# Patient Record
Sex: MALE | Race: WHITE | NOT HISPANIC OR LATINO | Employment: OTHER | URBAN - METROPOLITAN AREA
[De-identification: names, ages, dates, MRNs, and addresses within clinical notes are randomized per-mention and may not be internally consistent; named-entity substitution may affect disease eponyms.]

---

## 2017-03-02 ENCOUNTER — OFFICE VISIT (OUTPATIENT)
Dept: FAMILY MEDICINE CLINIC | Facility: CLINIC | Age: 77
End: 2017-03-02

## 2017-03-02 VITALS
RESPIRATION RATE: 16 BRPM | TEMPERATURE: 98.2 F | SYSTOLIC BLOOD PRESSURE: 110 MMHG | BODY MASS INDEX: 29.57 KG/M2 | HEIGHT: 72 IN | DIASTOLIC BLOOD PRESSURE: 60 MMHG | OXYGEN SATURATION: 97 % | HEART RATE: 66 BPM | WEIGHT: 218.3 LBS

## 2017-03-02 DIAGNOSIS — M47.816 SPONDYLOSIS OF LUMBAR REGION WITHOUT MYELOPATHY OR RADICULOPATHY: Primary | ICD-10-CM

## 2017-03-02 PROCEDURE — 99213 OFFICE O/P EST LOW 20 MIN: CPT | Performed by: INTERNAL MEDICINE

## 2017-03-02 NOTE — PROGRESS NOTES
"Subjective   Patient ID: Faustino Horton Jr. is a 76 y.o. male presents with   Chief Complaint   Patient presents with   • Back Pain       HPI - this patient presents today with lumbar pain from degenerative disc disease is been going on for years but seems like it's getting worse.  Pain is worse in the morning slowly gets better as he gets moving around.  He's on her for somebody can do about this.  She does not take nonsteroidals but he takes gabapentin.  X-ray from last April showed degenerative disc disease he has no symptoms of radiculopathy.    Assessment plan    Degenerative disc disease without radiculopathy-lets try physical therapy continue gabapentin        Allergies   Allergen Reactions   • Acetylcysteine Hives   • Allopurinol Hives   • Baclofen Itching   • Hydrocodone-Acetaminophen Swelling   • Levaquin [Levofloxacin] Itching       The following portions of the patient's history were reviewed and updated as appropriate: allergies, current medications, past family history, past medical history, past social history, past surgical history and problem list.      Review of Systems   Constitutional: Negative.    Musculoskeletal: Positive for arthralgias and back pain. Negative for gait problem.   Neurological: Negative.        Objective     Vitals:    03/02/17 0949   BP: 110/60   Pulse: 66   Resp: 16   Temp: 98.2 °F (36.8 °C)   TempSrc: Oral   SpO2: 97%   Weight: 218 lb 4.8 oz (99 kg)   Height: 72\" (182.9 cm)         Physical Exam   Constitutional: He is oriented to person, place, and time. He appears well-developed and well-nourished.   Musculoskeletal: Normal range of motion. He exhibits no edema, tenderness or deformity.   Neurological: He is alert and oriented to person, place, and time.   Psychiatric: He has a normal mood and affect. His behavior is normal.   Nursing note and vitals reviewed.        Faustino was seen today for back pain.    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

## 2017-03-09 ENCOUNTER — HOSPITAL ENCOUNTER (OUTPATIENT)
Dept: GENERAL RADIOLOGY | Facility: HOSPITAL | Age: 77
Discharge: HOME | End: 2017-03-09
Attending: SURGERY | Admitting: SURGERY

## 2017-03-09 ENCOUNTER — TREATMENT (OUTPATIENT)
Dept: PHYSICAL THERAPY | Facility: CLINIC | Age: 77
End: 2017-03-09

## 2017-03-09 DIAGNOSIS — M54.50 CHRONIC BILATERAL LOW BACK PAIN WITHOUT SCIATICA: Primary | ICD-10-CM

## 2017-03-09 DIAGNOSIS — G89.29 CHRONIC BILATERAL LOW BACK PAIN WITHOUT SCIATICA: Primary | ICD-10-CM

## 2017-03-09 DIAGNOSIS — Z86.79 S/P AAA REPAIR: ICD-10-CM

## 2017-03-09 DIAGNOSIS — Z98.890 S/P AAA REPAIR: ICD-10-CM

## 2017-03-09 PROCEDURE — G8978 MOBILITY CURRENT STATUS: HCPCS | Performed by: PHYSICAL THERAPIST

## 2017-03-09 PROCEDURE — G8979 MOBILITY GOAL STATUS: HCPCS | Performed by: PHYSICAL THERAPIST

## 2017-03-09 PROCEDURE — 97140 MANUAL THERAPY 1/> REGIONS: CPT | Performed by: PHYSICAL THERAPIST

## 2017-03-09 PROCEDURE — 74010 HC ABDOMEN SERIES FOR ANEURYSM: CPT

## 2017-03-09 PROCEDURE — G0283 ELEC STIM OTHER THAN WOUND: HCPCS | Performed by: PHYSICAL THERAPIST

## 2017-03-09 PROCEDURE — 97161 PT EVAL LOW COMPLEX 20 MIN: CPT | Performed by: PHYSICAL THERAPIST

## 2017-03-10 NOTE — PROGRESS NOTES
Physical Therapy Initial Evaluation and Plan of Care    Patient: Faustino Horton Jr.   : 1940  Diagnosis/ICD-10 Code:  Chronic bilateral low back pain without sciatica [M54.5, G89.29]  Referring practitioner: Yifan Patino MD  Date of Initial Visit: 3/9/2017        Subjective Evaluation    History of Present Illness  Mechanism of injury: Patient  Reports that he has had a long history of low back pain over the past 20 years which led to the discovery of an aortic aneursym 10 years ago where he underwent a bypass.  He has been taking gabapetin for low back pain for several years which helps.  His last imaging shows DDD, and arthritis with bulging discs as well.   His biggest complaint has been over the past 6 weeks he is having a harder and harder time getting moving in the am with difficulty walking.    PMHx: DM, MI, aortic aneurysm, neuropathy in feet    Quality of life: fair    Pain  Current pain ratin  At best pain ratin  At worst pain ratin  Location: low back  Quality: dull ache, sharp and tight  Relieving factors: rest and change in position  Aggravating factors: movement  Progression: worsening    Treatments  Previous treatment: injection treatment  Patient Goals  Patient goals for therapy: decreased pain, increased motion, increased strength, independence with ADLs/IADLs and return to sport/leisure activities             Objective     Palpation   Left   Muscle spasm in the erector spinae and lumbar paraspinals.     Right   Muscle spasm in the erector spinae and lumbar paraspinals.     Tenderness     Lumbar Spine  Tenderness in the spinous process.     Additional Tenderness Details  Decreased joint play at L 1-5    Active Range of Motion     Lumbar   Flexion: 75 (percent) degrees   Extension: 50 (percent) degrees   Left lateral flexion: 50 (percent) degrees   Right lateral flexion: 50 (percent) degrees   Left  rotation: 50 (percent) degrees   Right rotation: 50 (percent) degrees     Strength/Myotome Testing     Left Hip   Planes of Motion   Flexion: 4  Extension: 4  Abduction: 4  Adduction: 4    Right Hip   Planes of Motion   Flexion: 4  Extension: 4  Abduction: 4  Adduction: 4    Left Knee   Flexion: 4  Extension: 4    Right Knee   Flexion: 4  Extension: 4    Left Ankle/Foot   Dorsiflexion: 4  Plantar flexion: 4  Inversion: 4  Eversion: 4    Right Ankle/Foot   Dorsiflexion: 4  Plantar flexion: 4  Inversion: 4  Eversion: 4    Tests     Lumbar     Left   Positive passive SLR.     Right   Positive passive SLR.          Assessment & Plan     Assessment  Impairments: abnormal or restricted ROM, impaired physical strength, lacks appropriate home exercise program and pain with function  Assessment details: Patient was referred to physical therapy for low back pain.  Patient has been having pain for the past 20 years with it progressively becoming worse especially in the morning.  Patient has a complex medical history which has made staying active difficult.  Patient has decreased lower extremity and core strength, decreased lumbar ROm and joint mobility, high levels of pain.  At this time skilled physical therapy is medically necessary to restore patient to PLOF without any limitations.   Barriers to therapy: see past medical history  Prognosis: fair  Prognosis details: GOALS  STG - 4 weeks  1. Patient will be able to to get out of bed in the am without any difficulty.  2. Patient will report a 0/10 pain when first rising in the am.    LTG - 8 weeks  1. Patient will report 0/10 pain with all activity.  2. Patient will demonstrate 5/5 strength in his lower extremities for transfers and 4+/5 core strength.     Plan  Therapy options: will be seen for skilled physical therapy services  Planned modality interventions: thermotherapy (hydrocollator packs), electrical stimulation/Angolan stimulation, ultrasound, traction and  cryotherapy  Planned therapy interventions: abdominal trunk stabilization, flexibility, functional ROM exercises, home exercise program, joint mobilization, manual therapy, neuromuscular re-education, soft tissue mobilization, spinal/joint mobilization, strengthening, stretching and therapeutic activities  Frequency: 2x week  Duration in weeks: 8  Treatment plan discussed with: patient          Manual PT [id number] 25 minutes    Timed Treatment:   25   mins   Total Treatment:    55   mins    PT SIGNATURE: Cat Beth, LIAM   [place] License # [id number]  DATE TREATMENT INITIATED: 3/10/2017    Medicare Initial Certification  Certification Period: 6/8/2017  I certify that the therapy services are furnished while this patient is under my care.  The services outlined above are required by this patient, and will be reviewed every 90 days.     PHYSICIAN: Yifan Patino MD      DATE:     Please sign and return via fax to [phone].. Thank you, [facility].

## 2017-03-15 ENCOUNTER — TREATMENT (OUTPATIENT)
Dept: PHYSICAL THERAPY | Facility: CLINIC | Age: 77
End: 2017-03-15

## 2017-03-15 DIAGNOSIS — M54.50 CHRONIC BILATERAL LOW BACK PAIN WITHOUT SCIATICA: Primary | ICD-10-CM

## 2017-03-15 DIAGNOSIS — G89.29 CHRONIC BILATERAL LOW BACK PAIN WITHOUT SCIATICA: Primary | ICD-10-CM

## 2017-03-15 PROCEDURE — G0283 ELEC STIM OTHER THAN WOUND: HCPCS | Performed by: PHYSICAL THERAPIST

## 2017-03-15 PROCEDURE — 97140 MANUAL THERAPY 1/> REGIONS: CPT | Performed by: PHYSICAL THERAPIST

## 2017-03-15 NOTE — PROGRESS NOTES
Physical Therapy Daily Progress Note      Subjective     Faustino Hortno reports: that his back is feeling better today with a reduction in pain he reports that so far PT is helping.       Objective   See Exercise, Manual, and Modality Logs for complete treatment.       Assessment/Plan  Patient is doing well but continues to have a lot of tenderness at his lumbar paraspinals.     Progress per Plan of Care           Manual PT [id number] 25 minutes    Timed Treatment:   25   mins   Total Treatment:     40   mins    Cat Beth, PT  Physical Therapist  [place] License # [id number]

## 2017-03-17 ENCOUNTER — TREATMENT (OUTPATIENT)
Dept: PHYSICAL THERAPY | Facility: CLINIC | Age: 77
End: 2017-03-17

## 2017-03-17 DIAGNOSIS — G89.29 CHRONIC BILATERAL LOW BACK PAIN WITHOUT SCIATICA: Primary | ICD-10-CM

## 2017-03-17 DIAGNOSIS — M54.50 CHRONIC BILATERAL LOW BACK PAIN WITHOUT SCIATICA: Primary | ICD-10-CM

## 2017-03-17 PROCEDURE — G0283 ELEC STIM OTHER THAN WOUND: HCPCS | Performed by: PHYSICAL THERAPIST

## 2017-03-17 PROCEDURE — 97140 MANUAL THERAPY 1/> REGIONS: CPT | Performed by: PHYSICAL THERAPIST

## 2017-03-17 NOTE — PROGRESS NOTES
Physical Therapy Daily Progress Note      Subjective     Faustino Horton reports: that his back is feeling better but he continues to have some pain with activity.       Objective   See Exercise, Manual, and Modality Logs for complete treatment.       Assessment/Plan  Mr. Horton is making great progress with a reduction in pain and improved lumbar mobility.     Progress per Plan of Care           Manual PT [id number] 25 minutes    Timed Treatment:   25   mins   Total Treatment:    40   mins    Cat Beth, PT  Physical Therapist  [place] License # [id number]

## 2017-03-22 ENCOUNTER — TREATMENT (OUTPATIENT)
Dept: PHYSICAL THERAPY | Facility: CLINIC | Age: 77
End: 2017-03-22

## 2017-03-22 DIAGNOSIS — G89.29 CHRONIC BILATERAL LOW BACK PAIN WITHOUT SCIATICA: Primary | ICD-10-CM

## 2017-03-22 DIAGNOSIS — M54.50 CHRONIC BILATERAL LOW BACK PAIN WITHOUT SCIATICA: Primary | ICD-10-CM

## 2017-03-22 PROCEDURE — G0283 ELEC STIM OTHER THAN WOUND: HCPCS | Performed by: PHYSICAL THERAPIST

## 2017-03-22 PROCEDURE — 97140 MANUAL THERAPY 1/> REGIONS: CPT | Performed by: PHYSICAL THERAPIST

## 2017-03-23 NOTE — PROGRESS NOTES
Physical Therapy Daily Progress Note      Subjective     Faustino Horton reports: that his back is feeling about the same today.     Pain Scale: 4 /10    Objective   See Exercise, Manual, and Modality Logs for complete treatment.       Assessment/Plan  Mr. Horton had a reduction in pain after manual therapy, he had a lot of tenderness at his PSIS bilaterally.    Progress per Plan of Care           Manual PT [id number] 25 minutes    Timed Treatment:   25   mins   Total Treatment:    40   mins    Cat Beth, PT  Physical Therapist  [place] License # [id number]

## 2017-03-24 ENCOUNTER — TREATMENT (OUTPATIENT)
Dept: PHYSICAL THERAPY | Facility: CLINIC | Age: 77
End: 2017-03-24

## 2017-03-24 DIAGNOSIS — G89.29 CHRONIC BILATERAL LOW BACK PAIN WITHOUT SCIATICA: Primary | ICD-10-CM

## 2017-03-24 DIAGNOSIS — M54.50 CHRONIC BILATERAL LOW BACK PAIN WITHOUT SCIATICA: Primary | ICD-10-CM

## 2017-03-24 PROCEDURE — 97140 MANUAL THERAPY 1/> REGIONS: CPT | Performed by: PHYSICAL THERAPIST

## 2017-03-24 PROCEDURE — G0283 ELEC STIM OTHER THAN WOUND: HCPCS | Performed by: PHYSICAL THERAPIST

## 2017-03-24 PROCEDURE — 97110 THERAPEUTIC EXERCISES: CPT | Performed by: PHYSICAL THERAPIST

## 2017-03-24 NOTE — PROGRESS NOTES
Physical Therapy Daily Progress Note      Subjective     Faustino Horton reports: that he is able to move better in the am and get up out of bed without much difficulty.  He reports however that he continues to have some pain in his back with making getting in and out of the car challenging.   Pain Scale: 3 /10    Objective   See Exercise, Manual, and Modality Logs for complete treatment.       Assessment/Plan  SPoke with Mr. Horton about the pathology of his condition and what was to be expected with physical therapy.  He was very understanding and has so far been pleased with the improvement he is seeing.  Today we initiated some core stabilization which he did well with but found challenging.     Progress per Plan of Care           Manual PT [id number] 30 minutes and Therapy Exercise [id number] 10 minutes    Timed Treatment:   40   mins   Total Treatment:     55   mins    Cat Beth, PT  Physical Therapist  [place] License # [id number]

## 2017-03-29 ENCOUNTER — TREATMENT (OUTPATIENT)
Dept: PHYSICAL THERAPY | Facility: CLINIC | Age: 77
End: 2017-03-29

## 2017-03-29 DIAGNOSIS — M54.50 CHRONIC BILATERAL LOW BACK PAIN WITHOUT SCIATICA: Primary | ICD-10-CM

## 2017-03-29 DIAGNOSIS — G89.29 CHRONIC BILATERAL LOW BACK PAIN WITHOUT SCIATICA: Primary | ICD-10-CM

## 2017-03-29 PROCEDURE — G0283 ELEC STIM OTHER THAN WOUND: HCPCS | Performed by: PHYSICAL THERAPIST

## 2017-03-29 PROCEDURE — 97110 THERAPEUTIC EXERCISES: CPT | Performed by: PHYSICAL THERAPIST

## 2017-03-29 PROCEDURE — 97140 MANUAL THERAPY 1/> REGIONS: CPT | Performed by: PHYSICAL THERAPIST

## 2017-03-29 NOTE — PROGRESS NOTES
Physical Therapy Daily Progress Note      Subjective     Faustino Horton reports: that his back is feeling much better and he is able to get up from bed in the morning with no stiffness.   He reports that he  Continues to have some pain when standing for long periods of time.     Pain Scale: 2 /10    Objective   See Exercise, Manual, and Modality Logs for complete treatment.       Assessment/Plan  Mr. Horton is making great progress with PT with a reduction in pain and increased lumbar mobility.  We will start to introduce more core strengthening exercises into his program.    Progress per Plan of Care           Manual PT [id number] 25 minutes and Therapy Exercise [id number] 15 minutes    Timed Treatment:   40   mins   Total Treatment:    55   mins    Cat Beth, PT  Physical Therapist  [place] License # [id number]

## 2017-03-31 ENCOUNTER — TREATMENT (OUTPATIENT)
Dept: PHYSICAL THERAPY | Facility: CLINIC | Age: 77
End: 2017-03-31

## 2017-03-31 DIAGNOSIS — G89.29 CHRONIC BILATERAL LOW BACK PAIN WITHOUT SCIATICA: Primary | ICD-10-CM

## 2017-03-31 DIAGNOSIS — M54.50 CHRONIC BILATERAL LOW BACK PAIN WITHOUT SCIATICA: Primary | ICD-10-CM

## 2017-03-31 PROCEDURE — 97140 MANUAL THERAPY 1/> REGIONS: CPT | Performed by: PHYSICAL THERAPIST

## 2017-03-31 PROCEDURE — G0283 ELEC STIM OTHER THAN WOUND: HCPCS | Performed by: PHYSICAL THERAPIST

## 2017-03-31 NOTE — PROGRESS NOTES
Physical Therapy Daily Progress Note      Subjective     Faustino Horton reports: that he believes PT is really helping him because he is able to get out of bed in the am without difficulty.  He reports that his wife is battling cancer and had a fever this am and he had to take her to the MD today because of it and she is getting fluids. He reports that he is worried about her and wants to hold on exercise today so he can be with her.     Pain Scale:  3/10    Objective   See Exercise, Manual, and Modality Logs for complete treatment.       Assessment/Plan  Mr. Horton is improving with his lumbar mobility. He continues to have a lot of muscle stiffness but overall is progressing well.       Progress per Plan of Care           Manual PT [id number] 25 minutes    Timed Treatment:   25   mins   Total Treatment:     40   mins    Cat Beth PT  Physical Therapist  [place] License # [id number]

## 2017-04-05 ENCOUNTER — TREATMENT (OUTPATIENT)
Dept: PHYSICAL THERAPY | Facility: CLINIC | Age: 77
End: 2017-04-05

## 2017-04-05 DIAGNOSIS — M54.50 CHRONIC BILATERAL LOW BACK PAIN WITHOUT SCIATICA: Primary | ICD-10-CM

## 2017-04-05 DIAGNOSIS — G89.29 CHRONIC BILATERAL LOW BACK PAIN WITHOUT SCIATICA: Primary | ICD-10-CM

## 2017-04-05 PROCEDURE — G0283 ELEC STIM OTHER THAN WOUND: HCPCS | Performed by: PHYSICAL THERAPIST

## 2017-04-05 PROCEDURE — 97140 MANUAL THERAPY 1/> REGIONS: CPT | Performed by: PHYSICAL THERAPIST

## 2017-04-05 NOTE — PROGRESS NOTES
Re-Assessment / Re-Certification      Patient: Faustino Horton Jr.   : 1940  Diagnosis/ICD-10 Code:  Chronic bilateral low back pain without sciatica [M54.5, G89.29]  Referring practitioner: Yifan Patino MD  Date of Initial Visit: 2017  Today's Date: 2017  Patient has been seen for 8 sessions      Subjective:     Subjective Evaluation    Pain  Current pain ratin  Quality: tight and pulling  Relieving factors: rest and relaxation  Aggravating factors: movement           Faustino Horton reports: that he no longer has any pain getting out of bed or difficulty but throughout the day his pain continues to be present and he is unable to stand for greater than 10 minutes or his pain becomes present.     Subjective Questionnaire: Oswestry: 22  Clinical Progress: improved  Home Program Compliance: Yes  Objective     Palpation   Left   Tenderness of the lumbar paraspinals and quadratus lumborum.     Right Tenderness of the lumbar paraspinals and quadratus lumborum.     Tenderness     Lumbar Spine  Tenderness in the spinous process.     Additional Tenderness Details  Continued stiffness at L3-5 with tenderness and stiffness with mobility.      Assessment & Plan     Assessment  Assessment details: Mr. Horton continues to have a lot of stiffness in his lumbar spine with tenderness at the spinous processes.  He has made progress with mobility when it comes to the am. He is able to get out of bed without pain and is no longer slow moving.  He continues to report however pain when standing for long periods of time limiting him to about 10 minutes of standing time. He continues to report a dull aching pain in his low back that occasionally catches.  At this time skilled PT continues to be beneficial to increase his lumbar spinal mobility and core strength to return him to PLOF.   Prognosis: good      Progress toward previous goals: Partially Met      Recommendations: Continue as planned  Timeframe: 1  month  Prognosis to achieve goals: good    PT Signature: Cat Beth, PT      Based upon review of the patient's progress and continued therapy plan, it is my medical opinion that Faustino Horton should continue physical therapy treatment at [facility]  [address] [place] [address], [address] [address] [phone].    Signature: __________________________________  Yifan Patino MD      Manual PT [id number] 25 minutes  There ex with tech  Timed Code Treatment: 25 Minutes and Total Treatment Time: 55 Minutes

## 2017-04-07 ENCOUNTER — TREATMENT (OUTPATIENT)
Dept: PHYSICAL THERAPY | Facility: CLINIC | Age: 77
End: 2017-04-07

## 2017-04-07 DIAGNOSIS — M54.50 CHRONIC BILATERAL LOW BACK PAIN WITHOUT SCIATICA: Primary | ICD-10-CM

## 2017-04-07 DIAGNOSIS — G89.29 CHRONIC BILATERAL LOW BACK PAIN WITHOUT SCIATICA: Primary | ICD-10-CM

## 2017-04-07 PROCEDURE — 97140 MANUAL THERAPY 1/> REGIONS: CPT | Performed by: PHYSICAL THERAPIST

## 2017-04-07 PROCEDURE — G0283 ELEC STIM OTHER THAN WOUND: HCPCS | Performed by: PHYSICAL THERAPIST

## 2017-04-07 NOTE — PROGRESS NOTES
Physical Therapy Daily Progress Note  Patient has been seen for 9 sessions    Subjective     Faustino Horton reports: that his back is feeling more sore today he thinks that it is from the bike. He reports having more stiffness in the am than he usually does.     Pain Scale:  4/10    Objective   See Exercise, Manual, and Modality Logs for complete treatment.       Assessment/Plan  Mr. Horton continues to have pain in his low back , he has made some progress with PT but his symptoms continue to be present.     Progress per Plan of Care           Manual PT [id number] 25 minutes    Timed Treatment:   25   mins   Total Treatment:     40   mins    Cat Beth, PT  Physical Therapist  [place] License # [id number]

## 2017-04-12 ENCOUNTER — TREATMENT (OUTPATIENT)
Dept: PHYSICAL THERAPY | Facility: CLINIC | Age: 77
End: 2017-04-12

## 2017-04-12 DIAGNOSIS — M54.50 CHRONIC BILATERAL LOW BACK PAIN WITHOUT SCIATICA: Primary | ICD-10-CM

## 2017-04-12 DIAGNOSIS — G89.29 CHRONIC BILATERAL LOW BACK PAIN WITHOUT SCIATICA: Primary | ICD-10-CM

## 2017-04-12 PROCEDURE — 97140 MANUAL THERAPY 1/> REGIONS: CPT | Performed by: PHYSICAL THERAPIST

## 2017-04-12 PROCEDURE — 97110 THERAPEUTIC EXERCISES: CPT | Performed by: PHYSICAL THERAPIST

## 2017-04-12 PROCEDURE — G0283 ELEC STIM OTHER THAN WOUND: HCPCS | Performed by: PHYSICAL THERAPIST

## 2017-04-12 NOTE — PROGRESS NOTES
Physical Therapy Daily Progress Note  Patient has been seen for 10 sessions    Subjective     Faustino Horton reports: that his back continues to cause him a lot of stiffness.  He reports that PT had been helping making it easier to get out of bed in the am however over the past few days his low back pain has returned in the am.    Pain Scale:  4/10    Objective   See Exercise, Manual, and Modality Logs for complete treatment.       Assessment/Plan  Mr. Horton continues to have a lot of stiffness in his lumbar spine with decreased mobility. Today we added in the Nustep to help increase some motion and strength in his legs.     Progress per Plan of Care           Manual PT [id number] 30 minutes and Therapy Exercise [id number] 10 minutes    Timed Treatment:   40   mins   Total Treatment:     55   mins    Cat Beth, PT  Physical Therapist  [place] License # [id number]

## 2017-04-14 ENCOUNTER — TREATMENT (OUTPATIENT)
Dept: PHYSICAL THERAPY | Facility: CLINIC | Age: 77
End: 2017-04-14

## 2017-04-14 DIAGNOSIS — G89.29 CHRONIC BILATERAL LOW BACK PAIN WITHOUT SCIATICA: Primary | ICD-10-CM

## 2017-04-14 DIAGNOSIS — M54.50 CHRONIC BILATERAL LOW BACK PAIN WITHOUT SCIATICA: Primary | ICD-10-CM

## 2017-04-14 PROCEDURE — G0283 ELEC STIM OTHER THAN WOUND: HCPCS | Performed by: PHYSICAL THERAPIST

## 2017-04-14 PROCEDURE — 97140 MANUAL THERAPY 1/> REGIONS: CPT | Performed by: PHYSICAL THERAPIST

## 2017-04-14 NOTE — PROGRESS NOTES
Physical Therapy Daily Progress Note  Patient has been seen for 11 sessions    Subjective     Faustino Horton reports: that his back feels about the same.  He continues to have pain and a lot of tightness.     Pain Scale:  4/10    Objective   See Exercise, Manual, and Modality Logs for complete treatment.       Assessment/Plan  Mr. Horton and I spoke about his options and he has decided to return to his MD after his next appointment and see what he wants to do.  He states that he wants to hold off on having an injection for a little longer.     Progress per Plan of Care           Manual PT [id number] 25 minutes    Timed Treatment:   25   mins   Total Treatment:    40   mins    Cat Beth, PT  Physical Therapist  [place] License # [id number]

## 2017-04-16 ENCOUNTER — HOSPITAL ENCOUNTER (EMERGENCY)
Facility: HOSPITAL | Age: 77
Discharge: HOME | End: 2017-04-16
Attending: EMERGENCY MEDICINE | Admitting: EMERGENCY MEDICINE

## 2017-04-16 ENCOUNTER — APPOINTMENT (OUTPATIENT)
Dept: GENERAL RADIOLOGY | Facility: HOSPITAL | Age: 77
End: 2017-04-16

## 2017-04-16 VITALS
HEIGHT: 72 IN | WEIGHT: 210 LBS | DIASTOLIC BLOOD PRESSURE: 75 MMHG | TEMPERATURE: 99.3 F | BODY MASS INDEX: 28.44 KG/M2 | RESPIRATION RATE: 16 BRPM | OXYGEN SATURATION: 94 % | SYSTOLIC BLOOD PRESSURE: 126 MMHG | HEART RATE: 75 BPM

## 2017-04-16 DIAGNOSIS — J40 BRONCHITIS: Primary | ICD-10-CM

## 2017-04-16 DIAGNOSIS — R50.9 FEVER IN ADULT: ICD-10-CM

## 2017-04-16 LAB
ALBUMIN SERPL-MCNC: 4.3 G/DL (ref 3.5–5.2)
ALBUMIN/GLOB SERPL: 1.3 G/DL
ALP SERPL-CCNC: 79 U/L (ref 39–117)
ALT SERPL W P-5'-P-CCNC: 15 U/L (ref 1–41)
ANION GAP SERPL CALCULATED.3IONS-SCNC: 15.9 MMOL/L
AST SERPL-CCNC: 18 U/L (ref 1–40)
BASOPHILS # BLD AUTO: 0.02 10*3/MM3 (ref 0–0.2)
BASOPHILS NFR BLD AUTO: 0.3 % (ref 0–1.5)
BILIRUB SERPL-MCNC: 0.5 MG/DL (ref 0.1–1.2)
BUN BLD-MCNC: 14 MG/DL (ref 8–23)
BUN/CREAT SERPL: 10.3 (ref 7–25)
CALCIUM SPEC-SCNC: 9.1 MG/DL (ref 8.6–10.5)
CHLORIDE SERPL-SCNC: 99 MMOL/L (ref 98–107)
CO2 SERPL-SCNC: 24.1 MMOL/L (ref 22–29)
CREAT BLD-MCNC: 1.36 MG/DL (ref 0.76–1.27)
D-LACTATE SERPL-SCNC: 2.7 MMOL/L (ref 0.5–2)
DEPRECATED RDW RBC AUTO: 46.5 FL (ref 37–54)
EOSINOPHIL # BLD AUTO: 0.26 10*3/MM3 (ref 0–0.7)
EOSINOPHIL NFR BLD AUTO: 3.4 % (ref 0.3–6.2)
ERYTHROCYTE [DISTWIDTH] IN BLOOD BY AUTOMATED COUNT: 12.6 % (ref 11.5–14.5)
FLUAV AG NPH QL: NEGATIVE
FLUBV AG NPH QL IA: NEGATIVE
GFR SERPL CREATININE-BSD FRML MDRD: 51 ML/MIN/1.73
GLOBULIN UR ELPH-MCNC: 3.3 GM/DL
GLUCOSE BLD-MCNC: 211 MG/DL (ref 65–99)
HCT VFR BLD AUTO: 40.4 % (ref 40.4–52.2)
HGB BLD-MCNC: 13.8 G/DL (ref 13.7–17.6)
IMM GRANULOCYTES # BLD: 0 10*3/MM3 (ref 0–0.03)
IMM GRANULOCYTES NFR BLD: 0 % (ref 0–0.5)
LYMPHOCYTES # BLD AUTO: 0.65 10*3/MM3 (ref 0.9–4.8)
LYMPHOCYTES NFR BLD AUTO: 8.5 % (ref 19.6–45.3)
MCH RBC QN AUTO: 34.4 PG (ref 27–32.7)
MCHC RBC AUTO-ENTMCNC: 34.2 G/DL (ref 32.6–36.4)
MCV RBC AUTO: 100.7 FL (ref 79.8–96.2)
MONOCYTES # BLD AUTO: 1.01 10*3/MM3 (ref 0.2–1.2)
MONOCYTES NFR BLD AUTO: 13.3 % (ref 5–12)
NEUTROPHILS # BLD AUTO: 5.68 10*3/MM3 (ref 1.9–8.1)
NEUTROPHILS NFR BLD AUTO: 74.5 % (ref 42.7–76)
PLATELET # BLD AUTO: 201 10*3/MM3 (ref 140–500)
PMV BLD AUTO: 10 FL (ref 6–12)
POTASSIUM BLD-SCNC: 3.7 MMOL/L (ref 3.5–5.2)
PROCALCITONIN SERPL-MCNC: 0.06 NG/ML (ref 0.1–0.25)
PROT SERPL-MCNC: 7.6 G/DL (ref 6–8.5)
RBC # BLD AUTO: 4.01 10*6/MM3 (ref 4.6–6)
SODIUM BLD-SCNC: 139 MMOL/L (ref 136–145)
WBC NRBC COR # BLD: 7.62 10*3/MM3 (ref 4.5–10.7)

## 2017-04-16 PROCEDURE — 85025 COMPLETE CBC W/AUTO DIFF WBC: CPT | Performed by: EMERGENCY MEDICINE

## 2017-04-16 PROCEDURE — 87040 BLOOD CULTURE FOR BACTERIA: CPT | Performed by: EMERGENCY MEDICINE

## 2017-04-16 PROCEDURE — 99283 EMERGENCY DEPT VISIT LOW MDM: CPT

## 2017-04-16 PROCEDURE — 83605 ASSAY OF LACTIC ACID: CPT | Performed by: EMERGENCY MEDICINE

## 2017-04-16 PROCEDURE — 71020 HC CHEST PA AND LATERAL: CPT

## 2017-04-16 PROCEDURE — 36415 COLL VENOUS BLD VENIPUNCTURE: CPT

## 2017-04-16 PROCEDURE — 87804 INFLUENZA ASSAY W/OPTIC: CPT | Performed by: EMERGENCY MEDICINE

## 2017-04-16 PROCEDURE — 84145 PROCALCITONIN (PCT): CPT | Performed by: EMERGENCY MEDICINE

## 2017-04-16 PROCEDURE — 96360 HYDRATION IV INFUSION INIT: CPT

## 2017-04-16 PROCEDURE — 80053 COMPREHEN METABOLIC PANEL: CPT | Performed by: EMERGENCY MEDICINE

## 2017-04-16 RX ORDER — PREDNISONE 10 MG/1
TABLET ORAL
Qty: 40 TABLET | Refills: 0 | Status: SHIPPED | OUTPATIENT
Start: 2017-04-16 | End: 2017-04-27 | Stop reason: SDUPTHER

## 2017-04-16 RX ORDER — AZITHROMYCIN 250 MG/1
250 TABLET, FILM COATED ORAL DAILY
Qty: 6 TABLET | Refills: 0 | Status: SHIPPED | OUTPATIENT
Start: 2017-04-16 | End: 2017-04-24

## 2017-04-16 RX ADMIN — SODIUM CHLORIDE 500 ML: 9 INJECTION, SOLUTION INTRAVENOUS at 13:20

## 2017-04-16 NOTE — ED PROVIDER NOTES
EMERGENCY DEPARTMENT ENCOUNTER    CHIEF COMPLAINT  Chief Complaint: fever, cough  History given by: patient, family  History limited by: nothing   Room Number: 27/27  PMD: Yifan Patino MD      HPI:  Pt is a 76 y.o. male who presents complaining of a productive cough, which began last night. Pt is unsure what color his sputum was since he swallowed his sputum. Pt also complains of a subjective fever. Pt states that he had similar symptoms previously when he was diagnosed with pneumonia. Pt states that he saw his endocrinologist at [place] one week ago for routine follow up and was cleared at that time.    Duration:  1.5 days  Onset: gradual  Timing: intermittent  Location: N/A  Radiation: N/A  Quality: productive  Intensity/Severity: moderate  Progression: worsening  Associated Symptoms: fever  Aggravating Factors: none  Alleviating Factors: none  Previous Episodes: Pt states that he had similar symptoms previously when he was diagnosed with pneumonia.  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/02/2017     Resolved Ambulatory Problems     Diagnosis Date Noted   • No Resolved Ambulatory Problems     Past Medical History:   Diagnosis Date   • AAA (abdominal aortic aneurysm)    • Acute kidney failure    • Acute MI    • Adrenal insufficiency    • Arthritis    • B12 deficiency    • Back pain    • Cancer    • Coronary artery  disease    • Diabetes mellitus    • Disease of thyroid gland    • Gout    • Hyperlipidemia    • Hypertension    • Hypotension    • Hypothyroid    • Low testosterone    • Peripheral nerve disease    • Pneumonia 2012   • Prostate mass    • Vertebral compression fracture    • Vitamin B12 deficiency        PAST SURGICAL HISTORY  Past Surgical History:   Procedure Laterality Date   • ABDOMINAL AORTIC ANEURYSM REPAIR      stent placed Dr. Puga - 2005, sees q6 months   • APPENDECTOMY     • CHOLECYSTECTOMY         FAMILY HISTORY  Family History   Problem Relation Age of Onset   • Cancer Mother    • Breast cancer Mother    • Heart disease Mother    • Hypertension Father    • Stroke Father    • Cancer Sister    • Breast cancer Sister    • Aneurysm Sister    • Hypertension Sister    • Thyroid disease Sister    • Hyperlipidemia Sister    • Diabetes Brother    • Aneurysm Brother    • Stroke Brother        SOCIAL HISTORY  Social History     Social History   • Marital status:      Spouse name: N/A   • Number of children: N/A   • Years of education: N/A     Occupational History   • Not on file.     Social History Main Topics   • Smoking status: Never Smoker   • Smokeless tobacco: Never Used   • Alcohol use 1.2 oz/week     2 Cans of beer per week      Comment: daily   • Drug use: No   • Sexual activity: Defer     Other Topics Concern   • Not on file     Social History Narrative       ALLERGIES  Acetylcysteine; Allopurinol; Baclofen; Hydrocodone-acetaminophen; and Levaquin [levofloxacin]    REVIEW OF SYSTEMS  Review of Systems   Constitutional: Positive for fever (subjective). Negative for activity change and appetite change.   HENT: Negative for congestion and sore throat.    Eyes: Negative.    Respiratory: Positive for cough (productive). Negative for shortness of breath.    Cardiovascular: Negative for chest pain and leg swelling.   Gastrointestinal: Negative for abdominal pain, diarrhea and vomiting.   Endocrine: Negative.     Genitourinary: Negative for decreased urine volume and dysuria.   Musculoskeletal: Negative for neck pain.   Skin: Negative for rash and wound.   Allergic/Immunologic: Negative.    Neurological: Negative for weakness, numbness and headaches.   Hematological: Negative.    Psychiatric/Behavioral: Negative.    All other systems reviewed and are negative.      PHYSICAL EXAM  ED Triage Vitals   Temp Heart Rate Resp BP SpO2   04/16/17 1203 04/16/17 1203 04/16/17 1203 -- 04/16/17 1203   100.3 °F (37.9 °C) 78 16  98 %      Temp src Heart Rate Source Patient Position BP Location FiO2 (%)   04/16/17 1203 04/16/17 1203 -- -- --   Tympanic Monitor          Physical Exam   Constitutional: He is oriented to person, place, and time and well-developed, well-nourished, and in no distress.   HENT:   Head: Normocephalic and atraumatic.   Mouth/Throat: Mucous membranes are normal.   Eyes: Conjunctivae and EOM are normal. Pupils are equal, round, and reactive to light.   Neck: Normal range of motion. Neck supple.   Cardiovascular: Normal rate, regular rhythm and normal heart sounds.    Pulmonary/Chest: Effort normal. No respiratory distress. He has no wheezes. He has rhonchi in the right lower field and the left lower field.   Abdominal: Soft. There is no tenderness. There is no rebound and no guarding.   obese   Musculoskeletal: Normal range of motion. He exhibits no edema.   Neurological: He is alert and oriented to person, place, and time. He has normal sensation and normal strength.   Skin: Skin is warm and dry.   Healing abrasion to right anterior lower leg   Psychiatric: Mood and affect normal.   Nursing note and vitals reviewed.      LAB RESULTS  Lab Results (last 24 hours)     Procedure Component Value Units Date/Time    CBC & Differential [[id number]] Collected:  04/16/17 1242    Specimen:  Blood Updated:  04/16/17 1258    Narrative:       The following orders were created for panel order CBC & Differential.  Procedure                                Abnormality         Status                     ---------                               -----------         ------                     CBC Auto Differential[[id number]]         Abnormal            Final result                 Please view results for these tests on the individual orders.    Lactic Acid, Plasma [[id number]]  (Abnormal) Collected:  04/16/17 1242    Specimen:  Blood from Arm, Right Updated:  04/16/17 1310     Lactate 2.7 (C) mmol/L     Blood Culture [[id number]] Collected:  04/16/17 1242    Specimen:  Blood from Arm, Right Updated:  04/16/17 1255    CBC Auto Differential [[id number]]  (Abnormal) Collected:  04/16/17 1242    Specimen:  Blood from Arm, Right Updated:  04/16/17 1258     WBC 7.62 10*3/mm3      RBC 4.01 (L) 10*6/mm3      Hemoglobin 13.8 g/dL      Hematocrit 40.4 %      .7 (H) fL      MCH 34.4 (H) pg      MCHC 34.2 g/dL      RDW 12.6 %      RDW-SD 46.5 fl      MPV 10.0 fL      Platelets 201 10*3/mm3      Neutrophil % 74.5 %      Lymphocyte % 8.5 (L) %      Monocyte % 13.3 (H) %      Eosinophil % 3.4 %      Basophil % 0.3 %      Immature Grans % 0.0 %      Neutrophils, Absolute 5.68 10*3/mm3      Lymphocytes, Absolute 0.65 (L) 10*3/mm3      Monocytes, Absolute 1.01 10*3/mm3      Eosinophils, Absolute 0.26 10*3/mm3      Basophils, Absolute 0.02 10*3/mm3      Immature Grans, Absolute 0.00 10*3/mm3     Comprehensive Metabolic Panel [[id number]]  (Abnormal) Collected:  04/16/17 1243    Specimen:  Blood from Arm, Right Updated:  04/16/17 1324     Glucose 211 (H) mg/dL      BUN 14 mg/dL      Creatinine 1.36 (H) mg/dL      Sodium 139 mmol/L      Potassium 3.7 mmol/L      Chloride 99 mmol/L      CO2 24.1 mmol/L      Calcium 9.1 mg/dL      Total Protein 7.6 g/dL      Albumin 4.30 g/dL      ALT (SGPT) 15 U/L      AST (SGOT) 18 U/L      Alkaline Phosphatase 79 U/L      Total Bilirubin 0.5 mg/dL      eGFR Non African Amer 51 (L) mL/min/1.73      Globulin 3.3 gm/dL      A/G Ratio 1.3 g/dL  "     BUN/Creatinine Ratio 10.3     Anion Gap 15.9 mmol/L     Narrative:       The MDRD GFR formula is only valid for adults with stable renal function between ages 18 and 70.    Procalcitonin [[id number]]  (Abnormal) Collected:  04/16/17 1243    Specimen:  Blood from Arm, Right Updated:  04/16/17 1331     Procalcitonin 0.06 (L) ng/mL     Narrative:       As a Marker for Sepsis (Non-Neonates):   1. <0.5 ng/mL represents a low risk of severe sepsis and/or septic shock.  1. >2 ng/mL represents a high risk of severe sepsis and/or septic shock.    As a Marker for Lower Respiratory Tract Infections that require antibiotic therapy:  PCT on Admission     Antibiotic Therapy             6-12 Hrs later  > 0.5                Strongly Recommended            >0.25 - <0.5         Recommended  0.1 - 0.25           Discouraged                   Remeasure/reassess PCT  <0.1                 Strongly Discouraged          Remeasure/reassess PCT      As 28 day mortality risk marker: \"Change in Procalcitonin Result\" (> 80 % or <=80 %) if Day 0 (or Day 1) and Day 4 values are available. Refer to http://www.[employer]-pct-calculator.com/   Change in PCT <=80 %   A decrease of PCT levels below or equal to 80 % defines a positive change in PCT test result representing a higher risk for 28-day all-cause mortality of patients diagnosed with severe sepsis or septic shock.  Change in PCT > 80 %   A decrease of PCT levels of more than 80 % defines a negative change in PCT result representing a lower risk for 28-day all-cause mortality of patients diagnosed with severe sepsis or septic shock.                Blood Culture [[id number]] Collected:  04/16/17 1312    Specimen:  Blood from Arm, Left Updated:  04/16/17 1317    Influenza Antigen [[id number]]  (Normal) Collected:  04/16/17 1312    Specimen:  Swab from Nasopharynx Updated:  04/16/17 1331     Influenza A Ag, EIA Negative     Influenza B Ag, EIA Negative          I ordered the above labs and reviewed " the results    RADIOLOGY  XR Chest 2 View   Final Result   No focal pulmonary consolidation. Tortuous aorta. Follow-up   as clinically indicated.       This report was finalized on 4/16/2017 12:43 PM by Dr. Dev Rosas MD.               I ordered the above noted radiological studies. Interpreted by radiologist. Reviewed by me in PACS.       PROCEDURES  Procedures      PROGRESS AND CONSULTS  ED Course     1212- Ordered blood work, lactic acid, blood cultures, procalcitonin and CXR for further evaluation.    1250- Ordered influenza antigen for further evaluation.    1355- Rechecked pt. Pt is resting comfortably. Notified pt and family of the pt's lab and imaging results. Discussed the plan to discharge the pt home on abx and steroids. I recommended that the pt follow up with his PMD to ensure that he continues to improve. Pt and family agree with the plan and all questions were addressed.    1:56 PM  Latest vital signs   BP- 124/72 HR- 74 Temp- 100.3 °F (37.9 °C) (Tympanic) O2 sat- 95%     MEDICAL DECISION MAKING  Results were reviewed/discussed with the patient and they were also made aware of online access. Pt also made aware that some labs, such as cultures, will not be resulted during ER visit and follow up with PMD is necessary.     MDM  Number of Diagnoses or Management Options  Bronchitis:      Amount and/or Complexity of Data Reviewed  Clinical lab tests: ordered and reviewed (WBC=7.62, lactic acid=2.7)  Tests in the radiology section of CPT®: ordered and reviewed (CXR shows nothing acute)  Obtain history from someone other than the patient: yes (family)  Independent visualization of images, tracings, or specimens: yes    Patient Progress  Patient progress: stable         DIAGNOSIS  Final diagnoses:   Bronchitis   Fever in adult       DISPOSITION  DISCHARGE    Patient discharged in stable condition.    Reviewed implications of results, diagnosis, meds, responsibility to follow up, warning signs and  symptoms of possible worsening, potential complications and reasons to return to ER, including fever, worsening pain or any concerns.    Patient/Family voiced understanding of above instructions.    Discussed plan for discharge, as there is no emergent indication for admission.  Pt/family is agreeable and understands need for follow up and repeat testing.  Pt is aware that discharge does not mean that nothing is wrong but it indicates no emergency is present that requires admission and they must continue care with follow-up as given below or physician of their choice.     FOLLOW-UP  Yifan Patino MD  [address]  [place] [id number] [phone]    Call in 1 day  for follow up         Medication List      New Prescriptions          azithromycin 250 MG tablet   Commonly known as:  ZITHROMAX   Take 1 tablet by mouth Daily. Take 2 tablets the first day, then 1 tablet   daily for 4 days.       predniSONE 10 MG tablet   Commonly known as:  DELTASONE   4 po QD x 3d, 3 po QD x 3d, 2 po QD x 3d, 1 po QD x 3d               Latest Documented Vital Signs:  As of 1:58 PM  BP- 124/72 HR- 74 Temp- 100.3 °F (37.9 °C) (Tympanic) O2 sat- 95%    --  Documentation assistance provided by ana laura Bryant for Dr. Kong.  Information recorded by the scribe was done at my direction and has been verified and validated by me.     Chelsea Bryant  04/16/17 1301       Chelsea Bryant  04/16/17 3404       Zhou Kong MD  04/17/17 2565

## 2017-04-18 ENCOUNTER — TELEPHONE (OUTPATIENT)
Dept: SOCIAL WORK | Facility: HOSPITAL | Age: 77
End: 2017-04-18

## 2017-04-18 NOTE — TELEPHONE ENCOUNTER
Spoke with pt today in f/u and he states he feels a little better. He was able to fill his scripts today and is taking them as directed. He did call his PCP today and has a f/u on 4/27/17. No other questions or concerns voiced by pt at this time. Noni STEWART

## 2017-04-21 LAB
BACTERIA SPEC AEROBE CULT: NORMAL
BACTERIA SPEC AEROBE CULT: NORMAL

## 2017-04-27 ENCOUNTER — OFFICE VISIT (OUTPATIENT)
Dept: FAMILY MEDICINE CLINIC | Facility: CLINIC | Age: 77
End: 2017-04-27

## 2017-04-27 VITALS
HEART RATE: 54 BPM | OXYGEN SATURATION: 94 % | WEIGHT: 214.1 LBS | RESPIRATION RATE: 16 BRPM | BODY MASS INDEX: 29 KG/M2 | DIASTOLIC BLOOD PRESSURE: 64 MMHG | HEIGHT: 72 IN | TEMPERATURE: 98 F | SYSTOLIC BLOOD PRESSURE: 120 MMHG

## 2017-04-27 DIAGNOSIS — H83.09 LABYRINTHITIS, UNSPECIFIED LATERALITY: Primary | ICD-10-CM

## 2017-04-27 PROCEDURE — 99213 OFFICE O/P EST LOW 20 MIN: CPT | Performed by: INTERNAL MEDICINE

## 2017-04-27 RX ORDER — PREDNISONE 10 MG/1
10 TABLET ORAL 2 TIMES DAILY
Qty: 10 TABLET | Refills: 1 | Status: SHIPPED | OUTPATIENT
Start: 2017-04-27 | End: 2017-06-26 | Stop reason: HOSPADM

## 2017-04-27 NOTE — PROGRESS NOTES
"Subjective   Patient ID: Faustino Horton Jr. is a 76 y.o. male presents with   Chief Complaint   Patient presents with   • Follow-up     hospital, f/u on pt for his back, f/u on bronchitis, on monday he was so dizzy he couldn't stand up and went to u/c with a inner ear infection       HPI - This patient recently had a viral respiratory infection.  He ended up getting a Z-Moe and steroid taper.  The bronchitis improved but then he ended up with a labyrinthitis likely viral.  He was placed on meclizine and it helped but if he gets off meclizine then he gets very dizzy that's been going on for about a week.    Assessment plan    Likely viral labyrinthitis-prednisone 10 mg twice daily for 5 days meclizine.  Patient's let me know she's not feeling better.  He agreed.    Allergies   Allergen Reactions   • Acetylcysteine Hives   • Allopurinol Hives   • Baclofen Itching   • Hydrocodone-Acetaminophen Swelling   • Levaquin [Levofloxacin] Itching       The following portions of the patient's history were reviewed and updated as appropriate: allergies, current medications, past family history, past medical history, past social history, past surgical history and problem list.      Review of Systems   Constitutional: Negative.    Eyes: Negative.    Neurological: Positive for dizziness. Negative for syncope, speech difficulty, weakness, numbness and headaches.       Objective     Vitals:    04/27/17 1017   BP: 120/64   Pulse: 54   Resp: 16   Temp: 98 °F (36.7 °C)   TempSrc: Oral   SpO2: 94%   Weight: 214 lb 1.6 oz (97.1 kg)   Height: 72\" (182.9 cm)         Physical Exam   Constitutional: He is oriented to person, place, and time. He appears well-developed and well-nourished.   HENT:   Head: Normocephalic and atraumatic.   Right Ear: External ear normal.   Left Ear: External ear normal.   Mouth/Throat: Oropharynx is clear and moist.   Eyes: EOM are normal. Pupils are equal, round, and reactive to light.   Neurological: He is alert " and oriented to person, place, and time.   Psychiatric: He has a normal mood and affect. His behavior is normal.   Nursing note and vitals reviewed.        Faustino was seen today for follow-up.    Diagnoses and all orders for this visit:    Labyrinthitis, unspecified laterality    Other orders  -     predniSONE (DELTASONE) 10 MG tablet; Take 1 tablet by mouth 2 (Two) Times a Day.      Call or return to clinic prn if these symptoms worsen or fail to improve as anticipated.

## 2017-05-25 ENCOUNTER — DOCUMENTATION (OUTPATIENT)
Dept: PHYSICAL THERAPY | Facility: CLINIC | Age: 77
End: 2017-05-25

## 2017-06-22 ENCOUNTER — HOSPITAL ENCOUNTER (INPATIENT)
Facility: HOSPITAL | Age: 77
LOS: 4 days | Discharge: HOME | End: 2017-06-26
Attending: FAMILY MEDICINE | Admitting: INTERNAL MEDICINE

## 2017-06-22 ENCOUNTER — APPOINTMENT (OUTPATIENT)
Dept: GENERAL RADIOLOGY | Facility: HOSPITAL | Age: 77
End: 2017-06-22

## 2017-06-22 ENCOUNTER — APPOINTMENT (OUTPATIENT)
Dept: CT IMAGING | Facility: HOSPITAL | Age: 77
End: 2017-06-22

## 2017-06-22 DIAGNOSIS — I95.9 HYPOTENSION, UNSPECIFIED HYPOTENSION TYPE: ICD-10-CM

## 2017-06-22 DIAGNOSIS — E27.40 ADRENAL INSUFFICIENCY (HCC): Primary | ICD-10-CM

## 2017-06-22 DIAGNOSIS — N39.0 ACUTE UTI: ICD-10-CM

## 2017-06-22 LAB
ALBUMIN SERPL-MCNC: 3.4 G/DL (ref 3.5–5.2)
ALBUMIN/GLOB SERPL: 1.3 G/DL
ALP SERPL-CCNC: 61 U/L (ref 39–117)
ALT SERPL W P-5'-P-CCNC: 23 U/L (ref 1–41)
ANION GAP SERPL CALCULATED.3IONS-SCNC: 17.2 MMOL/L
AST SERPL-CCNC: 29 U/L (ref 1–40)
BACTERIA UR QL AUTO: ABNORMAL /HPF
BASOPHILS # BLD AUTO: 0 10*3/MM3 (ref 0–0.2)
BASOPHILS NFR BLD AUTO: 0 % (ref 0–1.5)
BILIRUB SERPL-MCNC: 0.3 MG/DL (ref 0.1–1.2)
BILIRUB UR QL STRIP: NEGATIVE
BUN BLD-MCNC: 14 MG/DL (ref 8–23)
BUN/CREAT SERPL: 7.4 (ref 7–25)
CALCIUM SPEC-SCNC: 8.5 MG/DL (ref 8.6–10.5)
CHLORIDE SERPL-SCNC: 103 MMOL/L (ref 98–107)
CLARITY UR: ABNORMAL
CO2 SERPL-SCNC: 22.8 MMOL/L (ref 22–29)
COLOR UR: ABNORMAL
CREAT BLD-MCNC: 1.9 MG/DL (ref 0.76–1.27)
D-LACTATE SERPL-SCNC: 4.1 MMOL/L (ref 0.5–2)
D-LACTATE SERPL-SCNC: 4.7 MMOL/L (ref 0.5–2)
DEPRECATED RDW RBC AUTO: 49.6 FL (ref 37–54)
EOSINOPHIL # BLD AUTO: 0.06 10*3/MM3 (ref 0–0.7)
EOSINOPHIL NFR BLD AUTO: 1.2 % (ref 0.3–6.2)
ERYTHROCYTE [DISTWIDTH] IN BLOOD BY AUTOMATED COUNT: 13.5 % (ref 11.5–14.5)
GFR SERPL CREATININE-BSD FRML MDRD: 35 ML/MIN/1.73
GLOBULIN UR ELPH-MCNC: 2.6 GM/DL
GLUCOSE BLD-MCNC: 96 MG/DL (ref 65–99)
GLUCOSE BLDC GLUCOMTR-MCNC: 177 MG/DL (ref 70–130)
GLUCOSE UR STRIP-MCNC: NEGATIVE MG/DL
HCT VFR BLD AUTO: 37 % (ref 40.4–52.2)
HGB BLD-MCNC: 12.4 G/DL (ref 13.7–17.6)
HGB UR QL STRIP.AUTO: NEGATIVE
HOLD SPECIMEN: NORMAL
HOLD SPECIMEN: NORMAL
HYALINE CASTS UR QL AUTO: ABNORMAL /LPF
IMM GRANULOCYTES # BLD: 0.02 10*3/MM3 (ref 0–0.03)
IMM GRANULOCYTES NFR BLD: 0.4 % (ref 0–0.5)
KETONES UR QL STRIP: ABNORMAL
LEUKOCYTE ESTERASE UR QL STRIP.AUTO: ABNORMAL
LYMPHOCYTES # BLD AUTO: 0.27 10*3/MM3 (ref 0.9–4.8)
LYMPHOCYTES NFR BLD AUTO: 5.2 % (ref 19.6–45.3)
MAGNESIUM SERPL-MCNC: 2 MG/DL (ref 1.6–2.4)
MCH RBC QN AUTO: 34 PG (ref 27–32.7)
MCHC RBC AUTO-ENTMCNC: 33.5 G/DL (ref 32.6–36.4)
MCV RBC AUTO: 101.4 FL (ref 79.8–96.2)
MONOCYTES # BLD AUTO: 0.07 10*3/MM3 (ref 0.2–1.2)
MONOCYTES NFR BLD AUTO: 1.4 % (ref 5–12)
NEUTROPHILS # BLD AUTO: 4.73 10*3/MM3 (ref 1.9–8.1)
NEUTROPHILS NFR BLD AUTO: 91.8 % (ref 42.7–76)
NITRITE UR QL STRIP: NEGATIVE
PH UR STRIP.AUTO: <=5 [PH] (ref 5–8)
PLATELET # BLD AUTO: 134 10*3/MM3 (ref 140–500)
PMV BLD AUTO: 10.2 FL (ref 6–12)
POTASSIUM BLD-SCNC: 3.5 MMOL/L (ref 3.5–5.2)
PROCALCITONIN SERPL-MCNC: 10 NG/ML (ref 0.1–0.25)
PROT SERPL-MCNC: 6 G/DL (ref 6–8.5)
PROT UR QL STRIP: ABNORMAL
RBC # BLD AUTO: 3.65 10*6/MM3 (ref 4.6–6)
RBC # UR: ABNORMAL /HPF
REF LAB TEST METHOD: ABNORMAL
SODIUM BLD-SCNC: 143 MMOL/L (ref 136–145)
SP GR UR STRIP: 1.02 (ref 1–1.03)
SQUAMOUS #/AREA URNS HPF: ABNORMAL /HPF
TRANS CELLS #/AREA URNS HPF: ABNORMAL /HPF
TROPONIN T SERPL-MCNC: <0.01 NG/ML (ref 0–0.03)
URATE CRY URNS QL MICRO: ABNORMAL /HPF
UROBILINOGEN UR QL STRIP: ABNORMAL
WBC NRBC COR # BLD: 5.15 10*3/MM3 (ref 4.5–10.7)
WBC UR QL AUTO: ABNORMAL /HPF
WHOLE BLOOD HOLD SPECIMEN: NORMAL
WHOLE BLOOD HOLD SPECIMEN: NORMAL

## 2017-06-22 PROCEDURE — 93005 ELECTROCARDIOGRAM TRACING: CPT | Performed by: FAMILY MEDICINE

## 2017-06-22 PROCEDURE — 93010 ELECTROCARDIOGRAM REPORT: CPT | Performed by: INTERNAL MEDICINE

## 2017-06-22 PROCEDURE — 80053 COMPREHEN METABOLIC PANEL: CPT | Performed by: FAMILY MEDICINE

## 2017-06-22 PROCEDURE — 83735 ASSAY OF MAGNESIUM: CPT | Performed by: FAMILY MEDICINE

## 2017-06-22 PROCEDURE — 83605 ASSAY OF LACTIC ACID: CPT | Performed by: FAMILY MEDICINE

## 2017-06-22 PROCEDURE — 71010 HC CHEST PA OR AP: CPT

## 2017-06-22 PROCEDURE — 74176 CT ABD & PELVIS W/O CONTRAST: CPT

## 2017-06-22 PROCEDURE — 99285 EMERGENCY DEPT VISIT HI MDM: CPT

## 2017-06-22 PROCEDURE — 84484 ASSAY OF TROPONIN QUANT: CPT | Performed by: FAMILY MEDICINE

## 2017-06-22 PROCEDURE — 25010000003 HYDROCORTISONE SOD SUCCINATE PF 250 MG RECONSTITUTED SOLUTION: Performed by: FAMILY MEDICINE

## 2017-06-22 PROCEDURE — 25010000002 HYDROCORTISONE SODIUM SUCCINATE 100 MG RECONSTITUTED SOLUTION: Performed by: INTERNAL MEDICINE

## 2017-06-22 PROCEDURE — 63710000001 INSULIN ASPART PER 5 UNITS: Performed by: INTERNAL MEDICINE

## 2017-06-22 PROCEDURE — 82962 GLUCOSE BLOOD TEST: CPT

## 2017-06-22 PROCEDURE — 85025 COMPLETE CBC W/AUTO DIFF WBC: CPT | Performed by: FAMILY MEDICINE

## 2017-06-22 PROCEDURE — 25010000003 CEFTRIAXONE PER 250 MG: Performed by: FAMILY MEDICINE

## 2017-06-22 PROCEDURE — 87086 URINE CULTURE/COLONY COUNT: CPT | Performed by: FAMILY MEDICINE

## 2017-06-22 PROCEDURE — 87040 BLOOD CULTURE FOR BACTERIA: CPT | Performed by: FAMILY MEDICINE

## 2017-06-22 PROCEDURE — 81001 URINALYSIS AUTO W/SCOPE: CPT | Performed by: FAMILY MEDICINE

## 2017-06-22 PROCEDURE — 84145 PROCALCITONIN (PCT): CPT | Performed by: FAMILY MEDICINE

## 2017-06-22 RX ORDER — CEFTRIAXONE SODIUM 1 G/50ML
1 INJECTION, SOLUTION INTRAVENOUS ONCE
Status: COMPLETED | OUTPATIENT
Start: 2017-06-22 | End: 2017-06-22

## 2017-06-22 RX ORDER — CEFTRIAXONE SODIUM 1 G/50ML
1 INJECTION, SOLUTION INTRAVENOUS EVERY 24 HOURS
Status: DISCONTINUED | OUTPATIENT
Start: 2017-06-23 | End: 2017-06-23

## 2017-06-22 RX ORDER — SODIUM CHLORIDE 0.9 % (FLUSH) 0.9 %
1-10 SYRINGE (ML) INJECTION AS NEEDED
Status: DISCONTINUED | OUTPATIENT
Start: 2017-06-22 | End: 2017-06-26 | Stop reason: HOSPADM

## 2017-06-22 RX ORDER — CEFTRIAXONE SODIUM 2 G/50ML
2 INJECTION, SOLUTION INTRAVENOUS EVERY 12 HOURS
Status: DISCONTINUED | OUTPATIENT
Start: 2017-06-23 | End: 2017-06-23

## 2017-06-22 RX ORDER — SODIUM CHLORIDE 9 MG/ML
125 INJECTION, SOLUTION INTRAVENOUS CONTINUOUS
Status: DISCONTINUED | OUTPATIENT
Start: 2017-06-22 | End: 2017-06-24

## 2017-06-22 RX ORDER — ATORVASTATIN CALCIUM 20 MG/1
40 TABLET, FILM COATED ORAL DAILY
Status: DISCONTINUED | OUTPATIENT
Start: 2017-06-23 | End: 2017-06-26 | Stop reason: HOSPADM

## 2017-06-22 RX ORDER — ONDANSETRON 2 MG/ML
4 INJECTION INTRAMUSCULAR; INTRAVENOUS EVERY 6 HOURS PRN
Status: DISCONTINUED | OUTPATIENT
Start: 2017-06-22 | End: 2017-06-26 | Stop reason: HOSPADM

## 2017-06-22 RX ORDER — CHOLECALCIFEROL (VITAMIN D3) 125 MCG
500 CAPSULE ORAL NIGHTLY
Status: DISCONTINUED | OUTPATIENT
Start: 2017-06-22 | End: 2017-06-26 | Stop reason: HOSPADM

## 2017-06-22 RX ORDER — ZOLPIDEM TARTRATE 5 MG/1
5 TABLET ORAL NIGHTLY PRN
Status: DISCONTINUED | OUTPATIENT
Start: 2017-06-22 | End: 2017-06-24

## 2017-06-22 RX ORDER — ZOLPIDEM TARTRATE 10 MG/1
5 TABLET ORAL NIGHTLY PRN
COMMUNITY
End: 2017-07-20

## 2017-06-22 RX ORDER — HEPARIN SODIUM 5000 [USP'U]/ML
5000 INJECTION, SOLUTION INTRAVENOUS; SUBCUTANEOUS EVERY 12 HOURS SCHEDULED
Status: DISCONTINUED | OUTPATIENT
Start: 2017-06-22 | End: 2017-06-26 | Stop reason: HOSPADM

## 2017-06-22 RX ORDER — CHOLECALCIFEROL (VITAMIN D3) 125 MCG
500 CAPSULE ORAL NIGHTLY
COMMUNITY
End: 2017-07-09 | Stop reason: HOSPADM

## 2017-06-22 RX ORDER — DEXTROSE MONOHYDRATE 25 G/50ML
25 INJECTION, SOLUTION INTRAVENOUS
Status: DISCONTINUED | OUTPATIENT
Start: 2017-06-22 | End: 2017-06-26 | Stop reason: HOSPADM

## 2017-06-22 RX ORDER — LEVOTHYROXINE SODIUM 0.15 MG/1
150 TABLET ORAL
Status: DISCONTINUED | OUTPATIENT
Start: 2017-06-23 | End: 2017-06-26 | Stop reason: HOSPADM

## 2017-06-22 RX ORDER — NICOTINE POLACRILEX 4 MG
15 LOZENGE BUCCAL
Status: DISCONTINUED | OUTPATIENT
Start: 2017-06-22 | End: 2017-06-26 | Stop reason: HOSPADM

## 2017-06-22 RX ORDER — SODIUM CHLORIDE 0.9 % (FLUSH) 0.9 %
10 SYRINGE (ML) INJECTION AS NEEDED
Status: DISCONTINUED | OUTPATIENT
Start: 2017-06-22 | End: 2017-06-26 | Stop reason: HOSPADM

## 2017-06-22 RX ORDER — MECLIZINE HYDROCHLORIDE 25 MG/1
25 TABLET ORAL 3 TIMES DAILY PRN
Status: DISCONTINUED | OUTPATIENT
Start: 2017-06-22 | End: 2017-06-26 | Stop reason: HOSPADM

## 2017-06-22 RX ORDER — ACETAMINOPHEN 325 MG/1
650 TABLET ORAL EVERY 4 HOURS PRN
Status: DISCONTINUED | OUTPATIENT
Start: 2017-06-22 | End: 2017-06-26 | Stop reason: HOSPADM

## 2017-06-22 RX ADMIN — HYDROCORTISONE SODIUM SUCCINATE 125 MG: 100 INJECTION, POWDER, FOR SOLUTION INTRAMUSCULAR; INTRAVENOUS at 21:06

## 2017-06-22 RX ADMIN — SODIUM CHLORIDE 125 ML/HR: 9 INJECTION, SOLUTION INTRAVENOUS at 22:56

## 2017-06-22 RX ADMIN — INSULIN ASPART 2 UNITS: 100 INJECTION, SOLUTION INTRAVENOUS; SUBCUTANEOUS at 21:07

## 2017-06-22 RX ADMIN — CEFTRIAXONE SODIUM 1 G: 1 INJECTION, SOLUTION INTRAVENOUS at 15:26

## 2017-06-22 RX ADMIN — SODIUM CHLORIDE 250 ML/HR: 9 INJECTION, SOLUTION INTRAVENOUS at 17:42

## 2017-06-22 RX ADMIN — SODIUM CHLORIDE 2829 ML: 9 INJECTION, SOLUTION INTRAVENOUS at 11:00

## 2017-06-22 RX ADMIN — HYDROCORTISONE SODIUM SUCCINATE 100 MG: 250 INJECTION, POWDER, FOR SOLUTION INTRAMUSCULAR; INTRAVENOUS at 11:32

## 2017-06-22 RX ADMIN — SODIUM CHLORIDE 1000 ML: 9 INJECTION, SOLUTION INTRAVENOUS at 10:38

## 2017-06-22 RX ADMIN — SODIUM CHLORIDE 250 ML/HR: 9 INJECTION, SOLUTION INTRAVENOUS at 13:00

## 2017-06-22 NOTE — ED PROVIDER NOTES
EMERGENCY DEPARTMENT ENCOUNTER    CHIEF COMPLAINT  Chief Complaint: dizziness/hypotension  History given by: patient, spouse  History limited by: none  Room Number: 21/21  PMD: Yifan Patino MD   Endocrinology - Dr. Stockton ([facility])   PMD - Dr. Ardon ([facility])      HPI:  Pt is a 77 y.o. male with h/o adrenal insufficiency (currently on steroids) presents complaining of dizziness onset this morning when the pt attempted to stand up. The pt also c/o nausea, hypotension, and abd discomfort at onset. The pt says he was near syncopal. Per the spouse the pt has been seen in the ED previously for similar symptoms which were due to his adrenal insufficiency. He denies cough, fevers, chills, urinary sx, CP, SOA,   Per the spouse the pt has h/o CAD and several years ago had 99% LAD blockage.     Duration:  Onset this morning  Onset: gradual  Timing: constant  Location: head, abd, blood pressure  Radiation: none  Quality: dizziness  Intensity/Severity: moderate  Progression: unchanged  Associated Symptoms: abd discomfort, nausea, dizziness, hypotensive  Aggravating Factors: movement  Alleviating Factors: none  Previous Episodes: h/o vertigo and HTN  Treatment before arrival: denies    PAST MEDICAL HISTORY  Active Ambulatory Problems     Diagnosis Date Noted   • Chronic coronary artery disease 04/17/2016   • Gout 04/17/2016   • Hyperlipidemia 04/17/2016   • Hypertension 04/17/2016   • Hypothyroidism 04/17/2016   • Peripheral vascular disease 04/17/2016   • Sepsis associated hypotension 05/07/2016   • Bacterial pneumonia 05/08/2016   • Wheezing 05/08/2016   • Acute kidney injury 05/08/2016   • Pneumonia of right lung due to infectious organism 05/17/2016   • Adrenal insufficiency 05/17/2016   • Bronchitis 10/31/2016   • Spondylosis of lumbar region without myelopathy or radiculopathy 03/02/2017     Resolved Ambulatory Problems     Diagnosis Date Noted   • No Resolved Ambulatory Problems     Past Medical History:   Diagnosis  Date   • AAA (abdominal aortic aneurysm)    • Acute kidney failure    • Acute MI    • Adrenal insufficiency    • Arthritis    • B12 deficiency    • Back pain    • Cancer    • Coronary artery disease    • Diabetes mellitus    • Disease of thyroid gland    • Gout    • Hyperlipidemia    • Hypertension    • Hypotension    • Hypothyroid    • Low testosterone    • Peripheral nerve disease    • Pneumonia 2012   • Prostate mass    • Vertebral compression fracture    • Vitamin B12 deficiency        PAST SURGICAL HISTORY  Past Surgical History:   Procedure Laterality Date   • ABDOMINAL AORTIC ANEURYSM REPAIR      stent placed Dr. Puga - 2005, sees q6 months   • APPENDECTOMY     • CHOLECYSTECTOMY         FAMILY HISTORY  Family History   Problem Relation Age of Onset   • Cancer Mother    • Breast cancer Mother    • Heart disease Mother    • Hypertension Father    • Stroke Father    • Cancer Sister    • Breast cancer Sister    • Aneurysm Sister    • Hypertension Sister    • Thyroid disease Sister    • Hyperlipidemia Sister    • Diabetes Brother    • Aneurysm Brother    • Stroke Brother        SOCIAL HISTORY  Social History     Social History   • Marital status:      Spouse name: N/A   • Number of children: N/A   • Years of education: N/A     Occupational History   • Not on file.     Social History Main Topics   • Smoking status: Never Smoker   • Smokeless tobacco: Never Used   • Alcohol use 1.2 oz/week     2 Cans of beer per week      Comment: daily   • Drug use: No   • Sexual activity: Defer     Other Topics Concern   • Not on file     Social History Narrative       ALLERGIES  Acetylcysteine; Allopurinol; Baclofen; Hydrocodone-acetaminophen; and Levaquin [levofloxacin]    REVIEW OF SYSTEMS  Review of Systems   Constitutional: Negative for chills and fever.   HENT: Negative for trouble swallowing and voice change.    Respiratory: Negative for cough and shortness of breath.    Cardiovascular: Negative for chest pain.    Gastrointestinal: Positive for abdominal pain (discomfort) and nausea. Negative for diarrhea and vomiting.   Genitourinary: Negative for dysuria and hematuria.   Skin: Negative for wound.   Neurological: Positive for dizziness. Negative for headaches.   All other systems reviewed and are negative.      PHYSICAL EXAM  ED Triage Vitals   Temp Heart Rate Resp BP SpO2   06/22/17 1011 06/22/17 1011 06/22/17 1011 06/22/17 1011 06/22/17 1011   98.2 °F (36.8 °C) 75 16 90/53 97 %      Temp src Heart Rate Source Patient Position BP Location FiO2 (%)   06/22/17 1011 06/22/17 1011 06/22/17 1036 -- --   Oral Monitor Lying         Physical Exam   Constitutional: He is oriented to person, place, and time and well-developed, well-nourished, and in no distress. No distress.   HENT:   Head: Normocephalic and atraumatic.   Eyes: EOM are normal. Pupils are equal, round, and reactive to light.   Neck: Normal range of motion. Neck supple.   Cardiovascular: Normal rate, regular rhythm and normal heart sounds.    Pulmonary/Chest: Effort normal and breath sounds normal. No respiratory distress.   Abdominal:   Hyperactive bowel sounds.    Musculoskeletal: Normal range of motion. He exhibits no edema.   Neurological: He is alert and oriented to person, place, and time. He has normal sensation and normal strength.   Skin: Skin is warm and dry.   Psychiatric: Mood and affect normal.   Nursing note and vitals reviewed.      LAB RESULTS  Lab Results (last 24 hours)     Procedure Component Value Units Date/Time    CBC & Differential [[id number]] Collected:  06/22/17 1130    Specimen:  Blood Updated:  06/22/17 1144    Narrative:       The following orders were created for panel order CBC & Differential.  Procedure                               Abnormality         Status                     ---------                               -----------         ------                     CBC Auto Differential[[id number]]        Abnormal            Final result                  Please view results for these tests on the individual orders.    Comprehensive Metabolic Panel [[id number]]  (Abnormal) Collected:  06/22/17 1130    Specimen:  Blood Updated:  06/22/17 1206     Glucose 96 mg/dL      BUN 14 mg/dL      Creatinine 1.90 (H) mg/dL      Sodium 143 mmol/L      Potassium 3.5 mmol/L      Chloride 103 mmol/L      CO2 22.8 mmol/L      Calcium 8.5 (L) mg/dL      Total Protein 6.0 g/dL      Albumin 3.40 (L) g/dL      ALT (SGPT) 23 U/L      AST (SGOT) 29 U/L      Alkaline Phosphatase 61 U/L      Total Bilirubin 0.3 mg/dL      eGFR Non African Amer 35 (L) mL/min/1.73      Globulin 2.6 gm/dL      A/G Ratio 1.3 g/dL      BUN/Creatinine Ratio 7.4     Anion Gap 17.2 mmol/L     Narrative:       The MDRD GFR formula is only valid for adults with stable renal function between ages 18 and 70.    Troponin [[id number]]  (Normal) Collected:  06/22/17 1130    Specimen:  Blood Updated:  06/22/17 1206     Troponin T <0.010 ng/mL     Narrative:       Troponin T Reference Ranges:  Less than 0.03 ng/mL:    Negative for AMI  0.03 to 0.09 ng/mL:      Indeterminant for AMI  Greater than 0.09 ng/mL: Positive for AMI    Magnesium [[id number]]  (Normal) Collected:  06/22/17 1130    Specimen:  Blood Updated:  06/22/17 1206     Magnesium 2.0 mg/dL     CBC Auto Differential [[id number]]  (Abnormal) Collected:  06/22/17 1130    Specimen:  Blood Updated:  06/22/17 1144     WBC 5.15 10*3/mm3      RBC 3.65 (L) 10*6/mm3      Hemoglobin 12.4 (L) g/dL      Hematocrit 37.0 (L) %      .4 (H) fL      MCH 34.0 (H) pg      MCHC 33.5 g/dL      RDW 13.5 %      RDW-SD 49.6 fl      MPV 10.2 fL      Platelets 134 (L) 10*3/mm3      Neutrophil % 91.8 (H) %      Lymphocyte % 5.2 (L) %      Monocyte % 1.4 (L) %      Eosinophil % 1.2 %      Basophil % 0.0 %      Immature Grans % 0.4 %      Neutrophils, Absolute 4.73 10*3/mm3      Lymphocytes, Absolute 0.27 (L) 10*3/mm3      Monocytes, Absolute 0.07 (L) 10*3/mm3       Eosinophils, Absolute 0.06 10*3/mm3      Basophils, Absolute 0.00 10*3/mm3      Immature Grans, Absolute 0.02 10*3/mm3     Lactic Acid, Plasma [[id number]]  (Abnormal) Collected:  06/22/17 1130    Specimen:  Blood Updated:  06/22/17 1204     Lactate 4.7 (C) mmol/L     Urinalysis With / Culture If Indicated [[id number]]  (Abnormal) Collected:  06/22/17 1254    Specimen:  Urine from Urine, Clean Catch Updated:  06/22/17 1340     Color, UA Dark Yellow (A)     Appearance, UA Cloudy (A)     pH, UA <=5.0     Specific Gravity, UA 1.020     Glucose, UA Negative     Ketones, UA Trace (A)     Bilirubin, UA Negative     Blood, UA Negative     Protein,  mg/dL (2+) (A)     Leuk Esterase, UA Trace (A)     Nitrite, UA Negative     Urobilinogen, UA 0.2 E.U./dL    Urinalysis, Microscopic Only [[id number]]  (Abnormal) Collected:  06/22/17 1254    Specimen:  Urine from Urine, Clean Catch Updated:  06/22/17 1353     RBC, UA 0-2 /HPF      WBC, UA 3-5 (A) /HPF      Bacteria, UA 1+ (A) /HPF      Squamous Epithelial Cells, UA 3-6 (A) /HPF      Transitional Epithelial Cells, UA 3-6 (A) /HPF      Hyaline Casts, UA 7-12 /LPF      Uric Acid Crystals, UA Small/1+ /HPF      Methodology Manual Light Microscopy    Urine Culture [[id number]] Collected:  06/22/17 1254    Specimen:  Urine from Urine, Clean Catch Updated:  06/22/17 1314    Blood Culture [[id number]] Collected:  06/22/17 1327    Specimen:  Blood from Blood, Venous Line Updated:  06/22/17 1327    Lactate Acid, Reflex [[id number]]  (Abnormal) Collected:  06/22/17 1328    Specimen:  Blood Updated:  06/22/17 1358     Lactate 4.1 (C) mmol/L           I ordered the above labs and reviewed the results    RADIOLOGY  CT Abdomen Pelvis Without Contrast   Final Result   1.   Bilateral moderate perinephric stranding. At this time, the   possibilities would include bilateral acute pyelonephritis,  acute   tubular necrosis or other cause of acute renal insufficiency   2. Diffuse hepatic steatosis.    3.  Mildly prominent subcarinal lymph node measuring up to 1.2 cm. A   followup CT chest is recommended on a nonemergent basis to further   evaluate.       These findings were discussed with Dr. Lim by telephone at 12:43 PM   on 06/22/2017.       This report was finalized on 6/22/2017 12:55 PM by Dr. Jordon Polanco MD.          XR Chest 1 View              I ordered the above noted radiological studies. Interpreted by radiologist. Discussed with radiologist. Reviewed by me in PACS.       PROCEDURES  Critical Care  Performed by: RASHID LIM  Authorized by: RASHID LIM   Total critical care time: 30 minutes  Critical care time was exclusive of separately billable procedures and treating other patients and teaching time.  Critical care was necessary to treat or prevent imminent or life-threatening deterioration of the following conditions: circulatory failure.  Critical care was time spent personally by me on the following activities: development of treatment plan with patient or surrogate, interpretation of cardiac output measurements, evaluation of patient's response to treatment, examination of patient, obtaining history from patient or surrogate, ordering and performing treatments and interventions, ordering and review of laboratory studies, ordering and review of radiographic studies, pulse oximetry, re-evaluation of patient's condition and review of old charts.            PROGRESS AND CONSULTS  ED Course     10:53 AM  Ordered CT ABD/PEL and Lactic Acid for further evaluation. Ordered Solu-Cortef for adrenal insufficiency and IVF for hydration.      2:12 PM  Ordered Rocephin for infection. Call placed to hospitalist for admission.     Rechecked repetitively    2:50 PM  Call returned by Dr. Adrian ([facility]) who agrees to admit.       MEDICAL DECISION MAKING  Results were reviewed/discussed with the patient and they were also made aware of online access. Pt also made aware that some labs, such as cultures,  will not be resulted during ER visit and follow up with PMD is necessary.     MDM  Number of Diagnoses or Management Options     Amount and/or Complexity of Data Reviewed  Clinical lab tests: reviewed and ordered (CREAT - 1.90  Initial Trop - negative  Lactate - 4.7  RBC - 3.65  UA - questionable for UTI. )  Tests in the radiology section of CPT®: reviewed and ordered (CT ABD/PEL - Perinephric stranding.      Independently viewed by me. Interpreted by radiologist. Discussed with Radiologist.       CXR - negative acute    Independently viewed by me. Interpreted by radiologist  )  Tests in the medicine section of CPT®: reviewed and ordered (EKG           EKG time: 1040  Rhythm/Rate: NSR, 72  P waves and SC: normal  QRS, axis: normal    ST and T waves: normal     Interpreted Contemporaneously by me, independently viewed  unchanged compared to prior 5/7/16  )  Discussion of test results with the performing providers: yes (Radiology )  Decide to obtain previous medical records or to obtain history from someone other than the patient: yes (Reviewed previous notes in EPIC.   Pt was seen in the ED on 4/16/17 for Bronchitis. )  Review and summarize past medical records: yes  Discuss the patient with other providers: yes (Dr. Adrian - [facility])    Critical Care  Total time providing critical care: 30-74 minutes         DIAGNOSIS  Final diagnoses:   Adrenal insufficiency   Hypotension, unspecified hypotension type   Acute UTI, possible       DISPOSITION  Admit    Latest Documented Vital Signs:  As of 2:51 PM  BP- 106/66 HR- 70 Temp- 98.2 °F (36.8 °C) (Oral) O2 sat- 92%    --  Documentation assistance provided by ana laura Erickson for Dr. Templeton.  Information recorded by the ana laura was done at my direction and has been verified and validated by me.       Derik Erickson  06/22/17 1041       Derik Erickson  06/22/17 9666       Henrik Templeton MD  06/22/17 0828

## 2017-06-22 NOTE — ED NOTES
Patient placed on 2L of oxygen due to history of sleep apnea and decreased oxygen saturations while sleeping.   MD aware.      Sally Harvey RN  06/22/17 3513

## 2017-06-22 NOTE — ED NOTES
Made Dr. Templeton aware of patients condition and his b/p. Normal saline bolus started.      Angel Luna RN  06/22/17 1037

## 2017-06-23 PROBLEM — N17.9 ACUTE RENAL FAILURE (HCC): Status: ACTIVE | Noted: 2017-06-23

## 2017-06-23 LAB
ALBUMIN SERPL-MCNC: 3.2 G/DL (ref 3.5–5.2)
ALBUMIN/GLOB SERPL: 1.1 G/DL
ALP SERPL-CCNC: 51 U/L (ref 39–117)
ALT SERPL W P-5'-P-CCNC: 15 U/L (ref 1–41)
ANION GAP SERPL CALCULATED.3IONS-SCNC: 16.4 MMOL/L
AST SERPL-CCNC: 19 U/L (ref 1–40)
BACTERIA SPEC AEROBE CULT: NORMAL
BASOPHILS # BLD AUTO: 0 10*3/MM3 (ref 0–0.2)
BASOPHILS NFR BLD AUTO: 0 % (ref 0–1.5)
BILIRUB SERPL-MCNC: 0.3 MG/DL (ref 0.1–1.2)
BUN BLD-MCNC: 23 MG/DL (ref 8–23)
BUN/CREAT SERPL: 13.7 (ref 7–25)
CALCIUM SPEC-SCNC: 7.7 MG/DL (ref 8.6–10.5)
CHLORIDE SERPL-SCNC: 104 MMOL/L (ref 98–107)
CO2 SERPL-SCNC: 16.6 MMOL/L (ref 22–29)
CREAT BLD-MCNC: 1.68 MG/DL (ref 0.76–1.27)
D-LACTATE SERPL-SCNC: 3 MMOL/L (ref 0.5–2)
D-LACTATE SERPL-SCNC: 4.7 MMOL/L (ref 0.5–2)
DEPRECATED RDW RBC AUTO: 48.8 FL (ref 37–54)
DEPRECATED RDW RBC AUTO: 50.8 FL (ref 37–54)
EOSINOPHIL # BLD AUTO: 0 10*3/MM3 (ref 0–0.7)
EOSINOPHIL NFR BLD AUTO: 0 % (ref 0.3–6.2)
ERYTHROCYTE [DISTWIDTH] IN BLOOD BY AUTOMATED COUNT: 13.7 % (ref 11.5–14.5)
ERYTHROCYTE [DISTWIDTH] IN BLOOD BY AUTOMATED COUNT: 13.8 % (ref 11.5–14.5)
GFR SERPL CREATININE-BSD FRML MDRD: 40 ML/MIN/1.73
GLOBULIN UR ELPH-MCNC: 2.8 GM/DL
GLUCOSE BLD-MCNC: 212 MG/DL (ref 65–99)
GLUCOSE BLDC GLUCOMTR-MCNC: 179 MG/DL (ref 70–130)
GLUCOSE BLDC GLUCOMTR-MCNC: 187 MG/DL (ref 70–130)
GLUCOSE BLDC GLUCOMTR-MCNC: 219 MG/DL (ref 70–130)
GLUCOSE BLDC GLUCOMTR-MCNC: 259 MG/DL (ref 70–130)
HBA1C MFR BLD: 6.99 % (ref 4.8–5.6)
HCT VFR BLD AUTO: 34.4 % (ref 40.4–52.2)
HCT VFR BLD AUTO: 35.1 % (ref 40.4–52.2)
HGB BLD-MCNC: 11.8 G/DL (ref 13.7–17.6)
HGB BLD-MCNC: 11.9 G/DL (ref 13.7–17.6)
IMM GRANULOCYTES # BLD: 0.06 10*3/MM3 (ref 0–0.03)
IMM GRANULOCYTES NFR BLD: 0.5 % (ref 0–0.5)
LYMPHOCYTES # BLD AUTO: 0.39 10*3/MM3 (ref 0.9–4.8)
LYMPHOCYTES NFR BLD AUTO: 3.1 % (ref 19.6–45.3)
MCH RBC QN AUTO: 33.8 PG (ref 27–32.7)
MCH RBC QN AUTO: 34.2 PG (ref 27–32.7)
MCHC RBC AUTO-ENTMCNC: 33.6 G/DL (ref 32.6–36.4)
MCHC RBC AUTO-ENTMCNC: 34.6 G/DL (ref 32.6–36.4)
MCV RBC AUTO: 100.6 FL (ref 79.8–96.2)
MCV RBC AUTO: 98.9 FL (ref 79.8–96.2)
MONOCYTES # BLD AUTO: 0.85 10*3/MM3 (ref 0.2–1.2)
MONOCYTES NFR BLD AUTO: 6.9 % (ref 5–12)
NEUTROPHILS # BLD AUTO: 11.1 10*3/MM3 (ref 1.9–8.1)
NEUTROPHILS NFR BLD AUTO: 89.5 % (ref 42.7–76)
PLATELET # BLD AUTO: 140 10*3/MM3 (ref 140–500)
PLATELET # BLD AUTO: 145 10*3/MM3 (ref 140–500)
PMV BLD AUTO: 10.3 FL (ref 6–12)
PMV BLD AUTO: 10.8 FL (ref 6–12)
POTASSIUM BLD-SCNC: 4.3 MMOL/L (ref 3.5–5.2)
PROT SERPL-MCNC: 6 G/DL (ref 6–8.5)
RBC # BLD AUTO: 3.48 10*6/MM3 (ref 4.6–6)
RBC # BLD AUTO: 3.49 10*6/MM3 (ref 4.6–6)
SODIUM BLD-SCNC: 137 MMOL/L (ref 136–145)
TSH SERPL DL<=0.05 MIU/L-ACNC: 1.13 MIU/ML (ref 0.27–4.2)
WBC NRBC COR # BLD: 12.4 10*3/MM3 (ref 4.5–10.7)
WBC NRBC COR # BLD: 12.42 10*3/MM3 (ref 4.5–10.7)

## 2017-06-23 PROCEDURE — 25010000002 VANCOMYCIN: Performed by: INTERNAL MEDICINE

## 2017-06-23 PROCEDURE — 84443 ASSAY THYROID STIM HORMONE: CPT | Performed by: INTERNAL MEDICINE

## 2017-06-23 PROCEDURE — 63710000001 INSULIN DETEMER PER 5 UNITS: Performed by: INTERNAL MEDICINE

## 2017-06-23 PROCEDURE — 25010000002 HEPARIN (PORCINE) PER 1000 UNITS: Performed by: INTERNAL MEDICINE

## 2017-06-23 PROCEDURE — 25010000002 HYDROCORTISONE SODIUM SUCCINATE 100 MG RECONSTITUTED SOLUTION: Performed by: INTERNAL MEDICINE

## 2017-06-23 PROCEDURE — 83605 ASSAY OF LACTIC ACID: CPT | Performed by: INTERNAL MEDICINE

## 2017-06-23 PROCEDURE — 83036 HEMOGLOBIN GLYCOSYLATED A1C: CPT | Performed by: INTERNAL MEDICINE

## 2017-06-23 PROCEDURE — 63710000001 INSULIN ASPART PER 5 UNITS: Performed by: INTERNAL MEDICINE

## 2017-06-23 PROCEDURE — 82962 GLUCOSE BLOOD TEST: CPT

## 2017-06-23 PROCEDURE — 99223 1ST HOSP IP/OBS HIGH 75: CPT | Performed by: INTERNAL MEDICINE

## 2017-06-23 PROCEDURE — 85027 COMPLETE CBC AUTOMATED: CPT | Performed by: INTERNAL MEDICINE

## 2017-06-23 PROCEDURE — 85025 COMPLETE CBC W/AUTO DIFF WBC: CPT | Performed by: INTERNAL MEDICINE

## 2017-06-23 PROCEDURE — 25010000002 CEFEPIME: Performed by: INTERNAL MEDICINE

## 2017-06-23 PROCEDURE — 80053 COMPREHEN METABOLIC PANEL: CPT | Performed by: INTERNAL MEDICINE

## 2017-06-23 RX ORDER — CLOBETASOL PROPIONATE 0.5 MG/G
CREAM TOPICAL EVERY 12 HOURS SCHEDULED
Status: DISCONTINUED | OUTPATIENT
Start: 2017-06-23 | End: 2017-06-26 | Stop reason: HOSPADM

## 2017-06-23 RX ORDER — GABAPENTIN 400 MG/1
1200 CAPSULE ORAL EVERY 12 HOURS SCHEDULED
Status: DISCONTINUED | OUTPATIENT
Start: 2017-06-23 | End: 2017-06-26 | Stop reason: HOSPADM

## 2017-06-23 RX ADMIN — HEPARIN SODIUM 5000 UNITS: 5000 INJECTION, SOLUTION INTRAVENOUS; SUBCUTANEOUS at 02:48

## 2017-06-23 RX ADMIN — ATORVASTATIN CALCIUM 40 MG: 20 TABLET, FILM COATED ORAL at 08:29

## 2017-06-23 RX ADMIN — HYDROCORTISONE SODIUM SUCCINATE 125 MG: 100 INJECTION, POWDER, FOR SOLUTION INTRAMUSCULAR; INTRAVENOUS at 14:01

## 2017-06-23 RX ADMIN — INSULIN ASPART 4 UNITS: 100 INJECTION, SOLUTION INTRAVENOUS; SUBCUTANEOUS at 11:57

## 2017-06-23 RX ADMIN — HYDROCORTISONE SODIUM SUCCINATE 100 MG: 100 INJECTION, POWDER, FOR SOLUTION INTRAMUSCULAR; INTRAVENOUS at 20:23

## 2017-06-23 RX ADMIN — INSULIN ASPART 6 UNITS: 100 INJECTION, SOLUTION INTRAVENOUS; SUBCUTANEOUS at 17:52

## 2017-06-23 RX ADMIN — CEFEPIME 2 G: 2 INJECTION, POWDER, FOR SOLUTION INTRAVENOUS at 02:56

## 2017-06-23 RX ADMIN — INSULIN ASPART 2 UNITS: 100 INJECTION, SOLUTION INTRAVENOUS; SUBCUTANEOUS at 08:29

## 2017-06-23 RX ADMIN — HYDROCORTISONE SODIUM SUCCINATE 125 MG: 100 INJECTION, POWDER, FOR SOLUTION INTRAMUSCULAR; INTRAVENOUS at 02:49

## 2017-06-23 RX ADMIN — SODIUM CHLORIDE 125 ML/HR: 9 INJECTION, SOLUTION INTRAVENOUS at 13:04

## 2017-06-23 RX ADMIN — HYDROCORTISONE SODIUM SUCCINATE 125 MG: 100 INJECTION, POWDER, FOR SOLUTION INTRAMUSCULAR; INTRAVENOUS at 08:30

## 2017-06-23 RX ADMIN — GABAPENTIN 1200 MG: 400 CAPSULE ORAL at 21:41

## 2017-06-23 RX ADMIN — VANCOMYCIN HYDROCHLORIDE 1500 MG: 1 INJECTION, POWDER, LYOPHILIZED, FOR SOLUTION INTRAVENOUS at 04:34

## 2017-06-23 RX ADMIN — CLOBETASOL PROPIONATE: 0.5 CREAM TOPICAL at 21:41

## 2017-06-23 RX ADMIN — INSULIN DETEMIR 20 UNITS: 100 INJECTION, SOLUTION SUBCUTANEOUS at 20:23

## 2017-06-23 RX ADMIN — CEFEPIME 2 G: 2 INJECTION, POWDER, FOR SOLUTION INTRAVENOUS at 13:59

## 2017-06-23 RX ADMIN — HEPARIN SODIUM 5000 UNITS: 5000 INJECTION, SOLUTION INTRAVENOUS; SUBCUTANEOUS at 20:23

## 2017-06-23 RX ADMIN — HEPARIN SODIUM 5000 UNITS: 5000 INJECTION, SOLUTION INTRAVENOUS; SUBCUTANEOUS at 08:30

## 2017-06-23 RX ADMIN — CYANOCOBALAMIN TAB 500 MCG 500 MCG: 500 TAB at 20:23

## 2017-06-23 RX ADMIN — SODIUM CHLORIDE 125 ML/HR: 9 INJECTION, SOLUTION INTRAVENOUS at 10:18

## 2017-06-23 RX ADMIN — ACETAMINOPHEN 650 MG: 325 TABLET ORAL at 08:29

## 2017-06-23 RX ADMIN — CYANOCOBALAMIN TAB 500 MCG 500 MCG: 500 TAB at 02:48

## 2017-06-23 RX ADMIN — LEVOTHYROXINE SODIUM 150 MCG: 150 TABLET ORAL at 07:09

## 2017-06-23 NOTE — CONSULTS
Derm Consult  Impression: Septic Emboli to hips secondary to UTI   PE:  Multiple vasculitic papules, each 3 - 5 mm in size scattered about both hips.  Plan:  Treatment of the pt's sepsis will clear these lesions.             I will rx topically with Clobetasol cream B I D.

## 2017-06-23 NOTE — PLAN OF CARE
Problem: Patient Care Overview (Adult)  Goal: Plan of Care Review  Outcome: Ongoing (interventions implemented as appropriate)    06/22/17 2000   Coping/Psychosocial Response Interventions   Plan Of Care Reviewed With patient   Patient Care Overview   Progress no change   Outcome Evaluation   Outcome Summary/Follow up Plan Pt admitted to floor from ER. Vital signs stable, BP much better. No complaints of pain. IV fluids continue. Awaiting MD to see pt. Continue to monitor.        Goal: Adult Individualization and Mutuality  Outcome: Ongoing (interventions implemented as appropriate)  Goal: Discharge Needs Assessment  Outcome: Ongoing (interventions implemented as appropriate)    Problem: Infection, Risk/Actual (Adult)  Goal: Identify Related Risk Factors and Signs and Symptoms  Outcome: Ongoing (interventions implemented as appropriate)  Goal: Infection Prevention/Resolution  Outcome: Ongoing (interventions implemented as appropriate)    Problem: Sepsis (Adult)  Goal: Signs and Symptoms of Listed Potential Problems Will be Absent or Manageable (Sepsis)  Outcome: Ongoing (interventions implemented as appropriate)

## 2017-06-23 NOTE — H&P
CHIEF COMPLAINT: Came to the emergency room with sudden onset of dry heaving, nausea, weakness, cramps in his legs starting at 3 a.m. in the morning.     SOURCE OF INFORMATION: The patient himself, the patient's current and previous hospitalization records.     HISTORY OF PRESENT ILLNESS: The patient is a 77-year-old male who has a complicated past medical history including the diagnosis of adrenal insufficiency for an unclear reason and has been on dexamethasone and fludrocortisone.  Was in his usual state of health when he went to bed last night. He woke up at 3:00 in the morning with significant dry heaving, nausea, and weakness and cramps in his lower extremities and felt bad. He thought he was going to go to the kitchen and drink some dill pickle juice to take care of the cramps but could not do it and was very dizzy. He crawled back in his bed and tried to sleep and eventually woke up in the morning very weak. He was dizzy whenever he was trying to stand up. His wife checked his blood pressure and it was in 70s. At that point in time his wife decided that she needs to call 911 and bring him to the emergency room for further evaluation. In the emergency room he was found to have blood pressure of 90/53, lactic acid level of 4.7, and procalcitonin of 10. The patient was afebrile and was feeling weak. The patient was given IV bolus, blood cultures were drawn, and was started on IV Rocephin for concerns for possible bilateral pyelonephritis, as CT scan of the abdomen and pelvis did show perinephric stranding. Upon further questioning, patient denies any significant pain and discomfort in any part of his body but does recall spots showing up in his underwear area 2 days prior. These spots are itching and are not painful. Patient denies any exposure to sick contact. The patient does not recall traveling recently and also does not recall anybody new visited him. The patient does do the outside work and, in fact, the  night before he went to bed all day he did all kinds of activities without feeling hampered. He has a pet dog. The lesions that he noticed in the underwear area 2 days prior to go onset of his symptoms at 3:00 in the morning have evolved into dark areas that itch. These areas do not hurt him. He does not have any other areas present on any other parts of his body.     PAST MEDICAL HISTORY:  1.  Coronary artery disease.   2.  Hypertension.   3.  Dyslipidemia.   4.  Adrenal insufficiency of unclear etiology.   5.  History of abdominal aortic aneurysm.   6.  Arthritis.  7.  Cancer.   8.  History of MI.   9.  Diabetes.   10.  History of gout.   11.  History of acute-on-chronic renal failure in the past.   12.  Osteoporosis and vertebral compression fractures.   13.  Hypogonadism.   14.  Vitamin B12 deficiency.   15.  Chronic back pain     PAST SURGICAL HISTORY:   1.  Cholecystectomy.   2.  Appendectomy.   3.  Abdominal aortic aneurysm repair with a stent placement.     ALLERGIES:   1.  ACETYLCYSTEINE.    2.  ALLOPURINOL.    3.  BACLOFEN.    4.  HYDROCODONE/ACETAMINOPHEN.    5.  LEVAQUIN.    6.  OXYCODONE.    7.  TRAMADOL.    MEDICATIONS:  1.  Lipitor.    2.  Decadron 0.5 mg daily.    3.  Florinef 0.1 mg daily.    4.  Gabapentin 600 mg.    5.  Glimepiride 2 mg.    6.  Levothyroxine.    7.  Lisinopril.   8.  Vitamin B12.    9.  Ambien.    10.  Meclizine.    11.  Prednisone.    12.  Multivitamin.     FAMILY HISTORY: Remarkable for abdominal aortic aneurysm, stroke, diabetes, high blood pressure, breast cancer, thyroid disease, as well as dyslipidemia.     SOCIAL HISTORY: The patient is . Denies smoking.  Drinks about 2 cans of beer per week.     REVIEW OF SYSTEMS:  CONSTITUTIONAL: Remarkable for no fever or chills. Generalized weakness.   CARDIOVASCULAR: Remarkable for no chest pain or shortness of breath.   GASTROINTESTINAL: Remarkable for severe nausea but no vomiting, diarrhea, or abdominal pain.   GENITOURINARY:  Remarkable for no burning on urination, frequency, urgency, or flank pain.   MUSCULOSKELETAL: Remarkable for cramps in the lower extremities but no other symptoms.   NEUROLOGIC: Remarkable for dizziness upon getting up denies any focal weakness or confusion.     PHYSICAL EXAMINATION:  GENERAL: At time of my evaluation, he is lying in the bed, appears ill, does not appear to be in any acute distress. Alert and oriented x3.   VITAL SIGNS: Temperature 97.9, pulse 63, respiratory rate 22, blood pressure 114/60, and oxygen saturation 96% on room air. Upon arrival his blood pressure was 68/56.   HEENT: Remarkable for dry oral mucosa. No facial asymmetry noted. No facial lesions. No conjunctival lesions noted.   NECK: Supple.   CHEST: Bilateral air entry. Clear to auscultation.   HEART: S1 and S2 regular.   ABDOMEN: Soft, nontender.   EXTREMITIES: No significant edema.   SKIN: His groin, gluteal, and suprapubic area show circumscribed, nontender lesions that are more consistent with scabbing insect bites versus vasculitic lesions. They are scattered in the groin folds on the lateral aspect of the bilateral gluteal area and near the  cleft.  No lesions on the genitals noted. These lesions are not present anywhere else and his palms and soles are free of any lesions.     DIAGNOSTIC DATA: Procalcitonin 10. Urinalysis shows leukocyte esterase trace. Urine specific gravity 1.020.  Microscopic examination shows 3-5 WBCs, 3-5 hyaline casts seen. Chemistry shows BUN 14, creatinine 1.90.  LFTs within normal limits. CBC shows WBC 5.5, hemoglobin 12.4, .4, and platelets 134,000.  Differential shows 91.8 neutrophils. Troponin less than 0.01. CT scan of the abdomen and pelvis shows bilateral moderate perinephric stranding concerning for possible acute renal insufficiency or pyelonephritis.       IMPRESSION:  1.  Hypertension.   2.  Skin lesions with significant dry heaving.  Is very concerning for underlying evolving sepsis  and possibilities include insect bite with itching resulting in secondary systemic infection with bacteremia due to infected skin/insect bite site versus vasculitic lesions due to bacteremic sepsis of endovascular type, i.e., possible infected abdominal aortic aneurysm graft.   3.  Diabetes mellitus.   4.  Possible multidermatomal zoster given the use of dexamethasone and prednisone.   5.  Relative adrenal insufficiency.   6.  History of coronary artery disease.   7.  Acute renal failure on chronic kidney disease.   8.  History of peripheral vascular disease.     PLAN:   1.  Admit the patient.   2.  IV fluids.    3.  Empiric antibiotic therapy directed towards systemic bacterial sepsis with associated bacteremia originating from the skin or endovascular infection causing skin lesions due to bacteremic/vasculitic process. Start him on IV vancomycin and cefepime to cover for Pseudomonas-type pathogens and keep an eye on progression of skin lesions in the underwear distribution area.   4.  Continue Solu-Cortef and fluids and check serial lactic acid level.    5.  Further assessment as his condition evolves.   6.  May consider dermatology evaluation, biopsy of these lesions, and check 2D echo with Doppler.        Janine Adrian M.D.  JN:fan  D:   06/23/2017 01:44:00  T:   06/23/2017 02:16:13  Job ID:   [id number]  Document ID:   [id number]  cc:   Srinivas Gómez M.D.

## 2017-06-23 NOTE — PROGRESS NOTES
"Pharmacokinetic Consult - Vancomycin Dosing (Initial Note)    Faustino Horton Jr. has been consulted for pharmacy to dose vancomycin for sepsis.  Pharmacy dosing vancomycin per Dr. Adrian's request.   Goal trough: 15-20 mg/L   Other antimicrobials: cefepime     Relevant clinical data and objective history reviewed:  77 y.o. male 72\" (182.9 cm) 223 lb 12.8 oz (102 kg)    Past Medical History:   Diagnosis Date   • AAA (abdominal aortic aneurysm)    • Acute kidney failure    • Acute MI     2004 - stented   • Adrenal insufficiency    • Arthritis    • B12 deficiency    • Back pain    • Cancer     prostate - prostatectomy   • Coronary artery disease    • Diabetes mellitus     oral meds   • Disease of thyroid gland    • Gout    • Hyperlipidemia    • Hypertension    • Hypotension     2-3 years ago was hospitalized 13 times in 1 year span abrupt hypotension   • Hypothyroid    • Low testosterone    • Peripheral nerve disease    • Pneumonia 2012   • Prostate mass     Malignant Tumor   • Vertebral compression fracture    • Vitamin B12 deficiency      Creatinine   Date Value Ref Range Status   06/23/2017 1.68 (H) 0.76 - 1.27 mg/dL Final   06/22/2017 1.90 (H) 0.76 - 1.27 mg/dL Final     BUN   Date Value Ref Range Status   06/23/2017 23 8 - 23 mg/dL Final     Estimated Creatinine Clearance: 47 mL/min (by C-G formula based on Cr of 1.68).    Lab Results   Component Value Date    WBC 12.42 (H) 06/23/2017     Temp Readings from Last 3 Encounters:   06/23/17 97.6 °F (36.4 °C) (Oral)   04/27/17 98 °F (36.7 °C) (Oral)   04/24/17 97.6 °F (36.4 °C) (Oral)      Baseline culture/source/susceptibility:   6/23 lactate : 3.0  6/22 BCx: NG  6/22 UCx: mixed   6/22 PCT: 10     Vancomycin Dosing history:  6/23 0434 vancomycin 1500 mg iv once    Assessment/Plan  1. Patient received vancomycin 1500 mg iv once this AM. Given patient's age, weight and renal function, will start vancomycin 1750 mg IV q24h.   2. Will monitor serum creatinine every 24 " hours for the first 3 days then at least every 48 hours per dosing recommendations.   3. Vancomycin level on 6/26 prior to the 4th dose. Thank you Dr. Adrian for the consult and pharmacy will continue to follow daily while on vancomycin and adjust as needed.     Diana Moses, Pharm.D.

## 2017-06-23 NOTE — PROGRESS NOTES
LOS: 1 day   Patient Care Team:  Yifan Patino MD as PCP - General (Internal Medicine)    Chief Complaint   Patient presents with   • Abdominal Pain     dizzy, hypotension - 125 mg solumedrol and 900 ns ems   • Dizziness         Subjective   Feels much better today. No nausea/vomiting. No dizziness.     Objective     Vital Signs  Temp:  [97.6 °F (36.4 °C)-98 °F (36.7 °C)] 97.6 °F (36.4 °C)  Heart Rate:  [52-72] 52  Resp:  [20-22] 20  BP: ()/(60-92) 138/92    Physical Exam:  WDWN WM in NAD  Skin warm & dry  Lungs clear  Heart RRR  Abd obese, soft, NT, BS+  Ext no edema     Results Review:     I reviewed the patient's new clinical results.    Medication Review:       LABS    Results from last 7 days  Lab Units 06/23/17  0610 06/22/17  1130   SODIUM mmol/L 137 143   POTASSIUM mmol/L 4.3 3.5   CHLORIDE mmol/L 104 103   TOTAL CO2 mmol/L 16.6* 22.8   BUN mg/dL 23 14   CREATININE mg/dL 1.68* 1.90*   GLUCOSE mg/dL 212* 96   CALCIUM mg/dL 7.7* 8.5*       Results from last 7 days  Lab Units 06/23/17  0610 06/22/17  1130   WBC 10*3/mm3 12.42* 5.15   HEMOGLOBIN g/dL 11.9* 12.4*   HEMATOCRIT % 34.4* 37.0*   PLATELETS 10*3/mm3 140 134*               Assessment/Plan     Active Problems:    Adrenal insufficiency    Acute renal failure - better, cont IVFs    Hypotension - BP better today    Nausea with vomiting - appears resolved    Sepsis - presumably pyelonephritis although pt had no sxs of pyelo. Discussed results of CT abd with pt. There is a notation about a subcarinal lymph node that is enlarged. Will check CT chest    Consult derm for skin rash        Dina Ohara MD  06/23/17  1:04 PM      Time:

## 2017-06-23 NOTE — PROGRESS NOTES
Discharge Planning Assessment  [facility]     Patient Name: Fautsino Horton Jr.  MRN: [id number]  Today's Date: 6/23/2017    Admit Date: 6/22/2017          Discharge Needs Assessment       06/23/17 1442    Living Environment    Lives With spouse    Living Arrangements house    Home Accessibility no concerns    Stair Railings at Home none    Type of Financial/Environmental Concern none    Transportation Available car    Living Environment    Provides Primary Care For no one    Quality Of Family Relationships supportive    Able to Return to Prior Living Arrangements yes    Discharge Needs Assessment    Concerns To Be Addressed no discharge needs identified    Readmission Within The Last 30 Days no previous admission in last 30 days    Anticipated Changes Related to Illness none    Equipment Needed After Discharge none            Discharge Plan       06/23/17 1443    Case Management/Social Work Plan    Plan Home no needs    Patient/Family In Agreement With Plan yes    Additional Comments IMM verified, met at bedside with pt who lives at home with sp. He has been independent in care. He used HH yrs ago after surgery for his shoulder this was done in [place]. He has never been to rehab. Uses no equip at home. Per pt his  PCP is Yifan Patino and Rx is [facility] in [place]. Denies problems affording or obtaining medications. Plt denies needs. CCP will follow and assist as needed....drc        Discharge Placement     No information found                Demographic Summary       06/23/17 1441    Referral Information    Admission Type inpatient    Arrived From admitted as an inpatient;home or self-care    Referral Source admission list    Reason For Consult discharge planning    Record Reviewed clinical discipline documentation;history and physical    Primary Care Physician Information    Name Yifan Sukumar            Functional Status       06/23/17 1442    Functional Status Current    Ambulation 2-->assistive person     Transferring 2-->assistive person    Toileting 2-->assistive person    Bathing 2-->assistive person    Functional Status Prior    Ambulation 0-->independent    Transferring 0-->independent    Toileting 0-->independent    Bathing 0-->independent    Dressing 0-->independent    Eating 0-->independent            Psychosocial     None            Abuse/Neglect     None            Legal     None            Substance Abuse     None            Patient Forms     None          Mila Rubio RN

## 2017-06-23 NOTE — PLAN OF CARE
Problem: Patient Care Overview (Adult)  Goal: Plan of Care Review  Outcome: Ongoing (interventions implemented as appropriate)    06/23/17 1400 06/23/17 9673   Coping/Psychosocial Response Interventions   Plan Of Care Reviewed With patient;spouse --    Patient Care Overview   Progress --  improving   Outcome Evaluation   Outcome Summary/Follow up Plan --  Pt's VSS. Pharm is dosing Vanc. Restarting home Gabapentin tonight. Will start Levemir this evening as well and continue SSI while on IV Steroids and off home PO meds. Derm consult to see this evening, await their eval for pt c/o itching. Continue to monitor.        Goal: Adult Individualization and Mutuality  Outcome: Ongoing (interventions implemented as appropriate)  Goal: Discharge Needs Assessment  Outcome: Ongoing (interventions implemented as appropriate)    Problem: Infection, Risk/Actual (Adult)  Goal: Infection Prevention/Resolution  Outcome: Ongoing (interventions implemented as appropriate)    Problem: Sepsis (Adult)  Goal: Signs and Symptoms of Listed Potential Problems Will be Absent or Manageable (Sepsis)  Outcome: Ongoing (interventions implemented as appropriate)

## 2017-06-23 NOTE — PLAN OF CARE
Problem: Patient Care Overview (Adult)  Goal: Plan of Care Review  Outcome: Ongoing (interventions implemented as appropriate)    06/23/17 0359   Coping/Psychosocial Response Interventions   Plan Of Care Reviewed With patient   Patient Care Overview   Progress no change   Outcome Evaluation   Outcome Summary/Follow up Plan New IV antibiotics ordered, Vitals stable, no s/s of distress noted         Problem: Infection, Risk/Actual (Adult)  Goal: Identify Related Risk Factors and Signs and Symptoms  Outcome: Outcome(s) achieved Date Met:  06/23/17

## 2017-06-23 NOTE — PLAN OF CARE
Problem: Infection, Risk/Actual (Adult)  Goal: Identify Related Risk Factors and Signs and Symptoms  Outcome: Outcome(s) achieved Date Met:  06/23/17  Goal: Infection Prevention/Resolution  Outcome: Ongoing (interventions implemented as appropriate)    Problem: Sepsis (Adult)  Goal: Signs and Symptoms of Listed Potential Problems Will be Absent or Manageable (Sepsis)  Outcome: Ongoing (interventions implemented as appropriate)

## 2017-06-23 NOTE — CONSULTS
77 y.o.  Patient Care Team:  Yifan Patino MD as PCP - General (Internal Medicine)      Chief Complaint   Patient presents with   • Abdominal Pain     dizzy, hypotension - 125 mg solumedrol and 900 ns ems   • Dizziness   Reason for consultation-adrenal insufficiency    HPI 77-year-old white male with past medical history of primary adrenal insufficiency unclear etiology has been admitted to the hospital with complaints of low blood pressure, nausea vomiting and muscle cramps.  On the day of the admission patient woke up feeling dizzy and nauseated.  And when he checked his blood pressure it was in 60s.  His wife called EMS and upon arrival to the emergency department his blood pressure was 9553 mm of Hg.  Patient reports that he was compliant with his steroid regimen.    Primary adrenal insufficiency patient reports being diagnosed with this issue about 8-10 years ago.  And has been started on steroids at that time.  He reports seeing Dr. Campos many years ago but is currently following an endocrinologist at [place].  Sees her every 6 months.  He is currently on dexamethasone 0.5 mg oral daily and Florinef 0.1 mg oral daily.  Takes both these medications first thing in the morning and is extremely compliant on the medication.  He also knows about the sick day rules on doubling up on the dose of his steroids in case if he was not feeling well.  However on the day of the admission he did not feel bad going to bed and so did not take double the dose of steroids.  Here in the emergency department patient received IV fluid boluses that has been started on IV steroids along with IV antibiotics for the concerns of bilateral pyelonephritis.  He says that years ago his adrenal insufficiency was diagnosed when he was having chronic stomach pain and the entire workup did not show anything abnormal with his gallbladder or his stomach but revealed adrenal insufficiency.    Of note patient was also showing a skin rash which  started 2 days before this episode and reports itching at the area of the rash.    History of type 2 diabetes mellitus-diagnosed about 2-3 years ago.  Currently on glimiperide 2 mg oral daily takes it in the morning.    Past Medical History:   Diagnosis Date   • AAA (abdominal aortic aneurysm)    • Acute kidney failure    • Acute MI     2004 - stented   • Adrenal insufficiency    • Arthritis    • B12 deficiency    • Back pain    • Cancer     prostate - prostatectomy   • Coronary artery disease    • Diabetes mellitus     oral meds   • Disease of thyroid gland    • Gout    • Hyperlipidemia    • Hypertension    • Hypotension     2-3 years ago was hospitalized 13 times in 1 year span abrupt hypotension   • Hypothyroid    • Low testosterone    • Peripheral nerve disease    • Pneumonia 2012   • Prostate mass     Malignant Tumor   • Vertebral compression fracture    • Vitamin B12 deficiency        Family History   Problem Relation Age of Onset   • Cancer Mother    • Breast cancer Mother    • Heart disease Mother    • Hypertension Father    • Stroke Father    • Cancer Sister    • Breast cancer Sister    • Aneurysm Sister    • Hypertension Sister    • Thyroid disease Sister    • Hyperlipidemia Sister    • Diabetes Brother    • Aneurysm Brother    • Stroke Brother        Social History     Social History   • Marital status:      Spouse name: N/A   • Number of children: N/A   • Years of education: N/A     Occupational History   • Not on file.     Social History Main Topics   • Smoking status: Never Smoker   • Smokeless tobacco: Never Used   • Alcohol use 1.2 oz/week     2 Cans of beer per week      Comment: daily   • Drug use: No   • Sexual activity: Defer     Other Topics Concern   • Not on file     Social History Narrative       Allergies   Allergen Reactions   • Acetylcysteine Hives   • Allopurinol Hives   • Baclofen Itching   • Hydrocodone-Acetaminophen Swelling   • Levaquin [Levofloxacin] Itching   • Oxycodone    •  Tramadol          Current Facility-Administered Medications:   •  acetaminophen (TYLENOL) tablet 650 mg, 650 mg, Oral, Q4H PRN, Janine Adrian MD, 650 mg at 06/23/17 0829  •  atorvastatin (LIPITOR) tablet 40 mg, 40 mg, Oral, Daily, Janine Adrian MD, 40 mg at 06/23/17 0829  •  cefepime (MAXIPIME) 2 g/100 mL 0.9% NS (mbp), 2 g, Intravenous, Q12H, Janine Adrian MD, 2 g at 06/23/17 1359  •  dextrose (D50W) solution 25 g, 25 g, Intravenous, Q15 Min PRN, Janine Adrian MD  •  dextrose (GLUTOSE) oral gel 15 g, 15 g, Oral, Q15 Min PRN, Janine Adrian MD  •  gabapentin (NEURONTIN) capsule 1,200 mg, 1,200 mg, Oral, Q12H, Dina Ohara MD  •  glucagon (human recombinant) (GLUCAGEN DIAGNOSTIC) injection 1 mg, 1 mg, Subcutaneous, Q15 Min PRN, Janine Adrian MD  •  heparin (porcine) 5000 UNIT/ML injection 5,000 Units, 5,000 Units, Subcutaneous, Q12H, Janine Adrian MD, 5,000 Units at 06/23/17 0830  •  hydrocortisone sodium succinate (Solu-CORTEF) injection 125 mg, 125 mg, Intravenous, Q6H, Janine Adrian MD, 125 mg at 06/23/17 1401  •  insulin aspart (novoLOG) injection 0-9 Units, 0-9 Units, Subcutaneous, 4x Daily With Meals & Nightly, Janine Adrian MD, 4 Units at 06/23/17 1157  •  levothyroxine (SYNTHROID, LEVOTHROID) tablet 150 mcg, 150 mcg, Oral, Q AM, Janine Adrian MD, 150 mcg at 06/23/17 0709  •  meclizine (ANTIVERT) tablet 25 mg, 25 mg, Oral, TID PRN, Janine Adrian MD  •  ondansetron (ZOFRAN) injection 4 mg, 4 mg, Intravenous, Q6H PRN, Janine Adrian MD  •  Pharmacy to dose vancomycin, , Does not apply, Continuous PRN, Janine Adrian MD  •  sodium chloride 0.9 % flush 1-10 mL, 1-10 mL, Intravenous, PRN, Jawanabel Adrian MD  •  sodium chloride 0.9 % flush 10 mL, 10 mL, Intravenous, PRN, Henrik Templeton MD  •  sodium chloride 0.9 % infusion, 125 mL/hr, Intravenous, Continuous, Jawanabel Adrian MD, Last Rate: 125 mL/hr at 06/23/17 1304, 125 mL/hr at 06/23/17 1304  •  [START ON 6/24/2017] vancomycin 1750 mg/500 mL 0.9% NS IVPB ([facility]), 1,750 mg,  Intravenous, Q24H, Jawanabel Adrian MD  •  vitamin B-12 (CYANOCOBALAMIN) tablet 500 mcg, 500 mcg, Oral, Nightly, Janine Adrian MD, 500 mcg at 06/23/17 0248  •  zolpidem (AMBIEN) tablet 5 mg, 5 mg, Oral, Nightly PRN, Janine Adrian MD         Review of Systems   Constitutional: Positive for appetite change and fatigue. Negative for fever.   HENT: Negative for facial swelling, nosebleeds, trouble swallowing and voice change.    Eyes: Negative for pain and redness.   Respiratory: Negative for shortness of breath and wheezing.    Cardiovascular: Negative for palpitations and leg swelling.   Gastrointestinal: Positive for abdominal pain and nausea. Negative for diarrhea and vomiting.   Endocrine: Positive for polyuria. Negative for polydipsia.   Genitourinary: Negative for decreased urine volume and frequency.   Musculoskeletal: Negative for joint swelling and neck pain.   Skin: Positive for rash.   Allergic/Immunologic: Negative for immunocompromised state.   Neurological: Positive for dizziness and light-headedness. Negative for seizures and facial asymmetry.   Hematological: Does not bruise/bleed easily.   Psychiatric/Behavioral: Negative for agitation and confusion.     Objective     Vital Signs  Temp:  [97.6 °F (36.4 °C)-98 °F (36.7 °C)] 97.9 °F (36.6 °C)  Heart Rate:  [52-67] 57  Resp:  [20-22] 20  BP: (114-144)/(60-92) 144/92    Physical Exam  Physical Exam   Constitutional: He is oriented to person, place, and time. He appears well-nourished.   obese   HENT:   Head: Normocephalic and atraumatic.   Eyes: Conjunctivae and EOM are normal.   Neck: Normal range of motion. Neck supple. Carotid bruit is not present. No thyromegaly present.   Cardiovascular: Normal rate and normal heart sounds.    HR - 70   Pulmonary/Chest: Effort normal and breath sounds normal. No stridor. No respiratory distress. He has no wheezes.   Abdominal: Soft. Bowel sounds are normal. He exhibits no distension. There is no tenderness.   Central  obesity   Musculoskeletal: He exhibits no edema or tenderness.   Lymphadenopathy:     He has no cervical adenopathy.   Neurological: He is alert and oriented to person, place, and time. He has normal reflexes.   Skin: Skin is warm and dry. Rash noted.   Psychiatric: He has a normal mood and affect. His behavior is normal.   Vitals reviewed.      Results Review:    I reviewed the patient's new clinical results.  Glucose   Date/Time Value Ref Range Status   06/23/2017 1100 219 (H) 70 - 130 mg/dL Final   06/23/2017 0636 179 (H) 70 - 130 mg/dL Final   06/22/2017 2010 177 (H) 70 - 130 mg/dL Final     Lab Results (last 72 hours)     Procedure Component Value Units Date/Time    CBC & Differential [[id number]] Collected:  06/22/17 1130    Specimen:  Blood Updated:  06/22/17 1144    Narrative:       The following orders were created for panel order CBC & Differential.  Procedure                               Abnormality         Status                     ---------                               -----------         ------                     CBC Auto Differential[[id number]]        Abnormal            Final result                 Please view results for these tests on the individual orders.    CBC Auto Differential [[id number]]  (Abnormal) Collected:  06/22/17 1130    Specimen:  Blood Updated:  06/22/17 1144     WBC 5.15 10*3/mm3      RBC 3.65 (L) 10*6/mm3      Hemoglobin 12.4 (L) g/dL      Hematocrit 37.0 (L) %      .4 (H) fL      MCH 34.0 (H) pg      MCHC 33.5 g/dL      RDW 13.5 %      RDW-SD 49.6 fl      MPV 10.2 fL      Platelets 134 (L) 10*3/mm3      Neutrophil % 91.8 (H) %      Lymphocyte % 5.2 (L) %      Monocyte % 1.4 (L) %      Eosinophil % 1.2 %      Basophil % 0.0 %      Immature Grans % 0.4 %      Neutrophils, Absolute 4.73 10*3/mm3      Lymphocytes, Absolute 0.27 (L) 10*3/mm3      Monocytes, Absolute 0.07 (L) 10*3/mm3      Eosinophils, Absolute 0.06 10*3/mm3      Basophils, Absolute 0.00 10*3/mm3      Immature  Grans, Absolute 0.02 10*3/mm3     Lactic Acid, Plasma [[id number]]  (Abnormal) Collected:  06/22/17 1130    Specimen:  Blood Updated:  06/22/17 1204     Lactate 4.7 (C) mmol/L     Comprehensive Metabolic Panel [[id number]]  (Abnormal) Collected:  06/22/17 1130    Specimen:  Blood Updated:  06/22/17 1206     Glucose 96 mg/dL      BUN 14 mg/dL      Creatinine 1.90 (H) mg/dL      Sodium 143 mmol/L      Potassium 3.5 mmol/L      Chloride 103 mmol/L      CO2 22.8 mmol/L      Calcium 8.5 (L) mg/dL      Total Protein 6.0 g/dL      Albumin 3.40 (L) g/dL      ALT (SGPT) 23 U/L      AST (SGOT) 29 U/L      Alkaline Phosphatase 61 U/L      Total Bilirubin 0.3 mg/dL      eGFR Non African Amer 35 (L) mL/min/1.73      Globulin 2.6 gm/dL      A/G Ratio 1.3 g/dL      BUN/Creatinine Ratio 7.4     Anion Gap 17.2 mmol/L     Narrative:       The MDRD GFR formula is only valid for adults with stable renal function between ages 18 and 70.    Troponin [[id number]]  (Normal) Collected:  06/22/17 1130    Specimen:  Blood Updated:  06/22/17 1206     Troponin T <0.010 ng/mL     Narrative:       Troponin T Reference Ranges:  Less than 0.03 ng/mL:    Negative for AMI  0.03 to 0.09 ng/mL:      Indeterminant for AMI  Greater than 0.09 ng/mL: Positive for AMI    Magnesium [[id number]]  (Normal) Collected:  06/22/17 1130    Specimen:  Blood Updated:  06/22/17 1206     Magnesium 2.0 mg/dL     [place] Draw [[id number]] Collected:  06/22/17 1130    Specimen:  Blood Updated:  06/22/17 1301    Narrative:       The following orders were created for panel order [place] Draw.  Procedure                               Abnormality         Status                     ---------                               -----------         ------                     Light Blue Top[[id number]]                                   Final result               Green Top (Gel)[[id number]]                                  Final result               Lavender Top[[id number]]                                      Final result               Gold Top - SST[[id number]]                                   Final result                 Please view results for these tests on the individual orders.    Light Blue Top [[id number]] Collected:  06/22/17 1130    Specimen:  Blood Updated:  06/22/17 1301     Extra Tube hold for add-on      Auto resulted       Green Top (Gel) [[id number]] Collected:  06/22/17 1130    Specimen:  Blood Updated:  06/22/17 1301     Extra Tube Hold for add-ons.      Auto resulted.       Lavender Top [[id number]] Collected:  06/22/17 1130    Specimen:  Blood Updated:  06/22/17 1301     Extra Tube hold for add-on      Auto resulted       Gold Top - SST [[id number]] Collected:  06/22/17 1130    Specimen:  Blood Updated:  06/22/17 1301     Extra Tube Hold for add-ons.      Auto resulted.       Urinalysis With / Culture If Indicated [[id number]]  (Abnormal) Collected:  06/22/17 1254    Specimen:  Urine from Urine, Clean Catch Updated:  06/22/17 1340     Color, UA Dark Yellow (A)     Appearance, UA Cloudy (A)     pH, UA <=5.0     Specific Gravity, UA 1.020     Glucose, UA Negative     Ketones, UA Trace (A)     Bilirubin, UA Negative     Blood, UA Negative     Protein,  mg/dL (2+) (A)     Leuk Esterase, UA Trace (A)     Nitrite, UA Negative     Urobilinogen, UA 0.2 E.U./dL    Urinalysis, Microscopic Only [[id number]]  (Abnormal) Collected:  06/22/17 1254    Specimen:  Urine from Urine, Clean Catch Updated:  06/22/17 1353     RBC, UA 0-2 /HPF      WBC, UA 3-5 (A) /HPF      Bacteria, UA 1+ (A) /HPF      Squamous Epithelial Cells, UA 3-6 (A) /HPF      Transitional Epithelial Cells, UA 3-6 (A) /HPF      Hyaline Casts, UA 7-12 /LPF      Uric Acid Crystals, UA Small/1+ /HPF      Methodology Manual Light Microscopy    Lactate Acid, Reflex [[id number]]  (Abnormal) Collected:  06/22/17 1328    Specimen:  Blood Updated:  06/22/17 1358     Lactate 4.1 (C) mmol/L     Procalcitonin [[id number]]  (Abnormal) Collected:   "06/22/17 1614    Specimen:  Blood Updated:  06/22/17 1728     Procalcitonin 10.00 (C) ng/mL     Narrative:       As a Marker for Sepsis (Non-Neonates):   1. <0.5 ng/mL represents a low risk of severe sepsis and/or septic shock.  1. >2 ng/mL represents a high risk of severe sepsis and/or septic shock.    As a Marker for Lower Respiratory Tract Infections that require antibiotic therapy:  PCT on Admission     Antibiotic Therapy             6-12 Hrs later  > 0.5                Strongly Recommended            >0.25 - <0.5         Recommended  0.1 - 0.25           Discouraged                   Remeasure/reassess PCT  <0.1                 Strongly Discouraged          Remeasure/reassess PCT      As 28 day mortality risk marker: \"Change in Procalcitonin Result\" (> 80 % or <=80 %) if Day 0 (or Day 1) and Day 4 values are available. Refer to http://www.[email or web address]calculator[facility]com/   Change in PCT <=80 %   A decrease of PCT levels below or equal to 80 % defines a positive change in PCT test result representing a higher risk for 28-day all-cause mortality of patients diagnosed with severe sepsis or septic shock.  Change in PCT > 80 %   A decrease of PCT levels of more than 80 % defines a negative change in PCT result representing a lower risk for 28-day all-cause mortality of patients diagnosed with severe sepsis or septic shock.                POC Glucose Fingerstick [[id number]]  (Abnormal) Collected:  06/22/17 2010    Specimen:  Blood Updated:  06/22/17 2012     Glucose 177 (H) mg/dL     Narrative:       Meter: [id number] : [id number] Cuong Wray    Lactic Acid, Plasma [[id number]]  (Abnormal) Collected:  06/23/17 0010    Specimen:  Blood Updated:  06/23/17 0050     Lactate 4.7 (C) mmol/L     Blood Culture [[id number]]  (Normal) Collected:  06/22/17 1529    Specimen:  Blood from Arm, Left Updated:  06/23/17 0401     Blood Culture No growth at less than 24 hours    POC Glucose Fingerstick [[id number]]  (Abnormal) Collected:  " 06/23/17 0636    Specimen:  Blood Updated:  06/23/17 0637     Glucose 179 (H) mg/dL     Narrative:       Meter: [id number] : [id number] Cuong Wray    Hemoglobin A1c [[id number]]  (Abnormal) Collected:  06/23/17 0610    Specimen:  Blood Updated:  06/23/17 0654     Hemoglobin A1C 6.99 (H) %     Narrative:       Hemoglobin A1C Ranges:    Increased Risk for Diabetes  5.7% to 6.4%  Diabetes                     >= 6.5%  Diabetic Goal                < 7.0%    Lactic Acid, Plasma [[id number]]  (Abnormal) Collected:  06/23/17 0610    Specimen:  Blood Updated:  06/23/17 0656     Lactate 3.0 (C) mmol/L     CBC (No Diff) [[id number]]  (Abnormal) Collected:  06/23/17 0610    Specimen:  Blood Updated:  06/23/17 0658     WBC 12.42 (H) 10*3/mm3      RBC 3.48 (L) 10*6/mm3      Hemoglobin 11.9 (L) g/dL      Hematocrit 34.4 (L) %      MCV 98.9 (H) fL      MCH 34.2 (H) pg      MCHC 34.6 g/dL      RDW 13.7 %      RDW-SD 48.8 fl      MPV 10.3 fL      Platelets 140 10*3/mm3     Comprehensive Metabolic Panel [[id number]]  (Abnormal) Collected:  06/23/17 0610    Specimen:  Blood Updated:  06/23/17 0706     Glucose 212 (H) mg/dL      BUN 23 mg/dL      Creatinine 1.68 (H) mg/dL      Sodium 137 mmol/L      Potassium 4.3 mmol/L      Chloride 104 mmol/L      CO2 16.6 (L) mmol/L      Calcium 7.7 (L) mg/dL      Total Protein 6.0 g/dL      Albumin 3.20 (L) g/dL      ALT (SGPT) 15 U/L      AST (SGOT) 19 U/L      Alkaline Phosphatase 51 U/L      Total Bilirubin 0.3 mg/dL      eGFR Non African Amer 40 (L) mL/min/1.73      Globulin 2.8 gm/dL      A/G Ratio 1.1 g/dL      BUN/Creatinine Ratio 13.7     Anion Gap 16.4 mmol/L     Narrative:       The MDRD GFR formula is only valid for adults with stable renal function between ages 18 and 70.    TSH [[id number]]  (Normal) Collected:  06/23/17 0610    Specimen:  Blood Updated:  06/23/17 0716     TSH 1.130 mIU/mL     Urine Culture [[id number]] Collected:  06/22/17 1254    Specimen:  Urine from Urine, Clean  Catch Updated:  06/23/17 0846     Urine Culture >100,000 CFU/mL Mixed Culture    Narrative:         Specimen contains mixed organisms of questionable pathogenicity which indicates contamination with commensal josey.  Further identification is unlikely to provide clinically useful information.  Suggest recollection.    POC Glucose Fingerstick [[id number]]  (Abnormal) Collected:  06/23/17 1100    Specimen:  Blood Updated:  06/23/17 1101     Glucose 219 (H) mg/dL     Narrative:       Meter: [id number] : [id number] Sally Gauthier    Blood Culture [[id number]]  (Normal) Collected:  06/22/17 1327    Specimen:  Blood from Blood, Venous Line Updated:  06/23/17 1401     Blood Culture No growth at 24 hours    CBC & Differential [[id number]] Collected:  06/23/17 0610    Specimen:  Blood Updated:  06/23/17 1528    Narrative:       The following orders were created for panel order CBC & Differential.  Procedure                               Abnormality         Status                     ---------                               -----------         ------                     CBC Auto Differential[[id number]]                            In process                   Please view results for these tests on the individual orders.    CBC Auto Differential [[id number]] Collected:  06/23/17 0610    Specimen:  Blood Updated:  06/23/17 1528          Imaging Results (last 72 hours)     Procedure Component Value Units Date/Time    CT Abdomen Pelvis Without Contrast [[id number]] Collected:  06/22/17 1246     Updated:  06/22/17 1258    Narrative:       CT ABDOMEN AND PELVIS WITHOUT CONTRAST     HISTORY: Septic, low blood pressure, increased creatinine     TECHNIQUE: Axial CT images of the abdomen and pelvis were obtained  without administration of intravenous and oral contrast. Coronal and  sagittal reformats were obtained.     COMPARISON: CT angiogram of the abdomen and pelvis from 09/11/2015.     FINDINGS: There is a prominent 1.2 cm subcarinal  lymph node present. No  pleural or pericardial effusion is identified. Bilateral dependent  atelectasis is seen. Calcified coronary artery disease is seen.     Diffuse hepatic steatosis is present. The gallbladder is surgically  absent. The spleen is normal in size and attenuation. The pancreas is  normal without ductal dilatation. Bilateral adrenal glands are normal.  Both kidneys are normal in size and attenuation. There is bilateral  perinephric stranding present. There is a partly exophytic cyst arising  from the lower pole of the right kidney that demonstrates internal  attenuation values around 11 Hounsfield units.     There is bilateral moderate perinephric stranding. No renal calculi or  hydronephrosis. Bilateral ureters demonstrate normal caliber. The  urinary bladder is minimally distended limiting evaluation. The small  and large bowel loops demonstrate normal caliber. There has been stent  graft repair for an abdominal aortic aneurysm which currently measures  6.2 x 5.7 cm in comparison to prior measurement of 6.8 x 6.7 cm  previously. No ascites is present. Degenerative disc disease is  identified within the spine. There is 3 mm retrolisthesis of L5 on S1.  Marked degenerative disc disease is seen at L2-3 level.Small  fat-containing umbilical hernia is present.       Impression:       1.   Bilateral moderate perinephric stranding. At this time, the  possibilities would include bilateral acute pyelonephritis,  acute  tubular necrosis or other cause of acute renal insufficiency  2. Diffuse hepatic steatosis.  3.  Mildly prominent subcarinal lymph node measuring up to 1.2 cm. A  followup CT chest is recommended on a nonemergent basis to further  evaluate.     These findings were discussed with Dr. Templeton by telephone at 12:43 PM  on 06/22/2017.     This report was finalized on 6/22/2017 12:55 PM by Dr. Jordon Polanco MD.       XR Chest 1 View [[id number]] Collected:  06/22/17 1231     Updated:  06/23/17  "1320    Narrative:       PORTABLE CHEST     HISTORY:  Weakness, dizziness.     A single view of the chest demonstrates moderate cardiomegaly. There is  no evidence of focal infiltrate, effusion or congestive failure.     This report was finalized on 6/23/2017 1:17 PM by Dr. Jones Richard MD.             Assessment/Plan     Active Hospital Problems (** Indicates Principal Problem)    Diagnosis Date Noted   • Acute renal failure [N17.9] 06/23/2017   • Adrenal insufficiency [E27.40] 05/17/2016      Resolved Hospital Problems    Diagnosis Date Noted Date Resolved   No resolved problems to display.     Primary adrenal insufficiency  Patient's blood pressure is in decent range and his electrolytes sodium, potassium are within normal limits.  Decrease IV hydrocortisone to 100 mg IV every 6 hours.  Will hold Florinef for now as IV hydrocortisone does have mineralocorticoid effect.  Will check ACTH levels.      Type 2 diabetes mellitus  LhV8o-8.99%  Will start patient on levemir 20 units at bedtime  Continue NovoLog sliding scale 3 times a day before meals and at bedtime  Blood sugars are elevated due to IV steroids.    Hypothyroidism  TSH-1.33  Continue levothyroxine 150 µg oral daily.    Sepsis -   IV antibiotics per primary team.      Skin rash - Derm consulted.     60 minutes out of 110 minutes face to face spent on floor managing care and counseling patient/family on diagnoses, treatment plan and side effects.    Zeb Ni MD.  06/23/17  3:37 PM        EM[facility] Dragon / transcription disclaimer:    \"Dictated utilizing Dragon dictation\".   "

## 2017-06-24 ENCOUNTER — APPOINTMENT (OUTPATIENT)
Dept: CT IMAGING | Facility: HOSPITAL | Age: 77
End: 2017-06-24

## 2017-06-24 ENCOUNTER — APPOINTMENT (OUTPATIENT)
Dept: CARDIOLOGY | Facility: HOSPITAL | Age: 77
End: 2017-06-24
Attending: INTERNAL MEDICINE

## 2017-06-24 PROBLEM — I95.9 HYPOTENSION: Status: ACTIVE | Noted: 2017-06-24

## 2017-06-24 PROBLEM — J15.9 COMMUNITY ACQUIRED BACTERIAL PNEUMONIA: Status: ACTIVE | Noted: 2017-06-24

## 2017-06-24 PROBLEM — R11.2 NAUSEA & VOMITING: Status: ACTIVE | Noted: 2017-06-24

## 2017-06-24 PROBLEM — A41.9 SEPSIS (HCC): Status: ACTIVE | Noted: 2017-06-24

## 2017-06-24 PROBLEM — I76 SEPTIC EMBOLISM (HCC): Status: ACTIVE | Noted: 2017-06-24

## 2017-06-24 LAB
ANION GAP SERPL CALCULATED.3IONS-SCNC: 12.7 MMOL/L
ASCENDING AORTA: 4 CM
B PERT DNA SPEC QL NAA+PROBE: NOT DETECTED
BACTERIA UR QL AUTO: NORMAL /HPF
BASOPHILS # BLD AUTO: 0 10*3/MM3 (ref 0–0.2)
BASOPHILS NFR BLD AUTO: 0 % (ref 0–1.5)
BH CV ECHO MEAS - ACS: 1.9 CM
BH CV ECHO MEAS - AO MAX PG (FULL): 0.94 MMHG
BH CV ECHO MEAS - AO MAX PG: 4.1 MMHG
BH CV ECHO MEAS - AO MEAN PG (FULL): 1 MMHG
BH CV ECHO MEAS - AO MEAN PG: 2 MMHG
BH CV ECHO MEAS - AO ROOT AREA (BSA CORRECTED): 1.5
BH CV ECHO MEAS - AO ROOT AREA: 9.1 CM^2
BH CV ECHO MEAS - AO ROOT DIAM: 3.4 CM
BH CV ECHO MEAS - AO V2 MAX: 101 CM/SEC
BH CV ECHO MEAS - AO V2 MEAN: 63.6 CM/SEC
BH CV ECHO MEAS - AO V2 VTI: 24.5 CM
BH CV ECHO MEAS - ASC AORTA: 4 CM
BH CV ECHO MEAS - AVA(I,A): 2.6 CM^2
BH CV ECHO MEAS - AVA(I,D): 2.6 CM^2
BH CV ECHO MEAS - AVA(V,A): 2.8 CM^2
BH CV ECHO MEAS - AVA(V,D): 2.8 CM^2
BH CV ECHO MEAS - BSA(HAYCOCK): 2.3 M^2
BH CV ECHO MEAS - BSA: 2.2 M^2
BH CV ECHO MEAS - BZI_BMI: 30.2 KILOGRAMS/M^2
BH CV ECHO MEAS - BZI_METRIC_HEIGHT: 182.9 CM
BH CV ECHO MEAS - BZI_METRIC_WEIGHT: 101.2 KG
BH CV ECHO MEAS - CONTRAST EF (2CH): 49.1 ML/M^2
BH CV ECHO MEAS - CONTRAST EF 4CH: 50.6 ML/M^2
BH CV ECHO MEAS - EDV(CUBED): 140.6 ML
BH CV ECHO MEAS - EDV(MOD-SP2): 175 ML
BH CV ECHO MEAS - EDV(MOD-SP4): 170 ML
BH CV ECHO MEAS - EDV(TEICH): 129.5 ML
BH CV ECHO MEAS - EF(CUBED): 30.8 %
BH CV ECHO MEAS - EF(MOD-SP2): 49.1 %
BH CV ECHO MEAS - EF(MOD-SP4): 45 %
BH CV ECHO MEAS - EF(TEICH): 24.8 %
BH CV ECHO MEAS - ESV(CUBED): 97.3 ML
BH CV ECHO MEAS - ESV(MOD-SP2): 89 ML
BH CV ECHO MEAS - ESV(MOD-SP4): 84 ML
BH CV ECHO MEAS - ESV(TEICH): 97.3 ML
BH CV ECHO MEAS - FS: 11.5 %
BH CV ECHO MEAS - IVS/LVPW: 0.92
BH CV ECHO MEAS - IVSD: 1.1 CM
BH CV ECHO MEAS - LAT PEAK E' VEL: 10 CM/SEC
BH CV ECHO MEAS - LV DIASTOLIC VOL/BSA (35-75): 76.2 ML/M^2
BH CV ECHO MEAS - LV MASS(C)D: 234.6 GRAMS
BH CV ECHO MEAS - LV MASS(C)DI: 105.1 GRAMS/M^2
BH CV ECHO MEAS - LV MAX PG: 3.1 MMHG
BH CV ECHO MEAS - LV MEAN PG: 1 MMHG
BH CV ECHO MEAS - LV SYSTOLIC VOL/BSA (12-30): 37.6 ML/M^2
BH CV ECHO MEAS - LV V1 MAX: 88.6 CM/SEC
BH CV ECHO MEAS - LV V1 MEAN: 52.9 CM/SEC
BH CV ECHO MEAS - LV V1 VTI: 20.4 CM
BH CV ECHO MEAS - LVIDD: 5.2 CM
BH CV ECHO MEAS - LVIDS: 4.6 CM
BH CV ECHO MEAS - LVLD AP2: 9.2 CM
BH CV ECHO MEAS - LVLD AP4: 9.5 CM
BH CV ECHO MEAS - LVLS AP2: 8 CM
BH CV ECHO MEAS - LVLS AP4: 8.3 CM
BH CV ECHO MEAS - LVOT AREA (M): 3.1 CM^2
BH CV ECHO MEAS - LVOT AREA: 3.1 CM^2
BH CV ECHO MEAS - LVOT DIAM: 2 CM
BH CV ECHO MEAS - LVPWD: 1.2 CM
BH CV ECHO MEAS - MED PEAK E' VEL: 8 CM/SEC
BH CV ECHO MEAS - MR MAX PG: 145.9 MMHG
BH CV ECHO MEAS - MR MAX VEL: 604 CM/SEC
BH CV ECHO MEAS - MV A DUR: 0.23 SEC
BH CV ECHO MEAS - MV A MAX VEL: 34.7 CM/SEC
BH CV ECHO MEAS - MV DEC SLOPE: 192 CM/SEC^2
BH CV ECHO MEAS - MV DEC TIME: 0.35 SEC
BH CV ECHO MEAS - MV E MAX VEL: 60.8 CM/SEC
BH CV ECHO MEAS - MV E/A: 1.8
BH CV ECHO MEAS - MV MAX PG: 2.4 MMHG
BH CV ECHO MEAS - MV MEAN PG: 1 MMHG
BH CV ECHO MEAS - MV P1/2T MAX VEL: 78.2 CM/SEC
BH CV ECHO MEAS - MV P1/2T: 119.3 MSEC
BH CV ECHO MEAS - MV V2 MAX: 76.7 CM/SEC
BH CV ECHO MEAS - MV V2 MEAN: 39.8 CM/SEC
BH CV ECHO MEAS - MV V2 VTI: 32.3 CM
BH CV ECHO MEAS - MVA P1/2T LCG: 2.8 CM^2
BH CV ECHO MEAS - MVA(P1/2T): 1.8 CM^2
BH CV ECHO MEAS - MVA(VTI): 2 CM^2
BH CV ECHO MEAS - PA ACC TIME: 0.14 SEC
BH CV ECHO MEAS - PA MAX PG (FULL): 1.3 MMHG
BH CV ECHO MEAS - PA MAX PG: 2.1 MMHG
BH CV ECHO MEAS - PA PR(ACCEL): 14.2 MMHG
BH CV ECHO MEAS - PA V2 MAX: 73.3 CM/SEC
BH CV ECHO MEAS - PI END-D VEL: 96.4 CM/SEC
BH CV ECHO MEAS - PULM A REVS DUR: 0.11 SEC
BH CV ECHO MEAS - PULM A REVS VEL: 30.3 CM/SEC
BH CV ECHO MEAS - PULM DIAS VEL: 21.1 CM/SEC
BH CV ECHO MEAS - PULM S/D: 2.2
BH CV ECHO MEAS - PULM SYS VEL: 45.9 CM/SEC
BH CV ECHO MEAS - PVA(V,A): 2.4 CM^2
BH CV ECHO MEAS - PVA(V,D): 2.4 CM^2
BH CV ECHO MEAS - QP/QS: 0.78
BH CV ECHO MEAS - RAP SYSTOLE: 15 MMHG
BH CV ECHO MEAS - RV MAX PG: 0.85 MMHG
BH CV ECHO MEAS - RV MEAN PG: 0 MMHG
BH CV ECHO MEAS - RV V1 MAX: 46 CM/SEC
BH CV ECHO MEAS - RV V1 MEAN: 32.9 CM/SEC
BH CV ECHO MEAS - RV V1 VTI: 13.1 CM
BH CV ECHO MEAS - RVOT AREA: 3.8 CM^2
BH CV ECHO MEAS - RVOT DIAM: 2.2 CM
BH CV ECHO MEAS - RVSP: 52.9 MMHG
BH CV ECHO MEAS - SI(AO): 99.7 ML/M^2
BH CV ECHO MEAS - SI(CUBED): 19.4 ML/M^2
BH CV ECHO MEAS - SI(LVOT): 28.7 ML/M^2
BH CV ECHO MEAS - SI(MOD-SP2): 38.5 ML/M^2
BH CV ECHO MEAS - SI(MOD-SP4): 38.5 ML/M^2
BH CV ECHO MEAS - SI(TEICH): 14.4 ML/M^2
BH CV ECHO MEAS - SUP REN AO DIAM: 2.4 CM
BH CV ECHO MEAS - SV(AO): 222.4 ML
BH CV ECHO MEAS - SV(CUBED): 43.3 ML
BH CV ECHO MEAS - SV(LVOT): 64.1 ML
BH CV ECHO MEAS - SV(MOD-SP2): 86 ML
BH CV ECHO MEAS - SV(MOD-SP4): 86 ML
BH CV ECHO MEAS - SV(RVOT): 49.8 ML
BH CV ECHO MEAS - SV(TEICH): 32.2 ML
BH CV ECHO MEAS - TAPSE (>1.6): 2.4 CM2
BH CV ECHO MEAS - TR MAX VEL: 308 CM/SEC
BH CV VAS BP RIGHT ARM: NORMAL MMHG
BH CV XLRA - RV BASE: 3.4 CM
BH CV XLRA - TDI S': 7 CM/SEC
BILIRUB UR QL STRIP: NEGATIVE
BUN BLD-MCNC: 22 MG/DL (ref 8–23)
BUN/CREAT SERPL: 18.6 (ref 7–25)
C PNEUM DNA NPH QL NAA+NON-PROBE: NOT DETECTED
CALCIUM SPEC-SCNC: 8 MG/DL (ref 8.6–10.5)
CHLORIDE SERPL-SCNC: 107 MMOL/L (ref 98–107)
CLARITY UR: CLEAR
CO2 SERPL-SCNC: 19.3 MMOL/L (ref 22–29)
COLOR UR: YELLOW
CREAT BLD-MCNC: 1.18 MG/DL (ref 0.76–1.27)
D-LACTATE SERPL-SCNC: 1.5 MMOL/L (ref 0.5–2)
DEPRECATED RDW RBC AUTO: 49.7 FL (ref 37–54)
E/E' RATIO: 7
EOSINOPHIL # BLD AUTO: 0.01 10*3/MM3 (ref 0–0.7)
EOSINOPHIL NFR BLD AUTO: 0.1 % (ref 0.3–6.2)
ERYTHROCYTE [DISTWIDTH] IN BLOOD BY AUTOMATED COUNT: 13.7 % (ref 11.5–14.5)
FLUAV H1 2009 PAND RNA NPH QL NAA+PROBE: NOT DETECTED
FLUAV H1 HA GENE NPH QL NAA+PROBE: NOT DETECTED
FLUAV H3 RNA NPH QL NAA+PROBE: NOT DETECTED
FLUAV SUBTYP SPEC NAA+PROBE: NOT DETECTED
FLUBV RNA ISLT QL NAA+PROBE: NOT DETECTED
GFR SERPL CREATININE-BSD FRML MDRD: 60 ML/MIN/1.73
GLUCOSE BLD-MCNC: 160 MG/DL (ref 65–99)
GLUCOSE BLDC GLUCOMTR-MCNC: 139 MG/DL (ref 70–130)
GLUCOSE BLDC GLUCOMTR-MCNC: 179 MG/DL (ref 70–130)
GLUCOSE BLDC GLUCOMTR-MCNC: 234 MG/DL (ref 70–130)
GLUCOSE BLDC GLUCOMTR-MCNC: 236 MG/DL (ref 70–130)
GLUCOSE UR STRIP-MCNC: ABNORMAL MG/DL
HADV DNA SPEC NAA+PROBE: NOT DETECTED
HCOV 229E RNA SPEC QL NAA+PROBE: NOT DETECTED
HCOV HKU1 RNA SPEC QL NAA+PROBE: NOT DETECTED
HCOV NL63 RNA SPEC QL NAA+PROBE: NOT DETECTED
HCOV OC43 RNA SPEC QL NAA+PROBE: NOT DETECTED
HCT VFR BLD AUTO: 35.1 % (ref 40.4–52.2)
HGB BLD-MCNC: 11.9 G/DL (ref 13.7–17.6)
HGB UR QL STRIP.AUTO: ABNORMAL
HMPV RNA NPH QL NAA+NON-PROBE: NOT DETECTED
HPIV1 RNA SPEC QL NAA+PROBE: NOT DETECTED
HPIV2 RNA SPEC QL NAA+PROBE: NOT DETECTED
HPIV3 RNA NPH QL NAA+PROBE: NOT DETECTED
HPIV4 P GENE NPH QL NAA+PROBE: NOT DETECTED
HYALINE CASTS UR QL AUTO: NORMAL /LPF
IMM GRANULOCYTES # BLD: 0.05 10*3/MM3 (ref 0–0.03)
IMM GRANULOCYTES NFR BLD: 0.4 % (ref 0–0.5)
KETONES UR QL STRIP: NEGATIVE
L PNEUMO1 AG UR QL IA: NEGATIVE
LEFT ATRIUM VOLUME INDEX: 59 ML/M2
LEUKOCYTE ESTERASE UR QL STRIP.AUTO: NEGATIVE
LYMPHOCYTES # BLD AUTO: 0.67 10*3/MM3 (ref 0.9–4.8)
LYMPHOCYTES NFR BLD AUTO: 5.8 % (ref 19.6–45.3)
M PNEUMO IGG SER IA-ACNC: NOT DETECTED
MCH RBC QN AUTO: 34 PG (ref 27–32.7)
MCHC RBC AUTO-ENTMCNC: 33.9 G/DL (ref 32.6–36.4)
MCV RBC AUTO: 100.3 FL (ref 79.8–96.2)
MONOCYTES # BLD AUTO: 0.76 10*3/MM3 (ref 0.2–1.2)
MONOCYTES NFR BLD AUTO: 6.6 % (ref 5–12)
NEUTROPHILS # BLD AUTO: 10.11 10*3/MM3 (ref 1.9–8.1)
NEUTROPHILS NFR BLD AUTO: 87.1 % (ref 42.7–76)
NITRITE UR QL STRIP: NEGATIVE
NT-PROBNP SERPL-MCNC: 3978 PG/ML (ref 0–1800)
PH UR STRIP.AUTO: <=5 [PH] (ref 5–8)
PLATELET # BLD AUTO: 147 10*3/MM3 (ref 140–500)
PMV BLD AUTO: 10.9 FL (ref 6–12)
POTASSIUM BLD-SCNC: 3.8 MMOL/L (ref 3.5–5.2)
PROCALCITONIN SERPL-MCNC: 2.17 NG/ML (ref 0.1–0.25)
PROT UR QL STRIP: ABNORMAL
RBC # BLD AUTO: 3.5 10*6/MM3 (ref 4.6–6)
RBC # UR: NORMAL /HPF
REF LAB TEST METHOD: NORMAL
RHINOVIRUS RNA SPEC NAA+PROBE: NOT DETECTED
RSV RNA NPH QL NAA+NON-PROBE: NOT DETECTED
S PNEUM AG SPEC QL LA: NEGATIVE
SODIUM BLD-SCNC: 139 MMOL/L (ref 136–145)
SP GR UR STRIP: >=1.03 (ref 1–1.03)
SQUAMOUS #/AREA URNS HPF: NORMAL /HPF
URATE SERPL-MCNC: 7.5 MG/DL (ref 3.4–7)
UROBILINOGEN UR QL STRIP: ABNORMAL
WBC NRBC COR # BLD: 11.6 10*3/MM3 (ref 4.5–10.7)
WBC UR QL AUTO: NORMAL /HPF

## 2017-06-24 PROCEDURE — 85025 COMPLETE CBC W/AUTO DIFF WBC: CPT | Performed by: INTERNAL MEDICINE

## 2017-06-24 PROCEDURE — 94799 UNLISTED PULMONARY SVC/PX: CPT

## 2017-06-24 PROCEDURE — 87486 CHLMYD PNEUM DNA AMP PROBE: CPT | Performed by: INTERNAL MEDICINE

## 2017-06-24 PROCEDURE — 93306 TTE W/DOPPLER COMPLETE: CPT | Performed by: INTERNAL MEDICINE

## 2017-06-24 PROCEDURE — 84145 PROCALCITONIN (PCT): CPT | Performed by: INTERNAL MEDICINE

## 2017-06-24 PROCEDURE — 99233 SBSQ HOSP IP/OBS HIGH 50: CPT | Performed by: INTERNAL MEDICINE

## 2017-06-24 PROCEDURE — 83880 ASSAY OF NATRIURETIC PEPTIDE: CPT | Performed by: INTERNAL MEDICINE

## 2017-06-24 PROCEDURE — 87633 RESP VIRUS 12-25 TARGETS: CPT | Performed by: INTERNAL MEDICINE

## 2017-06-24 PROCEDURE — 63710000001 INSULIN DETEMER PER 5 UNITS: Performed by: INTERNAL MEDICINE

## 2017-06-24 PROCEDURE — 87449 NOS EACH ORGANISM AG IA: CPT | Performed by: INTERNAL MEDICINE

## 2017-06-24 PROCEDURE — 83605 ASSAY OF LACTIC ACID: CPT | Performed by: INTERNAL MEDICINE

## 2017-06-24 PROCEDURE — 63710000001 INSULIN ASPART PER 5 UNITS: Performed by: INTERNAL MEDICINE

## 2017-06-24 PROCEDURE — 87081 CULTURE SCREEN ONLY: CPT | Performed by: INTERNAL MEDICINE

## 2017-06-24 PROCEDURE — 0 IOPAMIDOL PER 1 ML: Performed by: INTERNAL MEDICINE

## 2017-06-24 PROCEDURE — 25010000002 HYDROCORTISONE SODIUM SUCCINATE 100 MG RECONSTITUTED SOLUTION: Performed by: INTERNAL MEDICINE

## 2017-06-24 PROCEDURE — 25010000002 HEPARIN (PORCINE) PER 1000 UNITS: Performed by: INTERNAL MEDICINE

## 2017-06-24 PROCEDURE — 93306 TTE W/DOPPLER COMPLETE: CPT

## 2017-06-24 PROCEDURE — 84550 ASSAY OF BLOOD/URIC ACID: CPT | Performed by: INTERNAL MEDICINE

## 2017-06-24 PROCEDURE — 87581 M.PNEUMON DNA AMP PROBE: CPT | Performed by: INTERNAL MEDICINE

## 2017-06-24 PROCEDURE — 25010000002 CEFEPIME: Performed by: INTERNAL MEDICINE

## 2017-06-24 PROCEDURE — 87899 AGENT NOS ASSAY W/OPTIC: CPT | Performed by: INTERNAL MEDICINE

## 2017-06-24 PROCEDURE — 25010000002 VANCOMYCIN PER 500 MG: Performed by: INTERNAL MEDICINE

## 2017-06-24 PROCEDURE — 71275 CT ANGIOGRAPHY CHEST: CPT

## 2017-06-24 PROCEDURE — 25010000002 VANCOMYCIN: Performed by: INTERNAL MEDICINE

## 2017-06-24 PROCEDURE — 82962 GLUCOSE BLOOD TEST: CPT

## 2017-06-24 PROCEDURE — 87086 URINE CULTURE/COLONY COUNT: CPT | Performed by: INTERNAL MEDICINE

## 2017-06-24 PROCEDURE — 87798 DETECT AGENT NOS DNA AMP: CPT | Performed by: INTERNAL MEDICINE

## 2017-06-24 PROCEDURE — 94640 AIRWAY INHALATION TREATMENT: CPT

## 2017-06-24 PROCEDURE — 80048 BASIC METABOLIC PNL TOTAL CA: CPT | Performed by: INTERNAL MEDICINE

## 2017-06-24 PROCEDURE — 81001 URINALYSIS AUTO W/SCOPE: CPT | Performed by: INTERNAL MEDICINE

## 2017-06-24 RX ORDER — VANCOMYCIN HYDROCHLORIDE 1 G/200ML
1000 INJECTION, SOLUTION INTRAVENOUS EVERY 12 HOURS
Status: DISCONTINUED | OUTPATIENT
Start: 2017-06-24 | End: 2017-06-25

## 2017-06-24 RX ORDER — FUROSEMIDE 20 MG/1
20 TABLET ORAL ONCE
Status: COMPLETED | OUTPATIENT
Start: 2017-06-24 | End: 2017-06-24

## 2017-06-24 RX ORDER — IPRATROPIUM BROMIDE AND ALBUTEROL SULFATE 2.5; .5 MG/3ML; MG/3ML
3 SOLUTION RESPIRATORY (INHALATION)
Status: DISCONTINUED | OUTPATIENT
Start: 2017-06-24 | End: 2017-06-25

## 2017-06-24 RX ORDER — ALBUTEROL SULFATE 2.5 MG/3ML
2.5 SOLUTION RESPIRATORY (INHALATION) EVERY 6 HOURS PRN
Status: DISCONTINUED | OUTPATIENT
Start: 2017-06-24 | End: 2017-06-26 | Stop reason: HOSPADM

## 2017-06-24 RX ORDER — TEMAZEPAM 15 MG/1
15 CAPSULE ORAL NIGHTLY PRN
Status: DISCONTINUED | OUTPATIENT
Start: 2017-06-24 | End: 2017-06-26 | Stop reason: HOSPADM

## 2017-06-24 RX ORDER — IPRATROPIUM BROMIDE AND ALBUTEROL SULFATE 2.5; .5 MG/3ML; MG/3ML
3 SOLUTION RESPIRATORY (INHALATION) EVERY 4 HOURS PRN
Status: DISCONTINUED | OUTPATIENT
Start: 2017-06-24 | End: 2017-06-26 | Stop reason: HOSPADM

## 2017-06-24 RX ORDER — GUAIFENESIN 600 MG/1
600 TABLET, EXTENDED RELEASE ORAL 2 TIMES DAILY
Status: DISCONTINUED | OUTPATIENT
Start: 2017-06-24 | End: 2017-06-26 | Stop reason: HOSPADM

## 2017-06-24 RX ADMIN — FUROSEMIDE 20 MG: 20 TABLET ORAL at 17:37

## 2017-06-24 RX ADMIN — HEPARIN SODIUM 5000 UNITS: 5000 INJECTION, SOLUTION INTRAVENOUS; SUBCUTANEOUS at 20:39

## 2017-06-24 RX ADMIN — TEMAZEPAM 15 MG: 15 CAPSULE ORAL at 22:17

## 2017-06-24 RX ADMIN — CLOBETASOL PROPIONATE: 0.5 CREAM TOPICAL at 09:00

## 2017-06-24 RX ADMIN — ALBUTEROL SULFATE 2.5 MG: 2.5 SOLUTION RESPIRATORY (INHALATION) at 10:55

## 2017-06-24 RX ADMIN — GABAPENTIN 1200 MG: 400 CAPSULE ORAL at 22:17

## 2017-06-24 RX ADMIN — IPRATROPIUM BROMIDE AND ALBUTEROL SULFATE 3 ML: .5; 3 SOLUTION RESPIRATORY (INHALATION) at 16:15

## 2017-06-24 RX ADMIN — AZITHROMYCIN DIHYDRATE 500 MG: 500 INJECTION, POWDER, LYOPHILIZED, FOR SOLUTION INTRAVENOUS at 17:37

## 2017-06-24 RX ADMIN — CEFEPIME 2 G: 2 INJECTION, POWDER, FOR SOLUTION INTRAVENOUS at 15:45

## 2017-06-24 RX ADMIN — VANCOMYCIN HYDROCHLORIDE 1750 MG: 1 INJECTION, POWDER, LYOPHILIZED, FOR SOLUTION INTRAVENOUS at 05:57

## 2017-06-24 RX ADMIN — INSULIN ASPART 4 UNITS: 100 INJECTION, SOLUTION INTRAVENOUS; SUBCUTANEOUS at 17:37

## 2017-06-24 RX ADMIN — IOPAMIDOL 95 ML: 755 INJECTION, SOLUTION INTRAVENOUS at 13:39

## 2017-06-24 RX ADMIN — HEPARIN SODIUM 5000 UNITS: 5000 INJECTION, SOLUTION INTRAVENOUS; SUBCUTANEOUS at 08:58

## 2017-06-24 RX ADMIN — ATORVASTATIN CALCIUM 40 MG: 20 TABLET, FILM COATED ORAL at 08:59

## 2017-06-24 RX ADMIN — IPRATROPIUM BROMIDE AND ALBUTEROL SULFATE 3 ML: .5; 3 SOLUTION RESPIRATORY (INHALATION) at 20:28

## 2017-06-24 RX ADMIN — HYDROCORTISONE SODIUM SUCCINATE 100 MG: 100 INJECTION, POWDER, FOR SOLUTION INTRAMUSCULAR; INTRAVENOUS at 02:05

## 2017-06-24 RX ADMIN — VANCOMYCIN HYDROCHLORIDE 1000 MG: 1 INJECTION, SOLUTION INTRAVENOUS at 19:21

## 2017-06-24 RX ADMIN — HYDROCORTISONE SODIUM SUCCINATE 100 MG: 100 INJECTION, POWDER, FOR SOLUTION INTRAMUSCULAR; INTRAVENOUS at 08:58

## 2017-06-24 RX ADMIN — INSULIN ASPART 2 UNITS: 100 INJECTION, SOLUTION INTRAVENOUS; SUBCUTANEOUS at 11:50

## 2017-06-24 RX ADMIN — CLOBETASOL PROPIONATE: 0.5 CREAM TOPICAL at 20:40

## 2017-06-24 RX ADMIN — GUAIFENESIN 600 MG: 600 TABLET, EXTENDED RELEASE ORAL at 17:37

## 2017-06-24 RX ADMIN — HYDROCORTISONE SODIUM SUCCINATE 100 MG: 100 INJECTION, POWDER, FOR SOLUTION INTRAMUSCULAR; INTRAVENOUS at 15:45

## 2017-06-24 RX ADMIN — LEVOTHYROXINE SODIUM 150 MCG: 150 TABLET ORAL at 05:57

## 2017-06-24 RX ADMIN — INSULIN DETEMIR 25 UNITS: 100 INJECTION, SOLUTION SUBCUTANEOUS at 20:39

## 2017-06-24 RX ADMIN — HYDROCORTISONE SODIUM SUCCINATE 50 MG: 100 INJECTION, POWDER, FOR SOLUTION INTRAMUSCULAR; INTRAVENOUS at 22:16

## 2017-06-24 RX ADMIN — ZOLPIDEM TARTRATE 5 MG: 5 TABLET, FILM COATED ORAL at 02:01

## 2017-06-24 RX ADMIN — INSULIN ASPART 4 UNITS: 100 INJECTION, SOLUTION INTRAVENOUS; SUBCUTANEOUS at 20:39

## 2017-06-24 RX ADMIN — GABAPENTIN 1200 MG: 400 CAPSULE ORAL at 09:10

## 2017-06-24 RX ADMIN — CYANOCOBALAMIN TAB 500 MCG 500 MCG: 500 TAB at 20:39

## 2017-06-24 RX ADMIN — CEFEPIME 2 G: 2 INJECTION, POWDER, FOR SOLUTION INTRAVENOUS at 02:06

## 2017-06-24 NOTE — PROGRESS NOTES
LOS: 2 days   Patient Care Team:  Yifan Patino MD as PCP - General (Internal Medicine)    Chief Complaint   Patient presents with   • Abdominal Pain     dizzy, hypotension - 125 mg solumedrol and 900 ns ems   • Dizziness         Subjective   Had a bad night. Couldn't sleep from all of the steroids. Also developed SOA. Feels better presently after IVFs were d/c'ed.     Objective     Vital Signs  Temp:  [97.4 °F (36.3 °C)-98.4 °F (36.9 °C)] 97.4 °F (36.3 °C)  Heart Rate:  [42-76] 45  Resp:  [16-20] 20  BP: (106-180)/(66-97) 180/92    Physical Exam:  WDWN WM in NAD  Skin warm & dry  Lungs clear when listening posteriorly but wheezing present in bases when listening anteriorly with pt lying flat  Heart RRR  Abd obese, soft, NT, BS+  Ext no edema     Results Review:     I reviewed the patient's new clinical results.    Medication Review:       LABS    Results from last 7 days  Lab Units 06/24/17  0507 06/23/17  0610 06/22/17  1130   SODIUM mmol/L 139 137 143   POTASSIUM mmol/L 3.8 4.3 3.5   CHLORIDE mmol/L 107 104 103   TOTAL CO2 mmol/L 19.3* 16.6* 22.8   BUN mg/dL 22 23 14   CREATININE mg/dL 1.18 1.68* 1.90*   GLUCOSE mg/dL 160* 212* 96   CALCIUM mg/dL 8.0* 7.7* 8.5*       Results from last 7 days  Lab Units 06/24/17  0507 06/23/17  0610 06/22/17  1130   WBC 10*3/mm3 11.60* 12.40*  12.42* 5.15   HEMOGLOBIN g/dL 11.9* 11.8*  11.9* 12.4*   HEMATOCRIT % 35.1* 35.1*  34.4* 37.0*   PLATELETS 10*3/mm3 147 145  140 134*               Assessment/Plan     Active Problems:    Adrenal insufficiency    Acute renal failure - resolved, will d/c IVFs    Hypotension - resolved. BP high now    Nausea with vomiting -  resolved    Sepsis - chest CT shows multifocal PNA. His current antibx regimen doesn't provide coverage for atypical bacteria. I'd like to be able to change the cefepime to levaquin but this is listed as an allergy. He doesn't recall having this before but I don't want to risk it so I'll add on IV azithromycin.  Will check MRSA screen & if neg will d/c vanc. Will also check urine for Strep pneumo & Legionella Ag. He has started having some cough today & earlier was wheezing.  I've added scheduled & prn mininebs as well.    Volume overload - he has mod right & small left pleural effusions & BNP is up. I'm going to give him a very small dose of lasix but with his ARF when he came in & the hypotension he had, I don't want to risk giving him a bigger dose. Especially with the contrast that he received.    Bradycardia - unusual that he would be bradycardic given the situation but pt reports that every time he comes into the hospital, the staff tells him that he's bradycardic.    Over 45 min spent        Dina Ohara MD  06/24/17  2:55 PM      Time:

## 2017-06-24 NOTE — PLAN OF CARE
Problem: Patient Care Overview (Adult)  Goal: Plan of Care Review  Outcome: Ongoing (interventions implemented as appropriate)    06/24/17 0316   Coping/Psychosocial Response Interventions   Plan Of Care Reviewed With patient   Patient Care Overview   Progress improving   Outcome Evaluation   Outcome Summary/Follow up Plan Patient states he feels better and walked around the nurse's station 3 x's last evening; gave pt ambien to help him rest as he stated the solucortef makes him jittery; no other s/s of distress noted          Problem: Infection, Risk/Actual (Adult)  Goal: Infection Prevention/Resolution  Outcome: Ongoing (interventions implemented as appropriate)    Problem: Sepsis (Adult)  Goal: Signs and Symptoms of Listed Potential Problems Will be Absent or Manageable (Sepsis)  Outcome: Ongoing (interventions implemented as appropriate)

## 2017-06-24 NOTE — PROGRESS NOTES
"Pharmacy to Dose Vancomycin IV    Day 2  Consult for Dr. Adrian  Treating: Sepsis  Goal trough: 15-20 mcg/mL    Current Vancomycin dose: 17 mg/Kg/day    IV Anti-Infectives     Ordered     Dose/Rate Route Frequency Start Stop    06/23/17 0900  vancomycin 1750 mg/500 mL 0.9% NS IVPB ([facility])     Ordering Provider:  Janine Adrian MD    1,750 mg  over 175 Minutes Intravenous Every 24 Hours 06/24/17 0600      06/23/17 0145  cefepime (MAXIPIME) 2 g/100 mL 0.9% NS ([facility])     Ordering Provider:  Janine Adrian MD    2 g Intravenous Every 12 Hours 06/23/17 0230      06/22/17 2340  vancomycin 1500 mg/500 mL 0.9% NS IVPB ([facility])     Ordering Provider:  Janine Adrian MD    15 mg/kg × 94.3 kg Intravenous Once 06/23/17 0200 06/23/17 0434    06/22/17 1412  cefTRIAXone (ROCEPHIN) IVPB 1 g     Ordering Provider:  Henrik Templeton MD    1 g  over 30 Minutes Intravenous Once 06/22/17 1414 06/22/17 1556      Relevant clinical data and objective history reviewed:  77 y.o. male 72\" (182.9 cm) 223 lb 12.8 oz (102 kg)  Body mass index is 30.35 kg/(m^2).    Results from last 7 days  Lab Units 06/24/17  0507 06/23/17  0610 06/22/17  1130   CREATININE mg/dL 1.18 1.68* 1.90*     Estimated Creatinine Clearance: 66.9 mL/min (by C-G formula based on Cr of 1.18).    Lab Results   Component Value Date    WBC 11.60 (H) 06/24/2017    WBC 12.42 (H) 06/23/2017    WBC 12.40 (H) 06/23/2017     Temp:  [97.6 °F (36.4 °C)-98.4 °F (36.9 °C)] 97.9 °F (36.6 °C)  Heart Rate:  [42-76] 42  Resp:  [16-20] 20  BP: (106-155)/(66-97) 155/97    Intake/Output Summary (Last 24 hours) at 06/24/17 1310  Last data filed at 06/24/17 0712   Gross per 24 hour   Intake              220 ml   Output              800 ml   Net             -580 ml     Baseline cultures/labs/radiology:  6/23 Lactate : 3.0  6/22 BCx: NG x 24 hrs  6/22 UCx: >100K mixed cx  6/22 PCT: 10  6/2 CT abd pelvis:Bilateral moderate perinephric stranding. At this time, the possibilities would include bilateral acute " pyelonephritis, acute tubular necrosis or other cause of acute renal insufficiency      Assessment/Plan:   PMH: AAA; Acute kidney failure; Acute MI; Adrenal insufficiency; Arthritis; B12 deficiency; Back pain; Cancer; CAD; Diabetes mellitus; Disease of thyroid gland; Gout; Hyperlipidemia; Hypertension; Hypotension; Hypothyroid; Low testosterone; Peripheral nerve disease; Pneumonia (2012); Prostate mass; Vertebral compression fracture; and Vitamin B12 deficiency.    Scr improved. Will adjust Vancomycin to 1000 mg IV q12hrs (19.6 mg/Kg.day) & follow w levels.    PK parameters:  Dorian: 0.054 hr-1,  T1/2:  12.8 hr, Vd 71.4 L  (0.7 L/kg)    Vancomycin Dosing History:  6/23 04:34 Vancomycin 1500 mg IV x1  6/24 05:57 Vancomycin 1750 mg IV q24hrs (17mg/Kg/day)    Thank you for your consult,    Virginia Conway [facility]  06/24/17 1:10 PM

## 2017-06-24 NOTE — PROGRESS NOTES
77 y.o.   LOS: 2 days   Patient Care Team:  Yifan Patino MD as PCP - General (Internal Medicine)    Chief Complaint:  Adrenal insufficiency    Chief Complaint   Patient presents with   • Abdominal Pain     dizzy, hypotension - 125 mg solumedrol and 900 ns ems   • Dizziness       Subjective  No futher nausea or vomiting.  Blood pressure decently controlled.  Currently on IV antibiotics and IV steroids.  Blood sugars in decent range-120-1 80 mg/dL.    Interval History:    Review of Systems:   Review of Systems   Constitutional: Positive for appetite change and fatigue. Negative for fever.   Gastrointestinal: Negative for nausea and vomiting.   Musculoskeletal: Positive for back pain.   Skin: Positive for rash.   Neurological: Positive for dizziness.     Objective     Vital Signs   Temp:  [97.6 °F (36.4 °C)-98.4 °F (36.9 °C)] 97.9 °F (36.6 °C)  Heart Rate:  [42-76] 42  Resp:  [16-20] 20  BP: (106-155)/(66-97) 155/97    Physical Exam:  Physical Exam   Constitutional: He is oriented to person, place, and time. He appears well-nourished.   obese   HENT:   Head: Normocephalic and atraumatic.   Eyes: Conjunctivae and EOM are normal.   Neck: Normal range of motion. Neck supple. Carotid bruit is not present. No thyromegaly present.   Acanthosis nigricans   Cardiovascular: Normal rate and normal heart sounds.    HR - 62   Pulmonary/Chest: Effort normal and breath sounds normal. No stridor. No respiratory distress. He has no wheezes.   Abdominal: Soft. Bowel sounds are normal. He exhibits no distension. There is no tenderness.   Central obesity   Musculoskeletal: He exhibits no edema or tenderness.   Lymphadenopathy:     He has no cervical adenopathy.   Neurological: He is alert and oriented to person, place, and time. He has normal reflexes.   Skin: Skin is warm and dry. Rash noted.   Psychiatric: He has a normal mood and affect. His behavior is normal.   Vitals reviewed.  Results Review:     I reviewed the patient's new  clinical results.      Glucose   Date/Time Value Ref Range Status   06/24/2017 0507 160 (H) 65 - 99 mg/dL Final   06/23/2017 0610 212 (H) 65 - 99 mg/dL Final   06/22/2017 1130 96 65 - 99 mg/dL Final     Lab Results (last 72 hours)     Procedure Component Value Units Date/Time    CBC & Differential [[id number]] Collected:  06/22/17 1130    Specimen:  Blood Updated:  06/22/17 1144    Narrative:       The following orders were created for panel order CBC & Differential.  Procedure                               Abnormality         Status                     ---------                               -----------         ------                     CBC Auto Differential[[id number]]        Abnormal            Final result                 Please view results for these tests on the individual orders.    CBC Auto Differential [[id number]]  (Abnormal) Collected:  06/22/17 1130    Specimen:  Blood Updated:  06/22/17 1144     WBC 5.15 10*3/mm3      RBC 3.65 (L) 10*6/mm3      Hemoglobin 12.4 (L) g/dL      Hematocrit 37.0 (L) %      .4 (H) fL      MCH 34.0 (H) pg      MCHC 33.5 g/dL      RDW 13.5 %      RDW-SD 49.6 fl      MPV 10.2 fL      Platelets 134 (L) 10*3/mm3      Neutrophil % 91.8 (H) %      Lymphocyte % 5.2 (L) %      Monocyte % 1.4 (L) %      Eosinophil % 1.2 %      Basophil % 0.0 %      Immature Grans % 0.4 %      Neutrophils, Absolute 4.73 10*3/mm3      Lymphocytes, Absolute 0.27 (L) 10*3/mm3      Monocytes, Absolute 0.07 (L) 10*3/mm3      Eosinophils, Absolute 0.06 10*3/mm3      Basophils, Absolute 0.00 10*3/mm3      Immature Grans, Absolute 0.02 10*3/mm3     Lactic Acid, Plasma [[id number]]  (Abnormal) Collected:  06/22/17 1130    Specimen:  Blood Updated:  06/22/17 1204     Lactate 4.7 (C) mmol/L     Comprehensive Metabolic Panel [[id number]]  (Abnormal) Collected:  06/22/17 1130    Specimen:  Blood Updated:  06/22/17 1206     Glucose 96 mg/dL      BUN 14 mg/dL      Creatinine 1.90 (H) mg/dL      Sodium 143 mmol/L       Potassium 3.5 mmol/L      Chloride 103 mmol/L      CO2 22.8 mmol/L      Calcium 8.5 (L) mg/dL      Total Protein 6.0 g/dL      Albumin 3.40 (L) g/dL      ALT (SGPT) 23 U/L      AST (SGOT) 29 U/L      Alkaline Phosphatase 61 U/L      Total Bilirubin 0.3 mg/dL      eGFR Non African Amer 35 (L) mL/min/1.73      Globulin 2.6 gm/dL      A/G Ratio 1.3 g/dL      BUN/Creatinine Ratio 7.4     Anion Gap 17.2 mmol/L     Narrative:       The MDRD GFR formula is only valid for adults with stable renal function between ages 18 and 70.    Troponin [[id number]]  (Normal) Collected:  06/22/17 1130    Specimen:  Blood Updated:  06/22/17 1206     Troponin T <0.010 ng/mL     Narrative:       Troponin T Reference Ranges:  Less than 0.03 ng/mL:    Negative for AMI  0.03 to 0.09 ng/mL:      Indeterminant for AMI  Greater than 0.09 ng/mL: Positive for AMI    Magnesium [[id number]]  (Normal) Collected:  06/22/17 1130    Specimen:  Blood Updated:  06/22/17 1206     Magnesium 2.0 mg/dL     [place] [facility] [[id number]] Collected:  06/22/17 1130    Specimen:  Blood Updated:  06/22/17 1301    Narrative:       The following orders were created for panel order [place] [facility].  Procedure                               Abnormality         Status                     ---------                               -----------         ------                     Light Blue Top[[id number]]                                   Final result               Green Top (Gel)[[id number]]                                  Final result               Lavender Top[[id number]]                                     Final result               Gold Top - SST[[id number]]                                   Final result                 Please view results for these tests on the individual orders.    Light Blue Top [[id number]] Collected:  06/22/17 1130    Specimen:  Blood Updated:  06/22/17 1301     Extra Tube hold for add-on      Auto resulted       Green Top (Gel) [[id number]] Collected:  06/22/17  1130    Specimen:  Blood Updated:  06/22/17 1301     Extra Tube Hold for add-ons.      Auto resulted.       Lavender Top [[id number]] Collected:  06/22/17 1130    Specimen:  Blood Updated:  06/22/17 1301     Extra Tube hold for add-on      Auto resulted       Gold Top - SST [[id number]] Collected:  06/22/17 1130    Specimen:  Blood Updated:  06/22/17 1301     Extra Tube Hold for add-ons.      Auto resulted.       Urinalysis With / Culture If Indicated [[id number]]  (Abnormal) Collected:  06/22/17 1254    Specimen:  Urine from Urine, Clean Catch Updated:  06/22/17 1340     Color, UA Dark Yellow (A)     Appearance, UA Cloudy (A)     pH, UA <=5.0     Specific Gravity, UA 1.020     Glucose, UA Negative     Ketones, UA Trace (A)     Bilirubin, UA Negative     Blood, UA Negative     Protein,  mg/dL (2+) (A)     Leuk Esterase, UA Trace (A)     Nitrite, UA Negative     Urobilinogen, UA 0.2 E.U./dL    Urinalysis, Microscopic Only [[id number]]  (Abnormal) Collected:  06/22/17 1254    Specimen:  Urine from Urine, Clean Catch Updated:  06/22/17 1353     RBC, UA 0-2 /HPF      WBC, UA 3-5 (A) /HPF      Bacteria, UA 1+ (A) /HPF      Squamous Epithelial Cells, UA 3-6 (A) /HPF      Transitional Epithelial Cells, UA 3-6 (A) /HPF      Hyaline Casts, UA 7-12 /LPF      Uric Acid Crystals, UA Small/1+ /HPF      Methodology Manual Light Microscopy    Lactate Acid, Reflex [[id number]]  (Abnormal) Collected:  06/22/17 1328    Specimen:  Blood Updated:  06/22/17 1358     Lactate 4.1 (C) mmol/L     Procalcitonin [[id number]]  (Abnormal) Collected:  06/22/17 1614    Specimen:  Blood Updated:  06/22/17 1728     Procalcitonin 10.00 (C) ng/mL     Narrative:       As a Marker for Sepsis (Non-Neonates):   1. <0.5 ng/mL represents a low risk of severe sepsis and/or septic shock.  1. >2 ng/mL represents a high risk of severe sepsis and/or septic shock.    As a Marker for Lower Respiratory Tract Infections that require antibiotic therapy:  PCT on  "Admission     Antibiotic Therapy             6-12 Hrs later  > 0.5                Strongly Recommended            >0.25 - <0.5         Recommended  0.1 - 0.25           Discouraged                   Remeasure/reassess PCT  <0.1                 Strongly Discouraged          Remeasure/reassess PCT      As 28 day mortality risk marker: \"Change in Procalcitonin Result\" (> 80 % or <=80 %) if Day 0 (or Day 1) and Day 4 values are available. Refer to [email or web address]/   Change in PCT <=80 %   A decrease of PCT levels below or equal to 80 % defines a positive change in PCT test result representing a higher risk for 28-day all-cause mortality of patients diagnosed with severe sepsis or septic shock.  Change in PCT > 80 %   A decrease of PCT levels of more than 80 % defines a negative change in PCT result representing a lower risk for 28-day all-cause mortality of patients diagnosed with severe sepsis or septic shock.                POC Glucose Fingerstick [[id number]]  (Abnormal) Collected:  06/22/17 2010    Specimen:  Blood Updated:  06/22/17 2012     Glucose 177 (H) mg/dL     Narrative:       Meter: [id number] : [id number] Cuong Wray    Lactic Acid, Plasma [[id number]]  (Abnormal) Collected:  06/23/17 0010    Specimen:  Blood Updated:  06/23/17 0050     Lactate 4.7 (C) mmol/L     POC Glucose Fingerstick [[id number]]  (Abnormal) Collected:  06/23/17 0636    Specimen:  Blood Updated:  06/23/17 0637     Glucose 179 (H) mg/dL     Narrative:       Meter: [id number] : [id number] Cuong Wray    Hemoglobin A1c [[id number]]  (Abnormal) Collected:  06/23/17 0610    Specimen:  Blood Updated:  06/23/17 0654     Hemoglobin A1C 6.99 (H) %     Narrative:       Hemoglobin A1C Ranges:    Increased Risk for Diabetes  5.7% to 6.4%  Diabetes                     >= 6.5%  Diabetic Goal                < 7.0%    Lactic Acid, Plasma [[id number]]  (Abnormal) Collected:  06/23/17 0610    Specimen:  Blood Updated:  06/23/17 " 0656     Lactate 3.0 (C) mmol/L     CBC (No Diff) [[id number]]  (Abnormal) Collected:  06/23/17 0610    Specimen:  Blood Updated:  06/23/17 0658     WBC 12.42 (H) 10*3/mm3      RBC 3.48 (L) 10*6/mm3      Hemoglobin 11.9 (L) g/dL      Hematocrit 34.4 (L) %      MCV 98.9 (H) fL      MCH 34.2 (H) pg      MCHC 34.6 g/dL      RDW 13.7 %      RDW-SD 48.8 fl      MPV 10.3 fL      Platelets 140 10*3/mm3     Comprehensive Metabolic Panel [[id number]]  (Abnormal) Collected:  06/23/17 0610    Specimen:  Blood Updated:  06/23/17 0706     Glucose 212 (H) mg/dL      BUN 23 mg/dL      Creatinine 1.68 (H) mg/dL      Sodium 137 mmol/L      Potassium 4.3 mmol/L      Chloride 104 mmol/L      CO2 16.6 (L) mmol/L      Calcium 7.7 (L) mg/dL      Total Protein 6.0 g/dL      Albumin 3.20 (L) g/dL      ALT (SGPT) 15 U/L      AST (SGOT) 19 U/L      Alkaline Phosphatase 51 U/L      Total Bilirubin 0.3 mg/dL      eGFR Non African Amer 40 (L) mL/min/1.73      Globulin 2.8 gm/dL      A/G Ratio 1.1 g/dL      BUN/Creatinine Ratio 13.7     Anion Gap 16.4 mmol/L     Narrative:       The MDRD GFR formula is only valid for adults with stable renal function between ages 18 and 70.    TSH [[id number]]  (Normal) Collected:  06/23/17 0610    Specimen:  Blood Updated:  06/23/17 0716     TSH 1.130 mIU/mL     Urine Culture [[id number]] Collected:  06/22/17 1254    Specimen:  Urine from Urine, Clean Catch Updated:  06/23/17 0846     Urine Culture >100,000 CFU/mL Mixed Culture    Narrative:         Specimen contains mixed organisms of questionable pathogenicity which indicates contamination with commensal josey.  Further identification is unlikely to provide clinically useful information.  Suggest recollection.    POC Glucose Fingerstick [[id number]]  (Abnormal) Collected:  06/23/17 1100    Specimen:  Blood Updated:  06/23/17 1101     Glucose 219 (H) mg/dL     Narrative:       Meter: [id number] : [id number] Sally Gauthier    Blood Culture [[id number]]  (Normal)  Collected:  06/22/17 1327    Specimen:  Blood from Blood, Venous Line Updated:  06/23/17 1401     Blood Culture No growth at 24 hours    Blood Culture [[id number]]  (Normal) Collected:  06/22/17 1529    Specimen:  Blood from Arm, Left Updated:  06/23/17 1601     Blood Culture No growth at 24 hours    POC Glucose Fingerstick [[id number]]  (Abnormal) Collected:  06/23/17 1601    Specimen:  Blood Updated:  06/23/17 1603     Glucose 259 (H) mg/dL     Narrative:       Meter: [id number] : [id number] Cady Gonzalez    CBC & Differential [[id number]] Collected:  06/23/17 0610    Specimen:  Blood Updated:  06/23/17 1632    Narrative:       The following orders were created for panel order CBC & Differential.  Procedure                               Abnormality         Status                     ---------                               -----------         ------                     CBC Auto Differential[[id number]]        Abnormal            Final result                 Please view results for these tests on the individual orders.    CBC Auto Differential [[id number]]  (Abnormal) Collected:  06/23/17 0610    Specimen:  Blood Updated:  06/23/17 1632     WBC 12.40 (H) 10*3/mm3      RBC 3.49 (L) 10*6/mm3      Hemoglobin 11.8 (L) g/dL      Hematocrit 35.1 (L) %      .6 (H) fL      MCH 33.8 (H) pg      MCHC 33.6 g/dL      RDW 13.8 %      RDW-SD 50.8 fl      MPV 10.8 fL      Platelets 145 10*3/mm3      Neutrophil % 89.5 (H) %      Lymphocyte % 3.1 (L) %      Monocyte % 6.9 %      Eosinophil % 0.0 (L) %      Basophil % 0.0 %      Immature Grans % 0.5 %      Neutrophils, Absolute 11.10 (H) 10*3/mm3      Lymphocytes, Absolute 0.39 (L) 10*3/mm3      Monocytes, Absolute 0.85 10*3/mm3      Eosinophils, Absolute 0.00 10*3/mm3      Basophils, Absolute 0.00 10*3/mm3      Immature Grans, Absolute 0.06 (H) 10*3/mm3     POC Glucose Fingerstick [[id number]]  (Abnormal) Collected:  06/23/17 2029    Specimen:  Blood Updated:  06/23/17 2030      Glucose 187 (H) mg/dL     Narrative:       Meter: [id number] : [id number] Cuong Wray    Lactic Acid, Plasma [[id number]]  (Normal) Collected:  06/24/17 0507    Specimen:  Blood Updated:  06/24/17 0529     Lactate 1.5 mmol/L     CBC & Differential [[id number]] Collected:  06/24/17 0507    Specimen:  Blood Updated:  06/24/17 0536    Narrative:       The following orders were created for panel order CBC & Differential.  Procedure                               Abnormality         Status                     ---------                               -----------         ------                     CBC Auto Differential[[id number]]        Abnormal            Final result                 Please view results for these tests on the individual orders.    CBC Auto Differential [[id number]]  (Abnormal) Collected:  06/24/17 0507    Specimen:  Blood Updated:  06/24/17 0536     WBC 11.60 (H) 10*3/mm3      RBC 3.50 (L) 10*6/mm3      Hemoglobin 11.9 (L) g/dL      Hematocrit 35.1 (L) %      .3 (H) fL      MCH 34.0 (H) pg      MCHC 33.9 g/dL      RDW 13.7 %      RDW-SD 49.7 fl      MPV 10.9 fL      Platelets 147 10*3/mm3      Neutrophil % 87.1 (H) %      Lymphocyte % 5.8 (L) %      Monocyte % 6.6 %      Eosinophil % 0.1 (L) %      Basophil % 0.0 %      Immature Grans % 0.4 %      Neutrophils, Absolute 10.11 (H) 10*3/mm3      Lymphocytes, Absolute 0.67 (L) 10*3/mm3      Monocytes, Absolute 0.76 10*3/mm3      Eosinophils, Absolute 0.01 10*3/mm3      Basophils, Absolute 0.00 10*3/mm3      Immature Grans, Absolute 0.05 (H) 10*3/mm3     Basic Metabolic Panel [[id number]]  (Abnormal) Collected:  06/24/17 0507    Specimen:  Blood Updated:  06/24/17 0556     Glucose 160 (H) mg/dL      BUN 22 mg/dL      Creatinine 1.18 mg/dL      Sodium 139 mmol/L      Potassium 3.8 mmol/L      Chloride 107 mmol/L      CO2 19.3 (L) mmol/L      Calcium 8.0 (L) mg/dL      eGFR Non African Amer 60 (L) mL/min/1.73      BUN/Creatinine Ratio 18.6     Anion Gap  "12.7 mmol/L     Narrative:       The MDRD GFR formula is only valid for adults with stable renal function between ages 18 and 70.    Uric Acid [[id number]]  (Abnormal) Collected:  06/24/17 0507    Specimen:  Blood Updated:  06/24/17 0556     Uric Acid 7.5 (H) mg/dL     POC Glucose Fingerstick [[id number]]  (Abnormal) Collected:  06/24/17 0607    Specimen:  Blood Updated:  06/24/17 0609     Glucose 139 (H) mg/dL     Narrative:       Meter: [id number] : [id number] Cuong Wray    Procalcitonin [[id number]]  (Abnormal) Collected:  06/24/17 0507    Specimen:  Blood Updated:  06/24/17 0625     Procalcitonin 2.17 (C) ng/mL     Narrative:       As a Marker for Sepsis (Non-Neonates):   1. <0.5 ng/mL represents a low risk of severe sepsis and/or septic shock.  1. >2 ng/mL represents a high risk of severe sepsis and/or septic shock.    As a Marker for Lower Respiratory Tract Infections that require antibiotic therapy:  PCT on Admission     Antibiotic Therapy             6-12 Hrs later  > 0.5                Strongly Recommended            >0.25 - <0.5         Recommended  0.1 - 0.25           Discouraged                   Remeasure/reassess PCT  <0.1                 Strongly Discouraged          Remeasure/reassess PCT      As 28 day mortality risk marker: \"Change in Procalcitonin Result\" (> 80 % or <=80 %) if Day 0 (or Day 1) and Day 4 values are available. Refer to [email or web address]-[email or web address]/   Change in PCT <=80 %   A decrease of PCT levels below or equal to 80 % defines a positive change in PCT test result representing a higher risk for 28-day all-cause mortality of patients diagnosed with severe sepsis or septic shock.  Change in PCT > 80 %   A decrease of PCT levels of more than 80 % defines a negative change in PCT result representing a lower risk for 28-day all-cause mortality of patients diagnosed with severe sepsis or septic shock.                BNP [[id number]]  (Abnormal) Collected:  06/24/17 0507    " Specimen:  Blood Updated:  06/24/17 1117     proBNP 3978.0 (H) pg/mL     Narrative:       Among patients with dyspnea, NT-proBNP is highly sensitive for the detection of acute congestive heart failure. In addition NT-proBNP of <300 pg/ml effectively rules out acute congestive heart failure with 99% negative predictive value.    POC Glucose Fingerstick [[id number]]  (Abnormal) Collected:  06/24/17 1126    Specimen:  Blood Updated:  06/24/17 1134     Glucose 179 (H) mg/dL     Narrative:       Meter: [id number] : [id number] Krish Baker        Imaging Results (last 72 hours)     Procedure Component Value Units Date/Time    CT Abdomen Pelvis Without Contrast [[id number]] Collected:  06/22/17 1246     Updated:  06/22/17 1258    Narrative:       CT ABDOMEN AND PELVIS WITHOUT CONTRAST     HISTORY: Septic, low blood pressure, increased creatinine     TECHNIQUE: Axial CT images of the abdomen and pelvis were obtained  without administration of intravenous and oral contrast. Coronal and  sagittal reformats were obtained.     COMPARISON: CT angiogram of the abdomen and pelvis from 09/11/2015.     FINDINGS: There is a prominent 1.2 cm subcarinal lymph node present. No  pleural or pericardial effusion is identified. Bilateral dependent  atelectasis is seen. Calcified coronary artery disease is seen.     Diffuse hepatic steatosis is present. The gallbladder is surgically  absent. The spleen is normal in size and attenuation. The pancreas is  normal without ductal dilatation. Bilateral adrenal glands are normal.  Both kidneys are normal in size and attenuation. There is bilateral  perinephric stranding present. There is a partly exophytic cyst arising  from the lower pole of the right kidney that demonstrates internal  attenuation values around 11 Hounsfield units.     There is bilateral moderate perinephric stranding. No renal calculi or  hydronephrosis. Bilateral ureters demonstrate normal caliber. The  urinary bladder is  minimally distended limiting evaluation. The small  and large bowel loops demonstrate normal caliber. There has been stent  graft repair for an abdominal aortic aneurysm which currently measures  6.2 x 5.7 cm in comparison to prior measurement of 6.8 x 6.7 cm  previously. No ascites is present. Degenerative disc disease is  identified within the spine. There is 3 mm retrolisthesis of L5 on S1.  Marked degenerative disc disease is seen at L2-3 level.Small  fat-containing umbilical hernia is present.       Impression:       1.   Bilateral moderate perinephric stranding. At this time, the  possibilities would include bilateral acute pyelonephritis,  acute  tubular necrosis or other cause of acute renal insufficiency  2. Diffuse hepatic steatosis.  3.  Mildly prominent subcarinal lymph node measuring up to 1.2 cm. A  followup CT chest is recommended on a nonemergent basis to further  evaluate.     These findings were discussed with Dr. Templeton by telephone at 12:43 PM  on 06/22/2017.     This report was finalized on 6/22/2017 12:55 PM by Dr. Jordon Polanco MD.       XR Chest 1 View [[id number]] Collected:  06/22/17 1231     Updated:  06/23/17 1320    Narrative:       PORTABLE CHEST     HISTORY:  Weakness, dizziness.     A single view of the chest demonstrates moderate cardiomegaly. There is  no evidence of focal infiltrate, effusion or congestive failure.     This report was finalized on 6/23/2017 1:17 PM by Dr. Jones Richard MD.             Medication Review:  done      Current Facility-Administered Medications:   •  acetaminophen (TYLENOL) tablet 650 mg, 650 mg, Oral, Q4H PRN, Janine Adrian MD, 650 mg at 06/23/17 0829  •  albuterol (PROVENTIL) nebulizer solution 0.083% 2.5 mg/3mL, 2.5 mg, Nebulization, Q6H PRN, Dina Ohara MD, 2.5 mg at 06/24/17 1055  •  atorvastatin (LIPITOR) tablet 40 mg, 40 mg, Oral, Daily, Janine Adrian MD, 40 mg at 06/24/17 0859  •  cefepime (MAXIPIME) 2 g/100 mL 0.9% NS (mbp), 2 g,  Intravenous, Q12H, Janine Adrian MD, 2 g at 06/24/17 0206  •  clobetasol (TEMOVATE) 0.05 % cream, , Topical, Q12H, Pranay Caballero MD  •  dextrose (D50W) solution 25 g, 25 g, Intravenous, Q15 Min PRN, Janine Adrian MD  •  dextrose (GLUTOSE) oral gel 15 g, 15 g, Oral, Q15 Min PRN, Janine Adrian MD  •  gabapentin (NEURONTIN) capsule 1,200 mg, 1,200 mg, Oral, Q12H, Dina Ohara MD, 1,200 mg at 06/24/17 0910  •  glucagon (human recombinant) (GLUCAGEN DIAGNOSTIC) injection 1 mg, 1 mg, Subcutaneous, Q15 Min PRN, Janine Adrian MD  •  heparin (porcine) 5000 UNIT/ML injection 5,000 Units, 5,000 Units, Subcutaneous, Q12H, Janine Adrian MD, 5,000 Units at 06/24/17 0858  •  hydrocortisone sodium succinate (Solu-CORTEF) injection 100 mg, 100 mg, Intravenous, Q6H, Zeb Ni MD, 100 mg at 06/24/17 0858  •  insulin aspart (novoLOG) injection 0-9 Units, 0-9 Units, Subcutaneous, 4x Daily With Meals & Nightly, Janine Adrian MD, 2 Units at 06/24/17 1150  •  insulin detemir (LEVEMIR) injection 20 Units, 20 Units, Subcutaneous, Nightly, Zeb Ni MD, 20 Units at 06/23/17 2023  •  levothyroxine (SYNTHROID, LEVOTHROID) tablet 150 mcg, 150 mcg, Oral, Q AM, Janine Adrian MD, 150 mcg at 06/24/17 0557  •  meclizine (ANTIVERT) tablet 25 mg, 25 mg, Oral, TID PRN, Janine Adrian MD  •  ondansetron (ZOFRAN) injection 4 mg, 4 mg, Intravenous, Q6H PRN, Janine Adrian MD  •  Pharmacy to dose vancomycin, , Does not apply, Continuous PRN, Janine Adrian MD  •  sodium chloride 0.9 % flush 1-10 mL, 1-10 mL, Intravenous, PRN, Jawanabel Adrian MD  •  sodium chloride 0.9 % flush 10 mL, 10 mL, Intravenous, PRN, Henrik Templeton MD  •  vancomycin 1750 mg/500 mL 0.9% NS IVPB ([facility]), 1,750 mg, Intravenous, Q24H, Jawed MD Nguyễn, 1,750 mg at 06/24/17 0557  •  vitamin B-12 (CYANOCOBALAMIN) tablet 500 mcg, 500 mcg, Oral, Nightly, Jawed MD Nguyễn, 500 mcg at 06/23/17 2023  •  zolpidem (AMBIEN) tablet 5 mg, 5 mg, Oral, Nightly PRN, Jawanabel Adrian MD, 5 mg at  "06/24/17 0201    Assessment/Plan     Active Hospital Problems (** Indicates Principal Problem)    Diagnosis Date Noted   • Nausea & vomiting [R11.2] 06/24/2017   • Hypotension [I95.9] 06/24/2017   • Sepsis [A41.9] 06/24/2017   • Acute renal failure [N17.9] 06/23/2017   • Adrenal insufficiency [E27.40] 05/17/2016   • Sepsis associated hypotension [A41.9] 05/07/2016      Resolved Hospital Problems    Diagnosis Date Noted Date Resolved   No resolved problems to display.     Primary adrenal insufficiency - Unclear etiology  Patient's blood pressure is in decent range and his electrolytes sodium, potassium are within normal limits.  Continue IV hydrocortisone 100 mg IV every 6 hours.  Will hold Florinef for now as IV hydrocortisone does have mineralocorticoid effect.  ACTH levels pending.      Type 2 diabetes mellitus  [id number]  Increase levemir to 25 units at bedtime   Continue NovoLog sliding scale 3 times a day before meals and at bedtime  Blood sugars are elevated due to IV steroids.     Hypothyroidism  TSH-1.33  Continue levothyroxine 150 µg oral daily.     Sepsis - most probably related to urinary tract infection/questionable pyelonephritis.  IV antibiotics per primary team.      Skin rash - Derm consulted.   Secondary to septic emboli.    19 minutes out of 35 minutes face to face spent on floor managing care and counseling patient/family on  treatment plan and insulin adjustments    Zeb Ni MD.  06/24/17  12:33 PM      EM[facility] Dragon / transcription disclaimer:    \"Dictated utilizing Dragon dictation\".   "

## 2017-06-25 LAB
ANION GAP SERPL CALCULATED.3IONS-SCNC: 16.8 MMOL/L
BASOPHILS # BLD AUTO: 0.01 10*3/MM3 (ref 0–0.2)
BASOPHILS NFR BLD AUTO: 0.1 % (ref 0–1.5)
BUN BLD-MCNC: 17 MG/DL (ref 8–23)
BUN/CREAT SERPL: 16.7 (ref 7–25)
CALCIUM SPEC-SCNC: 8.4 MG/DL (ref 8.6–10.5)
CHLORIDE SERPL-SCNC: 99 MMOL/L (ref 98–107)
CO2 SERPL-SCNC: 23.2 MMOL/L (ref 22–29)
CREAT BLD-MCNC: 1.02 MG/DL (ref 0.76–1.27)
DEPRECATED RDW RBC AUTO: 48.5 FL (ref 37–54)
EOSINOPHIL # BLD AUTO: 0 10*3/MM3 (ref 0–0.7)
EOSINOPHIL NFR BLD AUTO: 0 % (ref 0.3–6.2)
ERYTHROCYTE [DISTWIDTH] IN BLOOD BY AUTOMATED COUNT: 13.3 % (ref 11.5–14.5)
GFR SERPL CREATININE-BSD FRML MDRD: 71 ML/MIN/1.73
GLUCOSE BLD-MCNC: 137 MG/DL (ref 65–99)
GLUCOSE BLDC GLUCOMTR-MCNC: 147 MG/DL (ref 70–130)
GLUCOSE BLDC GLUCOMTR-MCNC: 221 MG/DL (ref 70–130)
GLUCOSE BLDC GLUCOMTR-MCNC: 239 MG/DL (ref 70–130)
GLUCOSE BLDC GLUCOMTR-MCNC: 247 MG/DL (ref 70–130)
HCT VFR BLD AUTO: 34.9 % (ref 40.4–52.2)
HGB BLD-MCNC: 12 G/DL (ref 13.7–17.6)
IMM GRANULOCYTES # BLD: 0.05 10*3/MM3 (ref 0–0.03)
IMM GRANULOCYTES NFR BLD: 0.4 % (ref 0–0.5)
LYMPHOCYTES # BLD AUTO: 1.33 10*3/MM3 (ref 0.9–4.8)
LYMPHOCYTES NFR BLD AUTO: 11.5 % (ref 19.6–45.3)
MCH RBC QN AUTO: 33.9 PG (ref 27–32.7)
MCHC RBC AUTO-ENTMCNC: 34.4 G/DL (ref 32.6–36.4)
MCV RBC AUTO: 98.6 FL (ref 79.8–96.2)
MONOCYTES # BLD AUTO: 0.96 10*3/MM3 (ref 0.2–1.2)
MONOCYTES NFR BLD AUTO: 8.3 % (ref 5–12)
NEUTROPHILS # BLD AUTO: 9.19 10*3/MM3 (ref 1.9–8.1)
NEUTROPHILS NFR BLD AUTO: 79.7 % (ref 42.7–76)
PLATELET # BLD AUTO: 135 10*3/MM3 (ref 140–500)
PMV BLD AUTO: 11.2 FL (ref 6–12)
POTASSIUM BLD-SCNC: 2.8 MMOL/L (ref 3.5–5.2)
RBC # BLD AUTO: 3.54 10*6/MM3 (ref 4.6–6)
SODIUM BLD-SCNC: 139 MMOL/L (ref 136–145)
WBC NRBC COR # BLD: 11.54 10*3/MM3 (ref 4.5–10.7)

## 2017-06-25 PROCEDURE — 85025 COMPLETE CBC W/AUTO DIFF WBC: CPT | Performed by: INTERNAL MEDICINE

## 2017-06-25 PROCEDURE — 25010000002 CEFEPIME: Performed by: INTERNAL MEDICINE

## 2017-06-25 PROCEDURE — 25010000002 VANCOMYCIN PER 500 MG: Performed by: INTERNAL MEDICINE

## 2017-06-25 PROCEDURE — 80048 BASIC METABOLIC PNL TOTAL CA: CPT | Performed by: INTERNAL MEDICINE

## 2017-06-25 PROCEDURE — 82962 GLUCOSE BLOOD TEST: CPT

## 2017-06-25 PROCEDURE — 99233 SBSQ HOSP IP/OBS HIGH 50: CPT | Performed by: INTERNAL MEDICINE

## 2017-06-25 PROCEDURE — 25010000002 HYDROCORTISONE SODIUM SUCCINATE 100 MG RECONSTITUTED SOLUTION: Performed by: INTERNAL MEDICINE

## 2017-06-25 PROCEDURE — 94799 UNLISTED PULMONARY SVC/PX: CPT

## 2017-06-25 PROCEDURE — 25010000002 HEPARIN (PORCINE) PER 1000 UNITS: Performed by: INTERNAL MEDICINE

## 2017-06-25 PROCEDURE — 63710000001 INSULIN ASPART PER 5 UNITS: Performed by: INTERNAL MEDICINE

## 2017-06-25 RX ORDER — AZITHROMYCIN 250 MG/1
500 TABLET, FILM COATED ORAL
Status: DISCONTINUED | OUTPATIENT
Start: 2017-06-25 | End: 2017-06-26 | Stop reason: HOSPADM

## 2017-06-25 RX ORDER — DEXAMETHASONE 0.5 MG/1
1 TABLET ORAL
Status: DISCONTINUED | OUTPATIENT
Start: 2017-06-26 | End: 2017-06-26 | Stop reason: HOSPADM

## 2017-06-25 RX ORDER — POTASSIUM CHLORIDE 750 MG/1
40 CAPSULE, EXTENDED RELEASE ORAL
Status: COMPLETED | OUTPATIENT
Start: 2017-06-25 | End: 2017-06-25

## 2017-06-25 RX ORDER — LISINOPRIL 2.5 MG/1
2.5 TABLET ORAL
Status: DISCONTINUED | OUTPATIENT
Start: 2017-06-25 | End: 2017-06-26 | Stop reason: HOSPADM

## 2017-06-25 RX ADMIN — GABAPENTIN 1200 MG: 400 CAPSULE ORAL at 20:20

## 2017-06-25 RX ADMIN — INSULIN ASPART 4 UNITS: 100 INJECTION, SOLUTION INTRAVENOUS; SUBCUTANEOUS at 22:10

## 2017-06-25 RX ADMIN — CEFEPIME 2 G: 2 INJECTION, POWDER, FOR SOLUTION INTRAVENOUS at 14:35

## 2017-06-25 RX ADMIN — VANCOMYCIN HYDROCHLORIDE 1000 MG: 1 INJECTION, SOLUTION INTRAVENOUS at 05:49

## 2017-06-25 RX ADMIN — IPRATROPIUM BROMIDE AND ALBUTEROL SULFATE 3 ML: .5; 3 SOLUTION RESPIRATORY (INHALATION) at 10:59

## 2017-06-25 RX ADMIN — POTASSIUM CHLORIDE 40 MEQ: 750 CAPSULE, EXTENDED RELEASE ORAL at 19:21

## 2017-06-25 RX ADMIN — HYDROCORTISONE SODIUM SUCCINATE 50 MG: 100 INJECTION, POWDER, FOR SOLUTION INTRAMUSCULAR; INTRAVENOUS at 05:49

## 2017-06-25 RX ADMIN — ATORVASTATIN CALCIUM 40 MG: 20 TABLET, FILM COATED ORAL at 10:02

## 2017-06-25 RX ADMIN — LISINOPRIL 2.5 MG: 2.5 TABLET ORAL at 20:20

## 2017-06-25 RX ADMIN — GABAPENTIN 1200 MG: 400 CAPSULE ORAL at 10:02

## 2017-06-25 RX ADMIN — LEVOTHYROXINE SODIUM 150 MCG: 150 TABLET ORAL at 05:47

## 2017-06-25 RX ADMIN — IPRATROPIUM BROMIDE AND ALBUTEROL SULFATE 3 ML: .5; 3 SOLUTION RESPIRATORY (INHALATION) at 15:28

## 2017-06-25 RX ADMIN — GUAIFENESIN 600 MG: 600 TABLET, EXTENDED RELEASE ORAL at 18:43

## 2017-06-25 RX ADMIN — HYDROCORTISONE SODIUM SUCCINATE 50 MG: 100 INJECTION, POWDER, FOR SOLUTION INTRAMUSCULAR; INTRAVENOUS at 14:35

## 2017-06-25 RX ADMIN — AZITHROMYCIN DIHYDRATE 500 MG: 500 INJECTION, POWDER, LYOPHILIZED, FOR SOLUTION INTRAVENOUS at 16:10

## 2017-06-25 RX ADMIN — CLOBETASOL PROPIONATE: 0.5 CREAM TOPICAL at 20:19

## 2017-06-25 RX ADMIN — INSULIN DETEMIR 25 UNITS: 100 INJECTION, SOLUTION SUBCUTANEOUS at 20:22

## 2017-06-25 RX ADMIN — INSULIN ASPART 4 UNITS: 100 INJECTION, SOLUTION INTRAVENOUS; SUBCUTANEOUS at 12:01

## 2017-06-25 RX ADMIN — HEPARIN SODIUM 5000 UNITS: 5000 INJECTION, SOLUTION INTRAVENOUS; SUBCUTANEOUS at 10:03

## 2017-06-25 RX ADMIN — HEPARIN SODIUM 5000 UNITS: 5000 INJECTION, SOLUTION INTRAVENOUS; SUBCUTANEOUS at 20:19

## 2017-06-25 RX ADMIN — CYANOCOBALAMIN TAB 500 MCG 500 MCG: 500 TAB at 20:20

## 2017-06-25 RX ADMIN — INSULIN ASPART 4 UNITS: 100 INJECTION, SOLUTION INTRAVENOUS; SUBCUTANEOUS at 18:44

## 2017-06-25 RX ADMIN — HYDROCORTISONE SODIUM SUCCINATE 50 MG: 100 INJECTION, POWDER, FOR SOLUTION INTRAMUSCULAR; INTRAVENOUS at 20:20

## 2017-06-25 RX ADMIN — POTASSIUM CHLORIDE 40 MEQ: 750 CAPSULE, EXTENDED RELEASE ORAL at 10:05

## 2017-06-25 RX ADMIN — TEMAZEPAM 15 MG: 15 CAPSULE ORAL at 22:10

## 2017-06-25 RX ADMIN — POTASSIUM CHLORIDE 40 MEQ: 750 CAPSULE, EXTENDED RELEASE ORAL at 12:03

## 2017-06-25 RX ADMIN — IPRATROPIUM BROMIDE AND ALBUTEROL SULFATE 3 ML: .5; 3 SOLUTION RESPIRATORY (INHALATION) at 06:54

## 2017-06-25 RX ADMIN — GUAIFENESIN 600 MG: 600 TABLET, EXTENDED RELEASE ORAL at 10:03

## 2017-06-25 RX ADMIN — CEFEPIME 2 G: 2 INJECTION, POWDER, FOR SOLUTION INTRAVENOUS at 04:13

## 2017-06-25 NOTE — PROGRESS NOTES
77 y.o.   LOS: 3 days   Patient Care Team:  Yifan Patino MD as PCP - General (Internal Medicine)    Chief Complaint:  Adrenal insufficiency    Chief Complaint   Patient presents with   • Abdominal Pain     dizzy, hypotension - 125 mg solumedrol and 900 ns ems   • Dizziness       Subjective  Pt slept well but reports of urinary incontinence on the diuretic. No further N/V. BG in reasonable range and is eating well.   BP in the last 24 hrs - 158 - 184/79 - 101 mm of hg.     Interval History:    Review of Systems:   Review of Systems   Constitutional: Positive for fatigue. Negative for appetite change and fever.   Gastrointestinal: Negative for nausea and vomiting.   Musculoskeletal: Positive for back pain.   Skin: Positive for rash.   Neurological: Negative for dizziness.     Objective     Vital Signs   Temp:  [97.4 °F (36.3 °C)-97.6 °F (36.4 °C)] 97.6 °F (36.4 °C)  Heart Rate:  [38-50] 40  Resp:  [14-22] 20  BP: (158-184)/() 184/99    Physical Exam:  Physical Exam   Constitutional: He is oriented to person, place, and time. He appears well-nourished.   obese   HENT:   Head: Normocephalic and atraumatic.   Mouth/Throat: Oropharynx is clear and moist.   Eyes: Conjunctivae and EOM are normal.   Neck: Normal range of motion. Neck supple. Carotid bruit is not present. No thyromegaly present.   Acanthosis nigricans   Cardiovascular: Normal rate and normal heart sounds.    HR - 45   Pulmonary/Chest: Effort normal and breath sounds normal. No stridor. No respiratory distress. He has no wheezes.   Abdominal: Soft. Bowel sounds are normal. He exhibits no distension. There is no tenderness.   Central obesity   Musculoskeletal: He exhibits no edema or tenderness.   Lymphadenopathy:     He has no cervical adenopathy.   Neurological: He is alert and oriented to person, place, and time. He has normal reflexes.   Skin: Skin is warm and dry. Rash noted.   Psychiatric: He has a normal mood and affect. His behavior is normal.    Vitals reviewed.  Results Review:     I reviewed the patient's new clinical results.      Glucose   Date/Time Value Ref Range Status   06/25/2017 0542 137 (H) 65 - 99 mg/dL Final   06/24/2017 0507 160 (H) 65 - 99 mg/dL Final   06/23/2017 0610 212 (H) 65 - 99 mg/dL Final     Lab Results (last 72 hours)     Procedure Component Value Units Date/Time    CBC & Differential [[id number]] Collected:  06/22/17 1130    Specimen:  Blood Updated:  06/22/17 1144    Narrative:       The following orders were created for panel order CBC & Differential.  Procedure                               Abnormality         Status                     ---------                               -----------         ------                     CBC Auto Differential[[id number]]        Abnormal            Final result                 Please view results for these tests on the individual orders.    CBC Auto Differential [[id number]]  (Abnormal) Collected:  06/22/17 1130    Specimen:  Blood Updated:  06/22/17 1144     WBC 5.15 10*3/mm3      RBC 3.65 (L) 10*6/mm3      Hemoglobin 12.4 (L) g/dL      Hematocrit 37.0 (L) %      .4 (H) fL      MCH 34.0 (H) pg      MCHC 33.5 g/dL      RDW 13.5 %      RDW-SD 49.6 fl      MPV 10.2 fL      Platelets 134 (L) 10*3/mm3      Neutrophil % 91.8 (H) %      Lymphocyte % 5.2 (L) %      Monocyte % 1.4 (L) %      Eosinophil % 1.2 %      Basophil % 0.0 %      Immature Grans % 0.4 %      Neutrophils, Absolute 4.73 10*3/mm3      Lymphocytes, Absolute 0.27 (L) 10*3/mm3      Monocytes, Absolute 0.07 (L) 10*3/mm3      Eosinophils, Absolute 0.06 10*3/mm3      Basophils, Absolute 0.00 10*3/mm3      Immature Grans, Absolute 0.02 10*3/mm3     Lactic Acid, Plasma [[id number]]  (Abnormal) Collected:  06/22/17 1130    Specimen:  Blood Updated:  06/22/17 1204     Lactate 4.7 (C) mmol/L     Comprehensive Metabolic Panel [[id number]]  (Abnormal) Collected:  06/22/17 1130    Specimen:  Blood Updated:  06/22/17 1206     Glucose 96 mg/dL       BUN 14 mg/dL      Creatinine 1.90 (H) mg/dL      Sodium 143 mmol/L      Potassium 3.5 mmol/L      Chloride 103 mmol/L      CO2 22.8 mmol/L      Calcium 8.5 (L) mg/dL      Total Protein 6.0 g/dL      Albumin 3.40 (L) g/dL      ALT (SGPT) 23 U/L      AST (SGOT) 29 U/L      Alkaline Phosphatase 61 U/L      Total Bilirubin 0.3 mg/dL      eGFR Non African Amer 35 (L) mL/min/1.73      Globulin 2.6 gm/dL      A/G Ratio 1.3 g/dL      BUN/Creatinine Ratio 7.4     Anion Gap 17.2 mmol/L     Narrative:       The MDRD GFR formula is only valid for adults with stable renal function between ages 18 and 70.    Troponin [[id number]]  (Normal) Collected:  06/22/17 1130    Specimen:  Blood Updated:  06/22/17 1206     Troponin T <0.010 ng/mL     Narrative:       Troponin T Reference Ranges:  Less than 0.03 ng/mL:    Negative for AMI  0.03 to 0.09 ng/mL:      Indeterminant for AMI  Greater than 0.09 ng/mL: Positive for AMI    Magnesium [[id number]]  (Normal) Collected:  06/22/17 1130    Specimen:  Blood Updated:  06/22/17 1206     Magnesium 2.0 mg/dL     [facility] Draw [[id number]] Collected:  06/22/17 1130    Specimen:  Blood Updated:  06/22/17 1301    Narrative:       The following orders were created for panel order [facility] Draw.  Procedure                               Abnormality         Status                     ---------                               -----------         ------                     Light Blue Top[[id number]]                                   Final result               Green Top (Gel)[[id number]]                                  Final result               Lavender Top[[id number]]                                     Final result               Gold Top - SST[[id number]]                                   Final result                 Please view results for these tests on the individual orders.    Light Blue Top [[id number]] Collected:  06/22/17 1130    Specimen:  Blood Updated:  06/22/17 1301     Extra Tube hold for add-on       Auto resulted       Green Top (Gel) [[id number]] Collected:  06/22/17 1130    Specimen:  Blood Updated:  06/22/17 1301     Extra Tube Hold for add-ons.      Auto resulted.       Lavender Top [[id number]] Collected:  06/22/17 1130    Specimen:  Blood Updated:  06/22/17 1301     Extra Tube hold for add-on      Auto resulted       Gold Top - SST [[id number]] Collected:  06/22/17 1130    Specimen:  Blood Updated:  06/22/17 1301     Extra Tube Hold for add-ons.      Auto resulted.       Urinalysis With / Culture If Indicated [[id number]]  (Abnormal) Collected:  06/22/17 1254    Specimen:  Urine from Urine, Clean Catch Updated:  06/22/17 1340     Color, UA Dark Yellow (A)     Appearance, UA Cloudy (A)     pH, UA <=5.0     Specific Gravity, UA 1.020     Glucose, UA Negative     Ketones, UA Trace (A)     Bilirubin, UA Negative     Blood, UA Negative     Protein,  mg/dL (2+) (A)     Leuk Esterase, UA Trace (A)     Nitrite, UA Negative     Urobilinogen, UA 0.2 E.U./dL    Urinalysis, Microscopic Only [[id number]]  (Abnormal) Collected:  06/22/17 1254    Specimen:  Urine from Urine, Clean Catch Updated:  06/22/17 1353     RBC, UA 0-2 /HPF      WBC, UA 3-5 (A) /HPF      Bacteria, UA 1+ (A) /HPF      Squamous Epithelial Cells, UA 3-6 (A) /HPF      Transitional Epithelial Cells, UA 3-6 (A) /HPF      Hyaline Casts, UA 7-12 /LPF      Uric Acid Crystals, UA Small/1+ /HPF      Methodology Manual Light Microscopy    Lactate Acid, Reflex [[id number]]  (Abnormal) Collected:  06/22/17 1328    Specimen:  Blood Updated:  06/22/17 1358     Lactate 4.1 (C) mmol/L     Procalcitonin [[id number]]  (Abnormal) Collected:  06/22/17 1614    Specimen:  Blood Updated:  06/22/17 1728     Procalcitonin 10.00 (C) ng/mL     Narrative:       As a Marker for Sepsis (Non-Neonates):   1. <0.5 ng/mL represents a low risk of severe sepsis and/or septic shock.  1. >2 ng/mL represents a high risk of severe sepsis and/or septic shock.    As a Marker for Lower  "Respiratory Tract Infections that require antibiotic therapy:  PCT on Admission     Antibiotic Therapy             6-12 Hrs later  > 0.5                Strongly Recommended            >0.25 - <0.5         Recommended  0.1 - 0.25           Discouraged                   Remeasure/reassess PCT  <0.1                 Strongly Discouraged          Remeasure/reassess PCT      As 28 day mortality risk marker: \"Change in Procalcitonin Result\" (> 80 % or <=80 %) if Day 0 (or Day 1) and Day 4 values are available. Refer to http://www.[email or web address] [place] [facility]cal[facility].com/   Change in PCT <=80 %   A decrease of PCT levels below or equal to 80 % defines a positive change in PCT test result representing a higher risk for 28-day all-cause mortality of patients diagnosed with severe sepsis or septic shock.  Change in PCT > 80 %   A decrease of PCT levels of more than 80 % defines a negative change in PCT result representing a lower risk for 28-day all-cause mortality of patients diagnosed with severe sepsis or septic shock.                POC Glucose Fingerstick [[id number]]  (Abnormal) Collected:  06/22/17 2010    Specimen:  Blood Updated:  06/22/17 2012     Glucose 177 (H) mg/dL     Narrative:       Meter: [id number] : [id number] Cuong Wray    Lactic Acid, Plasma [[id number]]  (Abnormal) Collected:  06/23/17 0010    Specimen:  Blood Updated:  06/23/17 0050     Lactate 4.7 (C) mmol/L     POC Glucose Fingerstick [[id number]]  (Abnormal) Collected:  06/23/17 0636    Specimen:  Blood Updated:  06/23/17 0637     Glucose 179 (H) mg/dL     Narrative:       Meter: [id number] : [id number] Cuong Wray    Hemoglobin A1c [[id number]]  (Abnormal) Collected:  06/23/17 0610    Specimen:  Blood Updated:  06/23/17 0654     Hemoglobin A1C 6.99 (H) %     Narrative:       Hemoglobin A1C Ranges:    Increased Risk for Diabetes  5.7% to 6.4%  Diabetes                     >= 6.5%  Diabetic Goal                < 7.0%    Lactic Acid, Plasma [[id number]]  " (Abnormal) Collected:  06/23/17 0610    Specimen:  Blood Updated:  06/23/17 0656     Lactate 3.0 (C) mmol/L     CBC (No Diff) [[id number]]  (Abnormal) Collected:  06/23/17 0610    Specimen:  Blood Updated:  06/23/17 0658     WBC 12.42 (H) 10*3/mm3      RBC 3.48 (L) 10*6/mm3      Hemoglobin 11.9 (L) g/dL      Hematocrit 34.4 (L) %      MCV 98.9 (H) fL      MCH 34.2 (H) pg      MCHC 34.6 g/dL      RDW 13.7 %      RDW-SD 48.8 fl      MPV 10.3 fL      Platelets 140 10*3/mm3     Comprehensive Metabolic Panel [[id number]]  (Abnormal) Collected:  06/23/17 0610    Specimen:  Blood Updated:  06/23/17 0706     Glucose 212 (H) mg/dL      BUN 23 mg/dL      Creatinine 1.68 (H) mg/dL      Sodium 137 mmol/L      Potassium 4.3 mmol/L      Chloride 104 mmol/L      CO2 16.6 (L) mmol/L      Calcium 7.7 (L) mg/dL      Total Protein 6.0 g/dL      Albumin 3.20 (L) g/dL      ALT (SGPT) 15 U/L      AST (SGOT) 19 U/L      Alkaline Phosphatase 51 U/L      Total Bilirubin 0.3 mg/dL      eGFR Non African Amer 40 (L) mL/min/1.73      Globulin 2.8 gm/dL      A/G Ratio 1.1 g/dL      BUN/Creatinine Ratio 13.7     Anion Gap 16.4 mmol/L     Narrative:       The MDRD GFR formula is only valid for adults with stable renal function between ages 18 and 70.    TSH [[id number]]  (Normal) Collected:  06/23/17 0610    Specimen:  Blood Updated:  06/23/17 0716     TSH 1.130 mIU/mL     Urine Culture [[id number]] Collected:  06/22/17 1254    Specimen:  Urine from Urine, Clean Catch Updated:  06/23/17 0846     Urine Culture >100,000 CFU/mL Mixed Culture    Narrative:         Specimen contains mixed organisms of questionable pathogenicity which indicates contamination with commensal josey.  Further identification is unlikely to provide clinically useful information.  Suggest recollection.    POC Glucose Fingerstick [[id number]]  (Abnormal) Collected:  06/23/17 1100    Specimen:  Blood Updated:  06/23/17 1101     Glucose 219 (H) mg/dL     Narrative:       Meter:  [id number] : [id number] Sally Gauthier    Blood Culture [[id number]]  (Normal) Collected:  06/22/17 1327    Specimen:  Blood from Blood, Venous Line Updated:  06/23/17 1401     Blood Culture No growth at 24 hours    Blood Culture [[id number]]  (Normal) Collected:  06/22/17 1529    Specimen:  Blood from Arm, Left Updated:  06/23/17 1601     Blood Culture No growth at 24 hours    POC Glucose Fingerstick [[id number]]  (Abnormal) Collected:  06/23/17 1601    Specimen:  Blood Updated:  06/23/17 1603     Glucose 259 (H) mg/dL     Narrative:       Meter: [id number] : [id number] Cady Gonzalez    CBC & Differential [[id number]] Collected:  06/23/17 0610    Specimen:  Blood Updated:  06/23/17 1632    Narrative:       The following orders were created for panel order CBC & Differential.  Procedure                               Abnormality         Status                     ---------                               -----------         ------                     CBC Auto Differential[[id number]]        Abnormal            Final result                 Please view results for these tests on the individual orders.    CBC Auto Differential [[id number]]  (Abnormal) Collected:  06/23/17 0610    Specimen:  Blood Updated:  06/23/17 1632     WBC 12.40 (H) 10*3/mm3      RBC 3.49 (L) 10*6/mm3      Hemoglobin 11.8 (L) g/dL      Hematocrit 35.1 (L) %      .6 (H) fL      MCH 33.8 (H) pg      MCHC 33.6 g/dL      RDW 13.8 %      RDW-SD 50.8 fl      MPV 10.8 fL      Platelets 145 10*3/mm3      Neutrophil % 89.5 (H) %      Lymphocyte % 3.1 (L) %      Monocyte % 6.9 %      Eosinophil % 0.0 (L) %      Basophil % 0.0 %      Immature Grans % 0.5 %      Neutrophils, Absolute 11.10 (H) 10*3/mm3      Lymphocytes, Absolute 0.39 (L) 10*3/mm3      Monocytes, Absolute 0.85 10*3/mm3      Eosinophils, Absolute 0.00 10*3/mm3      Basophils, Absolute 0.00 10*3/mm3      Immature Grans, Absolute 0.06 (H) 10*3/mm3     POC Glucose Fingerstick [[id number]]   (Abnormal) Collected:  06/23/17 2029    Specimen:  Blood Updated:  06/23/17 2030     Glucose 187 (H) mg/dL     Narrative:       Meter: [id number] : [id number] Cuong Wray    Lactic Acid, Plasma [[id number]]  (Normal) Collected:  06/24/17 0507    Specimen:  Blood Updated:  06/24/17 0529     Lactate 1.5 mmol/L     CBC & Differential [[id number]] Collected:  06/24/17 0507    Specimen:  Blood Updated:  06/24/17 0536    Narrative:       The following orders were created for panel order CBC & Differential.  Procedure                               Abnormality         Status                     ---------                               -----------         ------                     CBC Auto Differential[[id number]]        Abnormal            Final result                 Please view results for these tests on the individual orders.    CBC Auto Differential [[id number]]  (Abnormal) Collected:  06/24/17 0507    Specimen:  Blood Updated:  06/24/17 0536     WBC 11.60 (H) 10*3/mm3      RBC 3.50 (L) 10*6/mm3      Hemoglobin 11.9 (L) g/dL      Hematocrit 35.1 (L) %      .3 (H) fL      MCH 34.0 (H) pg      MCHC 33.9 g/dL      RDW 13.7 %      RDW-SD 49.7 fl      MPV 10.9 fL      Platelets 147 10*3/mm3      Neutrophil % 87.1 (H) %      Lymphocyte % 5.8 (L) %      Monocyte % 6.6 %      Eosinophil % 0.1 (L) %      Basophil % 0.0 %      Immature Grans % 0.4 %      Neutrophils, Absolute 10.11 (H) 10*3/mm3      Lymphocytes, Absolute 0.67 (L) 10*3/mm3      Monocytes, Absolute 0.76 10*3/mm3      Eosinophils, Absolute 0.01 10*3/mm3      Basophils, Absolute 0.00 10*3/mm3      Immature Grans, Absolute 0.05 (H) 10*3/mm3     Basic Metabolic Panel [[id number]]  (Abnormal) Collected:  06/24/17 0507    Specimen:  Blood Updated:  06/24/17 0556     Glucose 160 (H) mg/dL      BUN 22 mg/dL      Creatinine 1.18 mg/dL      Sodium 139 mmol/L      Potassium 3.8 mmol/L      Chloride 107 mmol/L      CO2 19.3 (L) mmol/L      Calcium 8.0 (L) mg/dL      eGFR  "Non  Amer 60 (L) mL/min/1.73      BUN/Creatinine Ratio 18.6     Anion Gap 12.7 mmol/L     Narrative:       The MDRD GFR formula is only valid for adults with stable renal function between ages 18 and 70.    Uric Acid [[id number]]  (Abnormal) Collected:  06/24/17 0507    Specimen:  Blood Updated:  06/24/17 0556     Uric Acid 7.5 (H) mg/dL     POC Glucose Fingerstick [[id number]]  (Abnormal) Collected:  06/24/17 0607    Specimen:  Blood Updated:  06/24/17 0609     Glucose 139 (H) mg/dL     Narrative:       Meter: [id number] : [id number] Cuong Wray    Procalcitonin [[id number]]  (Abnormal) Collected:  06/24/17 0507    Specimen:  Blood Updated:  06/24/17 0625     Procalcitonin 2.17 (C) ng/mL     Narrative:       As a Marker for Sepsis (Non-Neonates):   1. <0.5 ng/mL represents a low risk of severe sepsis and/or septic shock.  1. >2 ng/mL represents a high risk of severe sepsis and/or septic shock.    As a Marker for Lower Respiratory Tract Infections that require antibiotic therapy:  PCT on Admission     Antibiotic Therapy             6-12 Hrs later  > 0.5                Strongly Recommended            >0.25 - <0.5         Recommended  0.1 - 0.25           Discouraged                   Remeasure/reassess PCT  <0.1                 Strongly Discouraged          Remeasure/reassess PCT      As 28 day mortality risk marker: \"Change in Procalcitonin Result\" (> 80 % or <=80 %) if Day 0 (or Day 1) and Day 4 values are available. Refer to http://www.[employer]-pct-calculator.com/   Change in PCT <=80 %   A decrease of PCT levels below or equal to 80 % defines a positive change in PCT test result representing a higher risk for 28-day all-cause mortality of patients diagnosed with severe sepsis or septic shock.  Change in PCT > 80 %   A decrease of PCT levels of more than 80 % defines a negative change in PCT result representing a lower risk for 28-day all-cause mortality of patients diagnosed with severe sepsis or septic " shock.                BNP [[id number]]  (Abnormal) Collected:  06/24/17 0507    Specimen:  Blood Updated:  06/24/17 1117     proBNP 3978.0 (H) pg/mL     Narrative:       Among patients with dyspnea, NT-proBNP is highly sensitive for the detection of acute congestive heart failure. In addition NT-proBNP of <300 pg/ml effectively rules out acute congestive heart failure with 99% negative predictive value.    POC Glucose Fingerstick [[id number]]  (Abnormal) Collected:  06/24/17 1126    Specimen:  Blood Updated:  06/24/17 1134     Glucose 179 (H) mg/dL     Narrative:       Meter: [id number] : [id number] Krish Baker        Imaging Results (last 72 hours)     Procedure Component Value Units Date/Time    CT Abdomen Pelvis Without Contrast [[id number]] Collected:  06/22/17 1246     Updated:  06/22/17 1258    Narrative:       CT ABDOMEN AND PELVIS WITHOUT CONTRAST     HISTORY: Septic, low blood pressure, increased creatinine     TECHNIQUE: Axial CT images of the abdomen and pelvis were obtained  without administration of intravenous and oral contrast. Coronal and  sagittal reformats were obtained.     COMPARISON: CT angiogram of the abdomen and pelvis from 09/11/2015.     FINDINGS: There is a prominent 1.2 cm subcarinal lymph node present. No  pleural or pericardial effusion is identified. Bilateral dependent  atelectasis is seen. Calcified coronary artery disease is seen.     Diffuse hepatic steatosis is present. The gallbladder is surgically  absent. The spleen is normal in size and attenuation. The pancreas is  normal without ductal dilatation. Bilateral adrenal glands are normal.  Both kidneys are normal in size and attenuation. There is bilateral  perinephric stranding present. There is a partly exophytic cyst arising  from the lower pole of the right kidney that demonstrates internal  attenuation values around 11 Hounsfield units.     There is bilateral moderate perinephric stranding. No renal calculi  or  hydronephrosis. Bilateral ureters demonstrate normal caliber. The  urinary bladder is minimally distended limiting evaluation. The small  and large bowel loops demonstrate normal caliber. There has been stent  graft repair for an abdominal aortic aneurysm which currently measures  6.2 x 5.7 cm in comparison to prior measurement of 6.8 x 6.7 cm  previously. No ascites is present. Degenerative disc disease is  identified within the spine. There is 3 mm retrolisthesis of L5 on S1.  Marked degenerative disc disease is seen at L2-3 level.Small  fat-containing umbilical hernia is present.       Impression:       1.   Bilateral moderate perinephric stranding. At this time, the  possibilities would include bilateral acute pyelonephritis,  acute  tubular necrosis or other cause of acute renal insufficiency  2. Diffuse hepatic steatosis.  3.  Mildly prominent subcarinal lymph node measuring up to 1.2 cm. A  followup CT chest is recommended on a nonemergent basis to further  evaluate.     These findings were discussed with Dr. Templeton by telephone at 12:43 PM  on 06/22/2017.     This report was finalized on 6/22/2017 12:55 PM by Dr. Jordon Polanco MD.       XR Chest 1 View [[id number]] Collected:  06/22/17 1231     Updated:  06/23/17 1320    Narrative:       PORTABLE CHEST     HISTORY:  Weakness, dizziness.     A single view of the chest demonstrates moderate cardiomegaly. There is  no evidence of focal infiltrate, effusion or congestive failure.     This report was finalized on 6/23/2017 1:17 PM by Dr. Jones Richard MD.             Medication Review:  done      Current Facility-Administered Medications:   •  acetaminophen (TYLENOL) tablet 650 mg, 650 mg, Oral, Q4H PRN, Janine Adrian MD, 650 mg at 06/23/17 0829  •  albuterol (PROVENTIL) nebulizer solution 0.083% 2.5 mg/3mL, 2.5 mg, Nebulization, Q6H PRN, Dina Ohara MD, 2.5 mg at 06/24/17 1055  •  atorvastatin (LIPITOR) tablet 40 mg, 40 mg, Oral, Daily, Janine  MD Nguyễn, 40 mg at 06/25/17 1002  •  azithromycin (ZITHROMAX) 500 mg 0.9% NaCl (Add-vantage) 250 mL, 500 mg, Intravenous, Q24H, Dina Ohara MD, 500 mg at 06/24/17 1737  •  cefepime (MAXIPIME) 2 g/100 mL 0.9% NS (mbp), 2 g, Intravenous, Q12H, Janine Adrian MD, 2 g at 06/25/17 0413  •  clobetasol (TEMOVATE) 0.05 % cream, , Topical, Q12H, Pranay Caballero MD  •  dextrose (D50W) solution 25 g, 25 g, Intravenous, Q15 Min PRN, Janine Adrian MD  •  dextrose (GLUTOSE) oral gel 15 g, 15 g, Oral, Q15 Min PRN, Janine Adrian MD  •  gabapentin (NEURONTIN) capsule 1,200 mg, 1,200 mg, Oral, Q12H, Dina Ohara MD, 1,200 mg at 06/25/17 1002  •  glucagon (human recombinant) (GLUCAGEN DIAGNOSTIC) injection 1 mg, 1 mg, Subcutaneous, Q15 Min PRN, Janine Adrian MD  •  guaiFENesin (MUCINEX) 12 hr tablet 600 mg, 600 mg, Oral, BID, Dina Ohara MD, 600 mg at 06/25/17 1003  •  heparin (porcine) 5000 UNIT/ML injection 5,000 Units, 5,000 Units, Subcutaneous, Q12H, Janine Adrian MD, 5,000 Units at 06/25/17 1003  •  hydrocortisone sodium succinate (Solu-CORTEF) injection 50 mg, 50 mg, Intravenous, Q8H, Dina Ohara MD, 50 mg at 06/25/17 0549  •  insulin aspart (novoLOG) injection 0-9 Units, 0-9 Units, Subcutaneous, 4x Daily With Meals & Nightly, Janine Adrian MD, 4 Units at 06/25/17 1201  •  insulin detemir (LEVEMIR) injection 25 Units, 25 Units, Subcutaneous, Nightly, Zeb Ni MD, 25 Units at 06/24/17 2039  •  ipratropium-albuterol (DUO-NEB) nebulizer solution 3 mL, 3 mL, Nebulization, 4x Daily - RT, Dina Ohara MD, 3 mL at 06/25/17 1059  •  ipratropium-albuterol (DUO-NEB) nebulizer solution 3 mL, 3 mL, Nebulization, Q4H PRN, Dina Ohara MD  •  levothyroxine (SYNTHROID, LEVOTHROID) tablet 150 mcg, 150 mcg, Oral, Q AM, Jawanabel Adrian MD, 150 mcg at 06/25/17 0547  •  meclizine (ANTIVERT) tablet 25 mg, 25 mg, Oral, TID PRN, Janien Adrian MD  •  ondansetron (ZOFRAN) injection 4 mg, 4 mg,  Intravenous, Q6H PRN, Janine Adrian MD  •  Pharmacy to dose vancomycin, , Does not apply, Continuous PRN, Janine Adrian MD  •  potassium chloride (MICRO-K) CR capsule 40 mEq, 40 mEq, Oral, TID With Meals, Dina Ohara MD, 40 mEq at 06/25/17 1203  •  sodium chloride 0.9 % flush 1-10 mL, 1-10 mL, Intravenous, PRN, Janine Adrian MD  •  sodium chloride 0.9 % flush 10 mL, 10 mL, Intravenous, PRN, Henrik Templeton MD  •  temazepam (RESTORIL) capsule 15 mg, 15 mg, Oral, Nightly PRN, Dina Ohara MD, 15 mg at 06/24/17 2217  •  vancomycin (VANCOCIN) in iso-osmotic dextrose IVPB 1 g (premix) 200 mL, 1,000 mg, Intravenous, Q12H, Jawanabel Adrian MD, 1,000 mg at 06/25/17 0581  •  vitamin B-12 (CYANOCOBALAMIN) tablet 500 mcg, 500 mcg, Oral, Nightly, Janine Adrian MD, 500 mcg at 06/24/17 2039    Assessment/Plan     Active Hospital Problems (** Indicates Principal Problem)    Diagnosis Date Noted   • Nausea & vomiting [R11.2] 06/24/2017   • Hypotension [I95.9] 06/24/2017   • Sepsis [A41.9] 06/24/2017   • Community acquired bacterial pneumonia [J15.9] 06/24/2017   • Septic embolism [I26.90] 06/24/2017   • Acute renal failure [N17.9] 06/23/2017   • Adrenal insufficiency [E27.40] 05/17/2016   • Sepsis associated hypotension [A41.9] 05/07/2016      Resolved Hospital Problems    Diagnosis Date Noted Date Resolved   No resolved problems to display.     Primary adrenal insufficiency - Unclear etiology  Patient's blood pressure is in decent range and his electrolytes sodium, potassium are within normal limits.  Will decrease steroids to 50 mg iv Q 8 hrs for now.   Noted that K levels are low - not related to low steroids but could because of higher doses of steroids or diuretics.   On K replacement  Will hold Florinef for now as IV hydrocortisone does have mineralocorticoid effect.  ACTH levels pending.      Type 2 diabetes mellitus  NkC3d-9.99  Continue levemir to 25 units at bedtime   Continue NovoLog sliding scale 3 times a day  "before meals and at bedtime  Blood sugars are elevated due to IV steroids.     Hypothyroidism  TSH-1.33  Continue levothyroxine 150 µg oral daily.     Sepsis - Ct scan of the chest showed multifocal PNA.   IV antibiotics per primary team.      Skin rash - Derm consulted.   Secondary to septic emboli.    20 minutes out of 35 minutes face to face spent on floor managing care, discussing plan with nursing and counseling patient/family on treatment options, side effects of the medications.    Zeb Ni MD.  06/25/17  12:33 PM      EMR Dragon / transcription disclaimer:    \"Dictated utilizing Dragon dictation\".   "

## 2017-06-25 NOTE — PROGRESS NOTES
LOS: 3 days   Patient Care Team:  Yifan Patino MD as PCP - General (Internal Medicine)    Chief Complaint   Patient presents with   • Abdominal Pain     dizzy, hypotension - 125 mg solumedrol and 900 ns ems   • Dizziness         Subjective   Feels great today. Slept last night. Doing much better    Objective     Vital Signs  Temp:  [97.4 °F (36.3 °C)-97.6 °F (36.4 °C)] 97.6 °F (36.4 °C)  Heart Rate:  [38-50] 50  Resp:  [14-22] 16  BP: (158-184)/() 184/99    Physical Exam:  WDWN WM in NAD  Skin warm & dry  Lungs BS absent in bases but clear  Heart RRR  Abd obese, soft, NT, BS+  Ext no edema     Results Review:     I reviewed the patient's new clinical results.    Medication Review:       LABS    Results from last 7 days  Lab Units 06/25/17  0542 06/24/17  0507 06/23/17  0610   SODIUM mmol/L 139 139 137   POTASSIUM mmol/L 2.8* 3.8 4.3   CHLORIDE mmol/L 99 107 104   TOTAL CO2 mmol/L 23.2 19.3* 16.6*   BUN mg/dL 17 22 23   CREATININE mg/dL 1.02 1.18 1.68*   GLUCOSE mg/dL 137* 160* 212*   CALCIUM mg/dL 8.4* 8.0* 7.7*       Results from last 7 days  Lab Units 06/25/17  0542 06/24/17  0507 06/23/17  0610   WBC 10*3/mm3 11.54* 11.60* 12.40*  12.42*   HEMOGLOBIN g/dL 12.0* 11.9* 11.8*  11.9*   HEMATOCRIT % 34.9* 35.1* 35.1*  34.4*   PLATELETS 10*3/mm3 135* 147 145  140               Assessment/Plan     Active Problems:    Adrenal insufficiency    Acute renal failure - resolved    Hypotension - resolved. BP high now. I told pt to wait on restarting florinef until his BP goes down    Nausea with vomiting -  resolved    Sepsis - chest CT shows multifocal PNA.     Volume overload - much better today    Bradycardia - plan on holter after d/c. HR better today          Dina Ohara MD  06/25/17  9:20 AM      Time:

## 2017-06-25 NOTE — PLAN OF CARE
Problem: Patient Care Overview (Adult)  Goal: Plan of Care Review  Outcome: Ongoing (interventions implemented as appropriate)    06/25/17 1435 06/25/17 3966   Coping/Psychosocial Response Interventions   Plan Of Care Reviewed With patient --    Patient Care Overview   Progress --  improving   Outcome Evaluation   Outcome Summary/Follow up Plan --  Pt states that he feels much better today. K=2.8, replaced with 40K x 3, recheck in AM. D/C Vanc, change Zithromax to PO starting in the morning. Change Solu-Cortef back to Decadron in the morning. Restart home Lisinopril tonight, BPs will probably still trend high. Pt remains asymptomatic with HR in the 30s-50s today. Continue to monitor tonight, anticipate d/c in the morning       Goal: Adult Individualization and Mutuality  Outcome: Ongoing (interventions implemented as appropriate)  Goal: Discharge Needs Assessment  Outcome: Ongoing (interventions implemented as appropriate)    Problem: Infection, Risk/Actual (Adult)  Goal: Infection Prevention/Resolution  Outcome: Ongoing (interventions implemented as appropriate)    Problem: Sepsis (Adult)  Goal: Signs and Symptoms of Listed Potential Problems Will be Absent or Manageable (Sepsis)  Outcome: Ongoing (interventions implemented as appropriate)

## 2017-06-25 NOTE — PLAN OF CARE
Problem: Patient Care Overview (Adult)  Goal: Plan of Care Review  Outcome: Ongoing (interventions implemented as appropriate)    06/25/17 0444   Coping/Psychosocial Response Interventions   Plan Of Care Reviewed With patient   Patient Care Overview   Progress no change   Outcome Evaluation   Outcome Summary/Follow up Plan Pt vitals stable. Lungs sound a little better. HR still in the 30's-50's range. Pt slept through night. No new complaints

## 2017-06-26 ENCOUNTER — APPOINTMENT (OUTPATIENT)
Dept: CARDIOLOGY | Facility: HOSPITAL | Age: 77
End: 2017-06-26
Attending: HOSPITALIST

## 2017-06-26 VITALS
DIASTOLIC BLOOD PRESSURE: 95 MMHG | TEMPERATURE: 97.6 F | HEART RATE: 49 BPM | OXYGEN SATURATION: 92 % | SYSTOLIC BLOOD PRESSURE: 161 MMHG | WEIGHT: 223.8 LBS | BODY MASS INDEX: 30.31 KG/M2 | HEIGHT: 72 IN | RESPIRATION RATE: 17 BRPM

## 2017-06-26 PROBLEM — E87.6 HYPOKALEMIA: Status: ACTIVE | Noted: 2017-06-26

## 2017-06-26 PROBLEM — R11.2 NAUSEA & VOMITING: Status: RESOLVED | Noted: 2017-06-24 | Resolved: 2017-06-26

## 2017-06-26 PROBLEM — R00.1 SINUS BRADYCARDIA: Status: RESOLVED | Noted: 2017-06-26 | Resolved: 2017-06-26

## 2017-06-26 PROBLEM — E87.6 HYPOKALEMIA: Status: RESOLVED | Noted: 2017-06-26 | Resolved: 2017-06-26

## 2017-06-26 PROBLEM — R00.1 SINUS BRADYCARDIA: Status: ACTIVE | Noted: 2017-06-26

## 2017-06-26 PROBLEM — I95.9 HYPOTENSION: Status: RESOLVED | Noted: 2017-06-24 | Resolved: 2017-06-26

## 2017-06-26 PROBLEM — A41.9 SEPSIS (HCC): Status: RESOLVED | Noted: 2017-06-24 | Resolved: 2017-06-26

## 2017-06-26 PROBLEM — J90 PLEURAL EFFUSION: Status: ACTIVE | Noted: 2017-06-26

## 2017-06-26 PROBLEM — I76 SEPTIC EMBOLISM (HCC): Status: RESOLVED | Noted: 2017-06-24 | Resolved: 2017-06-26

## 2017-06-26 PROBLEM — J15.9 COMMUNITY ACQUIRED BACTERIAL PNEUMONIA: Status: RESOLVED | Noted: 2017-06-24 | Resolved: 2017-06-26

## 2017-06-26 LAB
ANION GAP SERPL CALCULATED.3IONS-SCNC: 14.7 MMOL/L
BACTERIA SPEC AEROBE CULT: NO GROWTH
BASOPHILS # BLD AUTO: 0.01 10*3/MM3 (ref 0–0.2)
BASOPHILS NFR BLD AUTO: 0.1 % (ref 0–1.5)
BUN BLD-MCNC: 15 MG/DL (ref 8–23)
BUN/CREAT SERPL: 16 (ref 7–25)
CALCIUM SPEC-SCNC: 8.9 MG/DL (ref 8.6–10.5)
CHLORIDE SERPL-SCNC: 97 MMOL/L (ref 98–107)
CO2 SERPL-SCNC: 28.3 MMOL/L (ref 22–29)
CREAT BLD-MCNC: 0.94 MG/DL (ref 0.76–1.27)
DEPRECATED RDW RBC AUTO: 46.7 FL (ref 37–54)
EOSINOPHIL # BLD AUTO: 0.01 10*3/MM3 (ref 0–0.7)
EOSINOPHIL NFR BLD AUTO: 0.1 % (ref 0.3–6.2)
ERYTHROCYTE [DISTWIDTH] IN BLOOD BY AUTOMATED COUNT: 13.2 % (ref 11.5–14.5)
GFR SERPL CREATININE-BSD FRML MDRD: 78 ML/MIN/1.73
GLUCOSE BLD-MCNC: 119 MG/DL (ref 65–99)
GLUCOSE BLDC GLUCOMTR-MCNC: 109 MG/DL (ref 70–130)
GLUCOSE BLDC GLUCOMTR-MCNC: 197 MG/DL (ref 70–130)
HCT VFR BLD AUTO: 37.9 % (ref 40.4–52.2)
HGB BLD-MCNC: 13.1 G/DL (ref 13.7–17.6)
IMM GRANULOCYTES # BLD: 0.09 10*3/MM3 (ref 0–0.03)
IMM GRANULOCYTES NFR BLD: 1.2 % (ref 0–0.5)
LYMPHOCYTES # BLD AUTO: 1.79 10*3/MM3 (ref 0.9–4.8)
LYMPHOCYTES NFR BLD AUTO: 23.9 % (ref 19.6–45.3)
MCH RBC QN AUTO: 33.8 PG (ref 27–32.7)
MCHC RBC AUTO-ENTMCNC: 34.6 G/DL (ref 32.6–36.4)
MCV RBC AUTO: 97.7 FL (ref 79.8–96.2)
MONOCYTES # BLD AUTO: 0.75 10*3/MM3 (ref 0.2–1.2)
MONOCYTES NFR BLD AUTO: 10 % (ref 5–12)
MRSA SPEC QL CULT: NORMAL
NEUTROPHILS # BLD AUTO: 4.83 10*3/MM3 (ref 1.9–8.1)
NEUTROPHILS NFR BLD AUTO: 64.7 % (ref 42.7–76)
PLATELET # BLD AUTO: 148 10*3/MM3 (ref 140–500)
PMV BLD AUTO: 10.7 FL (ref 6–12)
POTASSIUM BLD-SCNC: 3 MMOL/L (ref 3.5–5.2)
RBC # BLD AUTO: 3.88 10*6/MM3 (ref 4.6–6)
SODIUM BLD-SCNC: 140 MMOL/L (ref 136–145)
VANCOMYCIN TROUGH SERPL-MCNC: 7 MCG/ML (ref 5–20)
WBC NRBC COR # BLD: 7.48 10*3/MM3 (ref 4.5–10.7)

## 2017-06-26 PROCEDURE — 82962 GLUCOSE BLOOD TEST: CPT

## 2017-06-26 PROCEDURE — 25010000002 CEFEPIME: Performed by: INTERNAL MEDICINE

## 2017-06-26 PROCEDURE — 80048 BASIC METABOLIC PNL TOTAL CA: CPT | Performed by: INTERNAL MEDICINE

## 2017-06-26 PROCEDURE — 80202 ASSAY OF VANCOMYCIN: CPT | Performed by: INTERNAL MEDICINE

## 2017-06-26 PROCEDURE — 93226 XTRNL ECG REC<48 HR SCAN A/R: CPT

## 2017-06-26 PROCEDURE — 85025 COMPLETE CBC W/AUTO DIFF WBC: CPT | Performed by: INTERNAL MEDICINE

## 2017-06-26 PROCEDURE — 99233 SBSQ HOSP IP/OBS HIGH 50: CPT | Performed by: INTERNAL MEDICINE

## 2017-06-26 PROCEDURE — 25010000002 HEPARIN (PORCINE) PER 1000 UNITS: Performed by: INTERNAL MEDICINE

## 2017-06-26 PROCEDURE — 93225 XTRNL ECG REC<48 HRS REC: CPT

## 2017-06-26 PROCEDURE — 25010000002 HYDROCORTISONE SODIUM SUCCINATE 100 MG RECONSTITUTED SOLUTION: Performed by: INTERNAL MEDICINE

## 2017-06-26 PROCEDURE — 63710000001 INSULIN ASPART PER 5 UNITS: Performed by: INTERNAL MEDICINE

## 2017-06-26 RX ORDER — GLIMEPIRIDE 2 MG/1
2 TABLET ORAL
Status: ON HOLD
Start: 2017-06-26 | End: 2017-07-26 | Stop reason: SDUPTHER

## 2017-06-26 RX ORDER — CEFDINIR 300 MG/1
300 CAPSULE ORAL 2 TIMES DAILY
Qty: 10 CAPSULE | Refills: 0 | Status: SHIPPED | OUTPATIENT
Start: 2017-06-26 | End: 2017-07-01

## 2017-06-26 RX ORDER — DEXAMETHASONE 1 MG
1 TABLET ORAL
Status: ON HOLD
Start: 2017-06-26 | End: 2017-07-26 | Stop reason: SDUPTHER

## 2017-06-26 RX ORDER — AZITHROMYCIN 250 MG/1
TABLET, FILM COATED ORAL
Qty: 5 TABLET | Refills: 0 | Status: SHIPPED | OUTPATIENT
Start: 2017-06-26 | End: 2017-07-09 | Stop reason: HOSPADM

## 2017-06-26 RX ORDER — POTASSIUM CHLORIDE 20 MEQ/1
20 TABLET, EXTENDED RELEASE ORAL 2 TIMES DAILY
Qty: 6 TABLET | Refills: 0 | Status: SHIPPED | OUTPATIENT
Start: 2017-06-26 | End: 2017-06-29

## 2017-06-26 RX ORDER — POTASSIUM CHLORIDE 750 MG/1
40 CAPSULE, EXTENDED RELEASE ORAL ONCE
Status: COMPLETED | OUTPATIENT
Start: 2017-06-26 | End: 2017-06-26

## 2017-06-26 RX ADMIN — GABAPENTIN 1200 MG: 400 CAPSULE ORAL at 09:28

## 2017-06-26 RX ADMIN — GUAIFENESIN 600 MG: 600 TABLET, EXTENDED RELEASE ORAL at 09:28

## 2017-06-26 RX ADMIN — CEFEPIME 2 G: 2 INJECTION, POWDER, FOR SOLUTION INTRAVENOUS at 02:40

## 2017-06-26 RX ADMIN — ACETAMINOPHEN 650 MG: 325 TABLET ORAL at 04:24

## 2017-06-26 RX ADMIN — DEXAMETHASONE 1 MG: 0.5 TABLET ORAL at 09:27

## 2017-06-26 RX ADMIN — HYDROCORTISONE SODIUM SUCCINATE 50 MG: 100 INJECTION, POWDER, FOR SOLUTION INTRAMUSCULAR; INTRAVENOUS at 06:09

## 2017-06-26 RX ADMIN — INSULIN ASPART 2 UNITS: 100 INJECTION, SOLUTION INTRAVENOUS; SUBCUTANEOUS at 12:39

## 2017-06-26 RX ADMIN — LISINOPRIL 2.5 MG: 2.5 TABLET ORAL at 09:28

## 2017-06-26 RX ADMIN — POTASSIUM CHLORIDE 40 MEQ: 750 CAPSULE, EXTENDED RELEASE ORAL at 15:40

## 2017-06-26 RX ADMIN — AZITHROMYCIN 500 MG: 250 TABLET, FILM COATED ORAL at 09:28

## 2017-06-26 RX ADMIN — LEVOTHYROXINE SODIUM 150 MCG: 150 TABLET ORAL at 06:09

## 2017-06-26 RX ADMIN — ATORVASTATIN CALCIUM 40 MG: 20 TABLET, FILM COATED ORAL at 09:28

## 2017-06-26 RX ADMIN — HEPARIN SODIUM 5000 UNITS: 5000 INJECTION, SOLUTION INTRAVENOUS; SUBCUTANEOUS at 09:28

## 2017-06-26 NOTE — PROGRESS NOTES
Continued Stay Note  [facility]     Patient Name: Faustino Horton Jr.  MRN: [id number]  Today's Date: 6/26/2017    Admit Date: 6/22/2017          Discharge Plan       06/26/17 0922    Case Management/Social Work Plan    Plan Home no needs    Additional Comments Plan remains home no needs....[facility]              Discharge Codes     None        Expected Discharge Date and Time     Expected Discharge Date Expected Discharge Time    Jun 27, 2017             Mila Rubio RN

## 2017-06-26 NOTE — PLAN OF CARE
Problem: Patient Care Overview (Adult)  Goal: Plan of Care Review  Outcome: Ongoing (interventions implemented as appropriate)    06/26/17 0312   Coping/Psychosocial Response Interventions   Plan Of Care Reviewed With patient   Patient Care Overview   Progress improving   Outcome Evaluation   Outcome Summary/Follow up Plan Patient walks very well around nurse's station; no s/s of distress; BP elevated, but MD aware; Pt to be d/c'd in the morning         Problem: Infection, Risk/Actual (Adult)  Goal: Infection Prevention/Resolution  Outcome: Ongoing (interventions implemented as appropriate)    Problem: Sepsis (Adult)  Goal: Signs and Symptoms of Listed Potential Problems Will be Absent or Manageable (Sepsis)  Outcome: Ongoing (interventions implemented as appropriate)

## 2017-06-26 NOTE — PLAN OF CARE
Problem: Patient Care Overview (Adult)  Goal: Plan of Care Review  Outcome: Outcome(s) achieved Date Met:  06/26/17 06/26/17 1502   Coping/Psychosocial Response Interventions   Plan Of Care Reviewed With patient   Patient Care Overview   Progress improving   Outcome Evaluation   Outcome Summary/Follow up Plan Pt ambulating well. HR running 30's/40's asymptomatic. Pt to be discharged.       Goal: Adult Individualization and Mutuality  Outcome: Outcome(s) achieved Date Met:  06/26/17  Goal: Discharge Needs Assessment  Outcome: Outcome(s) achieved Date Met:  06/26/17    Problem: Infection, Risk/Actual (Adult)  Goal: Infection Prevention/Resolution  Outcome: Outcome(s) achieved Date Met:  06/26/17    Problem: Sepsis (Adult)  Goal: Signs and Symptoms of Listed Potential Problems Will be Absent or Manageable (Sepsis)  Outcome: Outcome(s) achieved Date Met:  06/26/17

## 2017-06-26 NOTE — PROGRESS NOTES
77 y.o.   LOS: 4 days   Patient Care Team:  Yifan Patino MD as PCP - General (Internal Medicine)    Chief Complaint:  Adrenal insufficiency    Chief Complaint   Patient presents with   • Abdominal Pain     dizzy, hypotension - 125 mg solumedrol and 900 ns ems   • Dizziness       Subjective  Pt feels well and wants to go home. No further n/v and no dizzy spells. Pt steroids are changed to oral.     Interval History:    Review of Systems:   Review of Systems   Constitutional: Negative for appetite change, fatigue and fever.   Gastrointestinal: Negative for nausea and vomiting.   Musculoskeletal: Positive for back pain.   Skin: Positive for rash.   Neurological: Negative for dizziness and weakness.     Objective     Vital Signs   Temp:  [97.6 °F (36.4 °C)-97.9 °F (36.6 °C)] 97.8 °F (36.6 °C)  Heart Rate:  [38-45] 38  Resp:  [16-20] 18  BP: (151-182)/(86-95) 151/91    Physical Exam:  Physical Exam   Constitutional: He is oriented to person, place, and time. He appears well-nourished.   obese   HENT:   Head: Normocephalic and atraumatic.   Mouth/Throat: Oropharynx is clear and moist.   Eyes: Conjunctivae and EOM are normal.   Neck: Normal range of motion. Neck supple. Carotid bruit is not present. No thyromegaly present.   Acanthosis nigricans   Cardiovascular: Normal rate and normal heart sounds.    HR - 58/min   Pulmonary/Chest: Effort normal and breath sounds normal. No stridor. No respiratory distress. He has no wheezes.   Abdominal: Soft. Bowel sounds are normal. He exhibits no distension. There is no tenderness.   Central obesity   Musculoskeletal: He exhibits no edema, tenderness or deformity.   Lymphadenopathy:     He has no cervical adenopathy.   Neurological: He is alert and oriented to person, place, and time. He has normal reflexes.   Skin: Skin is warm and dry. Rash noted.   Psychiatric: He has a normal mood and affect. His behavior is normal.   Vitals reviewed.  Results Review:     I reviewed the patient's  new clinical results.      Glucose   Date/Time Value Ref Range Status   06/26/2017 0500 119 (H) 65 - 99 mg/dL Final   06/25/2017 0542 137 (H) 65 - 99 mg/dL Final   06/24/2017 0507 160 (H) 65 - 99 mg/dL Final     Lab Results (last 72 hours)     Procedure Component Value Units Date/Time    CBC & Differential [[id number]] Collected:  06/22/17 1130    Specimen:  Blood Updated:  06/22/17 1144    Narrative:       The following orders were created for panel order CBC & Differential.  Procedure                               Abnormality         Status                     ---------                               -----------         ------                     CBC Auto Differential[[id number]]        Abnormal            Final result                 Please view results for these tests on the individual orders.    CBC Auto Differential [[id number]]  (Abnormal) Collected:  06/22/17 1130    Specimen:  Blood Updated:  06/22/17 1144     WBC 5.15 10*3/mm3      RBC 3.65 (L) 10*6/mm3      Hemoglobin 12.4 (L) g/dL      Hematocrit 37.0 (L) %      .4 (H) fL      MCH 34.0 (H) pg      MCHC 33.5 g/dL      RDW 13.5 %      RDW-SD 49.6 fl      MPV 10.2 fL      Platelets 134 (L) 10*3/mm3      Neutrophil % 91.8 (H) %      Lymphocyte % 5.2 (L) %      Monocyte % 1.4 (L) %      Eosinophil % 1.2 %      Basophil % 0.0 %      Immature Grans % 0.4 %      Neutrophils, Absolute 4.73 10*3/mm3      Lymphocytes, Absolute 0.27 (L) 10*3/mm3      Monocytes, Absolute 0.07 (L) 10*3/mm3      Eosinophils, Absolute 0.06 10*3/mm3      Basophils, Absolute 0.00 10*3/mm3      Immature Grans, Absolute 0.02 10*3/mm3     Lactic Acid, Plasma [[id number]]  (Abnormal) Collected:  06/22/17 1130    Specimen:  Blood Updated:  06/22/17 1204     Lactate 4.7 (C) mmol/L     Comprehensive Metabolic Panel [[id number]]  (Abnormal) Collected:  06/22/17 1130    Specimen:  Blood Updated:  06/22/17 1206     Glucose 96 mg/dL      BUN 14 mg/dL      Creatinine 1.90 (H) mg/dL      Sodium 143  mmol/L      Potassium 3.5 mmol/L      Chloride 103 mmol/L      CO2 22.8 mmol/L      Calcium 8.5 (L) mg/dL      Total Protein 6.0 g/dL      Albumin 3.40 (L) g/dL      ALT (SGPT) 23 U/L      AST (SGOT) 29 U/L      Alkaline Phosphatase 61 U/L      Total Bilirubin 0.3 mg/dL      eGFR Non African Amer 35 (L) mL/min/1.73      Globulin 2.6 gm/dL      A/G Ratio 1.3 g/dL      BUN/Creatinine Ratio 7.4     Anion Gap 17.2 mmol/L     Narrative:       The MDRD GFR formula is only valid for adults with stable renal function between ages 18 and 70.    Troponin [[id number]]  (Normal) Collected:  06/22/17 1130    Specimen:  Blood Updated:  06/22/17 1206     Troponin T <0.010 ng/mL     Narrative:       Troponin T Reference Ranges:  Less than 0.03 ng/mL:    Negative for AMI  0.03 to 0.09 ng/mL:      Indeterminant for AMI  Greater than 0.09 ng/mL: Positive for AMI    Magnesium [[id number]]  (Normal) Collected:  06/22/17 1130    Specimen:  Blood Updated:  06/22/17 1206     Magnesium 2.0 mg/dL     [facility] [[id number]] Collected:  06/22/17 1130    Specimen:  Blood Updated:  06/22/17 1301    Narrative:       The following orders were created for panel order [facility].  Procedure                               Abnormality         Status                     ---------                               -----------         ------                     Light Blue Top[[id number]]                                   Final result               Green Top (Gel)[[id number]]                                  Final result               Lavender Top[[id number]]                                     Final result               Gold Top - SST[[id number]]                                   Final result                 Please view results for these tests on the individual orders.    Light Blue Top [[id number]] Collected:  06/22/17 1130    Specimen:  Blood Updated:  06/22/17 1301     Extra Tube hold for add-on      Auto resulted       Green Top (Gel) [[id number]] Collected:   06/22/17 1130    Specimen:  Blood Updated:  06/22/17 1301     Extra Tube Hold for add-ons.      Auto resulted.       Lavender Top [[id number]] Collected:  06/22/17 1130    Specimen:  Blood Updated:  06/22/17 1301     Extra Tube hold for add-on      Auto resulted       Gold Top - SST [[id number]] Collected:  06/22/17 1130    Specimen:  Blood Updated:  06/22/17 1301     Extra Tube Hold for add-ons.      Auto resulted.       Urinalysis With / Culture If Indicated [[id number]]  (Abnormal) Collected:  06/22/17 1254    Specimen:  Urine from Urine, Clean Catch Updated:  06/22/17 1340     Color, UA Dark Yellow (A)     Appearance, UA Cloudy (A)     pH, UA <=5.0     Specific Gravity, UA 1.020     Glucose, UA Negative     Ketones, UA Trace (A)     Bilirubin, UA Negative     Blood, UA Negative     Protein,  mg/dL (2+) (A)     Leuk Esterase, UA Trace (A)     Nitrite, UA Negative     Urobilinogen, UA 0.2 E.U./dL    Urinalysis, Microscopic Only [[id number]]  (Abnormal) Collected:  06/22/17 1254    Specimen:  Urine from Urine, Clean Catch Updated:  06/22/17 1353     RBC, UA 0-2 /HPF      WBC, UA 3-5 (A) /HPF      Bacteria, UA 1+ (A) /HPF      Squamous Epithelial Cells, UA 3-6 (A) /HPF      Transitional Epithelial Cells, UA 3-6 (A) /HPF      Hyaline Casts, UA 7-12 /LPF      Uric Acid Crystals, UA Small/1+ /HPF      Methodology Manual Light Microscopy    Lactate Acid, Reflex [[id number]]  (Abnormal) Collected:  06/22/17 1328    Specimen:  Blood Updated:  06/22/17 1358     Lactate 4.1 (C) mmol/L     Procalcitonin [[id number]]  (Abnormal) Collected:  06/22/17 1614    Specimen:  Blood Updated:  06/22/17 1728     Procalcitonin 10.00 (C) ng/mL     Narrative:       As a Marker for Sepsis (Non-Neonates):   1. <0.5 ng/mL represents a low risk of severe sepsis and/or septic shock.  1. >2 ng/mL represents a high risk of severe sepsis and/or septic shock.    As a Marker for Lower Respiratory Tract Infections that require antibiotic  "therapy:  PCT on Admission     Antibiotic Therapy             6-12 Hrs later  > 0.5                Strongly Recommended            >0.25 - <0.5         Recommended  0.1 - 0.25           Discouraged                   Remeasure/reassess PCT  <0.1                 Strongly Discouraged          Remeasure/reassess PCT      As 28 day mortality risk marker: \"Change in Procalcitonin Result\" (> 80 % or <=80 %) if Day 0 (or Day 1) and Day 4 values are available. Refer to http://[email or web address]/   Change in PCT <=80 %   A decrease of PCT levels below or equal to 80 % defines a positive change in PCT test result representing a higher risk for 28-day all-cause mortality of patients diagnosed with severe sepsis or septic shock.  Change in PCT > 80 %   A decrease of PCT levels of more than 80 % defines a negative change in PCT result representing a lower risk for 28-day all-cause mortality of patients diagnosed with severe sepsis or septic shock.                POC Glucose Fingerstick [[id number]]  (Abnormal) Collected:  06/22/17 2010    Specimen:  Blood Updated:  06/22/17 2012     Glucose 177 (H) mg/dL     Narrative:       Meter: [id number] : [id number] Cuong Wray    Lactic Acid, Plasma [[id number]]  (Abnormal) Collected:  06/23/17 0010    Specimen:  Blood Updated:  06/23/17 0050     Lactate 4.7 (C) mmol/L     POC Glucose Fingerstick [[id number]]  (Abnormal) Collected:  06/23/17 0636    Specimen:  Blood Updated:  06/23/17 0637     Glucose 179 (H) mg/dL     Narrative:       Meter: [id number] : [id number] Cuong Wray    Hemoglobin A1c [[id number]]  (Abnormal) Collected:  06/23/17 0610    Specimen:  Blood Updated:  06/23/17 0654     Hemoglobin A1C 6.99 (H) %     Narrative:       Hemoglobin A1C Ranges:    Increased Risk for Diabetes  5.7% to 6.4%  Diabetes                     >= 6.5%  Diabetic Goal                < 7.0%    Lactic Acid, Plasma [[id number]]  (Abnormal) Collected:  06/23/17 0610    Specimen:  Blood " Updated:  06/23/17 0656     Lactate 3.0 (C) mmol/L     CBC (No Diff) [[id number]]  (Abnormal) Collected:  06/23/17 0610    Specimen:  Blood Updated:  06/23/17 0658     WBC 12.42 (H) 10*3/mm3      RBC 3.48 (L) 10*6/mm3      Hemoglobin 11.9 (L) g/dL      Hematocrit 34.4 (L) %      MCV 98.9 (H) fL      MCH 34.2 (H) pg      MCHC 34.6 g/dL      RDW 13.7 %      RDW-SD 48.8 fl      MPV 10.3 fL      Platelets 140 10*3/mm3     Comprehensive Metabolic Panel [[id number]]  (Abnormal) Collected:  06/23/17 0610    Specimen:  Blood Updated:  06/23/17 0706     Glucose 212 (H) mg/dL      BUN 23 mg/dL      Creatinine 1.68 (H) mg/dL      Sodium 137 mmol/L      Potassium 4.3 mmol/L      Chloride 104 mmol/L      CO2 16.6 (L) mmol/L      Calcium 7.7 (L) mg/dL      Total Protein 6.0 g/dL      Albumin 3.20 (L) g/dL      ALT (SGPT) 15 U/L      AST (SGOT) 19 U/L      Alkaline Phosphatase 51 U/L      Total Bilirubin 0.3 mg/dL      eGFR Non African Amer 40 (L) mL/min/1.73      Globulin 2.8 gm/dL      A/G Ratio 1.1 g/dL      BUN/Creatinine Ratio 13.7     Anion Gap 16.4 mmol/L     Narrative:       The MDRD GFR formula is only valid for adults with stable renal function between ages 18 and 70.    TSH [[id number]]  (Normal) Collected:  06/23/17 0610    Specimen:  Blood Updated:  06/23/17 0716     TSH 1.130 mIU/mL     Urine Culture [[id number]] Collected:  06/22/17 1254    Specimen:  Urine from Urine, Clean Catch Updated:  06/23/17 0846     Urine Culture >100,000 CFU/mL Mixed Culture    Narrative:         Specimen contains mixed organisms of questionable pathogenicity which indicates contamination with commensal josey.  Further identification is unlikely to provide clinically useful information.  Suggest recollection.    POC Glucose Fingerstick [[id number]]  (Abnormal) Collected:  06/23/17 1100    Specimen:  Blood Updated:  06/23/17 1101     Glucose 219 (H) mg/dL     Narrative:       Meter: [id number] : [id number] Sally Gauthier    Blood Culture  [[id number]]  (Normal) Collected:  06/22/17 1327    Specimen:  Blood from Blood, Venous Line Updated:  06/23/17 1401     Blood Culture No growth at 24 hours    Blood Culture [[id number]]  (Normal) Collected:  06/22/17 1529    Specimen:  Blood from Arm, Left Updated:  06/23/17 1601     Blood Culture No growth at 24 hours    POC Glucose Fingerstick [[id number]]  (Abnormal) Collected:  06/23/17 1601    Specimen:  Blood Updated:  06/23/17 1603     Glucose 259 (H) mg/dL     Narrative:       Meter: [id number] : [id number] Cady Gonzalez    CBC & Differential [[id number]] Collected:  06/23/17 0610    Specimen:  Blood Updated:  06/23/17 1632    Narrative:       The following orders were created for panel order CBC & Differential.  Procedure                               Abnormality         Status                     ---------                               -----------         ------                     CBC Auto Differential[[id number]]        Abnormal            Final result                 Please view results for these tests on the individual orders.    CBC Auto Differential [[id number]]  (Abnormal) Collected:  06/23/17 0610    Specimen:  Blood Updated:  06/23/17 1632     WBC 12.40 (H) 10*3/mm3      RBC 3.49 (L) 10*6/mm3      Hemoglobin 11.8 (L) g/dL      Hematocrit 35.1 (L) %      .6 (H) fL      MCH 33.8 (H) pg      MCHC 33.6 g/dL      RDW 13.8 %      RDW-SD 50.8 fl      MPV 10.8 fL      Platelets 145 10*3/mm3      Neutrophil % 89.5 (H) %      Lymphocyte % 3.1 (L) %      Monocyte % 6.9 %      Eosinophil % 0.0 (L) %      Basophil % 0.0 %      Immature Grans % 0.5 %      Neutrophils, Absolute 11.10 (H) 10*3/mm3      Lymphocytes, Absolute 0.39 (L) 10*3/mm3      Monocytes, Absolute 0.85 10*3/mm3      Eosinophils, Absolute 0.00 10*3/mm3      Basophils, Absolute 0.00 10*3/mm3      Immature Grans, Absolute 0.06 (H) 10*3/mm3     POC Glucose Fingerstick [[id number]]  (Abnormal) Collected:  06/23/17 2029    Specimen:  Blood  Updated:  06/23/17 2030     Glucose 187 (H) mg/dL     Narrative:       Meter: [id number] : [id number] Cuong Wray    Lactic Acid, Plasma [[id number]]  (Normal) Collected:  06/24/17 0507    Specimen:  Blood Updated:  06/24/17 0529     Lactate 1.5 mmol/L     CBC & Differential [[id number]] Collected:  06/24/17 0507    Specimen:  Blood Updated:  06/24/17 0536    Narrative:       The following orders were created for panel order CBC & Differential.  Procedure                               Abnormality         Status                     ---------                               -----------         ------                     CBC Auto Differential[[id number]]        Abnormal            Final result                 Please view results for these tests on the individual orders.    CBC Auto Differential [[id number]]  (Abnormal) Collected:  06/24/17 0507    Specimen:  Blood Updated:  06/24/17 0536     WBC 11.60 (H) 10*3/mm3      RBC 3.50 (L) 10*6/mm3      Hemoglobin 11.9 (L) g/dL      Hematocrit 35.1 (L) %      .3 (H) fL      MCH 34.0 (H) pg      MCHC 33.9 g/dL      RDW 13.7 %      RDW-SD 49.7 fl      MPV 10.9 fL      Platelets 147 10*3/mm3      Neutrophil % 87.1 (H) %      Lymphocyte % 5.8 (L) %      Monocyte % 6.6 %      Eosinophil % 0.1 (L) %      Basophil % 0.0 %      Immature Grans % 0.4 %      Neutrophils, Absolute 10.11 (H) 10*3/mm3      Lymphocytes, Absolute 0.67 (L) 10*3/mm3      Monocytes, Absolute 0.76 10*3/mm3      Eosinophils, Absolute 0.01 10*3/mm3      Basophils, Absolute 0.00 10*3/mm3      Immature Grans, Absolute 0.05 (H) 10*3/mm3     Basic Metabolic Panel [[id number]]  (Abnormal) Collected:  06/24/17 0507    Specimen:  Blood Updated:  06/24/17 0556     Glucose 160 (H) mg/dL      BUN 22 mg/dL      Creatinine 1.18 mg/dL      Sodium 139 mmol/L      Potassium 3.8 mmol/L      Chloride 107 mmol/L      CO2 19.3 (L) mmol/L      Calcium 8.0 (L) mg/dL      eGFR Non African Amer 60 (L) mL/min/1.73      BUN/Creatinine  "Ratio 18.6     Anion Gap 12.7 mmol/L     Narrative:       The MDRD GFR formula is only valid for adults with stable renal function between ages 18 and 70.    Uric Acid [[id number]]  (Abnormal) Collected:  06/24/17 0507    Specimen:  Blood Updated:  06/24/17 0556     Uric Acid 7.5 (H) mg/dL     POC Glucose Fingerstick [[id number]]  (Abnormal) Collected:  06/24/17 0607    Specimen:  Blood Updated:  06/24/17 0609     Glucose 139 (H) mg/dL     Narrative:       Meter: [id number] : [id number] Cuong Wray    Procalcitonin [[id number]]  (Abnormal) Collected:  06/24/17 0507    Specimen:  Blood Updated:  06/24/17 0625     Procalcitonin 2.17 (C) ng/mL     Narrative:       As a Marker for Sepsis (Non-Neonates):   1. <0.5 ng/mL represents a low risk of severe sepsis and/or septic shock.  1. >2 ng/mL represents a high risk of severe sepsis and/or septic shock.    As a Marker for Lower Respiratory Tract Infections that require antibiotic therapy:  PCT on Admission     Antibiotic Therapy             6-12 Hrs later  > 0.5                Strongly Recommended            >0.25 - <0.5         Recommended  0.1 - 0.25           Discouraged                   Remeasure/reassess PCT  <0.1                 Strongly Discouraged          Remeasure/reassess PCT      As 28 day mortality risk marker: \"Change in Procalcitonin Result\" (> 80 % or <=80 %) if Day 0 (or Day 1) and Day 4 values are available. Refer to http://www.[employer]-pct-calculator.com/   Change in PCT <=80 %   A decrease of PCT levels below or equal to 80 % defines a positive change in PCT test result representing a higher risk for 28-day all-cause mortality of patients diagnosed with severe sepsis or septic shock.  Change in PCT > 80 %   A decrease of PCT levels of more than 80 % defines a negative change in PCT result representing a lower risk for 28-day all-cause mortality of patients diagnosed with severe sepsis or septic shock.                BNP [[id number]]  (Abnormal) " Collected:  06/24/17 0507    Specimen:  Blood Updated:  06/24/17 1117     proBNP 3978.0 (H) pg/mL     Narrative:       Among patients with dyspnea, NT-proBNP is highly sensitive for the detection of acute congestive heart failure. In addition NT-proBNP of <300 pg/ml effectively rules out acute congestive heart failure with 99% negative predictive value.    POC Glucose Fingerstick [[id number]]  (Abnormal) Collected:  06/24/17 1126    Specimen:  Blood Updated:  06/24/17 1134     Glucose 179 (H) mg/dL     Narrative:       Meter: [id number] : [id number] Krish Baker        Imaging Results (last 72 hours)     Procedure Component Value Units Date/Time    CT Abdomen Pelvis Without Contrast [[id number]] Collected:  06/22/17 1246     Updated:  06/22/17 1258    Narrative:       CT ABDOMEN AND PELVIS WITHOUT CONTRAST     HISTORY: Septic, low blood pressure, increased creatinine     TECHNIQUE: Axial CT images of the abdomen and pelvis were obtained  without administration of intravenous and oral contrast. Coronal and  sagittal reformats were obtained.     COMPARISON: CT angiogram of the abdomen and pelvis from 09/11/2015.     FINDINGS: There is a prominent 1.2 cm subcarinal lymph node present. No  pleural or pericardial effusion is identified. Bilateral dependent  atelectasis is seen. Calcified coronary artery disease is seen.     Diffuse hepatic steatosis is present. The gallbladder is surgically  absent. The spleen is normal in size and attenuation. The pancreas is  normal without ductal dilatation. Bilateral adrenal glands are normal.  Both kidneys are normal in size and attenuation. There is bilateral  perinephric stranding present. There is a partly exophytic cyst arising  from the lower pole of the right kidney that demonstrates internal  attenuation values around 11 Hounsfield units.     There is bilateral moderate perinephric stranding. No renal calculi or  hydronephrosis. Bilateral ureters demonstrate normal caliber.  The  urinary bladder is minimally distended limiting evaluation. The small  and large bowel loops demonstrate normal caliber. There has been stent  graft repair for an abdominal aortic aneurysm which currently measures  6.2 x 5.7 cm in comparison to prior measurement of 6.8 x 6.7 cm  previously. No ascites is present. Degenerative disc disease is  identified within the spine. There is 3 mm retrolisthesis of L5 on S1.  Marked degenerative disc disease is seen at L2-3 level.Small  fat-containing umbilical hernia is present.       Impression:       1.   Bilateral moderate perinephric stranding. At this time, the  possibilities would include bilateral acute pyelonephritis,  acute  tubular necrosis or other cause of acute renal insufficiency  2. Diffuse hepatic steatosis.  3.  Mildly prominent subcarinal lymph node measuring up to 1.2 cm. A  followup CT chest is recommended on a nonemergent basis to further  evaluate.     These findings were discussed with Dr. Templeton by telephone at 12:43 PM  on 06/22/2017.     This report was finalized on 6/22/2017 12:55 PM by Dr. Jordon Polanco MD.       XR Chest 1 View [[id number]] Collected:  06/22/17 1231     Updated:  06/23/17 1320    Narrative:       PORTABLE CHEST     HISTORY:  Weakness, dizziness.     A single view of the chest demonstrates moderate cardiomegaly. There is  no evidence of focal infiltrate, effusion or congestive failure.     This report was finalized on 6/23/2017 1:17 PM by Dr. Jones Richard MD.             Medication Review:  done      Current Facility-Administered Medications:   •  acetaminophen (TYLENOL) tablet 650 mg, 650 mg, Oral, Q4H PRN, Janine Adrian MD, 650 mg at 06/26/17 0424  •  albuterol (PROVENTIL) nebulizer solution 0.083% 2.5 mg/3mL, 2.5 mg, Nebulization, Q6H PRN, Dina Ohara MD, 2.5 mg at 06/24/17 1055  •  atorvastatin (LIPITOR) tablet 40 mg, 40 mg, Oral, Daily, Janine Adrian MD, 40 mg at 06/26/17 0960  •  azithromycin (ZITHROMAX) tablet  500 mg, 500 mg, Oral, Q24H, Dina Ohara MD, 500 mg at 06/26/17 0928  •  cefepime (MAXIPIME) 2 g/100 mL 0.9% NS (mbp), 2 g, Intravenous, Q12H, Janine Adrian MD, 2 g at 06/26/17 0240  •  clobetasol (TEMOVATE) 0.05 % cream, , Topical, Q12H, Pranay Caballero MD  •  dexamethasone (DECADRON) tablet 1 mg, 1 mg, Oral, Daily With Breakfast, Dina Ohara MD, 1 mg at 06/26/17 0927  •  dextrose (D50W) solution 25 g, 25 g, Intravenous, Q15 Min PRN, Janine Adrian MD  •  dextrose (GLUTOSE) oral gel 15 g, 15 g, Oral, Q15 Min PRN, Janine Adrian MD  •  gabapentin (NEURONTIN) capsule 1,200 mg, 1,200 mg, Oral, Q12H, Dina Ohara MD, 1,200 mg at 06/26/17 0928  •  glucagon (human recombinant) (GLUCAGEN DIAGNOSTIC) injection 1 mg, 1 mg, Subcutaneous, Q15 Min PRN, Janine Adrian MD  •  guaiFENesin (MUCINEX) 12 hr tablet 600 mg, 600 mg, Oral, BID, Dina Ohara MD, 600 mg at 06/26/17 0928  •  heparin (porcine) 5000 UNIT/ML injection 5,000 Units, 5,000 Units, Subcutaneous, Q12H, Janine Adrian MD, 5,000 Units at 06/26/17 0928  •  insulin aspart (novoLOG) injection 0-9 Units, 0-9 Units, Subcutaneous, 4x Daily With Meals & Nightly, Janine Adrian MD, 4 Units at 06/25/17 2210  •  insulin detemir (LEVEMIR) injection 25 Units, 25 Units, Subcutaneous, Nightly, Zeb Ni MD, 25 Units at 06/25/17 2022  •  ipratropium-albuterol (DUO-NEB) nebulizer solution 3 mL, 3 mL, Nebulization, Q4H PRN, Dina Ohara MD  •  levothyroxine (SYNTHROID, LEVOTHROID) tablet 150 mcg, 150 mcg, Oral, Q AM, Jawanabel Adrian MD, 150 mcg at 06/26/17 0609  •  lisinopril (PRINIVIL,ZESTRIL) tablet 2.5 mg, 2.5 mg, Oral, Q24H, Dina Ohara MD, 2.5 mg at 06/26/17 0928  •  meclizine (ANTIVERT) tablet 25 mg, 25 mg, Oral, TID PRN, Janine Adrian MD  •  ondansetron (ZOFRAN) injection 4 mg, 4 mg, Intravenous, Q6H PRN, Janine Adrian MD  •  sodium chloride 0.9 % flush 1-10 mL, 1-10 mL, Intravenous, PRN, Janine Adrian MD  •  sodium chloride 0.9 %  flush 10 mL, 10 mL, Intravenous, PRN, Henrik Templeton MD  •  temazepam (RESTORIL) capsule 15 mg, 15 mg, Oral, Nightly PRN, Dina Ohara MD, 15 mg at 06/25/17 2210  •  vitamin B-12 (CYANOCOBALAMIN) tablet 500 mcg, 500 mcg, Oral, Nightly, Jawed MD Nguyễn, 500 mcg at 06/25/17 2020    Assessment/Plan     Active Hospital Problems (** Indicates Principal Problem)    Diagnosis Date Noted   • Nausea & vomiting [R11.2] 06/24/2017   • Hypotension [I95.9] 06/24/2017   • Sepsis [A41.9] 06/24/2017   • Community acquired bacterial pneumonia [J15.9] 06/24/2017   • Septic embolism [I26.90] 06/24/2017   • Acute renal failure [N17.9] 06/23/2017   • Adrenal insufficiency [E27.40] 05/17/2016   • Sepsis associated hypotension [A41.9] 05/07/2016      Resolved Hospital Problems    Diagnosis Date Noted Date Resolved   No resolved problems to display.     Primary adrenal insufficiency - Unclear etiology  Patient's blood pressure is in decent range and his electrolytes sodium, potassium are within normal limits.  ACTH levels pending.   Discussed With the patient to double the dose of oral steroids-dexamethasone 1 mg oral daily for a week.   Based on his symptoms ok to go back to dexamethasone 0.5 mg oral daily after one week.  Start Florinef 0.1 mg oral daily upon discharge as dexamethasone does not have mineralocorticoid effect.  Discussed with the patient to check his blood pressure 2 times a day and if his blood pressure reaches his baseline levels should restart Florinef.  Patient needs to follow-up with his endocrinologist in a period of 2-3 weeks upon discharge.     Type 2 diabetes mellitus  HwH3m-1.99  Continue levemir to 25 units at bedtime   Continue NovoLog sliding scale 3 times a day before meals and at bedtime  Blood sugars are elevated due to IV steroids.  Since steroids are changed to oral would  Anticipate a decent blood sugar control.    Discharge instructions from endocrine  Will increase glimiperide to 2 mg oral  "twice daily with meals while on the double dose of dexamethasone.  And when dexamethasone is changed to 0.5 mg oral daily Will decrease glimiperide to 2 mg oral daily.     Hypothyroidism  TSH-1.33  Continue levothyroxine 150 µg oral daily.     Sepsis - Ct scan of the chest showed multifocal PNA.   Per primary team     Skin rash - Derm consulted.   Secondary to septic emboli.    20 minutes out of 35 minutes face to face spent on floor managing care, discussing plan with nursing and counseling patient/family on treatment options, side effects of the medications, discharge instructions.       Zeb Ni MD.  06/26/17  12:33 PM      EMR Dragon / transcription disclaimer:    \"Dictated utilizing Dragon dictation\".   "

## 2017-06-26 NOTE — DISCHARGE SUMMARY
NAME: Faustino Horton Jr. ADMIT: 2017   : 1940  PCP: Yifan Patino MD    MRN: [id number] LOS: 4 days   AGE/SEX: 77 y.o. male  ROOM: [facility]     Date of Admission:  2017  Date of Discharge:  2017    PCP: Yifan Patino MD      CHIEF COMPLAINT  Abdominal Pain (dizzy, hypotension - 125 mg solumedrol and 900 ns ems) and Dizziness      DISCHARGE DIAGNOSIS  Active Hospital Problems (** Indicates Principal Problem)    Diagnosis Date Noted   • **Community acquired bacterial pneumonia [J15.9] 2017   • Hypokalemia [E87.6] 2017   • Pleural effusions (right > left) [J90] 2017   • Nausea & vomiting [R11.2] 2017   • Hypotension [I95.9] 2017   • Sepsis [A41.9] 2017   • Septic embolism [I26.90] 2017   • Adrenal insufficiency [E27.40] 2016   • Sepsis associated hypotension [A41.9] 2016   • Chronic coronary artery disease [I25.10] 2016      Resolved Hospital Problems    Diagnosis Date Noted Date Resolved   • Acute kidney injury [N17.9] 2016       SECONDARY DIAGNOSES  Past Medical History:   Diagnosis Date   • AAA (abdominal aortic aneurysm)    • Acute MI     2004 - stented   • Adrenal insufficiency    • Arthritis    • B12 deficiency    • Back pain    • Cancer     prostate - prostatectomy   • Coronary artery disease    • Diabetes mellitus     oral meds   • Gout    • Hyperlipidemia    • Hypotension     2-3 years ago was hospitalized 13 times in 1 year span abrupt hypotension   • Hypothyroid    • Low testosterone    • Peripheral nerve disease    • Prostate cancer    • Vertebral compression fracture        CONSULTS   Consult Orders (all)     Start     Ordered    17  Inpatient Consult to Dermatology  Once     Specialty:  Dermatology  Provider:  Pranay Caballero MD    17  Inpatient Consult to Endocrinology  Once     Specialty:  Endocrinology  Provider:  Srinivas Gómez MD    17           PROCEDURES PERFORMED  2D ECHOCARDIOGRAM 6/24/2017  · The left ventricular cavity is mildly dilated.  · Left atrial cavity size is mild-to-moderately dilated.  · Mild-to-moderate mitral valve regurgitation is present  · Mild to moderate tricuspid valve regurgitation is present.  · Mild pulmonic valve regurgitation is present.  · There is no evidence of pericardial effusion  · Left Ventricle: Calculated EF = 45%     CT Angiogram Chest With & Without Contrast   Final Result   1. No evidence for pulmonary thromboembolic disease.   2. Multilobar pneumonia. Moderate right and small left pleural   effusions. Mildly enlarged mediastinal lymph nodes are most likely   reactive.   3. Previous cholecystectomy.        CT Abdomen Pelvis Without Contrast   Final Result   1. Bilateral moderate perinephric stranding. At this time, the   possibilities would include bilateral acute pyelonephritis, acute   tubular necrosis or other cause of acute renal insufficiency   2. Diffuse hepatic steatosis.   3. Mildly prominent subcarinal lymph node measuring up to 1.2 cm. A   followup CT chest is recommended on a nonemergent basis to further   evaluate.           HOSPITAL COURSE  Mr. Horton is a 77 y.o. male who presented to [facility] initially complaining of abdominal pain and dizziness.  Please see the admitting history and physical for further details.  He was found to be hypotensive and was admitted to the hospital for further evaluation and treatment.  Initially was felt that hypotension was due to acute adrenal insufficiency with possible septic shock due to UTI.  He had an abnormal urinalysis and CT scan suggestive of perinephric stranding.  His urine culture, however, was negative and patient denied having any urinary symptoms.  He was seen and treated by endocrinology for his adrenal insufficiency and responded nicely to stress dose steroids and IV fluids.  He was continued on empiric antibiotics and was  "eventually found to have bilateral pneumonia, felt to be community-acquired.  He also had pleural effusions and this will need close outpatient monitoring.  All of his cultures here have been negative.  He was seen by dermatology due to a abnormal rash on his right hip that was felt by dermatology to be septic emboli.  Interestingly this responded to topical treatment and has essentially resolved.  He was continued on systemic antibiotics will continue 5 additional days of azithromycin and cefdinir at discharge targeting community-acquired pneumonia.  He needs close follow-up with his PCP and eventual repeat chest x-ray to document resolution of pneumonia and effusions.  His potassium is low likely due to dose of IV Lasix he received.  Remains low today.  We will prescribe potassium chloride 20 mEq twice daily for 3 days.        Discharge instructions from endocrine  \"Will increase glimiperide to 2 mg oral twice daily with meals while on the double dose of dexamethasone. And when dexamethasone is changed to 0.5 mg oral daily Will decrease glimiperide to 2 mg oral daily.\"      Please also note patient had sinus bradycardia during his stay and it was suggested by Dr. Alena Ohara to have Holter monitor at discharge.  This was placed and will need to be reviewed by his PCP.  Patient is asymptomatic and his heart rate generally climbs into the 60s when he is ambulating.        PHYSICAL EXAM  Temp:  [97.6 °F (36.4 °C)-97.9 °F (36.6 °C)] 97.6 °F (36.4 °C)  Heart Rate:  [38-49] 49  Resp:  [16-18] 17  BP: (151-182)/(86-95) 161/95  Body mass index is 30.35 kg/(m^2).  Physical Exam   Constitutional: He is oriented to person, place, and time. No distress.   Cardiovascular: Normal rate and regular rhythm.    No murmur heard.  Pulmonary/Chest: Effort normal and breath sounds normal.   Abdominal: Soft. Bowel sounds are normal. He exhibits no distension. There is no tenderness.   Musculoskeletal: Normal range of motion. He exhibits " no edema.   Neurological: He is alert and oriented to person, place, and time.   Skin: Skin is warm and dry. He is not diaphoretic.   Rash right hip essentially resolved.         CONDITION ON DISCHARGE  Stable.      DISCHARGE DISPOSITION   Home or Self Care        DISCHARGE MEDICATIONS   Faustino Horton Jr.   Home Medication Instructions NITESH:[id number]    Printed on:06/26/17 4047   Medication Information                      atorvastatin (LIPITOR) 40 MG tablet  Take 40 mg by mouth daily.             azithromycin (ZITHROMAX) 250 MG tablet  Take 2 tablets the first day, then 1 tablet daily for 4 days.  Indications: Pneumonia             cefdinir (OMNICEF) 300 MG capsule  Take 1 capsule by mouth 2 (Two) Times a Day for 5 days.             dexamethasone (DECADRON) 1 MG tablet  Take 1 tablet by mouth Daily With Breakfast.             fludrocortisone 0.1 MG tablet  Take 0.1 mg by mouth daily.             gabapentin (NEURONTIN) 600 MG tablet  Take 1,200 mg by mouth 2 (two) times a day.             glimepiride (AMARYL) 2 MG tablet  Take 1 tablet by mouth 2 (Two) Times a Day Before Meals.             levothyroxine (SYNTHROID, LEVOTHROID) 150 MCG tablet  Take 150 mcg by mouth daily.             lisinopril (PRINIVIL,ZESTRIL) 2.5 MG tablet  2.5 mg Every Night.             potassium chloride (K-DUR,KLOR-CON) 20 MEQ CR tablet  Take 1 tablet by mouth 2 (Two) Times a Day for 3 days.             vitamin B-12 (CYANOCOBALAMIN) 500 MCG tablet  Take 500 mcg by mouth Every Night.             zolpidem (AMBIEN) 10 MG tablet  Take 5 mg by mouth At Night As Needed for Sleep.               Diet Instructions     Diet:       Diet Texture / Consistency:  Regular   Common Modifiers:   Cardiac  Consistent Carbohydrate                 Activity Instructions     Activity as Tolerated                 Future Appointments  Date Time Provider Department Center   7/6/2017 2:00 PM Yifan Patino MD [facility] None     Follow-up Information     Follow up  with Yifan Patino MD .    Specialty:  Internal Medicine    Contact information:    [address]  [address]  [place] [address]  [phone]            TEST  RESULTS PENDING AT DISCHARGE   Order Current Status    Blood Culture Preliminary result    Blood Culture Preliminary result             Gregory Alexander MD  [facility]  06/26/17  3:18 PM      Time: greater than 60 minutes.      Dragon disclaimer:  Much of this encounter note is an electronic transcription/translation of spoken language to printed text. The electronic translation of spoken language may permit erroneous, or at times, nonsensical words or phrases to be inadvertently transcribed; Although I have reviewed the note for such errors, some may still exist.

## 2017-06-27 LAB
BACTERIA SPEC AEROBE CULT: NORMAL
BACTERIA SPEC AEROBE CULT: NORMAL

## 2017-07-05 ENCOUNTER — APPOINTMENT (OUTPATIENT)
Dept: GENERAL RADIOLOGY | Facility: HOSPITAL | Age: 77
End: 2017-07-05

## 2017-07-05 ENCOUNTER — ANESTHESIA (OUTPATIENT)
Dept: PERIOP | Facility: HOSPITAL | Age: 77
End: 2017-07-05

## 2017-07-05 ENCOUNTER — ANESTHESIA EVENT (OUTPATIENT)
Dept: PERIOP | Facility: HOSPITAL | Age: 77
End: 2017-07-05

## 2017-07-05 ENCOUNTER — HOSPITAL ENCOUNTER (INPATIENT)
Facility: HOSPITAL | Age: 77
LOS: 3 days | Discharge: HOME | End: 2017-07-09
Attending: EMERGENCY MEDICINE | Admitting: HOSPITALIST

## 2017-07-05 ENCOUNTER — APPOINTMENT (OUTPATIENT)
Dept: CT IMAGING | Facility: HOSPITAL | Age: 77
End: 2017-07-05

## 2017-07-05 DIAGNOSIS — K61.1 ABSCESS, PERIRECTAL: ICD-10-CM

## 2017-07-05 DIAGNOSIS — R19.7 DIARRHEA OF PRESUMED INFECTIOUS ORIGIN: ICD-10-CM

## 2017-07-05 DIAGNOSIS — K52.9 COLITIS: Primary | ICD-10-CM

## 2017-07-05 LAB
ALBUMIN SERPL-MCNC: 4 G/DL (ref 3.5–5.2)
ALBUMIN/GLOB SERPL: 1.1 G/DL
ALP SERPL-CCNC: 72 U/L (ref 39–117)
ALT SERPL W P-5'-P-CCNC: 17 U/L (ref 1–41)
ANION GAP SERPL CALCULATED.3IONS-SCNC: 15.6 MMOL/L
AST SERPL-CCNC: 16 U/L (ref 1–40)
BACTERIA UR QL AUTO: ABNORMAL /HPF
BASOPHILS # BLD AUTO: 0.03 10*3/MM3 (ref 0–0.2)
BASOPHILS NFR BLD AUTO: 0.2 % (ref 0–1.5)
BILIRUB SERPL-MCNC: 1.2 MG/DL (ref 0.1–1.2)
BILIRUB UR QL STRIP: NEGATIVE
BUN BLD-MCNC: 17 MG/DL (ref 8–23)
BUN/CREAT SERPL: 12.5 (ref 7–25)
CALCIUM SPEC-SCNC: 9.1 MG/DL (ref 8.6–10.5)
CHLORIDE SERPL-SCNC: 93 MMOL/L (ref 98–107)
CLARITY UR: ABNORMAL
CO2 SERPL-SCNC: 25.4 MMOL/L (ref 22–29)
COLOR UR: ABNORMAL
CORTIS SERPL-MCNC: 11.13 MCG/DL
CREAT BLD-MCNC: 1.36 MG/DL (ref 0.76–1.27)
D-LACTATE SERPL-SCNC: 1.6 MMOL/L (ref 0.5–2)
DEPRECATED RDW RBC AUTO: 48.6 FL (ref 37–54)
EOSINOPHIL # BLD AUTO: 0.26 10*3/MM3 (ref 0–0.7)
EOSINOPHIL NFR BLD AUTO: 1.8 % (ref 0.3–6.2)
ERYTHROCYTE [DISTWIDTH] IN BLOOD BY AUTOMATED COUNT: 13.5 % (ref 11.5–14.5)
GFR SERPL CREATININE-BSD FRML MDRD: 51 ML/MIN/1.73
GLOBULIN UR ELPH-MCNC: 3.7 GM/DL
GLUCOSE BLD-MCNC: 132 MG/DL (ref 65–99)
GLUCOSE UR STRIP-MCNC: NEGATIVE MG/DL
HCT VFR BLD AUTO: 40.6 % (ref 40.4–52.2)
HGB BLD-MCNC: 14 G/DL (ref 13.7–17.6)
HGB UR QL STRIP.AUTO: NEGATIVE
HOLD SPECIMEN: NORMAL
HOLD SPECIMEN: NORMAL
HYALINE CASTS UR QL AUTO: ABNORMAL /LPF
IMM GRANULOCYTES # BLD: 0.05 10*3/MM3 (ref 0–0.03)
IMM GRANULOCYTES NFR BLD: 0.3 % (ref 0–0.5)
KETONES UR QL STRIP: ABNORMAL
LEUKOCYTE ESTERASE UR QL STRIP.AUTO: ABNORMAL
LIPASE SERPL-CCNC: 24 U/L (ref 13–60)
LYMPHOCYTES # BLD AUTO: 1.64 10*3/MM3 (ref 0.9–4.8)
LYMPHOCYTES NFR BLD AUTO: 11.1 % (ref 19.6–45.3)
MCH RBC QN AUTO: 34 PG (ref 27–32.7)
MCHC RBC AUTO-ENTMCNC: 34.5 G/DL (ref 32.6–36.4)
MCV RBC AUTO: 98.5 FL (ref 79.8–96.2)
MONOCYTES # BLD AUTO: 1.68 10*3/MM3 (ref 0.2–1.2)
MONOCYTES NFR BLD AUTO: 11.4 % (ref 5–12)
MUCOUS THREADS URNS QL MICRO: ABNORMAL /HPF
NEUTROPHILS # BLD AUTO: 11.05 10*3/MM3 (ref 1.9–8.1)
NEUTROPHILS NFR BLD AUTO: 75.2 % (ref 42.7–76)
NITRITE UR QL STRIP: NEGATIVE
PH UR STRIP.AUTO: 5.5 [PH] (ref 5–8)
PLATELET # BLD AUTO: 217 10*3/MM3 (ref 140–500)
PMV BLD AUTO: 9.9 FL (ref 6–12)
POTASSIUM BLD-SCNC: 4.6 MMOL/L (ref 3.5–5.2)
PROCALCITONIN SERPL-MCNC: 0.17 NG/ML (ref 0.1–0.25)
PROT SERPL-MCNC: 7.7 G/DL (ref 6–8.5)
PROT UR QL STRIP: ABNORMAL
RBC # BLD AUTO: 4.12 10*6/MM3 (ref 4.6–6)
RBC # UR: ABNORMAL /HPF
REF LAB TEST METHOD: ABNORMAL
SODIUM BLD-SCNC: 134 MMOL/L (ref 136–145)
SP GR UR STRIP: 1.02 (ref 1–1.03)
SQUAMOUS #/AREA URNS HPF: ABNORMAL /HPF
UROBILINOGEN UR QL STRIP: ABNORMAL
WBC NRBC COR # BLD: 14.71 10*3/MM3 (ref 4.5–10.7)
WBC UR QL AUTO: ABNORMAL /HPF
WHOLE BLOOD HOLD SPECIMEN: NORMAL
WHOLE BLOOD HOLD SPECIMEN: NORMAL

## 2017-07-05 PROCEDURE — 36415 COLL VENOUS BLD VENIPUNCTURE: CPT

## 2017-07-05 PROCEDURE — 88304 TISSUE EXAM BY PATHOLOGIST: CPT | Performed by: SURGERY

## 2017-07-05 PROCEDURE — 87040 BLOOD CULTURE FOR BACTERIA: CPT

## 2017-07-05 PROCEDURE — 99204 OFFICE O/P NEW MOD 45 MIN: CPT | Performed by: SURGERY

## 2017-07-05 PROCEDURE — 36415 COLL VENOUS BLD VENIPUNCTURE: CPT | Performed by: EMERGENCY MEDICINE

## 2017-07-05 PROCEDURE — 80053 COMPREHEN METABOLIC PANEL: CPT | Performed by: EMERGENCY MEDICINE

## 2017-07-05 PROCEDURE — 83605 ASSAY OF LACTIC ACID: CPT

## 2017-07-05 PROCEDURE — G0378 HOSPITAL OBSERVATION PER HR: HCPCS

## 2017-07-05 PROCEDURE — 25010000002 MIDAZOLAM PER 1 MG

## 2017-07-05 PROCEDURE — 87086 URINE CULTURE/COLONY COUNT: CPT | Performed by: EMERGENCY MEDICINE

## 2017-07-05 PROCEDURE — 82533 TOTAL CORTISOL: CPT

## 2017-07-05 PROCEDURE — 85025 COMPLETE CBC W/AUTO DIFF WBC: CPT | Performed by: EMERGENCY MEDICINE

## 2017-07-05 PROCEDURE — 74177 CT ABD & PELVIS W/CONTRAST: CPT

## 2017-07-05 PROCEDURE — 25010000002 FENTANYL CITRATE (PF) 100 MCG/2ML SOLUTION: Performed by: ANESTHESIOLOGY

## 2017-07-05 PROCEDURE — 25010000002 PROPOFOL 10 MG/ML EMULSION: Performed by: ANESTHESIOLOGY

## 2017-07-05 PROCEDURE — 83690 ASSAY OF LIPASE: CPT | Performed by: EMERGENCY MEDICINE

## 2017-07-05 PROCEDURE — 99285 EMERGENCY DEPT VISIT HI MDM: CPT

## 2017-07-05 PROCEDURE — 93227 XTRNL ECG REC<48 HR R&I: CPT | Performed by: INTERNAL MEDICINE

## 2017-07-05 PROCEDURE — 25010000002 PIPERACILLIN SOD-TAZOBACTAM PER 1 G

## 2017-07-05 PROCEDURE — 81001 URINALYSIS AUTO W/SCOPE: CPT | Performed by: EMERGENCY MEDICINE

## 2017-07-05 PROCEDURE — 0 IOPAMIDOL 61 % SOLUTION: Performed by: EMERGENCY MEDICINE

## 2017-07-05 PROCEDURE — 0D9P0ZZ DRAINAGE OF RECTUM, OPEN APPROACH: ICD-10-PCS | Performed by: SURGERY

## 2017-07-05 PROCEDURE — 46040 I&D ISCHIORCT&/PERIRCT ABSC: CPT | Performed by: SURGERY

## 2017-07-05 PROCEDURE — 71010 HC CHEST PA OR AP: CPT

## 2017-07-05 PROCEDURE — 84145 PROCALCITONIN (PCT): CPT

## 2017-07-05 PROCEDURE — 0J9B0ZZ DRAINAGE OF PERINEUM SUBCUTANEOUS TISSUE AND FASCIA, OPEN APPROACH: ICD-10-PCS | Performed by: SURGERY

## 2017-07-05 RX ORDER — PROPOFOL 10 MG/ML
VIAL (ML) INTRAVENOUS AS NEEDED
Status: DISCONTINUED | OUTPATIENT
Start: 2017-07-05 | End: 2017-07-05 | Stop reason: SURG

## 2017-07-05 RX ORDER — FAMOTIDINE 10 MG/ML
20 INJECTION, SOLUTION INTRAVENOUS ONCE
Status: COMPLETED | OUTPATIENT
Start: 2017-07-05 | End: 2017-07-05

## 2017-07-05 RX ORDER — FAMOTIDINE 10 MG/ML
INJECTION, SOLUTION INTRAVENOUS
Status: COMPLETED
Start: 2017-07-05 | End: 2017-07-05

## 2017-07-05 RX ORDER — MIDAZOLAM HYDROCHLORIDE 1 MG/ML
1 INJECTION INTRAMUSCULAR; INTRAVENOUS
Status: DISCONTINUED | OUTPATIENT
Start: 2017-07-05 | End: 2017-07-06 | Stop reason: HOSPADM

## 2017-07-05 RX ORDER — SODIUM CHLORIDE, SODIUM LACTATE, POTASSIUM CHLORIDE, CALCIUM CHLORIDE 600; 310; 30; 20 MG/100ML; MG/100ML; MG/100ML; MG/100ML
9 INJECTION, SOLUTION INTRAVENOUS CONTINUOUS
Status: DISCONTINUED | OUTPATIENT
Start: 2017-07-05 | End: 2017-07-06

## 2017-07-05 RX ORDER — FENTANYL CITRATE 50 UG/ML
INJECTION, SOLUTION INTRAMUSCULAR; INTRAVENOUS AS NEEDED
Status: DISCONTINUED | OUTPATIENT
Start: 2017-07-05 | End: 2017-07-05 | Stop reason: SURG

## 2017-07-05 RX ORDER — SODIUM CHLORIDE 0.9 % (FLUSH) 0.9 %
1-10 SYRINGE (ML) INJECTION AS NEEDED
Status: DISCONTINUED | OUTPATIENT
Start: 2017-07-05 | End: 2017-07-06 | Stop reason: HOSPADM

## 2017-07-05 RX ORDER — MIDAZOLAM HYDROCHLORIDE 1 MG/ML
INJECTION INTRAMUSCULAR; INTRAVENOUS
Status: COMPLETED
Start: 2017-07-05 | End: 2017-07-05

## 2017-07-05 RX ORDER — MIDAZOLAM HYDROCHLORIDE 1 MG/ML
2 INJECTION INTRAMUSCULAR; INTRAVENOUS
Status: DISCONTINUED | OUTPATIENT
Start: 2017-07-05 | End: 2017-07-06 | Stop reason: HOSPADM

## 2017-07-05 RX ORDER — CEFAZOLIN SODIUM 2 G/100ML
INJECTION, SOLUTION INTRAVENOUS
Status: DISPENSED
Start: 2017-07-05 | End: 2017-07-06

## 2017-07-05 RX ORDER — FENTANYL CITRATE 50 UG/ML
INJECTION, SOLUTION INTRAMUSCULAR; INTRAVENOUS
Status: DISPENSED
Start: 2017-07-05 | End: 2017-07-06

## 2017-07-05 RX ORDER — ONDANSETRON 4 MG/1
4 TABLET, ORALLY DISINTEGRATING ORAL ONCE
Status: COMPLETED | OUTPATIENT
Start: 2017-07-05 | End: 2017-07-05

## 2017-07-05 RX ORDER — SODIUM CHLORIDE 0.9 % (FLUSH) 0.9 %
10 SYRINGE (ML) INJECTION AS NEEDED
Status: DISCONTINUED | OUTPATIENT
Start: 2017-07-05 | End: 2017-07-09 | Stop reason: HOSPADM

## 2017-07-05 RX ADMIN — FAMOTIDINE 20 MG: 10 INJECTION, SOLUTION INTRAVENOUS at 22:50

## 2017-07-05 RX ADMIN — SODIUM CHLORIDE, POTASSIUM CHLORIDE, SODIUM LACTATE AND CALCIUM CHLORIDE: 600; 310; 30; 20 INJECTION, SOLUTION INTRAVENOUS at 23:18

## 2017-07-05 RX ADMIN — IOPAMIDOL 85 ML: 612 INJECTION, SOLUTION INTRAVENOUS at 20:12

## 2017-07-05 RX ADMIN — PROPOFOL 25 MG: 10 INJECTION, EMULSION INTRAVENOUS at 23:21

## 2017-07-05 RX ADMIN — PROPOFOL 50 MG: 10 INJECTION, EMULSION INTRAVENOUS at 23:14

## 2017-07-05 RX ADMIN — METRONIDAZOLE 500 MG: 500 INJECTION, SOLUTION INTRAVENOUS at 20:56

## 2017-07-05 RX ADMIN — MIDAZOLAM 1 MG: 1 INJECTION INTRAMUSCULAR; INTRAVENOUS at 22:50

## 2017-07-05 RX ADMIN — SODIUM CHLORIDE, POTASSIUM CHLORIDE, SODIUM LACTATE AND CALCIUM CHLORIDE 9 ML/HR: 600; 310; 30; 20 INJECTION, SOLUTION INTRAVENOUS at 22:50

## 2017-07-05 RX ADMIN — PROPOFOL 25 MG: 10 INJECTION, EMULSION INTRAVENOUS at 23:22

## 2017-07-05 RX ADMIN — FAMOTIDINE 20 MG: 10 INJECTION INTRAVENOUS at 22:50

## 2017-07-05 RX ADMIN — PROPOFOL 25 MG: 10 INJECTION, EMULSION INTRAVENOUS at 23:26

## 2017-07-05 RX ADMIN — FENTANYL CITRATE 50 MCG: 50 INJECTION INTRAMUSCULAR; INTRAVENOUS at 23:22

## 2017-07-05 RX ADMIN — SODIUM CHLORIDE 3030 ML: 9 INJECTION, SOLUTION INTRAVENOUS at 19:45

## 2017-07-05 RX ADMIN — PROPOFOL 50 MG: 10 INJECTION, EMULSION INTRAVENOUS at 23:18

## 2017-07-05 RX ADMIN — MIDAZOLAM HYDROCHLORIDE 1 MG: 1 INJECTION INTRAMUSCULAR; INTRAVENOUS at 22:50

## 2017-07-05 RX ADMIN — TAZOBACTAM SODIUM AND PIPERACILLIN SODIUM 4.5 G: 500; 4 INJECTION, SOLUTION INTRAVENOUS at 20:53

## 2017-07-05 RX ADMIN — ONDANSETRON 4 MG: 4 TABLET, ORALLY DISINTEGRATING ORAL at 17:43

## 2017-07-05 RX ADMIN — FENTANYL CITRATE 50 MCG: 50 INJECTION INTRAMUSCULAR; INTRAVENOUS at 23:32

## 2017-07-05 RX ADMIN — PROPOFOL 25 MG: 10 INJECTION, EMULSION INTRAVENOUS at 23:30

## 2017-07-05 NOTE — ED PROVIDER NOTES
EMERGENCY DEPARTMENT ENCOUNTER    CHIEF COMPLAINT  Chief Complaint: Diarrhea  History given by: Patient, Spouse  History limited by:   Room Number: 01/01  PMD: Yifan Patino MD      HPI:  Pt is a 77 y.o. male who presents complaining of diarrhea onset 3 days ago. Pt has had copious amount of diarrhea. Pt reports having some abd pain with diarrhea. Family also reports pt having an abscess to the L buttock and hypotension. He was discharged on 6/26 after and admission with adrenal insufficiency, PNA, and sepsis with septic emboli. Pt was discharged with Z-pack and Omnicef that was finished on 7/1/17.    Duration: 3 days  Onset: gradual  Timing: intermittent  Quality: watery  Intensity/Severity: severe  Progression: worsening  Associated Symptoms: abd pain, abscess  Aggravating Factors: none  Alleviating Factors: none  Previous Episodes: none  Treatment before arrival: discharged 6/26/17 after admission for adrenal insufficiency, PNA, sepsis and septic emboli.    PAST MEDICAL HISTORY  Active Ambulatory Problems     Diagnosis Date Noted   • Chronic coronary artery disease 04/17/2016   • Gout 04/17/2016   • Hyperlipidemia 04/17/2016   • Hypothyroidism 04/17/2016   • Peripheral vascular disease 04/17/2016   • Adrenal insufficiency 05/17/2016   • Spondylosis of lumbar region without myelopathy or radiculopathy 03/02/2017   • Pleural effusions (right > left) 06/26/2017     Resolved Ambulatory Problems     Diagnosis Date Noted   • Sepsis associated hypotension 05/07/2016   • Bacterial pneumonia 05/08/2016   • Wheezing 05/08/2016   • Acute kidney injury 05/08/2016   • Pneumonia of right lung due to infectious organism 05/17/2016   • Bronchitis 10/31/2016   • Nausea & vomiting 06/24/2017   • Hypotension 06/24/2017   • Sepsis 06/24/2017   • Community acquired bacterial pneumonia 06/24/2017   • Septic embolism 06/24/2017   • Hypokalemia 06/26/2017   • Sinus bradycardia 06/26/2017     Past Medical History:   Diagnosis Date   •  "AAA (abdominal aortic aneurysm)    • Acute MI    • Adrenal insufficiency    • Arthritis    • B12 deficiency    • Back pain    • Cancer    • Coronary artery disease    • Diabetes mellitus    • Gout    • Hyperlipidemia    • Hypotension    • Hypothyroid    • Low testosterone    • Peripheral nerve disease    • Pneumonia    • Prostate cancer    • Sepsis    • Vertebral compression fracture        PAST SURGICAL HISTORY  Past Surgical History:   Procedure Laterality Date   • ABDOMINAL AORTIC ANEURYSM REPAIR      stent placed Dr. Puga - 2005, sees q6 months   • APPENDECTOMY     • CHOLECYSTECTOMY     • CORONARY ANGIOPLASTY WITH STENT PLACEMENT         FAMILY HISTORY  Family History   Problem Relation Age of Onset   • Cancer Mother    • Breast cancer Mother    • Heart disease Mother    • Hypertension Father    • Stroke Father    • Cancer Sister    • Breast cancer Sister    • Aneurysm Sister    • Hypertension Sister    • Thyroid disease Sister    • Hyperlipidemia Sister    • Diabetes Brother    • Aneurysm Brother    • Stroke Brother        SOCIAL HISTORY  Social History     Social History   • Marital status:      Spouse name: N/A   • Number of children: N/A   • Years of education: N/A     Occupational History   • Not on file.     Social History Main Topics   • Smoking status: Former Smoker   • Smokeless tobacco: Never Used      Comment: quit 30 years ago   • Alcohol use 1.2 oz/week     2 Cans of beer per week      Comment: daily- \"no beer in the last several days\"   • Drug use: No   • Sexual activity: Defer     Other Topics Concern   • Not on file     Social History Narrative   • No narrative on file       ALLERGIES  Acetylcysteine; Allopurinol; Baclofen; Hydrocodone-acetaminophen; Levaquin [levofloxacin]; Oxycodone; and Tramadol    REVIEW OF SYSTEMS  Review of Systems   Constitutional: Negative for activity change, appetite change and fever.   HENT: Negative for congestion and sore throat.    Eyes: Negative.  "   Respiratory: Negative for cough and shortness of breath.    Cardiovascular: Negative for chest pain and leg swelling.   Gastrointestinal: Positive for abdominal pain and diarrhea. Negative for vomiting.   Endocrine: Negative.    Genitourinary: Negative for decreased urine volume and dysuria.   Musculoskeletal: Negative for neck pain.   Skin: Negative for rash and wound.        abscess   Allergic/Immunologic: Negative.    Neurological: Negative for weakness, numbness and headaches.   Hematological: Negative.    Psychiatric/Behavioral: Negative.    All other systems reviewed and are negative.      PHYSICAL EXAM  ED Triage Vitals   Temp Heart Rate Resp BP SpO2   07/05/17 1421 07/05/17 1421 07/05/17 1421 07/05/17 1448 07/05/17 1421   100.2 °F (37.9 °C) 77 16 106/56 96 %      Temp src Heart Rate Source Patient Position BP Location FiO2 (%)   07/05/17 1421 07/05/17 1421 07/05/17 1448 07/05/17 1448 --   Tympanic Monitor Sitting Right arm        Physical Exam   Constitutional: He is oriented to person, place, and time and well-developed, well-nourished, and in no distress.   HENT:   Head: Normocephalic and atraumatic.   Eyes: EOM are normal. Pupils are equal, round, and reactive to light.   Neck: Normal range of motion. Neck supple.   Cardiovascular: Normal rate, regular rhythm and normal heart sounds.    Pulmonary/Chest: Effort normal and breath sounds normal. No respiratory distress.   Abdominal: Soft. There is no tenderness. There is no rebound and no guarding.   Genitourinary:   Genitourinary Comments: Perirectal abscess   Musculoskeletal: Normal range of motion. He exhibits no edema.   Neurological: He is alert and oriented to person, place, and time. He has normal sensation and normal strength.   Skin: Skin is warm and dry.   Psychiatric: Mood and affect normal.   Nursing note and vitals reviewed.      LAB RESULTS  Lab Results (last 24 hours)     Procedure Component Value Units Date/Time    CBC & Differential  [[id number]] Collected:  07/05/17 1503    Specimen:  Blood Updated:  07/05/17 1520    Narrative:       The following orders were created for panel order CBC & Differential.  Procedure                               Abnormality         Status                     ---------                               -----------         ------                     CBC Auto Differential[[id number]]        Abnormal            Final result                 Please view results for these tests on the individual orders.    Comprehensive Metabolic Panel [[id number]]  (Abnormal) Collected:  07/05/17 1503    Specimen:  Blood Updated:  07/05/17 1542     Glucose 132 (H) mg/dL      BUN 17 mg/dL      Creatinine 1.36 (H) mg/dL      Sodium 134 (L) mmol/L      Potassium 4.6 mmol/L      Chloride 93 (L) mmol/L      CO2 25.4 mmol/L      Calcium 9.1 mg/dL      Total Protein 7.7 g/dL      Albumin 4.00 g/dL      ALT (SGPT) 17 U/L      AST (SGOT) 16 U/L      Alkaline Phosphatase 72 U/L      Total Bilirubin 1.2 mg/dL      eGFR Non African Amer 51 (L) mL/min/1.73      Globulin 3.7 gm/dL      A/G Ratio 1.1 g/dL      BUN/Creatinine Ratio 12.5     Anion Gap 15.6 mmol/L     Narrative:       The MDRD GFR formula is only valid for adults with stable renal function between ages 18 and 70.    Lipase [[id number]]  (Normal) Collected:  07/05/17 1503    Specimen:  Blood Updated:  07/05/17 1540     Lipase 24 U/L     CBC Auto Differential [[id number]]  (Abnormal) Collected:  07/05/17 1503    Specimen:  Blood Updated:  07/05/17 1520     WBC 14.71 (H) 10*3/mm3      RBC 4.12 (L) 10*6/mm3      Hemoglobin 14.0 g/dL      Hematocrit 40.6 %      MCV 98.5 (H) fL      MCH 34.0 (H) pg      MCHC 34.5 g/dL      RDW 13.5 %      RDW-SD 48.6 fl      MPV 9.9 fL      Platelets 217 10*3/mm3      Neutrophil % 75.2 %      Lymphocyte % 11.1 (L) %      Monocyte % 11.4 %      Eosinophil % 1.8 %      Basophil % 0.2 %      Immature Grans % 0.3 %      Neutrophils, Absolute 11.05 (H) 10*3/mm3       Lymphocytes, Absolute 1.64 10*3/mm3      Monocytes, Absolute 1.68 (H) 10*3/mm3      Eosinophils, Absolute 0.26 10*3/mm3      Basophils, Absolute 0.03 10*3/mm3      Immature Grans, Absolute 0.05 (H) 10*3/mm3     Cortisol [[id number]]  (Normal) Collected:  07/05/17 1503    Specimen:  Blood Updated:  07/05/17 1943     Cortisol 11.13 mcg/dL     Narrative:       Cortisol Reference Ranges:    Cortisol 6AM - 10AM Range: 6.02-18.40 mcg/dl  Cortisol 4PM - 8PM Range: 2.68-10.50 mcg/dl  Critical AM/PM:    Less than 2 mcg/dl    Urinalysis With / Culture If Indicated [[id number]]  (Abnormal) Collected:  07/05/17 1657    Specimen:  Urine from Urine, Clean Catch Updated:  07/05/17 1728     Color, UA Dark Yellow (A)     Appearance, UA Cloudy (A)     pH, UA 5.5     Specific Gravity, UA 1.020     Glucose, UA Negative     Ketones, UA 40 mg/dL (2+) (A)     Bilirubin, UA Negative     Blood, UA Negative     Protein,  mg/dL (2+) (A)     Leuk Esterase, UA Small (1+) (A)     Nitrite, UA Negative     Urobilinogen, UA 0.2 E.U./dL    Urinalysis, Microscopic Only [[id number]]  (Abnormal) Collected:  07/05/17 1657    Specimen:  Urine from Urine, Clean Catch Updated:  07/05/17 1801     RBC, UA None Seen /HPF      WBC, UA 6-12 (A) /HPF      Bacteria, UA None Seen /HPF      Squamous Epithelial Cells, UA 3-6 (A) /HPF      Hyaline Casts, UA 3-6 /LPF      Mucus, UA Moderate/2+ (A) /HPF      Methodology Manual Light Microscopy    Urine Culture [[id number]] Collected:  07/05/17 1657    Specimen:  Urine from Urine, Clean Catch Updated:  07/05/17 1720    Lactic Acid, Plasma [[id number]]  (Normal) Collected:  07/05/17 1946    Specimen:  Blood Updated:  07/05/17 2006     Lactate 1.6 mmol/L     Procalcitonin [[id number]]  (Normal) Collected:  07/05/17 1946    Specimen:  Blood Updated:  07/05/17 2028     Procalcitonin 0.17 ng/mL     Narrative:       As a Marker for Sepsis (Non-Neonates):   1. <0.5 ng/mL represents a low risk of severe sepsis and/or septic  "shock.  1. >2 ng/mL represents a high risk of severe sepsis and/or septic shock.    As a Marker for Lower Respiratory Tract Infections that require antibiotic therapy:  PCT on Admission     Antibiotic Therapy             6-12 Hrs later  > 0.5                Strongly Recommended            >0.25 - <0.5         Recommended  0.1 - 0.25           Discouraged                   Remeasure/reassess PCT  <0.1                 Strongly Discouraged          Remeasure/reassess PCT      As 28 day mortality risk marker: \"Change in Procalcitonin Result\" (> 80 % or <=80 %) if Day 0 (or Day 1) and Day 4 values are available. Refer to http://www.Pre[employer].compct-calculator.[employer]/   Change in PCT <=80 %   A decrease of PCT levels below or equal to 80 % defines a positive change in PCT test result representing a higher risk for 28-day all-cause mortality of patients diagnosed with severe sepsis or septic shock.  Change in PCT > 80 %   A decrease of PCT levels of more than 80 % defines a negative change in PCT result representing a lower risk for 28-day all-cause mortality of patients diagnosed with severe sepsis or septic shock.                Blood Culture [[id number]] Collected:  07/05/17 1946    Specimen:  Blood from Arm, Right Updated:  07/05/17 1950    Blood Culture [[id number]] Collected:  07/05/17 1946    Specimen:  Blood from Arm, Left Updated:  07/05/17 1951          I ordered the above labs and reviewed the results    RADIOLOGY  CT Abdomen Pelvis With Contrast - Diffuse colitis of the transverse, descending, and sigmoid colon. Perinephric stranding has improved.    XR Chest 1 View    (Results Pending)        I ordered the above noted radiological studies. Interpreted by radiologist. Discussed with radiologist (Dr. Ballesteros). Reviewed by me in PACS.       PROCEDURES  Procedures      PROGRESS AND CONSULTS  ED Course   7:11 PM  Ordered blood work, UA for further evaluation. Ordered CT abd/pel. IVF and Zofran ordered.   8:10 PM  Dicussed pt " with Dr. Kong. Dr. Kong has seen and evaluated pt and agrees with tx plan.  8:34 PM  Rechecked with pt. Informed pt of his CT result showing colitis and plan to admit for IV abx and draining of his abscess. Pt understands and agrees with tx plan. All concerns addressed.  Time 8:45 PM  Discussed case with Dr Fritz (Hospitalist)  Reviewed history, exam, results and treatments.  Discussed concerns and plan of care. Dr Fritz accepts pt to be admitted to med/surg. Will start on prophylactic Vancomycin and Zosyn and consult with surgery.  8:57 PM  Discussed pt with Dr. Pike (Surgeon). Dr. Pike will consult with pt.       MEDICAL DECISION MAKING      MDM  Number of Diagnoses or Management Options     Amount and/or Complexity of Data Reviewed  Review and summarize past medical records: yes (3/21/17 Admitted for adrenal insufficiency. )    DR. Pike saw patient in ER and is taking him to OR prior to admission to floor.       DIAGNOSIS  Final diagnoses:   Colitis   Diarrhea of presumed infectious origin   Abscess, perirectal       DISPOSITION  ADMISSION    Discussed treatment plan and reason for admission with pt/family and admitting physician.  Pt/family voiced understanding of the plan for admission for further testing/treatment as needed.         Latest Documented Vital Signs:  As of 9:18 PM  BP- 124/81 HR- 63 Temp- 100.2 °F (37.9 °C) (Tympanic) O2 sat- 97%    --  Documentation assistance provided by ana laura Baker for CYNTHIA Aleman.  Information recorded by the ana laura was done at my direction and has been verified and validated by me.     Jovany Baker  07/05/17 1943       Jovany Baker  07/05/17 2012       Jovany Baker  07/05/17 2118       CYNTHIA Cleveland  07/05/17 2119       CYNTHIA Cleveland  07/05/17 2212

## 2017-07-06 PROBLEM — E11.65 DIABETES MELLITUS WITH HYPERGLYCEMIA (HCC): Status: ACTIVE | Noted: 2017-07-06

## 2017-07-06 PROBLEM — K61.1 ABSCESS, PERIRECTAL: Status: ACTIVE | Noted: 2017-07-06

## 2017-07-06 PROBLEM — R19.7 DIARRHEA OF PRESUMED INFECTIOUS ORIGIN: Status: ACTIVE | Noted: 2017-07-06

## 2017-07-06 LAB
ANION GAP SERPL CALCULATED.3IONS-SCNC: 13 MMOL/L
BASOPHILS # BLD AUTO: 0.02 10*3/MM3 (ref 0–0.2)
BASOPHILS NFR BLD AUTO: 0.2 % (ref 0–1.5)
BUN BLD-MCNC: 11 MG/DL (ref 8–23)
BUN/CREAT SERPL: 11.5 (ref 7–25)
CALCIUM SPEC-SCNC: 8.1 MG/DL (ref 8.6–10.5)
CHLORIDE SERPL-SCNC: 98 MMOL/L (ref 98–107)
CO2 SERPL-SCNC: 22 MMOL/L (ref 22–29)
CREAT BLD-MCNC: 0.96 MG/DL (ref 0.76–1.27)
DEPRECATED RDW RBC AUTO: 49 FL (ref 37–54)
EOSINOPHIL # BLD AUTO: 0.16 10*3/MM3 (ref 0–0.7)
EOSINOPHIL NFR BLD AUTO: 1.3 % (ref 0.3–6.2)
ERYTHROCYTE [DISTWIDTH] IN BLOOD BY AUTOMATED COUNT: 13.3 % (ref 11.5–14.5)
GFR SERPL CREATININE-BSD FRML MDRD: 76 ML/MIN/1.73
GLUCOSE BLD-MCNC: 111 MG/DL (ref 65–99)
GLUCOSE BLDC GLUCOMTR-MCNC: 101 MG/DL (ref 70–130)
GLUCOSE BLDC GLUCOMTR-MCNC: 110 MG/DL (ref 70–130)
GLUCOSE BLDC GLUCOMTR-MCNC: 111 MG/DL (ref 70–130)
GLUCOSE BLDC GLUCOMTR-MCNC: 121 MG/DL (ref 70–130)
GLUCOSE BLDC GLUCOMTR-MCNC: 164 MG/DL (ref 70–130)
HBA1C MFR BLD: 6.8 % (ref 4.8–5.6)
HCT VFR BLD AUTO: 35.7 % (ref 40.4–52.2)
HGB BLD-MCNC: 12.2 G/DL (ref 13.7–17.6)
IMM GRANULOCYTES # BLD: 0.04 10*3/MM3 (ref 0–0.03)
IMM GRANULOCYTES NFR BLD: 0.3 % (ref 0–0.5)
LYMPHOCYTES # BLD AUTO: 0.9 10*3/MM3 (ref 0.9–4.8)
LYMPHOCYTES NFR BLD AUTO: 7.2 % (ref 19.6–45.3)
MCH RBC QN AUTO: 34.4 PG (ref 27–32.7)
MCHC RBC AUTO-ENTMCNC: 34.2 G/DL (ref 32.6–36.4)
MCV RBC AUTO: 100.6 FL (ref 79.8–96.2)
MONOCYTES # BLD AUTO: 1.56 10*3/MM3 (ref 0.2–1.2)
MONOCYTES NFR BLD AUTO: 12.5 % (ref 5–12)
NEUTROPHILS # BLD AUTO: 9.84 10*3/MM3 (ref 1.9–8.1)
NEUTROPHILS NFR BLD AUTO: 78.5 % (ref 42.7–76)
PLATELET # BLD AUTO: 191 10*3/MM3 (ref 140–500)
PMV BLD AUTO: 10 FL (ref 6–12)
POTASSIUM BLD-SCNC: 4.1 MMOL/L (ref 3.5–5.2)
RBC # BLD AUTO: 3.55 10*6/MM3 (ref 4.6–6)
SODIUM BLD-SCNC: 133 MMOL/L (ref 136–145)
TSH SERPL DL<=0.05 MIU/L-ACNC: 10.83 MIU/ML (ref 0.27–4.2)
WBC NRBC COR # BLD: 12.52 10*3/MM3 (ref 4.5–10.7)

## 2017-07-06 PROCEDURE — 25010000002 DIPHENHYDRAMINE PER 50 MG: Performed by: SURGERY

## 2017-07-06 PROCEDURE — 84443 ASSAY THYROID STIM HORMONE: CPT | Performed by: HOSPITALIST

## 2017-07-06 PROCEDURE — 25010000002 PIPERACILLIN SOD-TAZOBACTAM PER 1 G: Performed by: HOSPITALIST

## 2017-07-06 PROCEDURE — 82962 GLUCOSE BLOOD TEST: CPT

## 2017-07-06 PROCEDURE — 25010000002 LEVOFLOXACIN PER 250 MG: Performed by: SURGERY

## 2017-07-06 PROCEDURE — 87045 FECES CULTURE AEROBIC BACT: CPT | Performed by: HOSPITALIST

## 2017-07-06 PROCEDURE — 87046 STOOL CULTR AEROBIC BACT EA: CPT | Performed by: HOSPITALIST

## 2017-07-06 PROCEDURE — 25010000002 FENTANYL CITRATE (PF) 100 MCG/2ML SOLUTION: Performed by: ANESTHESIOLOGY

## 2017-07-06 PROCEDURE — 83036 HEMOGLOBIN GLYCOSYLATED A1C: CPT | Performed by: HOSPITALIST

## 2017-07-06 PROCEDURE — 87493 C DIFF AMPLIFIED PROBE: CPT

## 2017-07-06 PROCEDURE — 63710000001 INSULIN ASPART PER 5 UNITS: Performed by: HOSPITALIST

## 2017-07-06 PROCEDURE — 99024 POSTOP FOLLOW-UP VISIT: CPT | Performed by: SURGERY

## 2017-07-06 PROCEDURE — 85025 COMPLETE CBC W/AUTO DIFF WBC: CPT | Performed by: HOSPITALIST

## 2017-07-06 PROCEDURE — 25010000002 MORPHINE PER 10 MG: Performed by: HOSPITALIST

## 2017-07-06 PROCEDURE — 80048 BASIC METABOLIC PNL TOTAL CA: CPT | Performed by: HOSPITALIST

## 2017-07-06 RX ORDER — FLUDROCORTISONE ACETATE 0.1 MG/1
0.1 TABLET ORAL DAILY
Status: DISCONTINUED | OUTPATIENT
Start: 2017-07-06 | End: 2017-07-09 | Stop reason: HOSPADM

## 2017-07-06 RX ORDER — LEVOTHYROXINE SODIUM 0.15 MG/1
150 TABLET ORAL
Status: DISCONTINUED | OUTPATIENT
Start: 2017-07-06 | End: 2017-07-09 | Stop reason: HOSPADM

## 2017-07-06 RX ORDER — DEXTROSE MONOHYDRATE 25 G/50ML
25 INJECTION, SOLUTION INTRAVENOUS
Status: DISCONTINUED | OUTPATIENT
Start: 2017-07-06 | End: 2017-07-09 | Stop reason: HOSPADM

## 2017-07-06 RX ORDER — HYDRALAZINE HYDROCHLORIDE 20 MG/ML
5 INJECTION INTRAMUSCULAR; INTRAVENOUS
Status: DISCONTINUED | OUTPATIENT
Start: 2017-07-06 | End: 2017-07-06 | Stop reason: HOSPADM

## 2017-07-06 RX ORDER — DIPHENHYDRAMINE HYDROCHLORIDE 50 MG/ML
12.5 INJECTION INTRAMUSCULAR; INTRAVENOUS
Status: DISCONTINUED | OUTPATIENT
Start: 2017-07-06 | End: 2017-07-06 | Stop reason: HOSPADM

## 2017-07-06 RX ORDER — NICOTINE POLACRILEX 4 MG
15 LOZENGE BUCCAL
Status: DISCONTINUED | OUTPATIENT
Start: 2017-07-06 | End: 2017-07-09 | Stop reason: HOSPADM

## 2017-07-06 RX ORDER — ONDANSETRON 2 MG/ML
4 INJECTION INTRAMUSCULAR; INTRAVENOUS ONCE AS NEEDED
Status: DISCONTINUED | OUTPATIENT
Start: 2017-07-06 | End: 2017-07-06 | Stop reason: HOSPADM

## 2017-07-06 RX ORDER — ACETAMINOPHEN 325 MG/1
650 TABLET ORAL EVERY 6 HOURS PRN
Status: DISCONTINUED | OUTPATIENT
Start: 2017-07-06 | End: 2017-07-09 | Stop reason: HOSPADM

## 2017-07-06 RX ORDER — BUPIVACAINE HYDROCHLORIDE AND EPINEPHRINE 5; 5 MG/ML; UG/ML
INJECTION, SOLUTION PERINEURAL AS NEEDED
Status: DISCONTINUED | OUTPATIENT
Start: 2017-07-05 | End: 2017-07-06 | Stop reason: HOSPADM

## 2017-07-06 RX ORDER — NALOXONE HCL 0.4 MG/ML
0.2 VIAL (ML) INJECTION AS NEEDED
Status: DISCONTINUED | OUTPATIENT
Start: 2017-07-06 | End: 2017-07-06 | Stop reason: HOSPADM

## 2017-07-06 RX ORDER — PROMETHAZINE HYDROCHLORIDE 25 MG/1
25 TABLET ORAL ONCE AS NEEDED
Status: DISCONTINUED | OUTPATIENT
Start: 2017-07-06 | End: 2017-07-06 | Stop reason: HOSPADM

## 2017-07-06 RX ORDER — DEXAMETHASONE 0.5 MG/1
1 TABLET ORAL
Status: DISCONTINUED | OUTPATIENT
Start: 2017-07-06 | End: 2017-07-09 | Stop reason: HOSPADM

## 2017-07-06 RX ORDER — LABETALOL HYDROCHLORIDE 5 MG/ML
5 INJECTION, SOLUTION INTRAVENOUS
Status: DISCONTINUED | OUTPATIENT
Start: 2017-07-06 | End: 2017-07-06 | Stop reason: HOSPADM

## 2017-07-06 RX ORDER — PROMETHAZINE HYDROCHLORIDE 25 MG/1
12.5 TABLET ORAL ONCE AS NEEDED
Status: DISCONTINUED | OUTPATIENT
Start: 2017-07-06 | End: 2017-07-06 | Stop reason: HOSPADM

## 2017-07-06 RX ORDER — SODIUM CHLORIDE 0.9 % (FLUSH) 0.9 %
1-10 SYRINGE (ML) INJECTION AS NEEDED
Status: DISCONTINUED | OUTPATIENT
Start: 2017-07-06 | End: 2017-07-09 | Stop reason: HOSPADM

## 2017-07-06 RX ORDER — PROMETHAZINE HYDROCHLORIDE 25 MG/ML
12.5 INJECTION, SOLUTION INTRAMUSCULAR; INTRAVENOUS ONCE AS NEEDED
Status: DISCONTINUED | OUTPATIENT
Start: 2017-07-06 | End: 2017-07-06 | Stop reason: HOSPADM

## 2017-07-06 RX ORDER — DICYCLOMINE HYDROCHLORIDE 10 MG/1
10 CAPSULE ORAL 4 TIMES DAILY PRN
Status: DISCONTINUED | OUTPATIENT
Start: 2017-07-06 | End: 2017-07-09 | Stop reason: HOSPADM

## 2017-07-06 RX ORDER — ONDANSETRON 2 MG/ML
4 INJECTION INTRAMUSCULAR; INTRAVENOUS EVERY 6 HOURS PRN
Status: DISCONTINUED | OUTPATIENT
Start: 2017-07-06 | End: 2017-07-09 | Stop reason: HOSPADM

## 2017-07-06 RX ORDER — ZOLPIDEM TARTRATE 5 MG/1
5 TABLET ORAL NIGHTLY PRN
Status: DISCONTINUED | OUTPATIENT
Start: 2017-07-06 | End: 2017-07-08

## 2017-07-06 RX ORDER — LISINOPRIL 2.5 MG/1
2.5 TABLET ORAL
Status: DISCONTINUED | OUTPATIENT
Start: 2017-07-06 | End: 2017-07-09 | Stop reason: HOSPADM

## 2017-07-06 RX ORDER — CHOLECALCIFEROL (VITAMIN D3) 125 MCG
500 CAPSULE ORAL NIGHTLY
Status: DISCONTINUED | OUTPATIENT
Start: 2017-07-06 | End: 2017-07-09 | Stop reason: HOSPADM

## 2017-07-06 RX ORDER — FLUMAZENIL 0.1 MG/ML
0.2 INJECTION INTRAVENOUS AS NEEDED
Status: DISCONTINUED | OUTPATIENT
Start: 2017-07-06 | End: 2017-07-06 | Stop reason: HOSPADM

## 2017-07-06 RX ORDER — DIPHENHYDRAMINE HYDROCHLORIDE 50 MG/ML
25 INJECTION INTRAMUSCULAR; INTRAVENOUS EVERY 6 HOURS PRN
Status: DISCONTINUED | OUTPATIENT
Start: 2017-07-06 | End: 2017-07-09 | Stop reason: HOSPADM

## 2017-07-06 RX ORDER — EPHEDRINE SULFATE 50 MG/ML
5 INJECTION, SOLUTION INTRAVENOUS ONCE AS NEEDED
Status: DISCONTINUED | OUTPATIENT
Start: 2017-07-06 | End: 2017-07-06 | Stop reason: HOSPADM

## 2017-07-06 RX ORDER — LEVOFLOXACIN 5 MG/ML
500 INJECTION, SOLUTION INTRAVENOUS EVERY 24 HOURS
Status: DISCONTINUED | OUTPATIENT
Start: 2017-07-06 | End: 2017-07-09 | Stop reason: HOSPADM

## 2017-07-06 RX ORDER — SODIUM CHLORIDE 9 MG/ML
100 INJECTION, SOLUTION INTRAVENOUS CONTINUOUS
Status: DISCONTINUED | OUTPATIENT
Start: 2017-07-06 | End: 2017-07-07

## 2017-07-06 RX ORDER — FENTANYL CITRATE 50 UG/ML
50 INJECTION, SOLUTION INTRAMUSCULAR; INTRAVENOUS
Status: DISCONTINUED | OUTPATIENT
Start: 2017-07-06 | End: 2017-07-06 | Stop reason: HOSPADM

## 2017-07-06 RX ORDER — MORPHINE SULFATE 2 MG/ML
2 INJECTION, SOLUTION INTRAMUSCULAR; INTRAVENOUS
Status: DISCONTINUED | OUTPATIENT
Start: 2017-07-06 | End: 2017-07-09 | Stop reason: HOSPADM

## 2017-07-06 RX ORDER — IBUPROFEN 600 MG/1
600 TABLET ORAL EVERY 6 HOURS PRN
Status: DISCONTINUED | OUTPATIENT
Start: 2017-07-06 | End: 2017-07-09 | Stop reason: HOSPADM

## 2017-07-06 RX ORDER — PROMETHAZINE HYDROCHLORIDE 25 MG/1
25 SUPPOSITORY RECTAL ONCE AS NEEDED
Status: DISCONTINUED | OUTPATIENT
Start: 2017-07-06 | End: 2017-07-06 | Stop reason: HOSPADM

## 2017-07-06 RX ADMIN — INSULIN ASPART 2 UNITS: 100 INJECTION, SOLUTION INTRAVENOUS; SUBCUTANEOUS at 21:55

## 2017-07-06 RX ADMIN — LISINOPRIL 2.5 MG: 2.5 TABLET ORAL at 08:29

## 2017-07-06 RX ADMIN — METRONIDAZOLE 500 MG: 500 INJECTION, SOLUTION INTRAVENOUS at 12:40

## 2017-07-06 RX ADMIN — TAZOBACTAM SODIUM AND PIPERACILLIN SODIUM 3.38 G: 375; 3 INJECTION, SOLUTION INTRAVENOUS at 05:04

## 2017-07-06 RX ADMIN — LEVOFLOXACIN 500 MG: 5 INJECTION, SOLUTION INTRAVENOUS at 21:55

## 2017-07-06 RX ADMIN — MORPHINE SULFATE 2 MG: 2 INJECTION, SOLUTION INTRAMUSCULAR; INTRAVENOUS at 05:20

## 2017-07-06 RX ADMIN — SODIUM CHLORIDE 100 ML/HR: 9 INJECTION, SOLUTION INTRAVENOUS at 02:38

## 2017-07-06 RX ADMIN — METRONIDAZOLE 500 MG: 500 INJECTION, SOLUTION INTRAVENOUS at 20:58

## 2017-07-06 RX ADMIN — METRONIDAZOLE 500 MG: 500 INJECTION, SOLUTION INTRAVENOUS at 05:05

## 2017-07-06 RX ADMIN — DICYCLOMINE HYDROCHLORIDE 10 MG: 10 CAPSULE ORAL at 20:56

## 2017-07-06 RX ADMIN — TAZOBACTAM SODIUM AND PIPERACILLIN SODIUM 3.38 G: 375; 3 INJECTION, SOLUTION INTRAVENOUS at 11:17

## 2017-07-06 RX ADMIN — ACETAMINOPHEN 650 MG: 325 TABLET ORAL at 11:18

## 2017-07-06 RX ADMIN — TAZOBACTAM SODIUM AND PIPERACILLIN SODIUM 3.38 G: 375; 3 INJECTION, SOLUTION INTRAVENOUS at 17:14

## 2017-07-06 RX ADMIN — LEVOTHYROXINE SODIUM 150 MCG: 150 TABLET ORAL at 08:33

## 2017-07-06 RX ADMIN — FLUDROCORTISONE ACETATE 0.1 MG: 0.1 TABLET ORAL at 08:29

## 2017-07-06 RX ADMIN — FENTANYL CITRATE 50 MCG: 50 INJECTION INTRAMUSCULAR; INTRAVENOUS at 01:05

## 2017-07-06 RX ADMIN — DEXAMETHASONE 1 MG: 0.5 TABLET ORAL at 08:29

## 2017-07-06 RX ADMIN — FENTANYL CITRATE 50 MCG: 50 INJECTION INTRAMUSCULAR; INTRAVENOUS at 00:45

## 2017-07-06 RX ADMIN — MORPHINE SULFATE 2 MG: 2 INJECTION, SOLUTION INTRAMUSCULAR; INTRAVENOUS at 11:18

## 2017-07-06 RX ADMIN — CYANOCOBALAMIN TAB 500 MCG 500 MCG: 500 TAB at 21:47

## 2017-07-06 RX ADMIN — SODIUM CHLORIDE 100 ML/HR: 9 INJECTION, SOLUTION INTRAVENOUS at 13:28

## 2017-07-06 RX ADMIN — DIPHENHYDRAMINE HYDROCHLORIDE 25 MG: 50 INJECTION, SOLUTION INTRAMUSCULAR; INTRAVENOUS at 22:23

## 2017-07-06 NOTE — CONSULTS
Consultation note    Referring physician: No ref. provider found    Consulting Physician: Mark Pike MD    Reason for consultation: Gluteal abscess    Chief Complaint   Patient presents with   • Diarrhea     for 3 days, low BP       HPI:   The patient is a very pleasant 77 y.o. years old male that presented to the hospital with diarrhea for 3 days. He reports several episodes of watery diarrhea , at least 5 or 6 per day for the last 3 days. This is associated with abdominal pain, nausea but no vomiting. Denies fevers or chills. Was recently admitted for sepsis to the hospital. Was treated with antibiotics. Was started on steroids for possible adrenal insufficiency. He also pain at the left buttock with drainage. Has multiple episodes of inflammation and drainage at the same area before. It has been draining pus. Colonoscopy in 2005. Was found to have colitis on CT scan.       Past Medical History:   Diagnosis Date   • AAA (abdominal aortic aneurysm)    • Acute MI     2004 - stented   • Adrenal insufficiency    • Arthritis    • B12 deficiency    • Back pain    • Cancer     prostate - prostatectomy   • Coronary artery disease    • Diabetes mellitus     oral meds   • Gout    • Hyperlipidemia    • Hypotension     2-3 years ago was hospitalized 13 times in 1 year span abrupt hypotension   • Hypothyroid    • Low testosterone    • Peripheral nerve disease    • Pneumonia    • Prostate cancer    • Sepsis    • Vertebral compression fracture        Past Surgical History:   Procedure Laterality Date   • ABDOMINAL AORTIC ANEURYSM REPAIR      stent placed Dr. Puga - 2005, sees q6 months   • APPENDECTOMY     • CHOLECYSTECTOMY     • CORONARY ANGIOPLASTY WITH STENT PLACEMENT           Current Facility-Administered Medications:   •  sodium chloride 0.9 % flush 10 mL, 10 mL, Intravenous, PRN, Zhou Kong MD    Current Outpatient Prescriptions:   •  atorvastatin (LIPITOR) 40 MG tablet, Take 40 mg by mouth daily., Disp: , Rfl:  "  •  dexamethasone (DECADRON) 1 MG tablet, Take 1 tablet by mouth Daily With Breakfast., Disp: , Rfl:   •  fludrocortisone 0.1 MG tablet, Take 0.1 mg by mouth daily., Disp: , Rfl:   •  gabapentin (NEURONTIN) 600 MG tablet, Take 1,200 mg by mouth 2 (two) times a day., Disp: , Rfl:   •  glimepiride (AMARYL) 2 MG tablet, Take 1 tablet by mouth 2 (Two) Times a Day Before Meals., Disp: , Rfl:   •  levothyroxine (SYNTHROID, LEVOTHROID) 150 MCG tablet, Take 150 mcg by mouth daily., Disp: , Rfl:   •  lisinopril (PRINIVIL,ZESTRIL) 2.5 MG tablet, 2.5 mg Every Night., Disp: , Rfl:   •  vitamin B-12 (CYANOCOBALAMIN) 500 MCG tablet, Take 500 mcg by mouth Every Night., Disp: , Rfl:   •  zolpidem (AMBIEN) 10 MG tablet, Take 5 mg by mouth At Night As Needed for Sleep., Disp: , Rfl:   •  azithromycin (ZITHROMAX) 250 MG tablet, Take 2 tablets the first day, then 1 tablet daily for 4 days.  Indications: Pneumonia, Disp: 5 tablet, Rfl: 0    Allergies   Allergen Reactions   • Acetylcysteine Hives   • Allopurinol Hives   • Baclofen Itching   • Hydrocodone-Acetaminophen Swelling   • Levaquin [Levofloxacin] Itching   • Oxycodone    • Tramadol        Social History     Social History   • Marital status:      Spouse name: N/A   • Number of children: N/A   • Years of education: N/A     Occupational History   • Not on file.     Social History Main Topics   • Smoking status: Former Smoker   • Smokeless tobacco: Never Used      Comment: quit 30 years ago   • Alcohol use 1.2 oz/week     2 Cans of beer per week      Comment: daily- \"no beer in the last several days\"   • Drug use: No   • Sexual activity: Defer     Other Topics Concern   • Not on file     Social History Narrative   • No narrative on file       Family History   Problem Relation Age of Onset   • Cancer Mother    • Breast cancer Mother    • Heart disease Mother    • Hypertension Father    • Stroke Father    • Cancer Sister    • Breast cancer Sister    • Aneurysm Sister    • " Hypertension Sister    • Thyroid disease Sister    • Hyperlipidemia Sister    • Diabetes Brother    • Aneurysm Brother    • Stroke Brother        ROS:   Constitutional: denies any weight changes, reports fatigue  Eyes: : denies blurred or double vision  Cardiovascular: denies chest pain, palpitations, edemas.  Respiratory: denies cough, sputum, reports SOB  Gastrointestinal: Reports diarrhea and abdominal pain  Genitourinary: denies dysuria, frequency.  Endocrine: denies cold intolerance, lethargy and flushing.  Hem: denies excessive bruising and postop bleeding.  Musculoskeletal: denies weakness, joint swelling, pain or stiffness.  Neuro: denies seizures, CVA, paresthesia, or peripheral neuropathy.   Skin: denies change in nevi, rashes, masses.    Physical Exam:   Vitals:    07/05/17 2152   BP:    Pulse: 65   Resp:    Temp:    SpO2:      GENERAL:alert, well appearing, and in no distress  HEENT: normochephalic, atraumatic, no scleral icterus moist mucous membranes.NC in place  NECK: Supple there is no thyromegaly or lymphadenopathy  CHEST: clear to auscultation, no wheezes, rales or rhonchi, symmetric air entry  CARDIAC: regular rate and rhythm    ABDOMEN: soft, mildly tender, nondistended, no masses or organomegaly, small reducible umbilical hernia.   At the left perirectal area there's an area of induration and pain, there's small amount of purulent drainage.    Diagnostic workup:   CT Abdomen and Pelvis(7/5/17): CONCLUSION:  1. Findings compatible with colitis involving the transverse, descending  and sigmoid portions.  2. Aortobiiliac stent graft in position, caliber of the native aneurysm  remains stable, see above measurements.    Results for PRINCE SNOW JR. (MRN [id number]) as of 7/5/2017 22:06   Ref. Range 7/5/2017 15:03   WBC Latest Ref Range: 4.50 - 10.70 10*3/mm3 14.71 (H)   RBC Latest Ref Range: 4.60 - 6.00 10*6/mm3 4.12 (L)   Hemoglobin Latest Ref Range: 13.7 - 17.6 g/dL 14.0   Hematocrit Latest Ref  Range: 40.4 - 52.2 % 40.6     Results for PRINCE SNOW JR. (MRN [id number]) as of 7/5/2017 22:06   Ref. Range 7/5/2017 15:03   Sodium Latest Ref Range: 136 - 145 mmol/L 134 (L)   Potassium Latest Ref Range: 3.5 - 5.2 mmol/L 4.6   CO2 Latest Ref Range: 22.0 - 29.0 mmol/L 25.4   Chloride Latest Ref Range: 98 - 107 mmol/L 93 (L)   Anion Gap Latest Units: mmol/L 15.6   Creatinine Latest Ref Range: 0.76 - 1.27 mg/dL 1.36 (H)       Assessment and plan:   The patient is a very pleasant 77 y.o. years old male with abdominal pain, colitis and left gluteal abscess. He has risk factors for c.diff colitis. Abdominal exam is benign. Has small left gluteal abscess.     - Needs Incisional drainage of left gluteal abscess. Will take to OR today  - No surgical intervention for colitis, needs medical management  - Will need colonoscopy as outpatient  - C.diff stool  - IV antibiotics  - Clears for now    Case was discussed with patient.    Risk and benefits of the current recommended treatment were explained to the patient that had time to ask questions that where answered, verbalized understanding and agreed with the plan.     Mark Pike MD  General, Minimally Invasive and Endoscopic Surgery  [facility]    [address], [address]  [place], [place], [address]  P: [phone]  F: [phone]

## 2017-07-06 NOTE — PLAN OF CARE
Problem: Perioperative Period (Adult)  Goal: Signs and Symptoms of Listed Potential Problems Will be Absent or Manageable (Perioperative Period)  Outcome: Ongoing (interventions implemented as appropriate)    Problem: Patient Care Overview (Adult)  Goal: Plan of Care Review  Outcome: Ongoing (interventions implemented as appropriate)    07/06/17 0259   Coping/Psychosocial Response Interventions   Plan Of Care Reviewed With patient   Patient Care Overview   Progress no change   Outcome Evaluation   Outcome Summary/Follow up Plan pt admitted from [facility]. vss, bradycardia at pt baseline. small drainage from incision, packing intact. c/o pain but medication improved.       Goal: Adult Individualization and Mutuality  Outcome: Ongoing (interventions implemented as appropriate)  Goal: Discharge Needs Assessment  Outcome: Ongoing (interventions implemented as appropriate)    Problem: Infection, Risk/Actual (Adult)  Goal: Identify Related Risk Factors and Signs and Symptoms  Outcome: Outcome(s) achieved Date Met:  07/06/17  Goal: Infection Prevention/Resolution  Outcome: Ongoing (interventions implemented as appropriate)    Problem: Fall Risk (Adult)  Goal: Identify Related Risk Factors and Signs and Symptoms  Outcome: Ongoing (interventions implemented as appropriate)  Goal: Absence of Falls  Outcome: Ongoing (interventions implemented as appropriate)

## 2017-07-06 NOTE — H&P
HISTORY AND PHYSICAL   [facility]        Patient Identification:  Name: Faustino Horton Jr.  Age: 77 y.o.  Sex: male  :  1940  MRN: [id number]                     Primary Care Physician: Yifan Patino MD    Chief Complaint:  Abdominal pain with diarrhea and perirectal abscess    History of Present Illness:       The patient is a 77-year-old white male with history of adrenal insufficiency, arthritis, back pain, coronary artery disease, diabetes mellitus, gout, hyperlipidemia, hypothyroidism and history of prostate cancer who's recently been admitted and treated for pneumonia with antibiotics a few weeks ago.  He now presents with a few day history of some crampy abdominal pain with diarrhea and loose stools and fever with nausea but no vomiting.  The patient was evaluated in the ER and found to have perirectal abscess and CT scanning showed changes consistent with colitis.  Stool was sent for C. difficile and the patient was admitted for further evaluation treatment.  He subsequently is already gone to the OR and Dr. Pike did I&D of perirectal abscess.  The patient denies having any chest pain or shortness of air.    Past Medical History:  Past Medical History:   Diagnosis Date   • AAA (abdominal aortic aneurysm)    • Acute MI      - stented   • Adrenal insufficiency    • Arthritis    • B12 deficiency    • Back pain    • Cancer     prostate - prostatectomy   • Coronary artery disease    • Diabetes mellitus     oral meds   • Gout    • Hyperlipidemia    • Hypotension     2-3 years ago was hospitalized 13 times in 1 year span abrupt hypotension   • Hypothyroid    • Low testosterone    • Peripheral nerve disease    • Pneumonia    • Prostate cancer    • Sepsis    • Vertebral compression fracture      Past Surgical History:  Past Surgical History:   Procedure Laterality Date   • ABDOMINAL AORTIC ANEURYSM REPAIR      stent placed Dr. Puga - , sees q6 months   • APPENDECTOMY     •  "CHOLECYSTECTOMY     • CORONARY ANGIOPLASTY WITH STENT PLACEMENT        Home Meds:  Prescriptions Prior to Admission   Medication Sig Dispense Refill Last Dose   • atorvastatin (LIPITOR) 40 MG tablet Take 40 mg by mouth daily.   6/21/2017   • dexamethasone (DECADRON) 1 MG tablet Take 1 tablet by mouth Daily With Breakfast.      • fludrocortisone 0.1 MG tablet Take 0.1 mg by mouth daily.   6/21/2017   • gabapentin (NEURONTIN) 600 MG tablet Take 1,200 mg by mouth 2 (two) times a day.   6/21/2017   • glimepiride (AMARYL) 2 MG tablet Take 1 tablet by mouth 2 (Two) Times a Day Before Meals.      • levothyroxine (SYNTHROID, LEVOTHROID) 150 MCG tablet Take 150 mcg by mouth daily.   6/21/2017   • lisinopril (PRINIVIL,ZESTRIL) 2.5 MG tablet 2.5 mg Every Night.   6/21/2017   • vitamin B-12 (CYANOCOBALAMIN) 500 MCG tablet Take 500 mcg by mouth Every Night.   6/21/2017   • zolpidem (AMBIEN) 10 MG tablet Take 5 mg by mouth At Night As Needed for Sleep.      • azithromycin (ZITHROMAX) 250 MG tablet Take 2 tablets the first day, then 1 tablet daily for 4 days.  Indications: Pneumonia 5 tablet 0        Allergies:  Allergies   Allergen Reactions   • Acetylcysteine Hives   • Allopurinol Hives   • Baclofen Itching   • Hydrocodone-Acetaminophen Swelling   • Levaquin [Levofloxacin] Itching   • Oxycodone    • Tramadol      Immunizations:    There is no immunization history on file for this patient.  Social History:   Social History     Social History Narrative   • No narrative on file     Social History     Social History   • Marital status:      Spouse name: N/A   • Number of children: N/A   • Years of education: N/A     Occupational History   • Not on file.     Social History Main Topics   • Smoking status: Former Smoker   • Smokeless tobacco: Never Used      Comment: quit 30 years ago   • Alcohol use 1.2 oz/week     2 Cans of beer per week      Comment: daily- \"no beer in the last several days\"   • Drug use: No   • Sexual " "activity: Defer     Other Topics Concern   • Not on file     Social History Narrative   • No narrative on file       Family History:  Family History   Problem Relation Age of Onset   • Cancer Mother    • Breast cancer Mother    • Heart disease Mother    • Hypertension Father    • Stroke Father    • Cancer Sister    • Breast cancer Sister    • Aneurysm Sister    • Hypertension Sister    • Thyroid disease Sister    • Hyperlipidemia Sister    • Diabetes Brother    • Aneurysm Brother    • Stroke Brother         Review of Systems  See history of present illness and past medical history.  Patient denies headache, dizziness, syncope, falls, trauma, change in vision, change in hearing, change in taste, changes in weight, changes in appetite, focal weakness, numbness, or paresthesia.  Patient denies chest pain, palpitations, dyspnea, orthopnea, PND, cough, sinus pressure, rhinorrhea, epistaxis, hemoptysis,  vomiting,hematemesis, diarrhea, constipation or hematchezia.  Denies cold or heat intolerance, polydipsia, polyuria, polyphagia. Denies hematuria, pyuria, dysuria, hesitancy, frequency or urgency. Denies consumption of raw and under cooked meats foods or change in water source.  Remainder of ROS is negative.    Objective:  tMax 24 hrs: Temp (24hrs), Av °F (37.2 °C), Min:98.4 °F (36.9 °C), Max:100.2 °F (37.9 °C)    Vitals Ranges:   Temp:  [98.4 °F (36.9 °C)-100.2 °F (37.9 °C)] 98.7 °F (37.1 °C)  Heart Rate:  [55-77] 57  Resp:  [12-16] 12  BP: (105-133)/(56-81) 113/68      Exam:  /68 (BP Location: Left arm, Patient Position: Lying)  Pulse 57  Temp 98.7 °F (37.1 °C) (Oral)   Resp 12  Ht 72\" (182.9 cm)  Wt 223 lb (101 kg)  SpO2 98%  BMI 30.24 kg/m2    General Appearance:    Alert, cooperative, no distress, appears stated age   Head:    Normocephalic, without obvious abnormality, atraumatic   Eyes:    PERRL, conjunctiva/corneas clear, EOM's intact, both eyes   Ears:    Normal external ear canals, both ears "   Nose:   Nares normal, septum midline, mucosa normal, no drainage    or sinus tenderness   Throat:   Lips, mucosa, and tongue normal   Neck:   Supple, symmetrical, trachea midline, no adenopathy;     thyroid:  no enlargement/tenderness/nodules; no carotid    bruit or JVD   Back:     Symmetric, no curvature, ROM normal, no CVA tenderness   Lungs:     Clear to auscultation bilaterally, respirations unlabored   Chest Wall:    No tenderness or deformity    Heart:    Regular rate and rhythm, S1 and S2 normal, no murmur, rub   or gallop   Abdomen:     Soft,Mild diffuse tenderness, bowel sounds active all four quadrants,     no masses, no hepatomegaly, no splenomegaly   Extremities:   Extremities normal, atraumatic, no cyanosis or edema   Pulses:   2+ and symmetric all extremities   Skin:   Skin color, texture, turgor normal, no rashes or lesions   Lymph nodes:   Cervical, supraclavicular, and axillary nodes normal   Neurologic:   CNII-XII intact, normal strength, sensation intact throughout      .    Data Review:  Lab Results (last 72 hours)     Procedure Component Value Units Date/Time    CBC & Differential [[id number]] Collected:  07/05/17 1503    Specimen:  Blood Updated:  07/05/17 1520    Narrative:       The following orders were created for panel order CBC & Differential.  Procedure                               Abnormality         Status                     ---------                               -----------         ------                     CBC Auto Differential[[id number]]        Abnormal            Final result                 Please view results for these tests on the individual orders.    CBC Auto Differential [[id number]]  (Abnormal) Collected:  07/05/17 1503    Specimen:  Blood Updated:  07/05/17 1520     WBC 14.71 (H) 10*3/mm3      RBC 4.12 (L) 10*6/mm3      Hemoglobin 14.0 g/dL      Hematocrit 40.6 %      MCV 98.5 (H) fL      MCH 34.0 (H) pg      MCHC 34.5 g/dL      RDW 13.5 %      RDW-SD 48.6 fl      MPV  9.9 fL      Platelets 217 10*3/mm3      Neutrophil % 75.2 %      Lymphocyte % 11.1 (L) %      Monocyte % 11.4 %      Eosinophil % 1.8 %      Basophil % 0.2 %      Immature Grans % 0.3 %      Neutrophils, Absolute 11.05 (H) 10*3/mm3      Lymphocytes, Absolute 1.64 10*3/mm3      Monocytes, Absolute 1.68 (H) 10*3/mm3      Eosinophils, Absolute 0.26 10*3/mm3      Basophils, Absolute 0.03 10*3/mm3      Immature Grans, Absolute 0.05 (H) 10*3/mm3     Lipase [[id number]]  (Normal) Collected:  07/05/17 1503    Specimen:  Blood Updated:  07/05/17 1540     Lipase 24 U/L     Comprehensive Metabolic Panel [[id number]]  (Abnormal) Collected:  07/05/17 1503    Specimen:  Blood Updated:  07/05/17 1542     Glucose 132 (H) mg/dL      BUN 17 mg/dL      Creatinine 1.36 (H) mg/dL      Sodium 134 (L) mmol/L      Potassium 4.6 mmol/L      Chloride 93 (L) mmol/L      CO2 25.4 mmol/L      Calcium 9.1 mg/dL      Total Protein 7.7 g/dL      Albumin 4.00 g/dL      ALT (SGPT) 17 U/L      AST (SGOT) 16 U/L      Alkaline Phosphatase 72 U/L      Total Bilirubin 1.2 mg/dL      eGFR Non African Amer 51 (L) mL/min/1.73      Globulin 3.7 gm/dL      A/G Ratio 1.1 g/dL      BUN/Creatinine Ratio 12.5     Anion Gap 15.6 mmol/L     Narrative:       The MDRD GFR formula is only valid for adults with stable renal function between ages 18 and 70.    Light Blue Top [[id number]] Collected:  07/05/17 1503    Specimen:  Blood Updated:  07/05/17 1701     Extra Tube hold for add-on      Auto resulted       Gold Top - SST [[id number]] Collected:  07/05/17 1503    Specimen:  Blood Updated:  07/05/17 1701     Extra Tube Hold for add-ons.      Auto resulted.       [place] Draw [[id number]] Collected:  07/05/17 1503    Specimen:  Blood Updated:  07/05/17 1701    Narrative:       The following orders were created for panel order [place] Draw.  Procedure                               Abnormality         Status                     ---------                                -----------         ------                     Light Blue Top[[id number]]                                   Final result               Green Top (Gel)[[id number]]                                  Final result               Lavender Top[[id number]]                                     Final result               Gold Top - SST[[id number]]                                   Final result                 Please view results for these tests on the individual orders.    Green Top (Gel) [[id number]] Collected:  07/05/17 1503    Specimen:  Blood Updated:  07/05/17 1701     Extra Tube Hold for add-ons.      Auto resulted.       Lavender Top [[id number]] Collected:  07/05/17 1503    Specimen:  Blood Updated:  07/05/17 1701     Extra Tube hold for add-on      Auto resulted       Urine Culture [[id number]] Collected:  07/05/17 1657    Specimen:  Urine from Urine, Clean Catch Updated:  07/05/17 1720    Urinalysis With / Culture If Indicated [[id number]]  (Abnormal) Collected:  07/05/17 1657    Specimen:  Urine from Urine, Clean Catch Updated:  07/05/17 1728     Color, UA Dark Yellow (A)     Appearance, UA Cloudy (A)     pH, UA 5.5     Specific Gravity, UA 1.020     Glucose, UA Negative     Ketones, UA 40 mg/dL (2+) (A)     Bilirubin, UA Negative     Blood, UA Negative     Protein,  mg/dL (2+) (A)     Leuk Esterase, UA Small (1+) (A)     Nitrite, UA Negative     Urobilinogen, UA 0.2 E.U./dL    Urinalysis, Microscopic Only [[id number]]  (Abnormal) Collected:  07/05/17 1657    Specimen:  Urine from Urine, Clean Catch Updated:  07/05/17 1801     RBC, UA None Seen /HPF      WBC, UA 6-12 (A) /HPF      Bacteria, UA None Seen /HPF      Squamous Epithelial Cells, UA 3-6 (A) /HPF      Hyaline Casts, UA 3-6 /LPF      Mucus, UA Moderate/2+ (A) /HPF      Methodology Manual Light Microscopy    Cortisol [[id number]]  (Normal) Collected:  07/05/17 1503    Specimen:  Blood Updated:  07/05/17 1943     Cortisol 11.13 mcg/dL     Narrative:       Cortisol  "Reference Ranges:    Cortisol 6AM - 10AM Range: 6.02-18.40 mcg/dl  Cortisol 4PM - 8PM Range: 2.68-10.50 mcg/dl  Critical AM/PM:    Less than 2 mcg/dl    Blood Culture [[id number]] Collected:  07/05/17 1946    Specimen:  Blood from Arm, Right Updated:  07/05/17 1950    Blood Culture [[id number]] Collected:  07/05/17 1946    Specimen:  Blood from Arm, Left Updated:  07/05/17 1951    Lactic Acid, Plasma [[id number]]  (Normal) Collected:  07/05/17 1946    Specimen:  Blood Updated:  07/05/17 2006     Lactate 1.6 mmol/L     Procalcitonin [[id number]]  (Normal) Collected:  07/05/17 1946    Specimen:  Blood Updated:  07/05/17 2028     Procalcitonin 0.17 ng/mL     Narrative:       As a Marker for Sepsis (Non-Neonates):   1. <0.5 ng/mL represents a low risk of severe sepsis and/or septic shock.  1. >2 ng/mL represents a high risk of severe sepsis and/or septic shock.    As a Marker for Lower Respiratory Tract Infections that require antibiotic therapy:  PCT on Admission     Antibiotic Therapy             6-12 Hrs later  > 0.5                Strongly Recommended            >0.25 - <0.5         Recommended  0.1 - 0.25           Discouraged                   Remeasure/reassess PCT  <0.1                 Strongly Discouraged          Remeasure/reassess PCT      As 28 day mortality risk marker: \"Change in Procalcitonin Result\" (> 80 % or <=80 %) if Day 0 (or Day 1) and Day 4 values are available. Refer to http://www.[facility]-pct-calculator.com/   Change in PCT <=80 %   A decrease of PCT levels below or equal to 80 % defines a positive change in PCT test result representing a higher risk for 28-day all-cause mortality of patients diagnosed with severe sepsis or septic shock.  Change in PCT > 80 %   A decrease of PCT levels of more than 80 % defines a negative change in PCT result representing a lower risk for 28-day all-cause mortality of patients diagnosed with severe sepsis or septic shock.                               Imaging Results " (all)     Procedure Component Value Units Date/Time    XR Chest 1 View [[id number]] Collected:  07/05/17 2109     Updated:  07/05/17 2109    Narrative:       X-RAY CHEST 1 VIEW.     HISTORY: Sepsis.     COMPARISON: 06/22/2017.     FINDINGS:  Cardiomediastinal silhouette is within normal limits.         There is no consolidation or effusion.              Impression:       No acute findings.           CT Abdomen Pelvis With Contrast [[id number]] Collected:  07/05/17 2059     Updated:  07/05/17 2112    Narrative:       CT ABDOMEN PELVIS WITH CONTRAST-     CLINICAL INFORMATION: Abdominal pain and diarrhea.     COMPARISON:  06/22/2017.     FINDINGS: There is an aortobiiliac stent graft in position similar to  the previous examination. Native aneurysm caliber is similar to the  previous examination, 6.2 x 5.8 cm.     Wall edema and subtle induration of the pericolonic fat involving the  transverse, descending and sigmoid colon consistent with colitis. There  is sparing of the ascending colon. The small bowel is satisfactory in  appearance.     There is a right renal cyst, the kidneys are otherwise normal.  Perinephric fat stranding demonstrated on the prior examination resolved  in the interim.     The liver, pancreas, spleen and adrenal glands are typical in  appearance. No biliary duct dilatation postcholecystectomy. The stomach  is collapsed, contour within normal limits.     CONCLUSION:  1. Findings compatible with colitis involving the transverse, descending  and sigmoid portions.  2. Aortobiiliac stent graft in position, caliber of the native aneurysm  remains stable, see above measurements.     Findings of this report called to Al Hurtado at the time of  completion, 8:30 PM.     This report was finalized on 7/5/2017 9:09 PM by Dr. Viraj Ballesteros MD.           Patient Active Problem List   Diagnosis Code   • Chronic coronary artery disease I25.10   • Gout M10.9   • Hyperlipidemia E78.5   • Hypothyroidism E03.9    • Peripheral vascular disease I73.9   • Adrenal insufficiency E27.40   • Spondylosis of lumbar region without myelopathy or radiculopathy M47.816   • Pleural effusions (right > left) J90   • Colitis K52.9   • Abscess, perirectal K61.1   • Diarrhea of presumed infectious origin A09       Assessment:  Principal Problem:    Colitis  Active Problems:    Chronic coronary artery disease    Gout    Hyperlipidemia    Hypothyroidism    Peripheral vascular disease    Adrenal insufficiency    Abscess, perirectal    Diarrhea of presumed infectious origin      Plan:  The patient's admitted to hospital and treated with IV antibiotics for colitis and perirectal abscess.  We'll check stool for C&S and stool for C. difficile.  He likely may have C. difficile colitis.    Bacilio Fritz MD  7/6/2017  3:03 AM

## 2017-07-06 NOTE — OP NOTE
PREOPERATIVE DIAGNOSES:    (1) Perirectal abscess    POSTOPERATIVE DIAGNOSES:    (1) Perirectal abscess  (2) Perianal fistula    PROCEDURE:    (1) Incisional drainage of perirectal abscess  (2) Superficial fistulotomy    SURGEON/STAFF:  MAGUE Pike   SPECIMEN:  Culture swap  INTRAOPERATIVE COMPLICATIONS:  None.   Andesthesia: MAC    INDICATIONS:  This is a pleasant patient who presented to the hospital with perirectal pain and drainage.  He was found to have a perirectal abscess.  He has been having recurrent episodes of perirectal abscess is likely due to a fistula.  I discussed with the patient about treatment options.  I recommend he undergoes incision and drainage of perirectal abscess and possible fistulotomy.  Risk and benefits of the procedure including bleeding infection and fecal incontinence were discussed with the patient.  He verbalized understanding and agree with the plan      OPERATION: The patient was taken to the operative room and he was placed in the OR table in the supine position.  Decision was induced.  Patient was placed in stirrups.  The Perineal examination revealed and abscess in the left medial rectal area approximately 3 o'clock.  There was evidence of thrombosed external hemorrhoids.  Digital rectal examination revealed no masses and an empty rectal vault.  Half percent Marcaine with epinephrine was injected I stated procedure by performing an elliptical incision of 2 x 2 centimeters around the abscess.  Dissection was carried onto the subcutaneous tissue.  An opening from a fistulous tract was found.  This was probed with a lacrimal probe.  Patient was found to have an exit at the superior midline perianal area.  This was found to be superficial and without any involvement of the anal sphincter.  A fistulotomy was performed over the lacrimal probe.  I then examined the anal with Hill-Maciel retractors.  There was no evidence of internal fistulous tract.  There was evidence of internal  hemorrhoids.  The hemostasis was achieved.  There was no evidence of bleeding.  The area was irrigated.   The cavity was irrigated with saline and packed with 1/2 inch iodoform gauze. The wound was covered with 4x4 gauze and mesh panties      The patient tolerated the procedure well    Mark Pike MD  General, Minimally Invasive and Endoscopic Surgery  [facility]    [address], [address]  [place], [place], [address]  P: [phone]  F: [phone]

## 2017-07-06 NOTE — PLAN OF CARE
Problem: Perioperative Period (Adult)  Goal: Signs and Symptoms of Listed Potential Problems Will be Absent or Manageable (Perioperative Period)  Outcome: Ongoing (interventions implemented as appropriate)    07/06/17 0204   Perioperative Period   Problems Assessed (Perioperative Period) pain;wound complications;hypoxia/hypoxemia;infection   Problems Present (Perioperative Period) none         Problem: Patient Care Overview (Adult)  Goal: Adult Individualization and Mutuality  Outcome: Ongoing (interventions implemented as appropriate)

## 2017-07-06 NOTE — BRIEF OP NOTE
INCISION AND DRAINAGE OF PERIRECTAL ABSCESS  Procedure Note    Faustino Horton Jr.  7/5/2017    Pre-op Diagnosis:   Abscess, perirectal [K61.1]    Post-op Diagnosis:     Post-Op Diagnosis Codes:     * Abscess, perirectal [K61.1]    Procedure/CPT® Codes:      Procedure(s):  INCISION AND DRAINAGE OF PERIRECTAL ABSCESS    Surgeon(s):  Mark Pike MD    Anesthesia: General    Staff:   Circulator: Peggy Castillo RN  Scrub Person: Colleen Oquendo    Estimated Blood Loss: *No blood loss documented*  Urine Voided: * No values recorded between 7/5/2017 11:11 PM and 7/5/2017 11:45 PM *    Specimens:                  ID Type Source Tests Collected by Time Destination   A : PER-IRECTAL ABCESS Tissue Perineum TISSUE EXAM Mark Pike MD 7/5/2017 2337          Drains: None          Findings: Perirectal abscess and perirectal fistula    Complications: None      Mark Pike MD     Date: 7/5/2017  Time: 11:45 PM

## 2017-07-06 NOTE — ANESTHESIA POSTPROCEDURE EVALUATION
Patient: Faustino Horton Jr.    Procedure Summary     Date Anesthesia Start Anesthesia Stop Room / Location    07/05/17 1328 2537  [facility] / [facility] MAIN OR       Procedure Diagnosis Surgeon Provider    INCISION AND DRAINAGE OF ARIANNE-RECTAL ABSCESS; SUPERFICIAL FISTULOTOMY (Left Perirectal) Abscess, perirectal  (Abscess, perirectal [K61.1]) MD Lucas Chen MD          Anesthesia Type: MAC  Last vitals  /68 (07/06/17 0045)    Temp      Pulse 57 (07/06/17 0045)   Resp 12 (07/06/17 0045)    SpO2 98 % (07/06/17 0045)      Post Anesthesia Care and Evaluation    Patient location during evaluation: bedside  Patient participation: complete - patient participated  Level of consciousness: awake  Pain management: adequate  Airway patency: patent  Anesthetic complications: No anesthetic complications    Cardiovascular status: acceptable  Respiratory status: acceptable  Hydration status: acceptable

## 2017-07-06 NOTE — ANESTHESIA PREPROCEDURE EVALUATION
Anesthesia Evaluation     no history of anesthetic complications:         Airway   Mallampati: III  no difficulty expected  Dental - normal exam     Pulmonary - normal exam   (+) pneumonia , a smoker Former,   (-) COPD, asthma, sleep apnea    PE comment: nonlabored  Cardiovascular - normal exam    Rhythm: regular  Rate: normal    (+) past MI (2005) , CAD, cardiac stents more than 12 months ago PVD (AAA & PVD), hyperlipidemia  (-) hypertension, valvular problems/murmurs, dysrhythmias, angina      Neuro/Psych- negative ROS  (-) seizures, TIA, CVA  GI/Hepatic/Renal/Endo    (+)  diabetes mellitus type 2 well controlled, hypothyroidism,   (-) GERD, liver disease, hyperthyroidism    Musculoskeletal     (+) arthralgias, back pain,   Abdominal    Substance History      OB/GYN          Other   (+) arthritis   history of cancer (prostate-->s/p prostatectomy)      Other Comment: Adrenal Insufficiency                                  Anesthesia Plan    ASA 3     MAC     Anesthetic plan and risks discussed with patient.

## 2017-07-06 NOTE — PROGRESS NOTES
Discharge Planning Assessment  [facility]     Patient Name: Faustino Horton Jr.  MRN: [id number]  Today's Date: 7/6/2017    Admit Date: 7/5/2017          Discharge Needs Assessment       07/06/17 1113    Living Environment    Lives With spouse    Living Arrangements house    Home Accessibility bed and bath on same level;stairs to enter home    Number of Stairs to Enter Home 1    Type of Financial/Environmental Concern none    Transportation Available family or friend will provide    Living Environment    Provides Primary Care For no one    Quality Of Family Relationships supportive    Able to Return to Prior Living Arrangements yes    Living Arrangement Comments pt lives with his wife in a single level house 1 step to enter    Discharge Needs Assessment    Concerns To Be Addressed no discharge needs identified;denies needs/concerns at this time    Equipment Currently Used at Home none    Equipment Needed After Discharge none    Discharge Disposition home or self-care    Discharge Planning Comments pt plans home ;denies needs            Discharge Plan       07/06/17 1114    Case Management/Social Work Plan    Plan pt plans home; denies needs    Patient/Family In Agreement With Plan yes    Additional Comments MOON given. Met with pt bedside. Confirmed facesheet correct. Pt lives with his wife in a single level house with one step to enter. pt reports he is independent no DME used to ambulate. Pt reports he has never used HH or SNF in past. Pt reports if HH needed would use [facility] no referral made. pt reports he plans home with wife. Denies needs LUZMARIA Mccarhty        Discharge Placement     No information found                Demographic Summary       07/06/17 1112    Referral Information    Admission Type observation    Arrived From home or self-care    Reason For Consult discharge planning    Record Reviewed medical record    Contact Information    Permission Granted to Share Information With facility contact  person;family/designee            Functional Status       07/06/17 1112    Functional Status Current    Ambulation 1-->assistive equipment    Transferring 1-->assistive equipment    Toileting 0-->independent    Bathing 0-->independent    Dressing 0-->independent    Eating 0-->independent    Communication 0-->understands/communicates without difficulty    Swallowing (if score 2 or more for any item, consult Rehab Services) 0-->swallows foods/liquids without difficulty    Functional Status Prior    Ambulation 0-->independent    Transferring 0-->independent    Toileting 0-->independent    Bathing 0-->independent    Dressing 0-->independent    Eating 0-->independent    Communication 0-->understands/communicates without difficulty    Swallowing 0-->swallows foods/liquids without difficulty            Psychosocial     None            Abuse/Neglect     None            Legal     None            Substance Abuse     None            Patient Forms     None          Dina Flanagan

## 2017-07-06 NOTE — ED PROVIDER NOTES
Pt presents to the ED complaining of diarrhea for the last three days. Pt also complains of an abscess on his left buttocks and lower abd pain but denies SOB or cough. Pt's WBC=14.71. On exam, the pt has a small left sided perirectal abscess, mild lower abd tenderness and mild bibasilar rhonchi. I agree with the plan to order additional lab and imaging studies prior to disposition.    2154- Pt's CT Abd/Pelvis shows transverse, descending and sigmoid colitis. Dr. Pike (general surgery) will take the pt to the OR prior to being admitted.    I supervised care provided by the midlevel provider.    We have discussed this patient's history, physical exam, and treatment plan.   I have reviewed the note and personally saw and examined the patient and agree with the plan of care.    Documentation assistance provided by ana laura Bryant for Dr. Kong.  Information recorded by the ana laura was done at my direction and has been verified and validated by me.     Chelsea Bryant  07/05/17 2157       Zhou Kong MD  07/06/17 0401

## 2017-07-07 PROBLEM — A04.72 C. DIFFICILE COLITIS: Status: ACTIVE | Noted: 2017-07-05

## 2017-07-07 LAB
ALBUMIN SERPL-MCNC: 3.1 G/DL (ref 3.5–5.2)
ALBUMIN/GLOB SERPL: 0.9 G/DL
ALP SERPL-CCNC: 50 U/L (ref 39–117)
ALT SERPL W P-5'-P-CCNC: 12 U/L (ref 1–41)
ANION GAP SERPL CALCULATED.3IONS-SCNC: 12.4 MMOL/L
AST SERPL-CCNC: 13 U/L (ref 1–40)
BACTERIA SPEC AEROBE CULT: NO GROWTH
BASOPHILS # BLD AUTO: 0.03 10*3/MM3 (ref 0–0.2)
BASOPHILS NFR BLD AUTO: 0.3 % (ref 0–1.5)
BILIRUB SERPL-MCNC: 0.4 MG/DL (ref 0.1–1.2)
BUN BLD-MCNC: 6 MG/DL (ref 8–23)
BUN/CREAT SERPL: 6.2 (ref 7–25)
C DIFF TOX GENS STL QL NAA+PROBE: POSITIVE
CALCIUM SPEC-SCNC: 8.4 MG/DL (ref 8.6–10.5)
CHLORIDE SERPL-SCNC: 100 MMOL/L (ref 98–107)
CO2 SERPL-SCNC: 23.6 MMOL/L (ref 22–29)
CREAT BLD-MCNC: 0.97 MG/DL (ref 0.76–1.27)
CYTO UR: NORMAL
D-LACTATE SERPL-SCNC: 1 MMOL/L (ref 0.5–2)
DEPRECATED RDW RBC AUTO: 45.7 FL (ref 37–54)
EOSINOPHIL # BLD AUTO: 0.18 10*3/MM3 (ref 0–0.7)
EOSINOPHIL NFR BLD AUTO: 1.7 % (ref 0.3–6.2)
ERYTHROCYTE [DISTWIDTH] IN BLOOD BY AUTOMATED COUNT: 13.2 % (ref 11.5–14.5)
GFR SERPL CREATININE-BSD FRML MDRD: 75 ML/MIN/1.73
GLOBULIN UR ELPH-MCNC: 3.3 GM/DL
GLUCOSE BLD-MCNC: 124 MG/DL (ref 65–99)
GLUCOSE BLDC GLUCOMTR-MCNC: 122 MG/DL (ref 70–130)
GLUCOSE BLDC GLUCOMTR-MCNC: 134 MG/DL (ref 70–130)
GLUCOSE BLDC GLUCOMTR-MCNC: 138 MG/DL (ref 70–130)
GLUCOSE BLDC GLUCOMTR-MCNC: 140 MG/DL (ref 70–130)
HCT VFR BLD AUTO: 33.5 % (ref 40.4–52.2)
HGB BLD-MCNC: 11.8 G/DL (ref 13.7–17.6)
IMM GRANULOCYTES # BLD: 0.04 10*3/MM3 (ref 0–0.03)
IMM GRANULOCYTES NFR BLD: 0.4 % (ref 0–0.5)
LAB AP CASE REPORT: NORMAL
LYMPHOCYTES # BLD AUTO: 1.29 10*3/MM3 (ref 0.9–4.8)
LYMPHOCYTES NFR BLD AUTO: 12.3 % (ref 19.6–45.3)
Lab: NORMAL
MCH RBC QN AUTO: 33.8 PG (ref 27–32.7)
MCHC RBC AUTO-ENTMCNC: 35.2 G/DL (ref 32.6–36.4)
MCV RBC AUTO: 96 FL (ref 79.8–96.2)
MONOCYTES # BLD AUTO: 1.14 10*3/MM3 (ref 0.2–1.2)
MONOCYTES NFR BLD AUTO: 10.8 % (ref 5–12)
NEUTROPHILS # BLD AUTO: 7.85 10*3/MM3 (ref 1.9–8.1)
NEUTROPHILS NFR BLD AUTO: 74.5 % (ref 42.7–76)
PATH REPORT.FINAL DX SPEC: NORMAL
PATH REPORT.GROSS SPEC: NORMAL
PLATELET # BLD AUTO: 225 10*3/MM3 (ref 140–500)
PMV BLD AUTO: 9.5 FL (ref 6–12)
POTASSIUM BLD-SCNC: 3.5 MMOL/L (ref 3.5–5.2)
PROT SERPL-MCNC: 6.4 G/DL (ref 6–8.5)
RBC # BLD AUTO: 3.49 10*6/MM3 (ref 4.6–6)
SODIUM BLD-SCNC: 136 MMOL/L (ref 136–145)
WBC NRBC COR # BLD: 10.53 10*3/MM3 (ref 4.5–10.7)

## 2017-07-07 PROCEDURE — 25010000002 LEVOFLOXACIN PER 250 MG: Performed by: SURGERY

## 2017-07-07 PROCEDURE — 99024 POSTOP FOLLOW-UP VISIT: CPT | Performed by: SURGERY

## 2017-07-07 PROCEDURE — 82962 GLUCOSE BLOOD TEST: CPT

## 2017-07-07 PROCEDURE — 25010000002 DIPHENHYDRAMINE PER 50 MG: Performed by: SURGERY

## 2017-07-07 PROCEDURE — 85025 COMPLETE CBC W/AUTO DIFF WBC: CPT | Performed by: HOSPITALIST

## 2017-07-07 PROCEDURE — 83605 ASSAY OF LACTIC ACID: CPT | Performed by: HOSPITALIST

## 2017-07-07 PROCEDURE — 80053 COMPREHEN METABOLIC PANEL: CPT | Performed by: HOSPITALIST

## 2017-07-07 RX ORDER — DEXTROSE, SODIUM CHLORIDE, AND POTASSIUM CHLORIDE 5; .45; .15 G/100ML; G/100ML; G/100ML
75 INJECTION INTRAVENOUS CONTINUOUS
Status: DISCONTINUED | OUTPATIENT
Start: 2017-07-07 | End: 2017-07-09 | Stop reason: HOSPADM

## 2017-07-07 RX ADMIN — FLUDROCORTISONE ACETATE 0.1 MG: 0.1 TABLET ORAL at 09:45

## 2017-07-07 RX ADMIN — DIPHENHYDRAMINE HYDROCHLORIDE 25 MG: 50 INJECTION, SOLUTION INTRAMUSCULAR; INTRAVENOUS at 22:43

## 2017-07-07 RX ADMIN — DICYCLOMINE HYDROCHLORIDE 10 MG: 10 CAPSULE ORAL at 03:44

## 2017-07-07 RX ADMIN — DICYCLOMINE HYDROCHLORIDE 10 MG: 10 CAPSULE ORAL at 09:44

## 2017-07-07 RX ADMIN — METRONIDAZOLE 500 MG: 500 INJECTION, SOLUTION INTRAVENOUS at 04:02

## 2017-07-07 RX ADMIN — LEVOTHYROXINE SODIUM 150 MCG: 150 TABLET ORAL at 09:46

## 2017-07-07 RX ADMIN — VANCOMYCIN 125 MG: KIT at 18:58

## 2017-07-07 RX ADMIN — SODIUM CHLORIDE 100 ML/HR: 9 INJECTION, SOLUTION INTRAVENOUS at 04:02

## 2017-07-07 RX ADMIN — DIPHENHYDRAMINE HYDROCHLORIDE 25 MG: 50 INJECTION, SOLUTION INTRAMUSCULAR; INTRAVENOUS at 03:44

## 2017-07-07 RX ADMIN — DEXAMETHASONE 1 MG: 0.5 TABLET ORAL at 09:45

## 2017-07-07 RX ADMIN — DIPHENHYDRAMINE HYDROCHLORIDE 25 MG: 50 INJECTION, SOLUTION INTRAMUSCULAR; INTRAVENOUS at 09:46

## 2017-07-07 RX ADMIN — METRONIDAZOLE 500 MG: 500 INJECTION, SOLUTION INTRAVENOUS at 18:57

## 2017-07-07 RX ADMIN — POTASSIUM CHLORIDE, DEXTROSE MONOHYDRATE AND SODIUM CHLORIDE 75 ML/HR: 150; 5; 450 INJECTION, SOLUTION INTRAVENOUS at 12:09

## 2017-07-07 RX ADMIN — LEVOFLOXACIN 500 MG: 5 INJECTION, SOLUTION INTRAVENOUS at 22:44

## 2017-07-07 RX ADMIN — LISINOPRIL 2.5 MG: 2.5 TABLET ORAL at 09:44

## 2017-07-07 RX ADMIN — CYANOCOBALAMIN TAB 500 MCG 500 MCG: 500 TAB at 22:43

## 2017-07-07 NOTE — PLAN OF CARE
Problem: Perioperative Period (Adult)  Goal: Signs and Symptoms of Listed Potential Problems Will be Absent or Manageable (Perioperative Period)  Outcome: Ongoing (interventions implemented as appropriate)    07/06/17 1168   Perioperative Period   Problems Assessed (Perioperative Period) pain;infection   Problems Present (Perioperative Period) pain;infection         Problem: Patient Care Overview (Adult)  Goal: Plan of Care Review  Outcome: Ongoing (interventions implemented as appropriate)  Goal: Adult Individualization and Mutuality  Outcome: Ongoing (interventions implemented as appropriate)  Goal: Discharge Needs Assessment  Outcome: Ongoing (interventions implemented as appropriate)    Problem: Infection, Risk/Actual (Adult)  Goal: Infection Prevention/Resolution  Outcome: Ongoing (interventions implemented as appropriate)    Problem: Fall Risk (Adult)  Goal: Identify Related Risk Factors and Signs and Symptoms  Outcome: Outcome(s) achieved Date Met:  07/06/17  Goal: Absence of Falls  Outcome: Ongoing (interventions implemented as appropriate)

## 2017-07-07 NOTE — PLAN OF CARE
Problem: Perioperative Period (Adult)  Goal: Signs and Symptoms of Listed Potential Problems Will be Absent or Manageable (Perioperative Period)  Outcome: Ongoing (interventions implemented as appropriate)    Problem: Patient Care Overview (Adult)  Goal: Plan of Care Review  Outcome: Ongoing (interventions implemented as appropriate)    07/07/17 0409   Coping/Psychosocial Response Interventions   Plan Of Care Reviewed With patient   Patient Care Overview   Progress no change   Outcome Evaluation   Outcome Summary/Follow up Plan stool frequency decreased after bentyl. stool dark this morning. given benadryl for itchy hands.        Goal: Adult Individualization and Mutuality  Outcome: Ongoing (interventions implemented as appropriate)  Goal: Discharge Needs Assessment  Outcome: Ongoing (interventions implemented as appropriate)    Problem: Infection, Risk/Actual (Adult)  Goal: Infection Prevention/Resolution  Outcome: Ongoing (interventions implemented as appropriate)    Problem: Fall Risk (Adult)  Goal: Absence of Falls  Outcome: Ongoing (interventions implemented as appropriate)

## 2017-07-07 NOTE — PROGRESS NOTES
"DAILY PROGRESS NOTE  [facility]    Patient Identification:  Name: Faustino Horton Jr.  Age: 77 y.o.  Sex: male  :  1940  MRN: [id number]         Primary Care Physician: Yifan Patino MD      Subjective  Continues to notes cramping abd pain and diarrhea.  States stools are darker this AM.  No oscar blood.   Reviewed hx w pt again.  Abd pain was subacute in onset.      Objective:  General Appearance:  Comfortable, well-appearing, in no acute distress and not in pain.    Vital signs: (most recent): Blood pressure 107/58, pulse 67, temperature 99.2 °F (37.3 °C), temperature source Oral, resp. rate 18, height 72\" (182.9 cm), weight 223 lb (101 kg), SpO2 94 %.    Lungs:  Normal respiratory rate and normal effort.  Breath sounds clear to auscultation.    Heart: Normal rate.  Regular rhythm.    Abdomen: Abdomen is soft.  (Obese.  Possible mild distention. )Hypoactive bowel sounds.   There is generalized tenderness.  There is no rebound tenderness.  There is no guarding.  (Mild diffuse tenderness. ).   There is no mass.   Extremities: There is no dependent edema.    Pulses: (Pedal pulses 1+ bilat. )    Neurological: Patient is alert and oriented to person, place and time.    Skin:  Warm and dry.              Vital signs in last 24 hours:  Temp:  [97.1 °F (36.2 °C)-101.1 °F (38.4 °C)] 99.2 °F (37.3 °C)  Heart Rate:  [55-70] 67  Resp:  [18] 18  BP: (102-151)/(58-67) 107/58    Intake/Output:    Intake/Output Summary (Last 24 hours) at 17 0918  Last data filed at 17 0608   Gross per 24 hour   Intake             3209 ml   Output              150 ml   Net             3059 ml           Results from last 7 days  Lab Units 17  0545 17  1503   WBC 10*3/mm3 12.52* 14.71*   HEMOGLOBIN g/dL 12.2* 14.0   PLATELETS 10*3/mm3 191 217     Results from last 7 days  Lab Units 17  0545 17  1503   SODIUM mmol/L 133* 134*   POTASSIUM mmol/L 4.1 4.6   CHLORIDE mmol/L 98 93*   CO2 mmol/L " 22.0 25.4   BUN mg/dL 11 17   CREATININE mg/dL 0.96 1.36*   GLUCOSE mg/dL 111* 132*   Estimated Creatinine Clearance: 81.8 mL/min (by C-G formula based on Cr of 0.96).  Results from last 7 days  Lab Units 07/06/17  0545 07/05/17  1503   CALCIUM mg/dL 8.1* 9.1   ALBUMIN g/dL  --  4.00     Results from last 7 days  Lab Units 07/05/17  1503   ALBUMIN g/dL 4.00   BILIRUBIN mg/dL 1.2   ALK PHOS U/L 72   AST (SGOT) U/L 16   ALT (SGPT) U/L 17       This AM labs pending.     Assessment:  Principal Problem:    Colitis:  C diff pending.   Active Problems:    Abscess, perirectal:  Post Op    Chronic coronary artery disease    Gout    Hyperlipidemia    Hypothyroidism:  TSH up from last month.  Ensure Synthroid is being taken and re-eval.     Peripheral vascular disease    Adrenal insufficiency    Diarrhea of presumed infectious origin    Diabetes mellitus with hyperglycemia        Plan:  Surg input appreciated.  Cont present regime and await this AM lab and C diff.   Spoke w Lab.    Over 35 min spent w over 1/2 in medical management.     Dillon Barrera MD  7/7/2017  9:18 AM

## 2017-07-07 NOTE — PROGRESS NOTES
POD# 1 s/p incisional drainage of perirectal abscess and fistulotomy.     S: Continues to have diarrhea, seems better. Perirectal pain improved. Tolerating clears.     O:   Vitals:    07/06/17 0700 07/06/17 1131 07/06/17 1500 07/06/17 1839   BP:  108/58 102/61 151/67   BP Location:  Left arm Left arm Left arm   Patient Position:  Lying Lying Lying   Pulse:  58 55 59   Resp:  18 18 18   Temp: 100.4 °F (38 °C) 99.6 °F (37.6 °C) 97.1 °F (36.2 °C) 97.1 °F (36.2 °C)   TempSrc: Oral Oral Oral Oral   SpO2:  91% 96% 98%   Weight:       Height:         AOx3, NAD  cta b/l, rrr  Abdomen soft and not tender  Open incision at the left lateral area, healthy tissue, no drainage.     Plan:     - C. difficile colitis status pending.  We will DC Zosyn and continue Flagyl.  We'll order levofloxacin  - Advance to full liquid diet  - Sitz bath 3 times a day  - Dressing changes 3 times a day and after every bowel movement with saline and gauze      Mark Pike MD  General, Minimally Invasive and Endoscopic Surgery  [facility]    [address], [address]  [place], [place], [address]  P: [phone]  F: [phone]

## 2017-07-08 LAB
BACTERIA SPEC AEROBE CULT: ABNORMAL
GLUCOSE BLDC GLUCOMTR-MCNC: 110 MG/DL (ref 70–130)
GLUCOSE BLDC GLUCOMTR-MCNC: 111 MG/DL (ref 70–130)
GLUCOSE BLDC GLUCOMTR-MCNC: 147 MG/DL (ref 70–130)
GLUCOSE BLDC GLUCOMTR-MCNC: 207 MG/DL (ref 70–130)

## 2017-07-08 PROCEDURE — 25010000002 DIPHENHYDRAMINE PER 50 MG: Performed by: SURGERY

## 2017-07-08 PROCEDURE — 82962 GLUCOSE BLOOD TEST: CPT

## 2017-07-08 PROCEDURE — 25010000002 LEVOFLOXACIN PER 250 MG: Performed by: SURGERY

## 2017-07-08 PROCEDURE — 99024 POSTOP FOLLOW-UP VISIT: CPT | Performed by: SURGERY

## 2017-07-08 PROCEDURE — 63710000001 INSULIN ASPART PER 5 UNITS: Performed by: HOSPITALIST

## 2017-07-08 RX ORDER — GABAPENTIN 400 MG/1
1200 CAPSULE ORAL EVERY 12 HOURS SCHEDULED
Status: DISCONTINUED | OUTPATIENT
Start: 2017-07-08 | End: 2017-07-09 | Stop reason: HOSPADM

## 2017-07-08 RX ORDER — TEMAZEPAM 7.5 MG/1
7.5 CAPSULE ORAL NIGHTLY PRN
Status: DISCONTINUED | OUTPATIENT
Start: 2017-07-08 | End: 2017-07-09 | Stop reason: HOSPADM

## 2017-07-08 RX ADMIN — FLUDROCORTISONE ACETATE 0.1 MG: 0.1 TABLET ORAL at 10:10

## 2017-07-08 RX ADMIN — ZOLPIDEM TARTRATE 5 MG: 5 TABLET, FILM COATED ORAL at 00:59

## 2017-07-08 RX ADMIN — CYANOCOBALAMIN TAB 500 MCG 500 MCG: 500 TAB at 20:40

## 2017-07-08 RX ADMIN — METRONIDAZOLE 500 MG: 500 INJECTION, SOLUTION INTRAVENOUS at 12:13

## 2017-07-08 RX ADMIN — VANCOMYCIN 125 MG: KIT at 17:35

## 2017-07-08 RX ADMIN — METRONIDAZOLE 500 MG: 500 INJECTION, SOLUTION INTRAVENOUS at 20:40

## 2017-07-08 RX ADMIN — VANCOMYCIN 125 MG: KIT at 06:52

## 2017-07-08 RX ADMIN — INSULIN ASPART 3 UNITS: 100 INJECTION, SOLUTION INTRAVENOUS; SUBCUTANEOUS at 12:14

## 2017-07-08 RX ADMIN — GABAPENTIN 1200 MG: 400 CAPSULE ORAL at 20:40

## 2017-07-08 RX ADMIN — LEVOTHYROXINE SODIUM 150 MCG: 150 TABLET ORAL at 06:52

## 2017-07-08 RX ADMIN — DICYCLOMINE HYDROCHLORIDE 10 MG: 10 CAPSULE ORAL at 10:11

## 2017-07-08 RX ADMIN — LEVOFLOXACIN 500 MG: 5 INJECTION, SOLUTION INTRAVENOUS at 22:35

## 2017-07-08 RX ADMIN — LISINOPRIL 2.5 MG: 2.5 TABLET ORAL at 10:09

## 2017-07-08 RX ADMIN — VANCOMYCIN 125 MG: KIT at 00:55

## 2017-07-08 RX ADMIN — DIPHENHYDRAMINE HYDROCHLORIDE 25 MG: 50 INJECTION, SOLUTION INTRAMUSCULAR; INTRAVENOUS at 22:34

## 2017-07-08 RX ADMIN — DEXAMETHASONE 1 MG: 0.5 TABLET ORAL at 10:09

## 2017-07-08 RX ADMIN — VANCOMYCIN 125 MG: KIT at 23:53

## 2017-07-08 RX ADMIN — POTASSIUM CHLORIDE, DEXTROSE MONOHYDRATE AND SODIUM CHLORIDE 75 ML/HR: 150; 5; 450 INJECTION, SOLUTION INTRAVENOUS at 22:34

## 2017-07-08 RX ADMIN — POTASSIUM CHLORIDE, DEXTROSE MONOHYDRATE AND SODIUM CHLORIDE 75 ML/HR: 150; 5; 450 INJECTION, SOLUTION INTRAVENOUS at 00:56

## 2017-07-08 RX ADMIN — TEMAZEPAM 7.5 MG: 7.5 CAPSULE ORAL at 23:51

## 2017-07-08 RX ADMIN — METRONIDAZOLE 500 MG: 500 INJECTION, SOLUTION INTRAVENOUS at 04:59

## 2017-07-08 RX ADMIN — VANCOMYCIN 125 MG: KIT at 12:13

## 2017-07-08 NOTE — PROGRESS NOTES
POD# 3 s/p incisional drainage of perirectal abscess and fistulotomy.     Feeling much better, less diarrhea. Eating and drinking fine    Vitals stable  Labs normal  Incision at perineum clean and open    Plan:   - Cont Sitz baths TID  - High fiber diet, will add benefiber  - Continue treatment for c.diff  - Follow up in my office in 1 week  - Ok to DC from my standpoint    Mark Pike MD  General, Minimally Invasive and Endoscopic Surgery  [facility]    [address], [address]  [place], [place], [address]  P: [phone]  F: [phone]

## 2017-07-08 NOTE — PLAN OF CARE
Problem: Perioperative Period (Adult)  Goal: Signs and Symptoms of Listed Potential Problems Will be Absent or Manageable (Perioperative Period)  Outcome: Ongoing (interventions implemented as appropriate)    Problem: Patient Care Overview (Adult)  Goal: Plan of Care Review  Outcome: Ongoing (interventions implemented as appropriate)    07/07/17 0409 07/07/17 2033   Coping/Psychosocial Response Interventions   Plan Of Care Reviewed With patient --    Patient Care Overview   Progress --  no change   Outcome Evaluation   Outcome Summary/Follow up Plan --  Cdiff positive. Diet increased to full liq. Abxs. dressing changed.       Goal: Adult Individualization and Mutuality  Outcome: Ongoing (interventions implemented as appropriate)  Goal: Discharge Needs Assessment  Outcome: Ongoing (interventions implemented as appropriate)    Problem: Infection, Risk/Actual (Adult)  Goal: Infection Prevention/Resolution  Outcome: Ongoing (interventions implemented as appropriate)    Problem: Fall Risk (Adult)  Goal: Absence of Falls  Outcome: Ongoing (interventions implemented as appropriate)

## 2017-07-08 NOTE — PROGRESS NOTES
"DAILY PROGRESS NOTE  [facility]    Patient Identification:  Name: Faustino Horton Jr.  Age: 77 y.o.  Sex: male  :  1940  MRN: [id number]         Primary Care Physician: Yifan Patino MD      Subjective  No new c/o.  Overall feeling much better.  Abd pain much improved and diarrhea improving.      Objective:  General Appearance:  Comfortable, well-appearing, in no acute distress and not in pain.    Vital signs: (most recent): Blood pressure 139/81, pulse 55, temperature 97 °F (36.1 °C), temperature source Oral, resp. rate 16, height 72\" (182.9 cm), weight 223 lb (101 kg), SpO2 95 %.    Lungs:  Normal respiratory rate and normal effort.  Breath sounds clear to auscultation.    Heart: Normal rate.  Regular rhythm.    Abdomen: Abdomen is soft and non-distended.  Bowel sounds are normal.   There is no abdominal tenderness.     Extremities: There is no dependent edema.    Neurological: Patient is alert and oriented to person, place and time.    Skin:  Warm and dry.                Vital signs in last 24 hours:  Temp:  [97 °F (36.1 °C)-98.9 °F (37.2 °C)] 97 °F (36.1 °C)  Heart Rate:  [55-63] 55  Resp:  [16-18] 16  BP: (120-139)/(74-88) 139/81    Intake/Output:    Intake/Output Summary (Last 24 hours) at 17 1310  Last data filed at 17 1000   Gross per 24 hour   Intake             1765 ml   Output                0 ml   Net             1765 ml           Results from last 7 days  Lab Units 17  0912 17  0545 17  1503   WBC 10*3/mm3 10.53 12.52* 14.71*   HEMOGLOBIN g/dL 11.8* 12.2* 14.0   PLATELETS 10*3/mm3 225 191 217     Results from last 7 days  Lab Units 17  0912 17  0545 17  1503   SODIUM mmol/L 136 133* 134*   POTASSIUM mmol/L 3.5 4.1 4.6   CHLORIDE mmol/L 100 98 93*   CO2 mmol/L 23.6 22.0 25.4   BUN mg/dL 6* 11 17   CREATININE mg/dL 0.97 0.96 1.36*   GLUCOSE mg/dL 124* 111* 132*   Estimated Creatinine Clearance: 81 mL/min (by C-G formula based on Cr of " 0.97).  Results from last 7 days  Lab Units 07/07/17  0912 07/06/17  0545 07/05/17  1503   CALCIUM mg/dL 8.4* 8.1* 9.1   ALBUMIN g/dL 3.10*  --  4.00     Results from last 7 days  Lab Units 07/07/17  0912 07/05/17  1503   ALBUMIN g/dL 3.10* 4.00   BILIRUBIN mg/dL 0.4 1.2   ALK PHOS U/L 50 72   AST (SGOT) U/L 13 16   ALT (SGPT) U/L 12 17       Assessment:  Principal Problem:    C. difficile colitis  Active Problems:    Abscess, perirectal    Chronic coronary artery disease    Gout    Hyperlipidemia    Hypothyroidism    Peripheral vascular disease    Adrenal insufficiency    Diarrhea of presumed infectious origin    Diabetes mellitus with hyperglycemia        Plan:  Improving.  Change to PO antibiotic and d/c when OK w Surgery.     Dillon Barrera MD  7/8/2017  1:10 PM

## 2017-07-08 NOTE — DISCHARGE INSTRUCTIONS
POST OP RECOMMENDATIONS  Dr. Mark Pike  [phone]    Discharge Hemorrhoidectomy/Anal Fissures    1. Go home, rest and take it easy today; however, you should get up and move about several times today to reduce the risk of developing a blood clot in your legs.   2. You may experience some dizziness or memory loss from the anesthesia. This may last for the next 24 hours. Someone should plan on staying with you for the first 24 hours for your safety.   3. Do not make any important legal decisions or sign any legal papers for the next 24 hours.   4. Eat and drink lightly today. Start off with liquids, jello, soup, crackers or other bland foods at first. You may advance your diet tomorrow as tolerated as long as you do not experience any nausea or vomiting.   5. You should use ice to the anal area today and begin your sitz baths tomorrow  6. The best method of pain relief is a sitz bath (sitting in a tub of warm water) at least 3 times daily for 15 minutes. This helps to reduce pain and aids with hygiene/drainage. The drainage may have an unpleasant odor. This is not unexpected and should be controlled with baths and showers. If the skin around the anal area becomes irritated, you may apply Vaseline, A&D ointment or a similar barrier cream to the area.  7. Bleeding and drainage are to be expected and may persist for as long as 2-4 weeks. Bleeding may occur with your bowel movements as well. Wear a cotton liner such as a Kotex pad or panty liner inside your underwear to protect your clothing.   8. Use some type of flushable, moistened wipe instead of bathroom tissue to keep the anal area clean after your bowel movements. It may be necessary to cleanse yourself in the bathtub or shower following your bowel movements.   9. You have received a prescription for a narcotic pain medicine, as you will have some pain following surgery.  You will not be totally pain free, but your pain medicine should make the pain tolerable.  Please take your pain medicine as prescribed and always take your pills with food to prevent nausea. If you are having severe pain that cannot be controlled by the pain medicine, please contact me. Typically, patients with anal fissures will have less pain than those with hemorrhoids.   10. The goal is for your bowel movements to be soft which will help to minimize pain. The pain medication used to keep you comfortable may also cause some constipation so I recommend the following:   • Colace stool softener 100 mg by mouth 2 times daily  • Citrucel powder 1 tablespoon in 8 oz of water daily  • Miralax daily if Colace and Citrucel do not result in soft bowel movements  Contact me if the above does not result in soft bowel movements as you may need to add to the regimen.   11. You have also received a prescription for an anti-nausea medicine. Please take this as prescribed for any nausea or vomiting. Nausea could be a result of the anesthesia or a result of the narcotic pain medicine. If you experience severe nausea and vomiting that cannot be controlled by the nausea medicine, please call me.   12. No driving for 24 hours and for as long as you are taking your prescription pain medicine.   13. You will need to call the office at [phone] to schedule a follow-up appointment in 10-14 days.   14. Remember to contact me for any of the following:   • Fever > 101 degrees  • Severe pain that cannot be controlled by taking your pain pills  • Severe nausea or vomiting that cannot be controlled by taking your nausea pills  • Significant bleeding of your incisions  • Drainage that has a bad smell or is yellow or green in appearance  • Any other questions or concerns

## 2017-07-08 NOTE — PLAN OF CARE
Problem: Patient Care Overview (Adult)  Goal: Plan of Care Review  Outcome: Ongoing (interventions implemented as appropriate)    07/08/17 0641   Coping/Psychosocial Response Interventions   Plan Of Care Reviewed With patient   Patient Care Overview   Progress no change   Outcome Evaluation   Outcome Summary/Follow up Plan Pt still having loose stools, amb steady on feet by self, no c/o pain or nausea, NS w/d dsge x2 on wound. will cont. to monitor          Problem: Infection, Risk/Actual (Adult)  Goal: Infection Prevention/Resolution  Outcome: Ongoing (interventions implemented as appropriate)    Problem: Fall Risk (Adult)  Goal: Absence of Falls  Outcome: Ongoing (interventions implemented as appropriate)

## 2017-07-09 VITALS
RESPIRATION RATE: 16 BRPM | TEMPERATURE: 97.3 F | HEART RATE: 49 BPM | BODY MASS INDEX: 30.2 KG/M2 | HEIGHT: 72 IN | SYSTOLIC BLOOD PRESSURE: 109 MMHG | DIASTOLIC BLOOD PRESSURE: 64 MMHG | WEIGHT: 223 LBS | OXYGEN SATURATION: 97 %

## 2017-07-09 LAB
ANION GAP SERPL CALCULATED.3IONS-SCNC: 13.8 MMOL/L
BASOPHILS # BLD AUTO: 0.04 10*3/MM3 (ref 0–0.2)
BASOPHILS NFR BLD AUTO: 0.5 % (ref 0–1.5)
BUN BLD-MCNC: 3 MG/DL (ref 8–23)
BUN/CREAT SERPL: 2.8 (ref 7–25)
CALCIUM SPEC-SCNC: 8.6 MG/DL (ref 8.6–10.5)
CHLORIDE SERPL-SCNC: 100 MMOL/L (ref 98–107)
CO2 SERPL-SCNC: 25.2 MMOL/L (ref 22–29)
CREAT BLD-MCNC: 1.08 MG/DL (ref 0.76–1.27)
DEPRECATED RDW RBC AUTO: 45.5 FL (ref 37–54)
EOSINOPHIL # BLD AUTO: 0.26 10*3/MM3 (ref 0–0.7)
EOSINOPHIL NFR BLD AUTO: 3.2 % (ref 0.3–6.2)
ERYTHROCYTE [DISTWIDTH] IN BLOOD BY AUTOMATED COUNT: 13.1 % (ref 11.5–14.5)
GFR SERPL CREATININE-BSD FRML MDRD: 67 ML/MIN/1.73
GLUCOSE BLD-MCNC: 113 MG/DL (ref 65–99)
GLUCOSE BLDC GLUCOMTR-MCNC: 101 MG/DL (ref 70–130)
GLUCOSE BLDC GLUCOMTR-MCNC: 148 MG/DL (ref 70–130)
HCT VFR BLD AUTO: 35.5 % (ref 40.4–52.2)
HGB BLD-MCNC: 12.4 G/DL (ref 13.7–17.6)
IMM GRANULOCYTES # BLD: 0.02 10*3/MM3 (ref 0–0.03)
IMM GRANULOCYTES NFR BLD: 0.2 % (ref 0–0.5)
LYMPHOCYTES # BLD AUTO: 2.1 10*3/MM3 (ref 0.9–4.8)
LYMPHOCYTES NFR BLD AUTO: 25.8 % (ref 19.6–45.3)
MCH RBC QN AUTO: 33.5 PG (ref 27–32.7)
MCHC RBC AUTO-ENTMCNC: 34.9 G/DL (ref 32.6–36.4)
MCV RBC AUTO: 95.9 FL (ref 79.8–96.2)
MONOCYTES # BLD AUTO: 0.69 10*3/MM3 (ref 0.2–1.2)
MONOCYTES NFR BLD AUTO: 8.5 % (ref 5–12)
NEUTROPHILS # BLD AUTO: 5.04 10*3/MM3 (ref 1.9–8.1)
NEUTROPHILS NFR BLD AUTO: 61.8 % (ref 42.7–76)
NRBC BLD MANUAL-RTO: 0 /100 WBC (ref 0–0)
PLATELET # BLD AUTO: 273 10*3/MM3 (ref 140–500)
PMV BLD AUTO: 9.5 FL (ref 6–12)
POTASSIUM BLD-SCNC: 3.1 MMOL/L (ref 3.5–5.2)
RBC # BLD AUTO: 3.7 10*6/MM3 (ref 4.6–6)
SODIUM BLD-SCNC: 139 MMOL/L (ref 136–145)
WBC NRBC COR # BLD: 8.15 10*3/MM3 (ref 4.5–10.7)

## 2017-07-09 PROCEDURE — 99024 POSTOP FOLLOW-UP VISIT: CPT | Performed by: SURGERY

## 2017-07-09 PROCEDURE — 85025 COMPLETE CBC W/AUTO DIFF WBC: CPT | Performed by: HOSPITALIST

## 2017-07-09 PROCEDURE — 80048 BASIC METABOLIC PNL TOTAL CA: CPT | Performed by: HOSPITALIST

## 2017-07-09 PROCEDURE — 82962 GLUCOSE BLOOD TEST: CPT

## 2017-07-09 RX ORDER — METRONIDAZOLE 500 MG/1
500 TABLET ORAL 3 TIMES DAILY
Qty: 30 TABLET | Refills: 0 | Status: SHIPPED | OUTPATIENT
Start: 2017-07-09 | End: 2017-07-20

## 2017-07-09 RX ORDER — POTASSIUM CHLORIDE 1.5 G/1.77G
40 POWDER, FOR SOLUTION ORAL ONCE
Status: DISCONTINUED | OUTPATIENT
Start: 2017-07-09 | End: 2017-07-09 | Stop reason: HOSPADM

## 2017-07-09 RX ADMIN — METRONIDAZOLE 500 MG: 500 INJECTION, SOLUTION INTRAVENOUS at 05:14

## 2017-07-09 RX ADMIN — VANCOMYCIN 125 MG: KIT at 06:48

## 2017-07-09 RX ADMIN — LEVOTHYROXINE SODIUM 150 MCG: 150 TABLET ORAL at 06:48

## 2017-07-09 RX ADMIN — GABAPENTIN 1200 MG: 400 CAPSULE ORAL at 08:34

## 2017-07-09 RX ADMIN — DEXAMETHASONE 1 MG: 0.5 TABLET ORAL at 08:24

## 2017-07-09 RX ADMIN — FLUDROCORTISONE ACETATE 0.1 MG: 0.1 TABLET ORAL at 08:24

## 2017-07-09 RX ADMIN — LISINOPRIL 2.5 MG: 2.5 TABLET ORAL at 08:24

## 2017-07-09 NOTE — DISCHARGE SUMMARY
PHYSICIAN DISCHARGE SUMMARY                                                                        [facility]    Patient Identification:  Name: Faustino Horton Jr.  Age: 77 y.o.  Sex: male  :  1940  MRN: [id number]  Primary Care Physician: Yifan Patino MD    Admit date: 2017  Discharge date and time: 2017     Discharged Condition: good    Discharge Diagnoses:Principal Problem:    C. difficile colitis  Active Problems:    Abscess, perirectal    Sepsis 2nd to above.     Chronic coronary artery disease    Gout    Hyperlipidemia    Hypothyroidism    Peripheral vascular disease    Adrenal insufficiency    Diarrhea of presumed infectious origin    Diabetes mellitus with hyperglycemia         Hospital Course:  Pleasant 77-year-old gentleman who was recently treated for pneumonia.  He presents with abdominal pain and cramping.  Evaluation revealed leukocytosis, fever, and a CT scan showing changes consistent with colitis of the descending and transverse colon.  Also noted evaluation was presence of a perirectal abscess.  The patient was admitted and started on a combination of Flagyl and Levaquin and surgical consultation obtained for the perirectal abscess.  C. difficile studies were ordered and they did come back positive.  Oral vancomycin was added to the regime at that point.  He did undergo incision and drainage of perirectal abscess and has done very well perioperatively.  Abdominal symptoms are markedly improved.  There is no further abdominal pain.  His diarrhea is markedly prove.  Fever and leukocytosis have resolved.  The perirectal incision and drainage site is also healing well per surgical evaluation.  At this point he will be switched over to oral Flagyl for another 10 days to complete 14 days of treatment.  His wife is familiar with wound packing and dressing.  This will be reviewed with her again via the nursing  staff with the addition of gloves and gown, handwashing etc.        Consults:     Consults     Date and Time Order Name Status Description    7/5/2017 2048 Surgery (on-call MD unless specified) Completed     7/5/2017 2033 [facility] (on-call MD unless specified) Completed     6/22/2017 2113 Inpatient Consult to Endocrinology Completed     6/22/2017 1416 [facility] (on-call MD unless specified) Completed             Discharge Exam:  Physical Exam   Afebrile vital signs stable.  Well-developed well-nourished male in no apparent distress.  Lungs clear to auscultation with good air movement.  Heart regular rate and rhythm.  Abdomen with normal bowel sounds.  No tenderness organomegaly, guarding or masses.  Extremities with cyanosis or edema.  alert oriented conversant cooperative and pleasant.     Disposition:  Home    Patient Instructions:    Faustino Horton Jr.   Home Medication Instructions NITESH:[id number]    Printed on:07/09/17 0851   Medication Information                      atorvastatin (LIPITOR) 40 MG tablet  Take 40 mg by mouth daily.             dexamethasone (DECADRON) 1 MG tablet  Take 1 tablet by mouth Daily With Breakfast.             fludrocortisone 0.1 MG tablet  Take 0.1 mg by mouth daily.             gabapentin (NEURONTIN) 600 MG tablet  Take 1,200 mg by mouth 2 (two) times a day.             glimepiride (AMARYL) 2 MG tablet  Take 1 tablet by mouth 2 (Two) Times a Day Before Meals.             levothyroxine (SYNTHROID, LEVOTHROID) 150 MCG tablet  Take 150 mcg by mouth daily.             lisinopril (PRINIVIL,ZESTRIL) 2.5 MG tablet  2.5 mg Every Night.             metroNIDAZOLE (FLAGYL) 500 MG tablet  Take 1 tablet by mouth 3 (Three) Times a Day.             zolpidem (AMBIEN) 10 MG tablet  Take 5 mg by mouth At Night As Needed for Sleep.               No future appointments.  Additional Instructions for the Follow-ups that You Need to Schedule     Discharge Follow-up with PCP    As directed    Follow Up Details:  1  week       Discharge Follow-up with Specialty    As directed    Specialty:  Surgery.   Follow Up:  1 Week             Follow-up Information     Follow up with Mark Pike MD. Call in 1 week(s).    Specialty:  General Surgery    Contact information:    [address]  [address]  [facility] [address]  [phone]          Follow up with Yifan Patino MD .    Specialty:  Internal Medicine    Why:  1 week    Contact information:    [address]  [address]  [facility] [id number] [phone]          Discharge Order     Start     Ordered    07/09/17 0812  Discharge patient  Once     Expected Discharge Date:  07/09/17    Discharge Disposition:  Home or Self Care        07/09/17 0819            Total time spent discharging patient including evaluation,post hospitalization follow up,  medication and post hospitalization instructions and education total time exceeds 30 minutes.    Signed:  Dillon Barrera MD  7/9/2017  8:20 AM    EMR Dragon/Transcription disclaimer:   Much of this encounter note is an electronic transcription/translation of spoken language to printed text. The electronic translation of spoken language may permit erroneous, or at times, nonsensical words or phrases to be inadvertently transcribed; Although I have reviewed the note for such errors, some may still exist.

## 2017-07-09 NOTE — PLAN OF CARE
Problem: Patient Care Overview (Adult)  Goal: Plan of Care Review  Outcome: Ongoing (interventions implemented as appropriate)    07/09/17 0600   Coping/Psychosocial Response Interventions   Plan Of Care Reviewed With patient   Patient Care Overview   Progress improving   Outcome Evaluation   Outcome Summary/Follow up Plan no c/o pain or nausea, pt steady on feet and amb by self, pt stated he is having only a few less stools, sleep pill given vahe good results, pt hoping to be D/C'd today, will cont. to monitor         Problem: Infection, Risk/Actual (Adult)  Goal: Infection Prevention/Resolution  Outcome: Ongoing (interventions implemented as appropriate)    Problem: Fall Risk (Adult)  Goal: Absence of Falls  Outcome: Ongoing (interventions implemented as appropriate)

## 2017-07-09 NOTE — PLAN OF CARE
Problem: Perioperative Period (Adult)  Goal: Signs and Symptoms of Listed Potential Problems Will be Absent or Manageable (Perioperative Period)  Outcome: Ongoing (interventions implemented as appropriate)    07/08/17 1610   Perioperative Period   Problems Assessed (Perioperative Period) infection   Problems Present (Perioperative Period) pain;infection         Problem: Patient Care Overview (Adult)  Goal: Plan of Care Review  Outcome: Ongoing (interventions implemented as appropriate)  Goal: Adult Individualization and Mutuality  Outcome: Ongoing (interventions implemented as appropriate)  Goal: Discharge Needs Assessment  Outcome: Ongoing (interventions implemented as appropriate)    Problem: Infection, Risk/Actual (Adult)  Goal: Infection Prevention/Resolution  Outcome: Ongoing (interventions implemented as appropriate)

## 2017-07-09 NOTE — PROGRESS NOTES
POD# 4 s/p incisional drainage of perirectal abscess and fistulotomy.      Feeling much betterEating and drinking fine     Vitals stable  Labs normal  Incision at perineum clean and open     Plan:   - Cont Sitz baths TID  - High fiber diet, will add benefiber  - Continue treatment for c.diff  - Follow up in my office in 1 week  - Ok to DC from my standpoint    Mark Pike MD  General, Minimally Invasive and Endoscopic Surgery  [facility]    [address], [address]  [place], [place], [address]  P: [phone]  F: [phone]

## 2017-07-10 LAB
BACTERIA SPEC AEROBE CULT: NORMAL
BACTERIA SPEC AEROBE CULT: NORMAL

## 2017-07-10 NOTE — PROGRESS NOTES
Discharge Planning Assessment  [facility]     Patient Name: Faustino Horton Jr.  MRN: [id number]  Today's Date: 7/10/2017    Admit Date: 7/5/2017          Discharge Needs Assessment     None            Discharge Plan       07/10/17 1742    Final Note    Final Note Discharged to home with spouse on 7/9/17. KARLA Moreno, RN, CCP        Discharge Placement     No information found        Expected Discharge Date and Time     Expected Discharge Date Expected Discharge Time    Jul 9, 2017               Demographic Summary     None            Functional Status     None            Psychosocial     None            Abuse/Neglect     None            Legal     None            Substance Abuse     None            Patient Forms     None          Marisel Moreno, RN

## 2017-07-11 ENCOUNTER — TELEPHONE (OUTPATIENT)
Dept: FAMILY MEDICINE CLINIC | Facility: CLINIC | Age: 77
End: 2017-07-11

## 2017-07-11 RX ORDER — POTASSIUM CHLORIDE 750 MG/1
10 TABLET, FILM COATED, EXTENDED RELEASE ORAL DAILY
Qty: 30 TABLET | Refills: 1 | Status: SHIPPED | OUTPATIENT
Start: 2017-07-11

## 2017-07-11 NOTE — TELEPHONE ENCOUNTER
Faustino wants to know that he was suppose to take some sort of a potassium drink due to his potassium being low. He never got the script for it and he is seeing you next week for a hospital f/u. Do you want to send this in or have him wait until he sees you next week to decide.

## 2017-07-18 ENCOUNTER — OFFICE VISIT (OUTPATIENT)
Dept: SURGERY | Facility: CLINIC | Age: 77
End: 2017-07-18

## 2017-07-18 DIAGNOSIS — Z09 POSTOP CHECK: Primary | ICD-10-CM

## 2017-07-18 PROCEDURE — 99024 POSTOP FOLLOW-UP VISIT: CPT | Performed by: SURGERY

## 2017-07-18 NOTE — PROGRESS NOTES
"Cc: Follow-up after incision and drainage of perirectal abscess and fistulotomy    S: The patient is here today for follow-up after undergoing incision and drainage of perirectal abscess and superficial fistulotomy.  Patient was admitted also to the hospital because of C. difficile colitis.  He reports his diarrhea is significantly improved.  He is only 2 days left of by mouth antibiotics.  He reports minimal.  In discussion for mostly when he is sitting down.  He reports minimal wound drainage.  He has been washing the wound daily.  He reports no fecal incontinence    O: Over the right lateral position that is a fairly large open wound from the anoderm verge to the perirectal area.  There is no drainage but small amount of bleeding is exudate.  There is small amount of granulation tissue over the wound.  There is no evidence of drainage.    Pathology:   Final Diagnosis   \"PERIRECTAL ABSCESS\":  BENIGN SQUAMOUS MUCOSA/SKIN WITH ULCER AND ABSCESS.  NO SIGNIFICANT ATYPIA.     Assessment and plan    The patient is a very pleasant 77-year-old male with a perirectal abscess and perianal fistula status post incisional drainage and superficial fistulotomy.  The wound is slowly healing and there is no evidence of fistulous tract.  The patient has been washing the wound daily and doing dressing changes and night    - Continue doing wet-to-dry dressing changes at least twice a day.  If that is not possible then washed the wound with warm water after every bowel movement  - Continue full course of antibiotic for Clostridium difficile   - Follow-up in my office in 2 weeks for wound check    Mark Pike MD  General, Minimally Invasive and Endoscopic Surgery  [facility]    [address] [place] [address], [address]  [place], [place], [address]  P: [phone]  F: [phone]   "

## 2017-07-20 ENCOUNTER — OFFICE VISIT (OUTPATIENT)
Dept: FAMILY MEDICINE CLINIC | Facility: CLINIC | Age: 77
End: 2017-07-20

## 2017-07-20 VITALS
SYSTOLIC BLOOD PRESSURE: 120 MMHG | TEMPERATURE: 97.8 F | DIASTOLIC BLOOD PRESSURE: 70 MMHG | HEIGHT: 72 IN | WEIGHT: 204.8 LBS | OXYGEN SATURATION: 93 % | HEART RATE: 68 BPM | BODY MASS INDEX: 27.74 KG/M2 | RESPIRATION RATE: 16 BRPM

## 2017-07-20 DIAGNOSIS — A04.72 C. DIFFICILE COLITIS: ICD-10-CM

## 2017-07-20 DIAGNOSIS — E78.00 PURE HYPERCHOLESTEROLEMIA: ICD-10-CM

## 2017-07-20 DIAGNOSIS — E27.40 ADRENAL INSUFFICIENCY (HCC): Primary | ICD-10-CM

## 2017-07-20 DIAGNOSIS — E11.65 TYPE 2 DIABETES MELLITUS WITH HYPERGLYCEMIA, WITHOUT LONG-TERM CURRENT USE OF INSULIN (HCC): ICD-10-CM

## 2017-07-20 DIAGNOSIS — E03.9 ACQUIRED HYPOTHYROIDISM: ICD-10-CM

## 2017-07-20 PROCEDURE — 99214 OFFICE O/P EST MOD 30 MIN: CPT | Performed by: INTERNAL MEDICINE

## 2017-07-20 RX ORDER — TEMAZEPAM 15 MG/1
15 CAPSULE ORAL NIGHTLY PRN
Qty: 30 CAPSULE | Refills: 2 | Status: SHIPPED | OUTPATIENT
Start: 2017-07-20 | End: 2017-07-29 | Stop reason: HOSPADM

## 2017-07-20 NOTE — PROGRESS NOTES
Subjective   Patient ID: Faustino Horton Jr. is a 77 y.o. male presents with   Chief Complaint   Patient presents with   • Follow-up     from hospital for c diff   • Fatigue     no appeitite- lost almost 20 pounds       HPI - This patient has a history of adrenal insufficiency he was in the hospital with a pneumonia a few weeks ago and then after he was discharged developed diarrhea he had to go back and was diagnosed with C. difficile colitis.  He's been on Flagyl and it has interfered with his appetite he has lost weight.  He is no longer having diarrhea or abdominal pain or fever.  He does feel fatigued however.    Assessment plan    Hypothyroidism check thyroids    Adrenal insufficiency he is on Decadron and fludrocortisone we'll check a metabolic panel    Diabetes Amaryl and we will check an A1c and a CMP    hypercholesterolemia Lipitor 40 we'll check a fasting lipid profile    Chronic back pain-gabapentin 1200 mg twice daily this medicine helps with his quality of life he has no adverse effects he is adherent to regimen Hans reports pain narcotic agreement signed    Insomnia temazepam 15 mg 1 by mouth daily at bedtime when necessary this helps better than Ambien.    Allergies   Allergen Reactions   • Acetylcysteine Hives   • Allopurinol Hives   • Baclofen Itching   • Hydrocodone-Acetaminophen Swelling   • Levaquin [Levofloxacin] Itching   • Oxycodone    • Tramadol        The following portions of the patient's history were reviewed and updated as appropriate: allergies, current medications, past family history, past medical history, past social history, past surgical history and problem list.      Review of Systems   Constitutional: Positive for fatigue.   HENT: Negative.    Eyes: Negative.    Respiratory: Negative.    Cardiovascular: Negative.    Gastrointestinal: Negative.    Endocrine: Negative.    Genitourinary: Negative.    Musculoskeletal: Positive for back pain.   Skin: Negative.   "  Allergic/Immunologic: Negative.    Neurological: Negative.    Hematological: Negative.    Psychiatric/Behavioral: Negative.        Objective     Vitals:    07/20/17 0953   BP: 120/70   Pulse: 68   Resp: 16   Temp: 97.8 °F (36.6 °C)   TempSrc: Oral   SpO2: 93%   Weight: 204 lb 12.8 oz (92.9 kg)   Height: 72\" (182.9 cm)         Physical Exam   Constitutional: He is oriented to person, place, and time. He appears well-developed and well-nourished. No distress.   HENT:   Head: Normocephalic and atraumatic.   Eyes: Conjunctivae and EOM are normal. Pupils are equal, round, and reactive to light. Right eye exhibits no discharge. Left eye exhibits no discharge. No scleral icterus.   Neck: Normal range of motion. Neck supple. No tracheal deviation present. No thyromegaly present.   Cardiovascular: Normal rate, regular rhythm, normal heart sounds, intact distal pulses and normal pulses.  Exam reveals no gallop.    No murmur heard.  Pulmonary/Chest: Effort normal and breath sounds normal. No respiratory distress. He has no wheezes. He has no rales.   Abdominal: Soft. There is no tenderness.   Musculoskeletal: Normal range of motion.   Neurological: He is alert and oriented to person, place, and time. He exhibits normal muscle tone. Coordination normal.   Skin: Skin is warm. No rash noted. No erythema. No pallor.   Psychiatric: He has a normal mood and affect. His behavior is normal. Judgment and thought content normal.   Nursing note and vitals reviewed.        Faustino was seen today for follow-up and fatigue.    Diagnoses and all orders for this visit:    Adrenal insufficiency  -     CBC & Differential  -     Comprehensive Metabolic Panel  -     TSH+Free T4  -     Hemoglobin A1c  -     Microalbumin / Creatinine Urine Ratio    Type 2 diabetes mellitus with hyperglycemia, without long-term current use of insulin  -     CBC & Differential  -     Comprehensive Metabolic Panel  -     TSH+Free T4  -     Hemoglobin A1c  -     " Microalbumin / Creatinine Urine Ratio    Pure hypercholesterolemia  -     CBC & Differential  -     Comprehensive Metabolic Panel  -     TSH+Free T4  -     Hemoglobin A1c  -     Microalbumin / Creatinine Urine Ratio    C. difficile colitis  -     CBC & Differential  -     Comprehensive Metabolic Panel  -     TSH+Free T4  -     Hemoglobin A1c  -     Microalbumin / Creatinine Urine Ratio    Acquired hypothyroidism  -     CBC & Differential  -     Comprehensive Metabolic Panel  -     TSH+Free T4  -     Hemoglobin A1c  -     Microalbumin / Creatinine Urine Ratio    Other orders  -     temazepam (RESTORIL) 15 MG capsule; Take 1 capsule by mouth At Night As Needed for Sleep.        Call or return to clinic prn if these symptoms worsen or fail to improve as anticipated.

## 2017-07-21 LAB
ALBUMIN SERPL-MCNC: 3.9 G/DL (ref 3.5–5.2)
ALBUMIN/CREAT UR: 231.6 MG/G CREAT (ref 0–30)
ALBUMIN/GLOB SERPL: 1.3 G/DL
ALP SERPL-CCNC: 61 U/L (ref 39–117)
ALT SERPL-CCNC: 20 U/L (ref 1–41)
AST SERPL-CCNC: 22 U/L (ref 1–40)
BASOPHILS # BLD AUTO: 0.01 10*3/MM3 (ref 0–0.2)
BASOPHILS NFR BLD AUTO: 0.2 % (ref 0–1.5)
BILIRUB SERPL-MCNC: 0.3 MG/DL (ref 0.1–1.2)
BUN SERPL-MCNC: 9 MG/DL (ref 8–23)
BUN/CREAT SERPL: 8.2 (ref 7–25)
CALCIUM SERPL-MCNC: 9.3 MG/DL (ref 8.6–10.5)
CHLORIDE SERPL-SCNC: 100 MMOL/L (ref 98–107)
CO2 SERPL-SCNC: 25.6 MMOL/L (ref 22–29)
CREAT SERPL-MCNC: 1.1 MG/DL (ref 0.76–1.27)
CREAT UR-MCNC: 223.9 MG/DL
EOSINOPHIL # BLD AUTO: 0.22 10*3/MM3 (ref 0–0.7)
EOSINOPHIL NFR BLD AUTO: 4 % (ref 0.3–6.2)
ERYTHROCYTE [DISTWIDTH] IN BLOOD BY AUTOMATED COUNT: 13.7 % (ref 11.5–14.5)
GLOBULIN SER CALC-MCNC: 3 GM/DL
GLUCOSE SERPL-MCNC: 205 MG/DL (ref 65–99)
HBA1C MFR BLD: 6.76 % (ref 4.8–5.6)
HCT VFR BLD AUTO: 41.4 % (ref 40.4–52.2)
HGB BLD-MCNC: 13.3 G/DL (ref 13.7–17.6)
IMM GRANULOCYTES # BLD: 0 10*3/MM3 (ref 0–0.03)
IMM GRANULOCYTES NFR BLD: 0 % (ref 0–0.5)
LYMPHOCYTES # BLD AUTO: 1.91 10*3/MM3 (ref 0.9–4.8)
LYMPHOCYTES NFR BLD AUTO: 35 % (ref 19.6–45.3)
MCH RBC QN AUTO: 33.3 PG (ref 27–32.7)
MCHC RBC AUTO-ENTMCNC: 32.1 G/DL (ref 32.6–36.4)
MCV RBC AUTO: 103.8 FL (ref 79.8–96.2)
MICROALBUMIN UR-MCNC: 518.6 UG/ML
MONOCYTES # BLD AUTO: 0.54 10*3/MM3 (ref 0.2–1.2)
MONOCYTES NFR BLD AUTO: 9.9 % (ref 5–12)
NEUTROPHILS # BLD AUTO: 2.77 10*3/MM3 (ref 1.9–8.1)
NEUTROPHILS NFR BLD AUTO: 50.9 % (ref 42.7–76)
PLATELET # BLD AUTO: 332 10*3/MM3 (ref 140–500)
POTASSIUM SERPL-SCNC: 4.3 MMOL/L (ref 3.5–5.2)
PROT SERPL-MCNC: 6.9 G/DL (ref 6–8.5)
RBC # BLD AUTO: 3.99 10*6/MM3 (ref 4.6–6)
SODIUM SERPL-SCNC: 141 MMOL/L (ref 136–145)
T4 FREE SERPL-MCNC: 1.58 NG/DL (ref 0.93–1.7)
TSH SERPL DL<=0.005 MIU/L-ACNC: 2.51 MIU/ML (ref 0.27–4.2)
WBC # BLD AUTO: 5.45 10*3/MM3 (ref 4.5–10.7)

## 2017-07-26 ENCOUNTER — APPOINTMENT (OUTPATIENT)
Dept: CT IMAGING | Facility: HOSPITAL | Age: 77
End: 2017-07-26

## 2017-07-26 ENCOUNTER — HOSPITAL ENCOUNTER (INPATIENT)
Facility: HOSPITAL | Age: 77
LOS: 1 days | Discharge: HOME | End: 2017-07-29
Attending: EMERGENCY MEDICINE | Admitting: HOSPITALIST

## 2017-07-26 DIAGNOSIS — A04.72 C. DIFFICILE COLITIS: ICD-10-CM

## 2017-07-26 DIAGNOSIS — E87.6 HYPOKALEMIA: ICD-10-CM

## 2017-07-26 DIAGNOSIS — R19.7 DIARRHEA OF PRESUMED INFECTIOUS ORIGIN: Primary | ICD-10-CM

## 2017-07-26 PROBLEM — E86.0 DEHYDRATION: Status: ACTIVE | Noted: 2017-07-26

## 2017-07-26 LAB
ALBUMIN SERPL-MCNC: 4 G/DL (ref 3.5–5.2)
ALBUMIN/GLOB SERPL: 1.3 G/DL
ALP SERPL-CCNC: 49 U/L (ref 39–117)
ALT SERPL W P-5'-P-CCNC: 17 U/L (ref 1–41)
ANION GAP SERPL CALCULATED.3IONS-SCNC: 16.1 MMOL/L
AST SERPL-CCNC: 15 U/L (ref 1–40)
BASOPHILS # BLD AUTO: 0.01 10*3/MM3 (ref 0–0.2)
BASOPHILS NFR BLD AUTO: 0.1 % (ref 0–1.5)
BILIRUB SERPL-MCNC: 0.9 MG/DL (ref 0.1–1.2)
BUN BLD-MCNC: 13 MG/DL (ref 8–23)
BUN/CREAT SERPL: 11.1 (ref 7–25)
C DIFF TOX GENS STL QL NAA+PROBE: POSITIVE
CALCIUM SPEC-SCNC: 9 MG/DL (ref 8.6–10.5)
CHLORIDE SERPL-SCNC: 96 MMOL/L (ref 98–107)
CO2 SERPL-SCNC: 24.9 MMOL/L (ref 22–29)
CREAT BLD-MCNC: 1.17 MG/DL (ref 0.76–1.27)
D-LACTATE SERPL-SCNC: 1.8 MMOL/L (ref 0.5–2)
DEPRECATED RDW RBC AUTO: 50.7 FL (ref 37–54)
EOSINOPHIL # BLD AUTO: 0.06 10*3/MM3 (ref 0–0.7)
EOSINOPHIL NFR BLD AUTO: 0.6 % (ref 0.3–6.2)
ERYTHROCYTE [DISTWIDTH] IN BLOOD BY AUTOMATED COUNT: 13.7 % (ref 11.5–14.5)
GFR SERPL CREATININE-BSD FRML MDRD: 61 ML/MIN/1.73
GLOBULIN UR ELPH-MCNC: 3 GM/DL
GLUCOSE BLD-MCNC: 132 MG/DL (ref 65–99)
GLUCOSE BLDC GLUCOMTR-MCNC: 134 MG/DL (ref 70–130)
GLUCOSE BLDC GLUCOMTR-MCNC: 162 MG/DL (ref 70–130)
HCT VFR BLD AUTO: 39 % (ref 40.4–52.2)
HGB BLD-MCNC: 13 G/DL (ref 13.7–17.6)
IMM GRANULOCYTES # BLD: 0.02 10*3/MM3 (ref 0–0.03)
IMM GRANULOCYTES NFR BLD: 0.2 % (ref 0–0.5)
LIPASE SERPL-CCNC: 24 U/L (ref 13–60)
LYMPHOCYTES # BLD AUTO: 1.84 10*3/MM3 (ref 0.9–4.8)
LYMPHOCYTES NFR BLD AUTO: 17.3 % (ref 19.6–45.3)
MCH RBC QN AUTO: 33.9 PG (ref 27–32.7)
MCHC RBC AUTO-ENTMCNC: 33.3 G/DL (ref 32.6–36.4)
MCV RBC AUTO: 101.8 FL (ref 79.8–96.2)
MONOCYTES # BLD AUTO: 1.09 10*3/MM3 (ref 0.2–1.2)
MONOCYTES NFR BLD AUTO: 10.2 % (ref 5–12)
NEUTROPHILS # BLD AUTO: 7.62 10*3/MM3 (ref 1.9–8.1)
NEUTROPHILS NFR BLD AUTO: 71.6 % (ref 42.7–76)
PLATELET # BLD AUTO: 222 10*3/MM3 (ref 140–500)
PMV BLD AUTO: 10.1 FL (ref 6–12)
POTASSIUM BLD-SCNC: 3.5 MMOL/L (ref 3.5–5.2)
PROT SERPL-MCNC: 7 G/DL (ref 6–8.5)
RBC # BLD AUTO: 3.83 10*6/MM3 (ref 4.6–6)
SODIUM BLD-SCNC: 137 MMOL/L (ref 136–145)
WBC NRBC COR # BLD: 10.64 10*3/MM3 (ref 4.5–10.7)

## 2017-07-26 PROCEDURE — 25810000003 SODIUM CHLORIDE 0.9 % WITH KCL 20 MEQ 20-0.9 MEQ/L-% SOLUTION: Performed by: INTERNAL MEDICINE

## 2017-07-26 PROCEDURE — 63710000001 INSULIN ASPART PER 5 UNITS: Performed by: INTERNAL MEDICINE

## 2017-07-26 PROCEDURE — 87493 C DIFF AMPLIFIED PROBE: CPT | Performed by: EMERGENCY MEDICINE

## 2017-07-26 PROCEDURE — 99285 EMERGENCY DEPT VISIT HI MDM: CPT

## 2017-07-26 PROCEDURE — G0378 HOSPITAL OBSERVATION PER HR: HCPCS

## 2017-07-26 PROCEDURE — 83605 ASSAY OF LACTIC ACID: CPT | Performed by: EMERGENCY MEDICINE

## 2017-07-26 PROCEDURE — 87040 BLOOD CULTURE FOR BACTERIA: CPT | Performed by: EMERGENCY MEDICINE

## 2017-07-26 PROCEDURE — 74177 CT ABD & PELVIS W/CONTRAST: CPT

## 2017-07-26 PROCEDURE — 25010000002 HYDROCORTISONE SODIUM SUCCINATE 100 MG RECONSTITUTED SOLUTION: Performed by: INTERNAL MEDICINE

## 2017-07-26 PROCEDURE — 25010000002 HEPARIN (PORCINE) PER 1000 UNITS: Performed by: INTERNAL MEDICINE

## 2017-07-26 PROCEDURE — 85025 COMPLETE CBC W/AUTO DIFF WBC: CPT | Performed by: EMERGENCY MEDICINE

## 2017-07-26 PROCEDURE — 0 IOPAMIDOL 61 % SOLUTION: Performed by: EMERGENCY MEDICINE

## 2017-07-26 PROCEDURE — 82962 GLUCOSE BLOOD TEST: CPT

## 2017-07-26 PROCEDURE — 80053 COMPREHEN METABOLIC PANEL: CPT | Performed by: EMERGENCY MEDICINE

## 2017-07-26 PROCEDURE — 83690 ASSAY OF LIPASE: CPT | Performed by: EMERGENCY MEDICINE

## 2017-07-26 RX ORDER — ACETAMINOPHEN 500 MG
1000 TABLET ORAL ONCE
Status: COMPLETED | OUTPATIENT
Start: 2017-07-26 | End: 2017-07-26

## 2017-07-26 RX ORDER — FLUDROCORTISONE ACETATE 0.1 MG/1
0.1 TABLET ORAL DAILY
Status: DISCONTINUED | OUTPATIENT
Start: 2017-07-26 | End: 2017-07-27

## 2017-07-26 RX ORDER — NICOTINE POLACRILEX 4 MG
15 LOZENGE BUCCAL
Status: DISCONTINUED | OUTPATIENT
Start: 2017-07-26 | End: 2017-07-29 | Stop reason: HOSPADM

## 2017-07-26 RX ORDER — POTASSIUM CHLORIDE 750 MG/1
20 CAPSULE, EXTENDED RELEASE ORAL DAILY
Status: DISCONTINUED | OUTPATIENT
Start: 2017-07-26 | End: 2017-07-29 | Stop reason: HOSPADM

## 2017-07-26 RX ORDER — GLIMEPIRIDE 1 MG/1
1 TABLET ORAL EVERY EVENING
COMMUNITY

## 2017-07-26 RX ORDER — ZOLPIDEM TARTRATE 5 MG/1
5 TABLET ORAL NIGHTLY PRN
Status: DISCONTINUED | OUTPATIENT
Start: 2017-07-26 | End: 2017-07-29 | Stop reason: HOSPADM

## 2017-07-26 RX ORDER — SODIUM CHLORIDE 0.9 % (FLUSH) 0.9 %
1-10 SYRINGE (ML) INJECTION AS NEEDED
Status: DISCONTINUED | OUTPATIENT
Start: 2017-07-26 | End: 2017-07-29 | Stop reason: HOSPADM

## 2017-07-26 RX ORDER — DEXTROSE MONOHYDRATE 25 G/50ML
25 INJECTION, SOLUTION INTRAVENOUS
Status: DISCONTINUED | OUTPATIENT
Start: 2017-07-26 | End: 2017-07-29 | Stop reason: HOSPADM

## 2017-07-26 RX ORDER — CHOLECALCIFEROL (VITAMIN D3) 125 MCG
500 CAPSULE ORAL DAILY
Status: DISCONTINUED | OUTPATIENT
Start: 2017-07-26 | End: 2017-07-29 | Stop reason: HOSPADM

## 2017-07-26 RX ORDER — SODIUM CHLORIDE 0.9 % (FLUSH) 0.9 %
10 SYRINGE (ML) INJECTION AS NEEDED
Status: DISCONTINUED | OUTPATIENT
Start: 2017-07-26 | End: 2017-07-29 | Stop reason: HOSPADM

## 2017-07-26 RX ORDER — CHOLECALCIFEROL (VITAMIN D3) 125 MCG
500 CAPSULE ORAL DAILY
COMMUNITY

## 2017-07-26 RX ORDER — DEXAMETHASONE 0.5 MG/1
0.5 TABLET ORAL DAILY
COMMUNITY
End: 2019-05-23

## 2017-07-26 RX ORDER — HEPARIN SODIUM 5000 [USP'U]/ML
5000 INJECTION, SOLUTION INTRAVENOUS; SUBCUTANEOUS EVERY 12 HOURS SCHEDULED
Status: DISCONTINUED | OUTPATIENT
Start: 2017-07-26 | End: 2017-07-29 | Stop reason: HOSPADM

## 2017-07-26 RX ORDER — LEVOTHYROXINE SODIUM 0.15 MG/1
150 TABLET ORAL
Status: DISCONTINUED | OUTPATIENT
Start: 2017-07-26 | End: 2017-07-29 | Stop reason: HOSPADM

## 2017-07-26 RX ORDER — GABAPENTIN 300 MG/1
600 CAPSULE ORAL EVERY 12 HOURS SCHEDULED
Status: DISCONTINUED | OUTPATIENT
Start: 2017-07-26 | End: 2017-07-29 | Stop reason: HOSPADM

## 2017-07-26 RX ORDER — ZOLPIDEM TARTRATE 5 MG/1
5 TABLET ORAL NIGHTLY PRN
Status: ON HOLD | COMMUNITY
End: 2017-07-26

## 2017-07-26 RX ORDER — ATORVASTATIN CALCIUM 20 MG/1
40 TABLET, FILM COATED ORAL DAILY
Status: DISCONTINUED | OUTPATIENT
Start: 2017-07-26 | End: 2017-07-29 | Stop reason: HOSPADM

## 2017-07-26 RX ORDER — SODIUM CHLORIDE AND POTASSIUM CHLORIDE 150; 900 MG/100ML; MG/100ML
100 INJECTION, SOLUTION INTRAVENOUS CONTINUOUS
Status: DISCONTINUED | OUTPATIENT
Start: 2017-07-26 | End: 2017-07-28

## 2017-07-26 RX ADMIN — FLUDROCORTISONE ACETATE 0.1 MG: 0.1 TABLET ORAL at 15:24

## 2017-07-26 RX ADMIN — POTASSIUM CHLORIDE AND SODIUM CHLORIDE 125 ML/HR: 900; 150 INJECTION, SOLUTION INTRAVENOUS at 15:24

## 2017-07-26 RX ADMIN — HEPARIN SODIUM 5000 UNITS: 5000 INJECTION, SOLUTION INTRAVENOUS; SUBCUTANEOUS at 21:42

## 2017-07-26 RX ADMIN — VANCOMYCIN 250 MG: KIT at 17:53

## 2017-07-26 RX ADMIN — ZOLPIDEM TARTRATE 5 MG: 5 TABLET, FILM COATED ORAL at 21:56

## 2017-07-26 RX ADMIN — HYDROCORTISONE SODIUM SUCCINATE 100 MG: 100 INJECTION, POWDER, FOR SOLUTION INTRAMUSCULAR; INTRAVENOUS at 21:42

## 2017-07-26 RX ADMIN — IOPAMIDOL 85 ML: 612 INJECTION, SOLUTION INTRAVENOUS at 10:47

## 2017-07-26 RX ADMIN — HYDROCORTISONE SODIUM SUCCINATE 100 MG: 100 INJECTION, POWDER, FOR SOLUTION INTRAMUSCULAR; INTRAVENOUS at 15:25

## 2017-07-26 RX ADMIN — GABAPENTIN 600 MG: 300 CAPSULE ORAL at 21:42

## 2017-07-26 RX ADMIN — METRONIDAZOLE 500 MG: 500 INJECTION, SOLUTION INTRAVENOUS at 22:00

## 2017-07-26 RX ADMIN — POTASSIUM CHLORIDE 20 MEQ: 750 CAPSULE, EXTENDED RELEASE ORAL at 15:36

## 2017-07-26 RX ADMIN — HEPARIN SODIUM 5000 UNITS: 5000 INJECTION, SOLUTION INTRAVENOUS; SUBCUTANEOUS at 15:36

## 2017-07-26 RX ADMIN — CYANOCOBALAMIN TAB 500 MCG 500 MCG: 500 TAB at 15:24

## 2017-07-26 RX ADMIN — INSULIN ASPART 2 UNITS: 100 INJECTION, SOLUTION INTRAVENOUS; SUBCUTANEOUS at 21:43

## 2017-07-26 RX ADMIN — ATORVASTATIN CALCIUM 40 MG: 20 TABLET, FILM COATED ORAL at 15:24

## 2017-07-26 RX ADMIN — METRONIDAZOLE 500 MG: 500 INJECTION, SOLUTION INTRAVENOUS at 15:24

## 2017-07-26 RX ADMIN — SODIUM CHLORIDE 1000 ML: 9 INJECTION, SOLUTION INTRAVENOUS at 09:07

## 2017-07-26 RX ADMIN — GABAPENTIN 600 MG: 300 CAPSULE ORAL at 15:27

## 2017-07-26 RX ADMIN — ACETAMINOPHEN 1000 MG: 500 TABLET ORAL at 09:07

## 2017-07-26 RX ADMIN — LEVOTHYROXINE SODIUM 150 MCG: 150 TABLET ORAL at 15:24

## 2017-07-27 LAB
ALBUMIN SERPL-MCNC: 3.6 G/DL (ref 3.5–5.2)
ALBUMIN/GLOB SERPL: 1.1 G/DL
ALP SERPL-CCNC: 43 U/L (ref 39–117)
ALT SERPL W P-5'-P-CCNC: 13 U/L (ref 1–41)
ANION GAP SERPL CALCULATED.3IONS-SCNC: 14.7 MMOL/L
AST SERPL-CCNC: 9 U/L (ref 1–40)
BASOPHILS # BLD AUTO: 0 10*3/MM3 (ref 0–0.2)
BASOPHILS NFR BLD AUTO: 0 % (ref 0–1.5)
BILIRUB SERPL-MCNC: 0.6 MG/DL (ref 0.1–1.2)
BUN BLD-MCNC: 13 MG/DL (ref 8–23)
BUN/CREAT SERPL: 14.6 (ref 7–25)
CALCIUM SPEC-SCNC: 8.4 MG/DL (ref 8.6–10.5)
CHLORIDE SERPL-SCNC: 102 MMOL/L (ref 98–107)
CO2 SERPL-SCNC: 22.3 MMOL/L (ref 22–29)
CREAT BLD-MCNC: 0.89 MG/DL (ref 0.76–1.27)
DEPRECATED RDW RBC AUTO: 52.2 FL (ref 37–54)
EOSINOPHIL # BLD AUTO: 0 10*3/MM3 (ref 0–0.7)
EOSINOPHIL NFR BLD AUTO: 0 % (ref 0.3–6.2)
ERYTHROCYTE [DISTWIDTH] IN BLOOD BY AUTOMATED COUNT: 13.8 % (ref 11.5–14.5)
GFR SERPL CREATININE-BSD FRML MDRD: 83 ML/MIN/1.73
GLOBULIN UR ELPH-MCNC: 3.4 GM/DL
GLUCOSE BLD-MCNC: 160 MG/DL (ref 65–99)
GLUCOSE BLDC GLUCOMTR-MCNC: 153 MG/DL (ref 70–130)
GLUCOSE BLDC GLUCOMTR-MCNC: 159 MG/DL (ref 70–130)
GLUCOSE BLDC GLUCOMTR-MCNC: 172 MG/DL (ref 70–130)
GLUCOSE BLDC GLUCOMTR-MCNC: 213 MG/DL (ref 70–130)
HCT VFR BLD AUTO: 37.1 % (ref 40.4–52.2)
HGB BLD-MCNC: 12.3 G/DL (ref 13.7–17.6)
IMM GRANULOCYTES # BLD: 0.02 10*3/MM3 (ref 0–0.03)
IMM GRANULOCYTES NFR BLD: 0.2 % (ref 0–0.5)
LYMPHOCYTES # BLD AUTO: 1 10*3/MM3 (ref 0.9–4.8)
LYMPHOCYTES NFR BLD AUTO: 9.7 % (ref 19.6–45.3)
MCH RBC QN AUTO: 34.1 PG (ref 27–32.7)
MCHC RBC AUTO-ENTMCNC: 33.2 G/DL (ref 32.6–36.4)
MCV RBC AUTO: 102.8 FL (ref 79.8–96.2)
MONOCYTES # BLD AUTO: 0.45 10*3/MM3 (ref 0.2–1.2)
MONOCYTES NFR BLD AUTO: 4.4 % (ref 5–12)
NEUTROPHILS # BLD AUTO: 8.83 10*3/MM3 (ref 1.9–8.1)
NEUTROPHILS NFR BLD AUTO: 85.7 % (ref 42.7–76)
PLATELET # BLD AUTO: 190 10*3/MM3 (ref 140–500)
PMV BLD AUTO: 10.3 FL (ref 6–12)
POTASSIUM BLD-SCNC: 3.8 MMOL/L (ref 3.5–5.2)
PROT SERPL-MCNC: 7 G/DL (ref 6–8.5)
RBC # BLD AUTO: 3.61 10*6/MM3 (ref 4.6–6)
SODIUM BLD-SCNC: 139 MMOL/L (ref 136–145)
WBC NRBC COR # BLD: 10.3 10*3/MM3 (ref 4.5–10.7)

## 2017-07-27 PROCEDURE — 63710000001 INSULIN ASPART PER 5 UNITS: Performed by: INTERNAL MEDICINE

## 2017-07-27 PROCEDURE — 82962 GLUCOSE BLOOD TEST: CPT

## 2017-07-27 PROCEDURE — 85025 COMPLETE CBC W/AUTO DIFF WBC: CPT | Performed by: INTERNAL MEDICINE

## 2017-07-27 PROCEDURE — 25810000003 SODIUM CHLORIDE 0.9 % WITH KCL 20 MEQ 20-0.9 MEQ/L-% SOLUTION: Performed by: INTERNAL MEDICINE

## 2017-07-27 PROCEDURE — 25810000003 SODIUM CHLORIDE 0.9 % WITH KCL 20 MEQ 20-0.9 MEQ/L-% SOLUTION: Performed by: HOSPITALIST

## 2017-07-27 PROCEDURE — 25010000002 HEPARIN (PORCINE) PER 1000 UNITS: Performed by: INTERNAL MEDICINE

## 2017-07-27 PROCEDURE — 25010000002 HYDROCORTISONE SODIUM SUCCINATE 100 MG RECONSTITUTED SOLUTION: Performed by: INTERNAL MEDICINE

## 2017-07-27 PROCEDURE — 80053 COMPREHEN METABOLIC PANEL: CPT | Performed by: INTERNAL MEDICINE

## 2017-07-27 PROCEDURE — G0378 HOSPITAL OBSERVATION PER HR: HCPCS

## 2017-07-27 RX ORDER — FLUDROCORTISONE ACETATE 0.1 MG/1
0.1 TABLET ORAL EVERY 12 HOURS SCHEDULED
Status: DISCONTINUED | OUTPATIENT
Start: 2017-07-27 | End: 2017-07-29 | Stop reason: HOSPADM

## 2017-07-27 RX ADMIN — LEVOTHYROXINE SODIUM 150 MCG: 150 TABLET ORAL at 06:41

## 2017-07-27 RX ADMIN — HYDROCORTISONE SODIUM SUCCINATE 100 MG: 100 INJECTION, POWDER, FOR SOLUTION INTRAMUSCULAR; INTRAVENOUS at 01:56

## 2017-07-27 RX ADMIN — METRONIDAZOLE 500 MG: 500 INJECTION, SOLUTION INTRAVENOUS at 06:41

## 2017-07-27 RX ADMIN — POTASSIUM CHLORIDE 20 MEQ: 750 CAPSULE, EXTENDED RELEASE ORAL at 08:49

## 2017-07-27 RX ADMIN — GABAPENTIN 600 MG: 300 CAPSULE ORAL at 08:50

## 2017-07-27 RX ADMIN — HYDROCORTISONE SODIUM SUCCINATE 100 MG: 100 INJECTION, POWDER, FOR SOLUTION INTRAMUSCULAR; INTRAVENOUS at 14:00

## 2017-07-27 RX ADMIN — INSULIN ASPART 2 UNITS: 100 INJECTION, SOLUTION INTRAVENOUS; SUBCUTANEOUS at 08:50

## 2017-07-27 RX ADMIN — VANCOMYCIN 250 MG: KIT at 11:22

## 2017-07-27 RX ADMIN — METRONIDAZOLE 500 MG: 500 INJECTION, SOLUTION INTRAVENOUS at 14:16

## 2017-07-27 RX ADMIN — INSULIN ASPART 2 UNITS: 100 INJECTION, SOLUTION INTRAVENOUS; SUBCUTANEOUS at 21:47

## 2017-07-27 RX ADMIN — VANCOMYCIN 250 MG: KIT at 01:56

## 2017-07-27 RX ADMIN — VANCOMYCIN 250 MG: KIT at 17:28

## 2017-07-27 RX ADMIN — VANCOMYCIN 250 MG: KIT at 23:41

## 2017-07-27 RX ADMIN — HEPARIN SODIUM 5000 UNITS: 5000 INJECTION, SOLUTION INTRAVENOUS; SUBCUTANEOUS at 21:20

## 2017-07-27 RX ADMIN — CYANOCOBALAMIN TAB 500 MCG 500 MCG: 500 TAB at 08:50

## 2017-07-27 RX ADMIN — INSULIN ASPART 2 UNITS: 100 INJECTION, SOLUTION INTRAVENOUS; SUBCUTANEOUS at 11:23

## 2017-07-27 RX ADMIN — FLUDROCORTISONE ACETATE 0.1 MG: 0.1 TABLET ORAL at 21:20

## 2017-07-27 RX ADMIN — POTASSIUM CHLORIDE AND SODIUM CHLORIDE 100 ML/HR: 900; 150 INJECTION, SOLUTION INTRAVENOUS at 21:47

## 2017-07-27 RX ADMIN — HYDROCORTISONE SODIUM SUCCINATE 100 MG: 100 INJECTION, POWDER, FOR SOLUTION INTRAMUSCULAR; INTRAVENOUS at 08:50

## 2017-07-27 RX ADMIN — POTASSIUM CHLORIDE AND SODIUM CHLORIDE 125 ML/HR: 900; 150 INJECTION, SOLUTION INTRAVENOUS at 01:56

## 2017-07-27 RX ADMIN — HEPARIN SODIUM 5000 UNITS: 5000 INJECTION, SOLUTION INTRAVENOUS; SUBCUTANEOUS at 08:50

## 2017-07-27 RX ADMIN — ZOLPIDEM TARTRATE 5 MG: 5 TABLET, FILM COATED ORAL at 21:48

## 2017-07-27 RX ADMIN — INSULIN ASPART 4 UNITS: 100 INJECTION, SOLUTION INTRAVENOUS; SUBCUTANEOUS at 17:28

## 2017-07-27 RX ADMIN — POTASSIUM CHLORIDE AND SODIUM CHLORIDE 125 ML/HR: 900; 150 INJECTION, SOLUTION INTRAVENOUS at 11:56

## 2017-07-27 RX ADMIN — GABAPENTIN 600 MG: 300 CAPSULE ORAL at 21:20

## 2017-07-27 RX ADMIN — FLUDROCORTISONE ACETATE 0.1 MG: 0.1 TABLET ORAL at 08:49

## 2017-07-27 RX ADMIN — METRONIDAZOLE 500 MG: 500 INJECTION, SOLUTION INTRAVENOUS at 23:41

## 2017-07-27 RX ADMIN — ATORVASTATIN CALCIUM 40 MG: 20 TABLET, FILM COATED ORAL at 08:49

## 2017-07-27 RX ADMIN — VANCOMYCIN 250 MG: KIT at 06:41

## 2017-07-28 PROBLEM — R19.7 DIARRHEA OF PRESUMED INFECTIOUS ORIGIN: Status: ACTIVE | Noted: 2017-07-28

## 2017-07-28 LAB
ANION GAP SERPL CALCULATED.3IONS-SCNC: 11.2 MMOL/L
BASOPHILS # BLD AUTO: 0.01 10*3/MM3 (ref 0–0.2)
BASOPHILS NFR BLD AUTO: 0.1 % (ref 0–1.5)
BUN BLD-MCNC: 11 MG/DL (ref 8–23)
BUN/CREAT SERPL: 12.8 (ref 7–25)
CALCIUM SPEC-SCNC: 8.1 MG/DL (ref 8.6–10.5)
CHLORIDE SERPL-SCNC: 108 MMOL/L (ref 98–107)
CO2 SERPL-SCNC: 21.8 MMOL/L (ref 22–29)
CREAT BLD-MCNC: 0.86 MG/DL (ref 0.76–1.27)
DEPRECATED RDW RBC AUTO: 51.7 FL (ref 37–54)
EOSINOPHIL # BLD AUTO: 0.02 10*3/MM3 (ref 0–0.7)
EOSINOPHIL NFR BLD AUTO: 0.3 % (ref 0.3–6.2)
ERYTHROCYTE [DISTWIDTH] IN BLOOD BY AUTOMATED COUNT: 13.9 % (ref 11.5–14.5)
GFR SERPL CREATININE-BSD FRML MDRD: 87 ML/MIN/1.73
GLUCOSE BLD-MCNC: 105 MG/DL (ref 65–99)
GLUCOSE BLDC GLUCOMTR-MCNC: 100 MG/DL (ref 70–130)
GLUCOSE BLDC GLUCOMTR-MCNC: 107 MG/DL (ref 70–130)
GLUCOSE BLDC GLUCOMTR-MCNC: 110 MG/DL (ref 70–130)
GLUCOSE BLDC GLUCOMTR-MCNC: 128 MG/DL (ref 70–130)
HCT VFR BLD AUTO: 35.2 % (ref 40.4–52.2)
HGB BLD-MCNC: 11.4 G/DL (ref 13.7–17.6)
IMM GRANULOCYTES # BLD: 0 10*3/MM3 (ref 0–0.03)
IMM GRANULOCYTES NFR BLD: 0 % (ref 0–0.5)
LYMPHOCYTES # BLD AUTO: 0.99 10*3/MM3 (ref 0.9–4.8)
LYMPHOCYTES NFR BLD AUTO: 13 % (ref 19.6–45.3)
MCH RBC QN AUTO: 32.9 PG (ref 27–32.7)
MCHC RBC AUTO-ENTMCNC: 32.4 G/DL (ref 32.6–36.4)
MCV RBC AUTO: 101.4 FL (ref 79.8–96.2)
MONOCYTES # BLD AUTO: 0.39 10*3/MM3 (ref 0.2–1.2)
MONOCYTES NFR BLD AUTO: 5.1 % (ref 5–12)
NEUTROPHILS # BLD AUTO: 6.23 10*3/MM3 (ref 1.9–8.1)
NEUTROPHILS NFR BLD AUTO: 81.5 % (ref 42.7–76)
PLATELET # BLD AUTO: 183 10*3/MM3 (ref 140–500)
PMV BLD AUTO: 10.2 FL (ref 6–12)
POTASSIUM BLD-SCNC: 3.3 MMOL/L (ref 3.5–5.2)
RBC # BLD AUTO: 3.47 10*6/MM3 (ref 4.6–6)
SODIUM BLD-SCNC: 141 MMOL/L (ref 136–145)
WBC NRBC COR # BLD: 7.64 10*3/MM3 (ref 4.5–10.7)

## 2017-07-28 PROCEDURE — 82962 GLUCOSE BLOOD TEST: CPT

## 2017-07-28 PROCEDURE — 80048 BASIC METABOLIC PNL TOTAL CA: CPT | Performed by: HOSPITALIST

## 2017-07-28 PROCEDURE — 85025 COMPLETE CBC W/AUTO DIFF WBC: CPT | Performed by: HOSPITALIST

## 2017-07-28 PROCEDURE — 25010000002 HEPARIN (PORCINE) PER 1000 UNITS: Performed by: INTERNAL MEDICINE

## 2017-07-28 RX ORDER — ZOLPIDEM TARTRATE 5 MG/1
5 TABLET ORAL NIGHTLY PRN
Status: DISCONTINUED | OUTPATIENT
Start: 2017-07-28 | End: 2017-07-29 | Stop reason: HOSPADM

## 2017-07-28 RX ORDER — POTASSIUM CHLORIDE 1.5 G/1.77G
40 POWDER, FOR SOLUTION ORAL ONCE
Status: COMPLETED | OUTPATIENT
Start: 2017-07-28 | End: 2017-07-28

## 2017-07-28 RX ADMIN — VANCOMYCIN 250 MG: KIT at 06:27

## 2017-07-28 RX ADMIN — METRONIDAZOLE 500 MG: 500 INJECTION, SOLUTION INTRAVENOUS at 22:04

## 2017-07-28 RX ADMIN — CYANOCOBALAMIN TAB 500 MCG 500 MCG: 500 TAB at 08:37

## 2017-07-28 RX ADMIN — POTASSIUM CHLORIDE 40 MEQ: 1.5 POWDER, FOR SOLUTION ORAL at 18:33

## 2017-07-28 RX ADMIN — FLUDROCORTISONE ACETATE 0.1 MG: 0.1 TABLET ORAL at 08:37

## 2017-07-28 RX ADMIN — METRONIDAZOLE 500 MG: 500 INJECTION, SOLUTION INTRAVENOUS at 06:30

## 2017-07-28 RX ADMIN — LEVOTHYROXINE SODIUM 150 MCG: 150 TABLET ORAL at 06:27

## 2017-07-28 RX ADMIN — FLUDROCORTISONE ACETATE 0.1 MG: 0.1 TABLET ORAL at 20:58

## 2017-07-28 RX ADMIN — GABAPENTIN 600 MG: 300 CAPSULE ORAL at 08:37

## 2017-07-28 RX ADMIN — GABAPENTIN 600 MG: 300 CAPSULE ORAL at 20:58

## 2017-07-28 RX ADMIN — METRONIDAZOLE 500 MG: 500 INJECTION, SOLUTION INTRAVENOUS at 14:37

## 2017-07-28 RX ADMIN — HEPARIN SODIUM 5000 UNITS: 5000 INJECTION, SOLUTION INTRAVENOUS; SUBCUTANEOUS at 20:58

## 2017-07-28 RX ADMIN — ZOLPIDEM TARTRATE 5 MG: 5 TABLET, FILM COATED ORAL at 22:02

## 2017-07-28 RX ADMIN — ATORVASTATIN CALCIUM 40 MG: 20 TABLET, FILM COATED ORAL at 08:37

## 2017-07-28 RX ADMIN — VANCOMYCIN 250 MG: KIT at 23:17

## 2017-07-28 RX ADMIN — HEPARIN SODIUM 5000 UNITS: 5000 INJECTION, SOLUTION INTRAVENOUS; SUBCUTANEOUS at 08:37

## 2017-07-28 RX ADMIN — VANCOMYCIN 250 MG: KIT at 17:23

## 2017-07-28 RX ADMIN — VANCOMYCIN 250 MG: KIT at 12:21

## 2017-07-28 RX ADMIN — POTASSIUM CHLORIDE 20 MEQ: 750 CAPSULE, EXTENDED RELEASE ORAL at 08:37

## 2017-07-29 VITALS
TEMPERATURE: 97.1 F | RESPIRATION RATE: 16 BRPM | HEART RATE: 57 BPM | DIASTOLIC BLOOD PRESSURE: 86 MMHG | HEIGHT: 72 IN | SYSTOLIC BLOOD PRESSURE: 146 MMHG | WEIGHT: 204 LBS | BODY MASS INDEX: 27.63 KG/M2 | OXYGEN SATURATION: 96 %

## 2017-07-29 LAB
ANION GAP SERPL CALCULATED.3IONS-SCNC: 11.7 MMOL/L
BUN BLD-MCNC: 6 MG/DL (ref 8–23)
BUN/CREAT SERPL: 7.1 (ref 7–25)
CALCIUM SPEC-SCNC: 8.1 MG/DL (ref 8.6–10.5)
CHLORIDE SERPL-SCNC: 104 MMOL/L (ref 98–107)
CO2 SERPL-SCNC: 23.3 MMOL/L (ref 22–29)
CREAT BLD-MCNC: 0.84 MG/DL (ref 0.76–1.27)
DEPRECATED RDW RBC AUTO: 50.7 FL (ref 37–54)
ERYTHROCYTE [DISTWIDTH] IN BLOOD BY AUTOMATED COUNT: 13.6 % (ref 11.5–14.5)
GFR SERPL CREATININE-BSD FRML MDRD: 89 ML/MIN/1.73
GLUCOSE BLD-MCNC: 98 MG/DL (ref 65–99)
GLUCOSE BLDC GLUCOMTR-MCNC: 115 MG/DL (ref 70–130)
GLUCOSE BLDC GLUCOMTR-MCNC: 94 MG/DL (ref 70–130)
HCT VFR BLD AUTO: 35.3 % (ref 40.4–52.2)
HGB BLD-MCNC: 11.5 G/DL (ref 13.7–17.6)
MCH RBC QN AUTO: 33 PG (ref 27–32.7)
MCHC RBC AUTO-ENTMCNC: 32.6 G/DL (ref 32.6–36.4)
MCV RBC AUTO: 101.4 FL (ref 79.8–96.2)
PLATELET # BLD AUTO: 174 10*3/MM3 (ref 140–500)
PMV BLD AUTO: 10.4 FL (ref 6–12)
POTASSIUM BLD-SCNC: 3.2 MMOL/L (ref 3.5–5.2)
POTASSIUM BLD-SCNC: 3.6 MMOL/L (ref 3.5–5.2)
RBC # BLD AUTO: 3.48 10*6/MM3 (ref 4.6–6)
SODIUM BLD-SCNC: 139 MMOL/L (ref 136–145)
WBC NRBC COR # BLD: 5.6 10*3/MM3 (ref 4.5–10.7)

## 2017-07-29 PROCEDURE — 84132 ASSAY OF SERUM POTASSIUM: CPT | Performed by: HOSPITALIST

## 2017-07-29 PROCEDURE — 82962 GLUCOSE BLOOD TEST: CPT

## 2017-07-29 PROCEDURE — 25010000002 HEPARIN (PORCINE) PER 1000 UNITS: Performed by: INTERNAL MEDICINE

## 2017-07-29 PROCEDURE — 80048 BASIC METABOLIC PNL TOTAL CA: CPT | Performed by: HOSPITALIST

## 2017-07-29 PROCEDURE — 85027 COMPLETE CBC AUTOMATED: CPT | Performed by: HOSPITALIST

## 2017-07-29 RX ORDER — POTASSIUM CHLORIDE 1.5 G/1.77G
40 POWDER, FOR SOLUTION ORAL ONCE
Status: COMPLETED | OUTPATIENT
Start: 2017-07-29 | End: 2017-07-29

## 2017-07-29 RX ORDER — VANCOMYCIN HYDROCHLORIDE 125 MG/1
125 CAPSULE ORAL 4 TIMES DAILY
Qty: 44 CAPSULE | Refills: 0 | Status: SHIPPED | OUTPATIENT
Start: 2017-07-29 | End: 2017-08-14 | Stop reason: SDUPTHER

## 2017-07-29 RX ADMIN — LEVOTHYROXINE SODIUM 150 MCG: 150 TABLET ORAL at 06:13

## 2017-07-29 RX ADMIN — POTASSIUM CHLORIDE 20 MEQ: 750 CAPSULE, EXTENDED RELEASE ORAL at 08:16

## 2017-07-29 RX ADMIN — FLUDROCORTISONE ACETATE 0.1 MG: 0.1 TABLET ORAL at 08:16

## 2017-07-29 RX ADMIN — VANCOMYCIN 250 MG: KIT at 11:44

## 2017-07-29 RX ADMIN — GABAPENTIN 600 MG: 300 CAPSULE ORAL at 08:16

## 2017-07-29 RX ADMIN — ATORVASTATIN CALCIUM 40 MG: 20 TABLET, FILM COATED ORAL at 08:16

## 2017-07-29 RX ADMIN — VANCOMYCIN 250 MG: KIT at 06:13

## 2017-07-29 RX ADMIN — METRONIDAZOLE 500 MG: 500 INJECTION, SOLUTION INTRAVENOUS at 06:13

## 2017-07-29 RX ADMIN — POTASSIUM CHLORIDE 40 MEQ: 1.5 POWDER, FOR SOLUTION ORAL at 11:44

## 2017-07-29 RX ADMIN — HEPARIN SODIUM 5000 UNITS: 5000 INJECTION, SOLUTION INTRAVENOUS; SUBCUTANEOUS at 08:16

## 2017-07-29 RX ADMIN — CYANOCOBALAMIN TAB 500 MCG 500 MCG: 500 TAB at 08:16

## 2017-07-31 LAB
BACTERIA SPEC AEROBE CULT: NORMAL
BACTERIA SPEC AEROBE CULT: NORMAL

## 2017-08-01 ENCOUNTER — OFFICE VISIT (OUTPATIENT)
Dept: SURGERY | Facility: CLINIC | Age: 77
End: 2017-08-01

## 2017-08-01 DIAGNOSIS — Z51.89 VISIT FOR WOUND CHECK: Primary | ICD-10-CM

## 2017-08-01 PROCEDURE — 99024 POSTOP FOLLOW-UP VISIT: CPT | Performed by: SURGERY

## 2017-08-01 RX ORDER — LANCETS
EACH MISCELLANEOUS
Refills: 3 | COMMUNITY
Start: 2017-07-18

## 2017-08-01 RX ORDER — BLOOD SUGAR DIAGNOSTIC
STRIP MISCELLANEOUS
Refills: 3 | COMMUNITY
Start: 2017-07-18

## 2017-08-01 RX ORDER — SACCHAROMYCES BOULARDII 250 MG
250 CAPSULE ORAL 2 TIMES DAILY
Qty: 60 CAPSULE | Refills: 0 | Status: SHIPPED | OUTPATIENT
Start: 2017-08-01 | End: 2018-11-08 | Stop reason: SDUPTHER

## 2017-08-01 NOTE — PROGRESS NOTES
Cc: Follow-up after incision and drainage of perirectal abscess and fistulotomy    S: Patient is here today for follow-up after undergoing incision and drainage of perirectal abscess and anal fistulotomy.  He was recently readmitted to the hospital for recurrent C. difficile colitis.  He was started on vancomycin treatment.  He reports his diarrhea is resolved and denies any abdominal pain.  He has been doing wet-to-dry dressing changes twice a day.  He denies any drainage from the wound.  Reports no pain     O: Abdomen is soft and nontender.    At the perianal area over the right lateral aspect there is a slowly healing open wound that goes from the anal verge approximately 4 cm lateral and 2 cm wide.  There is no drainage and there is granulation tissue at the base     Assessment and plan     77-year-old male with perirectal abscess and.  A fistula status post incision and drainage and superficial fistulotomy.  He had recurrent C. difficile infection that is now under control.  His wound is healing great and there is no drainage.     - I recommend that he apply bacitracin to the wound twice a day and keep a dry dressing   - No need for more wet-to-dry dressing changes   - Continue treatment for C. difficile colitis, if recurrence he will be candidate for fecal transplant   - Follow-up in my office in a month, he will need to have colonoscopy schedule after wound is completely healed     Mark Pike MD  General, Minimally Invasive and Endoscopic Surgery  [facility]    [address], [address]  [place], [place], [address]  P: [phone]  F: [phone]

## 2017-08-03 ENCOUNTER — OFFICE VISIT (OUTPATIENT)
Dept: FAMILY MEDICINE CLINIC | Facility: CLINIC | Age: 77
End: 2017-08-03

## 2017-08-03 VITALS
DIASTOLIC BLOOD PRESSURE: 78 MMHG | RESPIRATION RATE: 16 BRPM | HEIGHT: 72 IN | TEMPERATURE: 98.3 F | BODY MASS INDEX: 27.19 KG/M2 | WEIGHT: 200.7 LBS | HEART RATE: 66 BPM | SYSTOLIC BLOOD PRESSURE: 132 MMHG | OXYGEN SATURATION: 96 %

## 2017-08-03 DIAGNOSIS — E87.6 HYPOKALEMIA: Primary | ICD-10-CM

## 2017-08-03 DIAGNOSIS — A04.72 C. DIFFICILE COLITIS: ICD-10-CM

## 2017-08-03 LAB
ALBUMIN SERPL-MCNC: 4 G/DL (ref 3.5–5.2)
ALBUMIN/GLOB SERPL: 1.3 G/DL
ALP SERPL-CCNC: 53 U/L (ref 39–117)
ALT SERPL-CCNC: 17 U/L (ref 1–41)
AST SERPL-CCNC: 15 U/L (ref 1–40)
BASOPHILS # BLD AUTO: 0.02 10*3/MM3 (ref 0–0.2)
BASOPHILS NFR BLD AUTO: 0.2 % (ref 0–1.5)
BILIRUB SERPL-MCNC: 0.5 MG/DL (ref 0.1–1.2)
BUN SERPL-MCNC: 10 MG/DL (ref 8–23)
BUN/CREAT SERPL: 10.6 (ref 7–25)
CALCIUM SERPL-MCNC: 9.4 MG/DL (ref 8.6–10.5)
CHLORIDE SERPL-SCNC: 96 MMOL/L (ref 98–107)
CO2 SERPL-SCNC: 27.7 MMOL/L (ref 22–29)
CREAT SERPL-MCNC: 0.94 MG/DL (ref 0.76–1.27)
EOSINOPHIL # BLD AUTO: 0.31 10*3/MM3 (ref 0–0.7)
EOSINOPHIL NFR BLD AUTO: 3.7 % (ref 0.3–6.2)
ERYTHROCYTE [DISTWIDTH] IN BLOOD BY AUTOMATED COUNT: 13.6 % (ref 11.5–14.5)
GLOBULIN SER CALC-MCNC: 3.1 GM/DL
GLUCOSE SERPL-MCNC: 187 MG/DL (ref 65–99)
HCT VFR BLD AUTO: 38.9 % (ref 40.4–52.2)
HGB BLD-MCNC: 12.8 G/DL (ref 13.7–17.6)
IMM GRANULOCYTES # BLD: 0.05 10*3/MM3 (ref 0–0.03)
IMM GRANULOCYTES NFR BLD: 0.6 % (ref 0–0.5)
LYMPHOCYTES # BLD AUTO: 1.98 10*3/MM3 (ref 0.9–4.8)
LYMPHOCYTES NFR BLD AUTO: 23.9 % (ref 19.6–45.3)
MCH RBC QN AUTO: 33.6 PG (ref 27–32.7)
MCHC RBC AUTO-ENTMCNC: 32.9 G/DL (ref 32.6–36.4)
MCV RBC AUTO: 102.1 FL (ref 79.8–96.2)
MONOCYTES # BLD AUTO: 1.21 10*3/MM3 (ref 0.2–1.2)
MONOCYTES NFR BLD AUTO: 14.6 % (ref 5–12)
NEUTROPHILS # BLD AUTO: 4.71 10*3/MM3 (ref 1.9–8.1)
NEUTROPHILS NFR BLD AUTO: 57 % (ref 42.7–76)
PLATELET # BLD AUTO: 220 10*3/MM3 (ref 140–500)
POTASSIUM SERPL-SCNC: 3.8 MMOL/L (ref 3.5–5.2)
PROT SERPL-MCNC: 7.1 G/DL (ref 6–8.5)
RBC # BLD AUTO: 3.81 10*6/MM3 (ref 4.6–6)
SODIUM SERPL-SCNC: 137 MMOL/L (ref 136–145)
WBC # BLD AUTO: 8.28 10*3/MM3 (ref 4.5–10.7)

## 2017-08-03 PROCEDURE — 99213 OFFICE O/P EST LOW 20 MIN: CPT | Performed by: INTERNAL MEDICINE

## 2017-08-03 NOTE — PROGRESS NOTES
"Subjective   Patient ID: Faustino Horton Jr. is a 77 y.o. male presents with   Chief Complaint   Patient presents with   • Hospital Follow Up     for C-Diff       HPI - This patient presents today for hospital follow-up for C. difficile colitis.  He still on vancomycin and is doing better.  He had some hypokalemia in the hospital from excessive diarrhea.  We talked about other strategies to keep him from getting reinfected.    Assessment plan    C. difficile colitis and hypokalemia we'll check a CBC and a CMP.  Recommend patient stay on a probiotic for ever.  Once he runs out of the vancomycin if he starts having loose stools he's to let me know.    Allergies   Allergen Reactions   • Acetylcysteine Hives   • Allopurinol Hives   • Baclofen Itching   • Hydrocodone-Acetaminophen Swelling   • Levaquin [Levofloxacin] Itching   • Nsaids    • Oxycodone    • Tramadol        The following portions of the patient's history were reviewed and updated as appropriate: allergies, current medications, past family history, past medical history, past social history, past surgical history and problem list.      Review of Systems   Constitutional: Positive for fatigue.   HENT: Negative.    Respiratory: Negative.    Cardiovascular: Negative.    Gastrointestinal: Positive for diarrhea. Negative for abdominal pain.   Psychiatric/Behavioral: Negative.        Objective     Vitals:    08/03/17 0920   BP: 132/78   Pulse: 66   Resp: 16   Temp: 98.3 °F (36.8 °C)   TempSrc: Oral   SpO2: 96%   Weight: 200 lb 11.2 oz (91 kg)   Height: 72\" (182.9 cm)         Physical Exam   Constitutional: He appears well-developed and well-nourished.   Abdominal: Soft. There is no tenderness. There is no rebound and no guarding.   Psychiatric: He has a normal mood and affect. His behavior is normal.   Nursing note and vitals reviewed.        Faustino was seen today for hospital follow up.    Diagnoses and all orders for this visit:    Hypokalemia  -     " Comprehensive Metabolic Panel  -     CBC & Differential    C. difficile colitis  -     Comprehensive Metabolic Panel  -     CBC & Differential        Call or return to clinic prn if these symptoms worsen or fail to improve as anticipated.

## 2017-08-14 ENCOUNTER — TELEPHONE (OUTPATIENT)
Dept: FAMILY MEDICINE CLINIC | Facility: CLINIC | Age: 77
End: 2017-08-14

## 2017-08-14 RX ORDER — VANCOMYCIN HYDROCHLORIDE 125 MG/1
125 CAPSULE ORAL 4 TIMES DAILY
Qty: 44 CAPSULE | Refills: 0 | Status: SHIPPED | OUTPATIENT
Start: 2017-08-14 | End: 2017-08-15 | Stop reason: SDUPTHER

## 2017-08-14 NOTE — TELEPHONE ENCOUNTER
Armando said that he is currently out of his antibiotics for c diff and is still having diarrhea. Does he need to have more sent in? Or what can he do?

## 2017-08-15 ENCOUNTER — TELEPHONE (OUTPATIENT)
Dept: FAMILY MEDICINE CLINIC | Facility: CLINIC | Age: 77
End: 2017-08-15

## 2017-08-15 RX ORDER — VANCOMYCIN HYDROCHLORIDE 125 MG/1
125 CAPSULE ORAL 4 TIMES DAILY
Qty: 44 CAPSULE | Refills: 0 | Status: ON HOLD | OUTPATIENT
Start: 2017-08-15 | End: 2017-09-12

## 2017-08-17 ENCOUNTER — PATIENT OUTREACH (OUTPATIENT)
Dept: CASE MANAGEMENT | Facility: OTHER | Age: 77
End: 2017-08-17

## 2017-08-17 NOTE — OUTREACH NOTE
Current Barriers & Concerns:  Barriers: Disease education  The main concerns and/or symptoms the patient would like to address are:Patient states he would like to manage colitis and symptoms.     Education/instruction provided by Care Coordinator: Research and provide educational  materials  regarding colitis, C-dif and diet.    Follow Up Outreach Due: 9-18-17

## 2017-09-05 ENCOUNTER — OFFICE VISIT (OUTPATIENT)
Dept: SURGERY | Facility: CLINIC | Age: 77
End: 2017-09-05

## 2017-09-05 DIAGNOSIS — K61.1 PERIRECTAL ABSCESS: Primary | ICD-10-CM

## 2017-09-05 PROCEDURE — 99024 POSTOP FOLLOW-UP VISIT: CPT | Performed by: SURGERY

## 2017-09-05 NOTE — PROGRESS NOTES
Cc: Follow-up after incision and drainage of perirectal abscess and fistulotomy    S: Patient is here today for follow-up after undergoing incision and drainage of perirectal abscess and anal fistulotomy.  He has recurrent C. difficile colitis infection and has been on 2 different courses of antibiotics.  He reports his diarrhea is now resolved.  He denies any drainage from the rectum or the perirectal incision and has been having no fevers or chills.    O: Over the perianal area over the right lateral aspect there is a completely healed wound.  There is no evidence of drainage.  There is no abscess or fistulous tract    Assessment and plan      77-year-old male with perirectal abscess and.  A fistula status post incision and drainage and superficial fistulotomy.  He had recurrent C. difficile infection that is now under control.  His wound is completely healed.  He had a colonoscopy more than 10 years ago and will need to have repeat colonoscopy     - Screening colonoscopy.  -Avoid unnecessary antibiotics  - Continue probiotics    Indications, risks and procedure were discussed with Him including but not limited to bleeding, infection, possibility of perforation and possible polypectomy. All of their questions were answered and  would like to proceed with the above recommendations.  Any additional follow-up will be discussed with Him after the results have been reviewed.    Mark Pike MD  General, Minimally Invasive and Endoscopic Surgery  [facility]    [address], [address]  [place], [place], [address]  P: [phone]  F: [phone]

## 2017-09-12 ENCOUNTER — ANESTHESIA EVENT (OUTPATIENT)
Dept: GASTROENTEROLOGY | Facility: HOSPITAL | Age: 77
End: 2017-09-12

## 2017-09-12 ENCOUNTER — HOSPITAL ENCOUNTER (OUTPATIENT)
Facility: HOSPITAL | Age: 77
Setting detail: HOSPITAL OUTPATIENT SURGERY
Discharge: HOME | End: 2017-09-12
Attending: SURGERY | Admitting: SURGERY

## 2017-09-12 ENCOUNTER — ANESTHESIA (OUTPATIENT)
Dept: GASTROENTEROLOGY | Facility: HOSPITAL | Age: 77
End: 2017-09-12

## 2017-09-12 VITALS
BODY MASS INDEX: 26.82 KG/M2 | HEART RATE: 56 BPM | WEIGHT: 198 LBS | DIASTOLIC BLOOD PRESSURE: 87 MMHG | OXYGEN SATURATION: 95 % | SYSTOLIC BLOOD PRESSURE: 132 MMHG | RESPIRATION RATE: 14 BRPM | HEIGHT: 72 IN | TEMPERATURE: 97.6 F

## 2017-09-12 DIAGNOSIS — K61.1 PERIRECTAL ABSCESS: ICD-10-CM

## 2017-09-12 PROCEDURE — 45380 COLONOSCOPY AND BIOPSY: CPT | Performed by: SURGERY

## 2017-09-12 PROCEDURE — 45385 COLONOSCOPY W/LESION REMOVAL: CPT | Performed by: SURGERY

## 2017-09-12 PROCEDURE — 25010000002 PROPOFOL 10 MG/ML EMULSION: Performed by: ANESTHESIOLOGY

## 2017-09-12 PROCEDURE — 88305 TISSUE EXAM BY PATHOLOGIST: CPT | Performed by: SURGERY

## 2017-09-12 RX ORDER — SODIUM CHLORIDE 0.9 % (FLUSH) 0.9 %
3 SYRINGE (ML) INJECTION AS NEEDED
Status: DISCONTINUED | OUTPATIENT
Start: 2017-09-12 | End: 2017-09-12 | Stop reason: HOSPADM

## 2017-09-12 RX ORDER — ONDANSETRON 2 MG/ML
4 INJECTION INTRAMUSCULAR; INTRAVENOUS ONCE AS NEEDED
Status: DISCONTINUED | OUTPATIENT
Start: 2017-09-12 | End: 2017-09-12 | Stop reason: HOSPADM

## 2017-09-12 RX ORDER — PROMETHAZINE HYDROCHLORIDE 25 MG/ML
6.25 INJECTION, SOLUTION INTRAMUSCULAR; INTRAVENOUS ONCE AS NEEDED
Status: DISCONTINUED | OUTPATIENT
Start: 2017-09-12 | End: 2017-09-12 | Stop reason: HOSPADM

## 2017-09-12 RX ORDER — LIDOCAINE HYDROCHLORIDE 10 MG/ML
0.5 INJECTION, SOLUTION INFILTRATION; PERINEURAL ONCE AS NEEDED
Status: DISCONTINUED | OUTPATIENT
Start: 2017-09-12 | End: 2017-09-12 | Stop reason: HOSPADM

## 2017-09-12 RX ORDER — PROMETHAZINE HYDROCHLORIDE 25 MG/1
25 SUPPOSITORY RECTAL ONCE AS NEEDED
Status: DISCONTINUED | OUTPATIENT
Start: 2017-09-12 | End: 2017-09-12 | Stop reason: HOSPADM

## 2017-09-12 RX ORDER — GLYCOPYRROLATE 0.2 MG/ML
INJECTION INTRAMUSCULAR; INTRAVENOUS AS NEEDED
Status: DISCONTINUED | OUTPATIENT
Start: 2017-09-12 | End: 2017-09-12 | Stop reason: SURG

## 2017-09-12 RX ORDER — SODIUM CHLORIDE, SODIUM LACTATE, POTASSIUM CHLORIDE, CALCIUM CHLORIDE 600; 310; 30; 20 MG/100ML; MG/100ML; MG/100ML; MG/100ML
1000 INJECTION, SOLUTION INTRAVENOUS CONTINUOUS PRN
Status: DISCONTINUED | OUTPATIENT
Start: 2017-09-12 | End: 2017-09-12 | Stop reason: HOSPADM

## 2017-09-12 RX ORDER — PROPOFOL 10 MG/ML
VIAL (ML) INTRAVENOUS AS NEEDED
Status: DISCONTINUED | OUTPATIENT
Start: 2017-09-12 | End: 2017-09-12 | Stop reason: SURG

## 2017-09-12 RX ORDER — LIDOCAINE HYDROCHLORIDE 20 MG/ML
INJECTION, SOLUTION INFILTRATION; PERINEURAL AS NEEDED
Status: DISCONTINUED | OUTPATIENT
Start: 2017-09-12 | End: 2017-09-12 | Stop reason: SURG

## 2017-09-12 RX ORDER — PROMETHAZINE HYDROCHLORIDE 25 MG/1
25 TABLET ORAL ONCE AS NEEDED
Status: DISCONTINUED | OUTPATIENT
Start: 2017-09-12 | End: 2017-09-12 | Stop reason: HOSPADM

## 2017-09-12 RX ORDER — PROPOFOL 10 MG/ML
VIAL (ML) INTRAVENOUS CONTINUOUS PRN
Status: DISCONTINUED | OUTPATIENT
Start: 2017-09-12 | End: 2017-09-12 | Stop reason: SURG

## 2017-09-12 RX ADMIN — SODIUM CHLORIDE, POTASSIUM CHLORIDE, SODIUM LACTATE AND CALCIUM CHLORIDE 1000 ML: 600; 310; 30; 20 INJECTION, SOLUTION INTRAVENOUS at 09:59

## 2017-09-12 RX ADMIN — LIDOCAINE HYDROCHLORIDE 60 MG: 20 INJECTION, SOLUTION INFILTRATION; PERINEURAL at 10:15

## 2017-09-12 RX ADMIN — GLYCOPYRROLATE 0.2 MCG: 0.2 INJECTION INTRAMUSCULAR; INTRAVENOUS at 10:19

## 2017-09-12 RX ADMIN — PROPOFOL 150 MCG/KG/MIN: 10 INJECTION, EMULSION INTRAVENOUS at 10:15

## 2017-09-12 RX ADMIN — PROPOFOL 100 MG: 10 INJECTION, EMULSION INTRAVENOUS at 10:15

## 2017-09-12 RX ADMIN — SODIUM CHLORIDE, POTASSIUM CHLORIDE, SODIUM LACTATE AND CALCIUM CHLORIDE: 600; 310; 30; 20 INJECTION, SOLUTION INTRAVENOUS at 10:15

## 2017-09-12 NOTE — H&P (VIEW-ONLY)
Cc: Follow-up after incision and drainage of perirectal abscess and fistulotomy    S: Patient is here today for follow-up after undergoing incision and drainage of perirectal abscess and anal fistulotomy.  He has recurrent C. difficile colitis infection and has been on 2 different courses of antibiotics.  He reports his diarrhea is now resolved.  He denies any drainage from the rectum or the perirectal incision and has been having no fevers or chills.    O: Over the perianal area over the right lateral aspect there is a completely healed wound.  There is no evidence of drainage.  There is no abscess or fistulous tract    Assessment and plan      77-year-old male with perirectal abscess and.  A fistula status post incision and drainage and superficial fistulotomy.  He had recurrent C. difficile infection that is now under control.  His wound is completely healed.  He had a colonoscopy more than 10 years ago and will need to have repeat colonoscopy     - Screening colonoscopy.  -Avoid unnecessary antibiotics  - Continue probiotics    Indications, risks and procedure were discussed with Him including but not limited to bleeding, infection, possibility of perforation and possible polypectomy. All of their questions were answered and  would like to proceed with the above recommendations.  Any additional follow-up will be discussed with Him after the results have been reviewed.    Mark Pike MD  General, Minimally Invasive and Endoscopic Surgery  [facility]    [address], [address]  [place], [place], [address]  P: [phone]  F: [phone]

## 2017-09-12 NOTE — ANESTHESIA POSTPROCEDURE EVALUATION
Patient: Faustino Horton Jr.    Procedure Summary     Date Anesthesia Start Anesthesia Stop Room / Location    09/12/17 1015   [facility] /  [facility]       Procedure Diagnosis Surgeon Provider    COLONOSCOPY TO CECUM WITH COLD BIOPSY AND HOT SNARE POLYPECTOMIES (N/A ) Perirectal abscess  (Perirectal abscess [K61.1]) MD Valdez Chen MD          Anesthesia Type: MAC  Last vitals  BP   112/76 (09/12/17 1048)    Temp        Pulse   53 (09/12/17 1048)   Resp   16 (09/12/17 1048)    SpO2   96 % (09/12/17 1048)      Post Anesthesia Care and Evaluation    Patient location during evaluation: PHASE II  Anesthetic complications: No anesthetic complications

## 2017-09-12 NOTE — ANESTHESIA PREPROCEDURE EVALUATION
Anesthesia Evaluation     Patient summary reviewed   no history of anesthetic complications:  NPO Solid Status: > 6 hours       Airway   Mallampati: III  TM distance: >3 FB  Neck ROM: full  no difficulty expected  Dental - normal exam     Pulmonary - normal exam   (+) a smoker Former,   Cardiovascular - normal exam    (+) hypertension, past MI , PVD,   (-) angina      Neuro/Psych  GI/Hepatic/Renal/Endo    (+)  diabetes mellitus,     Musculoskeletal     Abdominal    Substance History      OB/GYN          Other      history of cancer remission                                    Anesthesia Plan    ASA 3     MAC     Anesthetic plan and risks discussed with patient.

## 2017-09-12 NOTE — PLAN OF CARE
Problem: Patient Care Overview (Adult)  Goal: Adult Individualization and Mutuality  Outcome: Ongoing (interventions implemented as appropriate)    09/12/17 0948   Individualization   Patient Specific Interventions denies       Goal: Discharge Needs Assessment  Outcome: Ongoing (interventions implemented as appropriate)    09/12/17 0948   Discharge Needs Assessment   Concerns To Be Addressed no discharge needs identified   Discharge Disposition home or self-care   Self-Care   Equipment Currently Used at Home none         Problem: GI Endoscopy (Adult)  Goal: Signs and Symptoms of Listed Potential Problems Will be Absent or Manageable (GI Endoscopy)  Outcome: Ongoing (interventions implemented as appropriate)    09/12/17 0948   GI Endoscopy   Problems Assessed (GI Endoscopy) bleeding;pain;fluid imbalance;hypoxia/hypoxemia   Problems Present (GI Endoscopy) none

## 2017-09-13 LAB
CYTO UR: NORMAL
LAB AP CASE REPORT: NORMAL
Lab: NORMAL
PATH REPORT.FINAL DX SPEC: NORMAL
PATH REPORT.GROSS SPEC: NORMAL

## 2017-09-14 ENCOUNTER — TELEPHONE (OUTPATIENT)
Dept: SURGERY | Facility: CLINIC | Age: 77
End: 2017-09-14

## 2017-09-14 NOTE — TELEPHONE ENCOUNTER
----- Message from Mark Pike MD sent at 9/13/2017  5:34 PM EDT -----  Please call the patient with c-scope results. PIC c-scope in 5 years

## 2017-09-19 ENCOUNTER — PATIENT OUTREACH (OUTPATIENT)
Dept: CASE MANAGEMENT | Facility: OTHER | Age: 77
End: 2017-09-19

## 2017-09-19 ENCOUNTER — OFFICE VISIT (OUTPATIENT)
Dept: FAMILY MEDICINE CLINIC | Facility: CLINIC | Age: 77
End: 2017-09-19

## 2017-09-19 VITALS
HEIGHT: 72 IN | OXYGEN SATURATION: 94 % | BODY MASS INDEX: 27.89 KG/M2 | DIASTOLIC BLOOD PRESSURE: 70 MMHG | TEMPERATURE: 98.3 F | WEIGHT: 205.9 LBS | HEART RATE: 64 BPM | SYSTOLIC BLOOD PRESSURE: 140 MMHG | RESPIRATION RATE: 16 BRPM

## 2017-09-19 DIAGNOSIS — Z23 NEED FOR VACCINATION: Primary | ICD-10-CM

## 2017-09-19 DIAGNOSIS — Z00.00 HEALTH CARE MAINTENANCE: ICD-10-CM

## 2017-09-19 DIAGNOSIS — E27.40 ADRENAL INSUFFICIENCY (HCC): ICD-10-CM

## 2017-09-19 DIAGNOSIS — A04.72 C. DIFFICILE COLITIS: Primary | ICD-10-CM

## 2017-09-19 PROCEDURE — 99213 OFFICE O/P EST LOW 20 MIN: CPT | Performed by: INTERNAL MEDICINE

## 2017-09-19 PROCEDURE — G0008 ADMIN INFLUENZA VIRUS VAC: HCPCS | Performed by: INTERNAL MEDICINE

## 2017-09-19 NOTE — PROGRESS NOTES
"Subjective   Patient ID: Faustino Horton Jr. is a 77 y.o. male presents with   Chief Complaint   Patient presents with   • Follow-up     on stomach issues       HPI - This patient's following up for C. difficile and adrenal insufficiency.  He also had some colon polyps.  He's feeling good he wants a flu shot today.  I recommend they did take a probiotic.    Allergies   Allergen Reactions   • Acetylcysteine Hives   • Allopurinol Hives   • Baclofen Itching   • Hydrocodone-Acetaminophen Swelling   • Levaquin [Levofloxacin] Itching       The following portions of the patient's history were reviewed and updated as appropriate: allergies, current medications, past family history, past medical history, past social history, past surgical history and problem list.      Review of Systems   Constitutional: Negative.    Respiratory: Negative.    Cardiovascular: Negative.    Musculoskeletal: Positive for back pain.       Objective     Vitals:    09/19/17 1318   BP: 140/70   Pulse: 64   Resp: 16   Temp: 98.3 °F (36.8 °C)   TempSrc: Oral   SpO2: 94%   Weight: 205 lb 14.4 oz (93.4 kg)   Height: 72\" (182.9 cm)         Physical Exam   Constitutional: He is oriented to person, place, and time. He appears well-developed and well-nourished.   HENT:   Head: Normocephalic and atraumatic.   Eyes: EOM are normal. Pupils are equal, round, and reactive to light.   Cardiovascular: Normal rate, regular rhythm and normal heart sounds.    Pulmonary/Chest: Effort normal and breath sounds normal.   Neurological: He is alert and oriented to person, place, and time.   Psychiatric: He has a normal mood and affect. His behavior is normal.   Nursing note and vitals reviewed.        Faustino was seen today for follow-up.    Diagnoses and all orders for this visit:    C. difficile colitis    Adrenal insufficiency    Health care maintenance        Call or return to clinic prn if these symptoms worsen or fail to improve as anticipated.  "

## 2017-09-19 NOTE — OUTREACH NOTE
Patient contacted. States he had appointment with PCP today and is doing very well. Patient is compliant with medical appointments; medications and taking probiotic. Patient states to have received educational material and has no questions at this time. Will continue follow up.

## 2017-09-21 ENCOUNTER — HOSPITAL ENCOUNTER (OUTPATIENT)
Dept: GENERAL RADIOLOGY | Facility: HOSPITAL | Age: 77
Discharge: HOME | End: 2017-09-21
Attending: SURGERY | Admitting: SURGERY

## 2017-09-21 DIAGNOSIS — I71.40 AAA (ABDOMINAL AORTIC ANEURYSM) (HCC): ICD-10-CM

## 2017-09-21 PROCEDURE — 74000 HC ABDOMEN KUB: CPT

## 2018-01-23 ENCOUNTER — OFFICE VISIT (OUTPATIENT)
Dept: FAMILY MEDICINE CLINIC | Facility: CLINIC | Age: 78
End: 2018-01-23

## 2018-01-23 VITALS
HEIGHT: 72 IN | SYSTOLIC BLOOD PRESSURE: 130 MMHG | TEMPERATURE: 99.1 F | DIASTOLIC BLOOD PRESSURE: 70 MMHG | HEART RATE: 64 BPM | BODY MASS INDEX: 28.85 KG/M2 | OXYGEN SATURATION: 90 % | WEIGHT: 213 LBS

## 2018-01-23 DIAGNOSIS — J11.1 FLU: Primary | ICD-10-CM

## 2018-01-23 DIAGNOSIS — E27.40 ADRENAL INSUFFICIENCY (HCC): ICD-10-CM

## 2018-01-23 DIAGNOSIS — R05.9 COUGH: ICD-10-CM

## 2018-01-23 LAB
EXPIRATION DATE: ABNORMAL
FLUAV AG NPH QL: NEGATIVE
FLUBV AG NPH QL: POSITIVE
INTERNAL CONTROL: ABNORMAL
Lab: ABNORMAL

## 2018-01-23 PROCEDURE — 87804 INFLUENZA ASSAY W/OPTIC: CPT | Performed by: NURSE PRACTITIONER

## 2018-01-23 PROCEDURE — 99214 OFFICE O/P EST MOD 30 MIN: CPT | Performed by: NURSE PRACTITIONER

## 2018-01-23 RX ORDER — OSELTAMIVIR PHOSPHATE 75 MG/1
75 CAPSULE ORAL 2 TIMES DAILY
Qty: 10 CAPSULE | Refills: 0 | Status: SHIPPED | OUTPATIENT
Start: 2018-01-23 | End: 2018-01-28

## 2018-01-23 NOTE — PATIENT INSTRUCTIONS

## 2018-01-23 NOTE — PROGRESS NOTES
Subjective   Faustino Horton Jr. is a 77 y.o. male.     HPI Comments: Thinks he has flu, complains of body aches myalgias chills started    1-1/2 days ago  Without chest pain shortness of breath or lethargy    Adrenal insuffiency, sees endocrinology takes Decadron 0.5 mg    increased it to days ago takes 2 tablets daily which is a instructions if he gets a febrile illness (endocrinologist      Diabetes is controlled presently    Wants to make sure he does not get antibiotics unless absolutely required  He had pneumonia in the spring hospitalized  And in about getting C. Difficile      No abdominal pain no diarrhea  Symptoms much less severe today          URI    This is a new problem. Episode onset: 36 hours ago. The problem has been unchanged. Maximum temperature: Chills myalgias no documented fever. Associated symptoms include congestion, coughing, rhinorrhea and a sore throat. Pertinent negatives include no abdominal pain, chest pain, diarrhea, headaches, neck pain, rash or wheezing. He has tried nothing for the symptoms.        The following portions of the patient's history were reviewed and updated as appropriate: allergies, past family history, past medical history, past social history, past surgical history and problem list.    Review of Systems   Constitutional: Positive for chills and fatigue.   HENT: Positive for congestion, rhinorrhea and sore throat.    Eyes: Negative.    Respiratory: Positive for cough. Negative for wheezing.    Cardiovascular: Negative for chest pain.   Gastrointestinal: Negative for abdominal pain and diarrhea.   Genitourinary: Negative.    Musculoskeletal: Negative for neck pain.   Skin: Negative for rash.   Neurological: Negative.  Negative for headaches.   Psychiatric/Behavioral: Negative.        Objective   Physical Exam   Constitutional: He is oriented to person, place, and time. He appears well-developed and well-nourished.   Pleasant nontoxic   HENT:   Head: Normocephalic.    Mouth/Throat: Oropharynx is clear and moist.   Turbinates congested   Eyes: Conjunctivae are normal. Pupils are equal, round, and reactive to light.   Neck: Normal range of motion. Neck supple.   Cardiovascular: Normal rate, regular rhythm and normal heart sounds.    Pulmonary/Chest: Effort normal and breath sounds normal. No respiratory distress. He has no wheezes. He has no rales.   Abdominal: Soft. Bowel sounds are normal. He exhibits no distension and no mass. There is no tenderness.   No hepato-or splenomegaly    Musculoskeletal: He exhibits no edema or tenderness.   Lymphadenopathy:     He has no cervical adenopathy.   Neurological: He is alert and oriented to person, place, and time.   Skin: Skin is warm.   Psychiatric: He has a normal mood and affect. His behavior is normal. Judgment and thought content normal.   Vitals reviewed.      Assessment/Plan   Faustino was seen today for uri and cough.    Diagnoses and all orders for this visit:    Flu  -     oseltamivir (TAMIFLU) 75 MG capsule; Take 1 capsule by mouth 2 (Two) Times a Day for 5 days.    Cough  -     POC Influenza A / B    Adrenal insufficiency                      Discharge instructions    Push fluids  Plenty rest  Tylenol 1 g every 4 hours as needed for aches or pains myalgias or fever  Double up on the Decadron per your endocrinologist instructions until you're better    If high fever chest pain shortness of breath increasing fatigue feeling worse  Urgent care ER    You should be improving in 3 or 4 days if not recheck    Any late onset worsening late onset fever is suspicious that you need a recheck or urgent care    Over-the-counter medication as needed Mucinex DM  Benadryl as needed cough congestion    Saline nasal spray as needed    Good handwashing    Avoid public settings until your without fever for 24 hours  And feeling better    Avoid a chronically ill    The very young or very old until you're better    Thank You,      James Epley,   NP

## 2018-03-08 ENCOUNTER — HOSPITAL ENCOUNTER (OUTPATIENT)
Dept: GENERAL RADIOLOGY | Facility: HOSPITAL | Age: 78
Discharge: HOME | End: 2018-03-08
Attending: SURGERY | Admitting: SURGERY

## 2018-03-08 DIAGNOSIS — Z86.79 S/P AAA (ABDOMINAL AORTIC ANEURYSM) REPAIR: ICD-10-CM

## 2018-03-08 DIAGNOSIS — Z98.890 S/P AAA (ABDOMINAL AORTIC ANEURYSM) REPAIR: ICD-10-CM

## 2018-03-08 PROCEDURE — 74021 RADEX ABDOMEN 3+ VIEWS: CPT

## 2018-03-09 ENCOUNTER — TRANSCRIBE ORDERS (OUTPATIENT)
Dept: ADMINISTRATIVE | Facility: HOSPITAL | Age: 78
End: 2018-03-09

## 2018-03-09 DIAGNOSIS — I71.40 ABDOMINAL AORTIC ANEURYSM (AAA) WITHOUT RUPTURE (HCC): Primary | ICD-10-CM

## 2018-03-14 ENCOUNTER — HOSPITAL ENCOUNTER (OUTPATIENT)
Dept: CT IMAGING | Facility: HOSPITAL | Age: 78
Discharge: HOME | End: 2018-03-14
Attending: SURGERY | Admitting: SURGERY

## 2018-03-14 DIAGNOSIS — I71.40 ABDOMINAL AORTIC ANEURYSM (AAA) WITHOUT RUPTURE (HCC): ICD-10-CM

## 2018-03-14 LAB — CREAT BLDA-MCNC: 1.1 MG/DL (ref 0.6–1.3)

## 2018-03-14 PROCEDURE — 82565 ASSAY OF CREATININE: CPT

## 2018-03-14 PROCEDURE — 0 IOPAMIDOL PER 1 ML: Performed by: SURGERY

## 2018-03-14 PROCEDURE — 71275 CT ANGIOGRAPHY CHEST: CPT

## 2018-03-14 PROCEDURE — 74174 CTA ABD&PLVS W/CONTRAST: CPT

## 2018-03-14 RX ADMIN — IOPAMIDOL 95 ML: 755 INJECTION, SOLUTION INTRAVENOUS at 15:24

## 2018-03-20 ENCOUNTER — OFFICE VISIT (OUTPATIENT)
Dept: FAMILY MEDICINE CLINIC | Facility: CLINIC | Age: 78
End: 2018-03-20

## 2018-03-20 VITALS
DIASTOLIC BLOOD PRESSURE: 74 MMHG | HEART RATE: 50 BPM | WEIGHT: 219.5 LBS | TEMPERATURE: 98.9 F | SYSTOLIC BLOOD PRESSURE: 122 MMHG | BODY MASS INDEX: 29.73 KG/M2 | OXYGEN SATURATION: 95 % | HEIGHT: 72 IN

## 2018-03-20 DIAGNOSIS — Z12.5 PROSTATE CANCER SCREENING: ICD-10-CM

## 2018-03-20 DIAGNOSIS — R06.02 SOB (SHORTNESS OF BREATH): ICD-10-CM

## 2018-03-20 DIAGNOSIS — E78.00 PURE HYPERCHOLESTEROLEMIA: Primary | ICD-10-CM

## 2018-03-20 DIAGNOSIS — I25.10 CORONARY ARTERY DISEASE DUE TO LIPID RICH PLAQUE: ICD-10-CM

## 2018-03-20 DIAGNOSIS — E03.9 ACQUIRED HYPOTHYROIDISM: ICD-10-CM

## 2018-03-20 DIAGNOSIS — I25.10 CHRONIC CORONARY ARTERY DISEASE: ICD-10-CM

## 2018-03-20 DIAGNOSIS — N42.9 DISORDER OF PROSTATE: ICD-10-CM

## 2018-03-20 DIAGNOSIS — E87.6 HYPOKALEMIA: ICD-10-CM

## 2018-03-20 DIAGNOSIS — I25.83 CORONARY ARTERY DISEASE DUE TO LIPID RICH PLAQUE: ICD-10-CM

## 2018-03-20 DIAGNOSIS — E27.40 ADRENAL INSUFFICIENCY (HCC): ICD-10-CM

## 2018-03-20 DIAGNOSIS — R73.9 HYPERGLYCEMIA: ICD-10-CM

## 2018-03-20 LAB
ALBUMIN SERPL-MCNC: 4.4 G/DL (ref 3.5–5.2)
ALBUMIN/GLOB SERPL: 1.5 G/DL
ALP SERPL-CCNC: 60 U/L (ref 39–117)
ALT SERPL-CCNC: 20 U/L (ref 1–41)
AST SERPL-CCNC: 18 U/L (ref 1–40)
BASOPHILS # BLD AUTO: 0.04 10*3/MM3 (ref 0–0.2)
BASOPHILS NFR BLD AUTO: 0.6 % (ref 0–1.5)
BILIRUB SERPL-MCNC: 0.4 MG/DL (ref 0.1–1.2)
BUN SERPL-MCNC: 20 MG/DL (ref 8–23)
BUN/CREAT SERPL: 16.8 (ref 7–25)
CALCIUM SERPL-MCNC: 9.7 MG/DL (ref 8.6–10.5)
CHLORIDE SERPL-SCNC: 101 MMOL/L (ref 98–107)
CHOLEST SERPL-MCNC: 207 MG/DL (ref 0–200)
CO2 SERPL-SCNC: 27.1 MMOL/L (ref 22–29)
CREAT SERPL-MCNC: 1.19 MG/DL (ref 0.76–1.27)
EOSINOPHIL # BLD AUTO: 0.25 10*3/MM3 (ref 0–0.7)
EOSINOPHIL NFR BLD AUTO: 3.7 % (ref 0.3–6.2)
ERYTHROCYTE [DISTWIDTH] IN BLOOD BY AUTOMATED COUNT: 13.3 % (ref 11.5–14.5)
GFR SERPLBLD CREATININE-BSD FMLA CKD-EPI: 60 ML/MIN/1.73
GFR SERPLBLD CREATININE-BSD FMLA CKD-EPI: 72 ML/MIN/1.73
GLOBULIN SER CALC-MCNC: 2.9 GM/DL
GLUCOSE SERPL-MCNC: 131 MG/DL (ref 65–99)
HBA1C MFR BLD: 6.84 % (ref 4.8–5.6)
HCT VFR BLD AUTO: 40.6 % (ref 40.4–52.2)
HDLC SERPL-MCNC: 52 MG/DL (ref 40–60)
HGB BLD-MCNC: 13.5 G/DL (ref 13.7–17.6)
IMM GRANULOCYTES # BLD: 0.02 10*3/MM3 (ref 0–0.03)
IMM GRANULOCYTES NFR BLD: 0.3 % (ref 0–0.5)
LDLC SERPL CALC-MCNC: 107 MG/DL (ref 0–100)
LYMPHOCYTES # BLD AUTO: 1.9 10*3/MM3 (ref 0.9–4.8)
LYMPHOCYTES NFR BLD AUTO: 28 % (ref 19.6–45.3)
MCH RBC QN AUTO: 34.8 PG (ref 27–32.7)
MCHC RBC AUTO-ENTMCNC: 33.3 G/DL (ref 32.6–36.4)
MCV RBC AUTO: 104.6 FL (ref 79.8–96.2)
MONOCYTES # BLD AUTO: 0.75 10*3/MM3 (ref 0.2–1.2)
MONOCYTES NFR BLD AUTO: 11.1 % (ref 5–12)
NEUTROPHILS # BLD AUTO: 3.82 10*3/MM3 (ref 1.9–8.1)
NEUTROPHILS NFR BLD AUTO: 56.3 % (ref 42.7–76)
PLATELET # BLD AUTO: 232 10*3/MM3 (ref 140–500)
POTASSIUM SERPL-SCNC: 4.2 MMOL/L (ref 3.5–5.2)
PROT SERPL-MCNC: 7.3 G/DL (ref 6–8.5)
PSA SERPL-MCNC: <0.014 NG/ML (ref 0–4)
RBC # BLD AUTO: 3.88 10*6/MM3 (ref 4.6–6)
SODIUM SERPL-SCNC: 142 MMOL/L (ref 136–145)
T4 FREE SERPL-MCNC: 1.26 NG/DL (ref 0.93–1.7)
TRIGL SERPL-MCNC: 242 MG/DL (ref 0–150)
TSH SERPL DL<=0.005 MIU/L-ACNC: 4.69 MIU/ML (ref 0.27–4.2)
URATE SERPL-MCNC: 7 MG/DL (ref 3.4–7)
VLDLC SERPL CALC-MCNC: 48.4 MG/DL (ref 5–40)
WBC # BLD AUTO: 6.78 10*3/MM3 (ref 4.5–10.7)

## 2018-03-20 PROCEDURE — 93000 ELECTROCARDIOGRAM COMPLETE: CPT | Performed by: INTERNAL MEDICINE

## 2018-03-20 PROCEDURE — 99214 OFFICE O/P EST MOD 30 MIN: CPT | Performed by: INTERNAL MEDICINE

## 2018-03-20 NOTE — PROGRESS NOTES
Procedure     ECG 12 Lead  Date/Time: 3/20/2018 11:05 AM  Performed by: NAILA DE LA O  Authorized by: NAILA DE LA O   Comparison: not compared with previous ECG   Rhythm: sinus bradycardia  Conduction: conduction normal  T depression: III  Clinical impression: abnormal ECG  Comments: Q waves in 3 no R waves in v 1 in V2

## 2018-03-20 NOTE — PROGRESS NOTES
Subjective   Patient ID: Faustino Horton Jr. is a 77 y.o. male presents with   Chief Complaint   Patient presents with   • c diff     follow up        HPI  - This patient has multiple medical problems including adrenal insufficiency, coronary artery disease prostate cancer, hypothyroidism, hyperglycemia, adrenal insufficiency.  The patient's been having some shortness of breath on exertion for the past 2 weeks.  No chest pain or diaphoresis or nausea.  I ordered an electrocardiogram it showed no acute ischemic changes.    Assessment plan    Short of breath and history of coronary disease appointment with cardiology.  If he has any problem in the meantime he should call 911.  He agreed.    Hypokalemia and adrenal insufficiency hyperglycemia we'll get a CMP and A1c    Hypothyroidism levothyroxin 150 get thyroids    Hyperlipidemia Lipitor 40 we'll get a fasting lipid profile    Disorder the prostate we'll get a PSA.    Allergies   Allergen Reactions   • Acetylcysteine Hives   • Allopurinol Hives   • Baclofen Itching   • Hydrocodone-Acetaminophen Swelling   • Levaquin [Levofloxacin] Itching       The following portions of the patient's history were reviewed and updated as appropriate: allergies, current medications, past family history, past medical history, past social history, past surgical history and problem list.      Review of Systems   Constitutional: Negative.    HENT: Negative.    Eyes: Negative.    Respiratory: Positive for shortness of breath.    Cardiovascular: Negative.    Gastrointestinal: Negative.    Endocrine: Negative.    Genitourinary: Negative.    Musculoskeletal: Negative.    Skin: Negative.    Allergic/Immunologic: Negative.    Neurological: Negative.    Hematological: Negative.    Psychiatric/Behavioral: Negative.        Objective     Vitals:    03/20/18 0913   BP: 122/74   BP Location: Left arm   Patient Position: Sitting   Cuff Size: Large Adult   Pulse: 50   Temp: 98.9 °F (37.2 °C)   TempSrc:  "Oral   SpO2: 95%   Weight: 99.6 kg (219 lb 8 oz)   Height: 182.9 cm (72.01\")         Physical Exam   Constitutional: He is oriented to person, place, and time. He appears well-developed and well-nourished. No distress.   HENT:   Head: Normocephalic and atraumatic.   Eyes: Conjunctivae and EOM are normal. Pupils are equal, round, and reactive to light. Right eye exhibits no discharge. Left eye exhibits no discharge. No scleral icterus.   Neck: Normal range of motion. Neck supple. No tracheal deviation present. No thyromegaly present.   Cardiovascular: Normal rate, regular rhythm, normal heart sounds, intact distal pulses and normal pulses.  Exam reveals no gallop.    No murmur heard.  Pulmonary/Chest: Effort normal and breath sounds normal. No respiratory distress. He has no wheezes. He has no rales.   Musculoskeletal: Normal range of motion.   Neurological: He is alert and oriented to person, place, and time. He exhibits normal muscle tone. Coordination normal.   Skin: Skin is warm. No rash noted. No erythema. No pallor.   Psychiatric: He has a normal mood and affect. His behavior is normal. Judgment and thought content normal.   Nursing note and vitals reviewed.        Faustino was seen today for c diff.    Diagnoses and all orders for this visit:    Pure hypercholesterolemia  -     Lipid Panel  -     Hemoglobin A1c  -     Comprehensive Metabolic Panel  -     CBC & Differential  -     TSH+Free T4  -     Uric Acid  -     PSA DIAGNOSTIC    Chronic coronary artery disease  -     Lipid Panel  -     Hemoglobin A1c  -     Comprehensive Metabolic Panel  -     CBC & Differential  -     TSH+Free T4  -     Uric Acid  -     PSA DIAGNOSTIC    Hypokalemia  -     Lipid Panel  -     Hemoglobin A1c  -     Comprehensive Metabolic Panel  -     CBC & Differential  -     TSH+Free T4  -     Uric Acid  -     PSA DIAGNOSTIC    Adrenal insufficiency  -     Lipid Panel  -     Hemoglobin A1c  -     Comprehensive Metabolic Panel  -     CBC & " Differential  -     TSH+Free T4  -     Uric Acid  -     PSA DIAGNOSTIC    Hyperglycemia  -     Lipid Panel  -     Hemoglobin A1c  -     Comprehensive Metabolic Panel  -     CBC & Differential  -     TSH+Free T4  -     Uric Acid  -     PSA DIAGNOSTIC    Acquired hypothyroidism  -     Lipid Panel  -     Hemoglobin A1c  -     Comprehensive Metabolic Panel  -     CBC & Differential  -     TSH+Free T4  -     Uric Acid  -     PSA DIAGNOSTIC    Prostate cancer screening  -     Lipid Panel  -     Hemoglobin A1c  -     Comprehensive Metabolic Panel  -     CBC & Differential  -     TSH+Free T4  -     Uric Acid  -     PSA DIAGNOSTIC    Disorder of prostate   -     PSA DIAGNOSTIC    SOB (shortness of breath)  -     Ambulatory Referral to Cardiology    Coronary artery disease due to lipid rich plaque  -     Ambulatory Referral to Cardiology        Call or return to clinic prn if these symptoms worsen or fail to improve as anticipated.

## 2018-06-21 ENCOUNTER — OFFICE VISIT (OUTPATIENT)
Dept: CARDIOLOGY | Facility: CLINIC | Age: 78
End: 2018-06-21

## 2018-06-21 VITALS
BODY MASS INDEX: 29.26 KG/M2 | SYSTOLIC BLOOD PRESSURE: 106 MMHG | HEIGHT: 72 IN | WEIGHT: 216 LBS | DIASTOLIC BLOOD PRESSURE: 60 MMHG | HEART RATE: 55 BPM

## 2018-06-21 DIAGNOSIS — I73.9 PERIPHERAL VASCULAR DISEASE (HCC): ICD-10-CM

## 2018-06-21 DIAGNOSIS — I25.10 CHRONIC CORONARY ARTERY DISEASE: Primary | ICD-10-CM

## 2018-06-21 DIAGNOSIS — E11.65 TYPE 2 DIABETES MELLITUS WITH HYPERGLYCEMIA, WITHOUT LONG-TERM CURRENT USE OF INSULIN (HCC): ICD-10-CM

## 2018-06-21 PROCEDURE — 93000 ELECTROCARDIOGRAM COMPLETE: CPT | Performed by: INTERNAL MEDICINE

## 2018-06-21 PROCEDURE — 99204 OFFICE O/P NEW MOD 45 MIN: CPT | Performed by: INTERNAL MEDICINE

## 2018-06-21 RX ORDER — ZOLPIDEM TARTRATE 5 MG/1
5 TABLET ORAL NIGHTLY PRN
COMMUNITY
End: 2018-11-08

## 2018-06-21 NOTE — PROGRESS NOTES
PATIENTINFORMATION    Date of Office Visit: 2018  Encounter Provider: Nan Ramachandran MD  Place of Service: [facility] CARDIOLOGY  Patient Name: Faustino Horton Jr.  : 1940    Subjective:     Encounter Date:2018      Patient ID: Faustino Horton Jr. is a 78 y.o. male.      History of Present Illness    This is a nice gentleman who was in [place] in  when he had sudden onset of chest pain and went to the hospital and was having an ST elevation myocardial infarction. He went straight to the catheter lab. His ejection fraction was 40%, left main normal, left anterior descending artery with a 99% mid-vessel stenosis which was stented. The circumflex was the dominant vessel with luminal irregularities. The right coronary artery was a medium size vessel with luminal irregularities. He had an echocardiogram in  which showed preserved LV function. No ejection fraction was given. He has a history of an abdominal aortic aneurysm and follows with Dr. Puga. He had some kind of a procedure, I am presuming a stent graft performed. He has not had open surgery. His other medical history includes adrenal insufficieny, prostate cancer, hypothyroidism, diabetes, hyperlipidemia. He has a history of hypotension and was having issues with syncope but has not had any recent problems.       Review of Systems   Constitution: Negative for fever, malaise/fatigue, weight gain and weight loss.   HENT: Negative for ear pain, hearing loss, nosebleeds and sore throat.    Eyes: Negative for double vision, pain, vision loss in left eye and vision loss in right eye.   Cardiovascular:        See history of present illness.   Respiratory: Positive for shortness of breath. Negative for cough, sleep disturbances due to breathing, snoring and wheezing.    Endocrine: Negative for cold intolerance, heat intolerance and polyuria.   Skin: Negative for itching, poor wound healing and rash.  "  Musculoskeletal: Negative for joint pain, joint swelling and myalgias.   Gastrointestinal: Negative for abdominal pain, diarrhea, hematochezia, nausea and vomiting.   Genitourinary: Negative for hematuria and hesitancy.   Neurological: Negative for numbness, paresthesias and seizures.   Psychiatric/Behavioral: Negative for depression. The patient is not nervous/anxious.            ECG 12 Lead  Date/Time: 6/21/2018 2:49 PM  Performed by: MARCIA OSORIO  Authorized by: MARCIA OSORIO   Comparison: compared with previous ECG from 3/20/2018  Similar to previous ECG  Rhythm: sinus rhythm  BPM: 55  Other findings: PRWP  Clinical impression: abnormal ECG               Objective:     /60 (BP Location: Left arm)   Pulse 55   Ht 182.9 cm (72\")   Wt 98 kg (216 lb)   BMI 29.29 kg/m²  Body mass index is 29.29 kg/m².     Physical Exam   Constitutional: He appears well-developed.   HENT:   Head: Normocephalic and atraumatic.   Eyes: Conjunctivae and lids are normal. Pupils are equal, round, and reactive to light. Lids are everted and swept, no foreign bodies found.   Neck: Normal range of motion. No JVD present. Carotid bruit is not present. No tracheal deviation present. No thyroid mass present.   Cardiovascular: Normal rate, regular rhythm and normal heart sounds.    Pulses:       Dorsalis pedis pulses are 2+ on the right side, and 2+ on the left side.   Pulmonary/Chest: Effort normal and breath sounds normal.   Abdominal: Normal appearance and bowel sounds are normal.   Musculoskeletal: Normal range of motion.   Neurological: He is alert. He has normal strength.   Skin: Skin is warm, dry and intact.   Psychiatric: He has a normal mood and affect. His behavior is normal.   Vitals reviewed.          Assessment/Plan:       1. Coronary Artery Disease  Assessment  • The patient has no angina    Plan  • Lifestyle modifications discussed include adhering to a heart healthy diet, maintenance of a healthy weight and " regular exercise    Subjective - Objective  • There is a history of past MI on or around 8/4/2004      I encouraged him to exercise on a more regular basis.  He is not on aspirin.  He was told in the past not to take it.  He is on a statin.  2.  Hyperlipidemia  3.  Diabetes  4.  Hypotension with a history of syncope  5.  Abdominal aortic aneurysm followed by Dr. Puga  6.  Adrenal insufficiency  7.  Hypothyroid.  He follows with an endocrinologist at [place]  8.  Obstructive sleep apnea.  He says he lost 30 pounds and has no more issues with that.    I will see him back in one year unless he is having problems sooner.    Orders Placed This Encounter   Procedures   • ECG 12 Lead     This order was created via procedure documentation        Discharge Medications          Accurate as of 6/21/18  3:05 PM. If you have any questions, ask your nurse or doctor.               Continue These Medications      Instructions Start Date   ACCU-CHEK FASTCLIX LANCETS misc   CHECK BLOOD SUGAR D      ACCU-CHEK SMARTVIEW test strip  Generic drug:  glucose blood   USE TO TEST BLOOD SUGAR LEVEL QD      atorvastatin 40 MG tablet  Commonly known as:  LIPITOR   40 mg, Oral, Daily      dexamethasone 0.5 MG tablet  Commonly known as:  DECADRON   0.5 mg, Oral, Daily      fludrocortisone 0.1 MG tablet   0.1 mg, Oral, Daily      gabapentin 600 MG tablet  Commonly known as:  NEURONTIN   1,200 mg, Oral, 2 Times Daily      glimepiride 1 MG tablet  Commonly known as:  AMARYL   1 mg, Oral, Every Evening      levothyroxine 150 MCG tablet  Commonly known as:  SYNTHROID, LEVOTHROID   150 mcg, Oral, Daily      potassium chloride 10 MEQ CR tablet  Commonly known as:  K-DUR   10 mEq, Oral, Daily      saccharomyces boulardii 250 MG capsule  Commonly known as:  FLORASTOR   250 mg, Oral, 2 Times Daily      vitamin B-12 500 MCG tablet  Commonly known as:  CYANOCOBALAMIN   500 mcg, Oral, Daily      zolpidem 5 MG tablet  Commonly known as:  AMBIEN   5 mg,  Oral, Nightly PRN                    Nan Ramachandran MD  06/21/18  3:05 PM

## 2018-07-02 ENCOUNTER — APPOINTMENT (OUTPATIENT)
Dept: GENERAL RADIOLOGY | Facility: HOSPITAL | Age: 78
End: 2018-07-02

## 2018-07-02 PROCEDURE — 73070 X-RAY EXAM OF ELBOW: CPT | Performed by: GENERAL PRACTICE

## 2018-11-08 ENCOUNTER — OFFICE VISIT (OUTPATIENT)
Dept: FAMILY MEDICINE CLINIC | Facility: CLINIC | Age: 78
End: 2018-11-08

## 2018-11-08 VITALS
WEIGHT: 218 LBS | OXYGEN SATURATION: 93 % | HEART RATE: 55 BPM | DIASTOLIC BLOOD PRESSURE: 76 MMHG | SYSTOLIC BLOOD PRESSURE: 143 MMHG | HEIGHT: 72 IN | BODY MASS INDEX: 29.53 KG/M2

## 2018-11-08 DIAGNOSIS — M54.12 CERVICAL RADICULOPATHY: ICD-10-CM

## 2018-11-08 DIAGNOSIS — N64.51 INDURATION OF BREAST: ICD-10-CM

## 2018-11-08 DIAGNOSIS — R06.00 DYSPNEA, UNSPECIFIED TYPE: ICD-10-CM

## 2018-11-08 DIAGNOSIS — N63.10 LUMP OF BREAST, RIGHT: Primary | ICD-10-CM

## 2018-11-08 DIAGNOSIS — Z80.3 FH: BREAST CANCER: ICD-10-CM

## 2018-11-08 PROCEDURE — 99214 OFFICE O/P EST MOD 30 MIN: CPT | Performed by: NURSE PRACTITIONER

## 2018-11-08 RX ORDER — ATORVASTATIN CALCIUM 40 MG/1
40 TABLET, FILM COATED ORAL DAILY
COMMUNITY
End: 2019-10-21

## 2018-11-08 RX ORDER — LISINOPRIL 2.5 MG/1
TABLET ORAL
COMMUNITY
Start: 2018-10-18 | End: 2019-05-23

## 2018-11-08 RX ORDER — FLUDROCORTISONE ACETATE 0.1 MG/1
TABLET ORAL
COMMUNITY
Start: 2018-10-18 | End: 2018-11-08 | Stop reason: SDUPTHER

## 2018-11-08 NOTE — PROGRESS NOTES
Subjective   Faustino Horton Jr. is a 78 y.o. male.       Patient complains lump right breast several weeks  Tenderness  No nipple discharge no redness  No fever no chills   had lumpy areas in his chest before but increased in size and tenderness presently        Mother and 2 sisters breast cancer paster    Short of breath      Shoulder pain 2 mos   Radiates from his neck down to shoulder  No weakness upper extremities  Nothing makes better or worse  No injury  No bowel or bladder change    Chronic dyspnea with exertion  No chest pain nausea vomiting with exertion           The following portions of the patient's history were reviewed and updated as appropriate: allergies, current medications, past medical history, past social history, past surgical history and problem list.    Review of Systems    Objective   Physical Exam   Constitutional: He is oriented to person, place, and time. He appears well-developed and well-nourished. No distress.   HENT:   Head: Normocephalic and atraumatic.   Nose: Nose normal.   Mouth/Throat: Oropharynx is clear and moist.   Eyes: Conjunctivae are normal. Pupils are equal, round, and reactive to light. No scleral icterus.   Neck: Neck supple. No JVD present. No thyromegaly present.   Cardiovascular: Normal rate, regular rhythm and normal heart sounds. Exam reveals no gallop and no friction rub.   No murmur heard.  Pulmonary/Chest: Effort normal and breath sounds normal. No respiratory distress. He has no wheezes. He has no rales.   Approximately two and half to 3 cm  Lump approximate 10 to 12 o'clock  Mildly tender no abcess no fluctuance no redness no swelling  No nipple discharge  No retractionor orange peel appearance       Abdominal: Soft. Bowel sounds are normal. He exhibits no distension and no mass. There is no tenderness. There is no guarding. No hernia.   Musculoskeletal: Normal range of motion. He exhibits no edema or tenderness.   Neurovascularly intact  Specifically  shoulders normal exam bilateral without pain   Lymphadenopathy:     He has no cervical adenopathy.   Neurological: He is alert and oriented to person, place, and time. He has normal reflexes.   Skin: Skin is warm and dry. No rash noted. He is not diaphoretic. No erythema.   Psychiatric: He has a normal mood and affect. His behavior is normal. Judgment and thought content normal.   Vitals reviewed.        Assessment/Plan   Faustino was seen today for right breast nipple soreness.    Diagnoses and all orders for this visit:    Lump of breast, right  -     Mammo Diagnostic Bilateral With CAD    FH: breast cancer  -     Mammo Diagnostic Bilateral With CAD    Induration of breast   -     Mammo Diagnostic Bilateral With CAD    Dyspnea, unspecified type  -     Ambulatory Referral to Pulmonology    Cervical radiculopathy  -     XR Spine Cervical 2 or 3 View  -     Ambulatory Referral to Physical Therapy                  Sample  Spiriva RESPA  Only have the lower dose 1.25  Can double up on this is a trial    Discharge instructions  Physical therapy for likely cervical radiculopathy or pinched nerve    Right breast mass  Diagnostic mammogram call for results    Dyspnea  PFT with pulmonary  Trial inhaler    2 inhalations Spiriva 1.25 µg twice a day    If doses adjusted to the higher 2.5    You will only do this once a day

## 2018-11-08 NOTE — PATIENT INSTRUCTIONS
Discharge instructions  Physical therapy for likely cervical radiculopathy or pinched nerve    Right breast mass  Diagnostic mammogram call for results    Dyspnea  PFT with pulmonary  Trial inhaler    2 inhalations Spiriva 1.25 µg twice a day    If doses adjusted to the higher 2.5    You will only do this once a day

## 2018-11-16 ENCOUNTER — TELEPHONE (OUTPATIENT)
Dept: FAMILY MEDICINE CLINIC | Facility: CLINIC | Age: 78
End: 2018-11-16

## 2018-11-16 ENCOUNTER — TREATMENT (OUTPATIENT)
Dept: PHYSICAL THERAPY | Facility: CLINIC | Age: 78
End: 2018-11-16

## 2018-11-16 DIAGNOSIS — M54.2 PAIN, NECK: Primary | ICD-10-CM

## 2018-11-16 PROCEDURE — G8979 MOBILITY GOAL STATUS: HCPCS | Performed by: PHYSICAL THERAPIST

## 2018-11-16 PROCEDURE — 97140 MANUAL THERAPY 1/> REGIONS: CPT | Performed by: PHYSICAL THERAPIST

## 2018-11-16 PROCEDURE — 97161 PT EVAL LOW COMPLEX 20 MIN: CPT | Performed by: PHYSICAL THERAPIST

## 2018-11-16 PROCEDURE — G8978 MOBILITY CURRENT STATUS: HCPCS | Performed by: PHYSICAL THERAPIST

## 2018-11-16 NOTE — PROGRESS NOTES
Physical Therapy Initial Evaluation and Plan of Care    Patient: Faustino Horton Jr.   : 1940  Diagnosis/ICD-10 Code:  Pain, neck [M54.2]  Referring practitioner: James Epley, APRN  Date of Initial Visit: 2018  Today's Date: 2018    Subjective Evaluation    History of Present Illness  Mechanism of injury: Episodes of neck pain since . Current exacerbation began 2 months ago, with bilateral neck pain left worse than right. Also pain into left shoulder, pt doesn't feel like it is his rotator cuff. Denies numbness/tingling. Pt fell on his left side around the same time that pain started.     PMH: B rotator cuff repairs       Patient Occupation: Retired, enjoys fishing Quality of life: good    Pain  Current pain rating: 3  At best pain ratin  At worst pain ratin  Location: c-spine  Alleviating factors: movement.  Exacerbated by: sitting for long periods.  Progression: worsening    Hand dominance: right    Diagnostic Tests  No diagnostic tests performed  Abnormal x-ray: ordered but not yet performed.    Treatments  Previous treatment: medication (voltaren)  Patient Goals  Patient goals for therapy: decreased pain             Objective     Special Questions      Additional Special Questions  No red flags noted      Static Posture     Head  Forward.    Shoulders  Rounded.    Postural Observations  Seated posture: fair  Correction of posture: has no consistent effect        Palpation   Left   No palpable tenderness to the lower trapezius, middle trapezius, rhomboids, scalenes and sternocleidomastoid.   Hypertonic in the cervical paraspinals, levator scapulae, suboccipitals and upper trapezius.   Tenderness of the cervical paraspinals and levator scapulae.     Right   No palpable tenderness to the lower trapezius, middle trapezius, rhomboids, scalenes and sternocleidomastoid.   Hypertonic in the cervical paraspinals, levator scapulae, suboccipitals and upper trapezius. Tenderness of  the cervical paraspinals and levator scapulae.     Tenderness   Cervical Spine   Tenderness in the facet joint.     Neurological Testing     Sensation   Cervical/Thoracic   Left   Intact: light touch    Right   Intact: light touch    Reflexes   Left   Biceps (C5/C6): normal (2+)  Triceps (C7): normal (2+)    Right   Biceps (C5/C6): normal (2+)  Triceps (C7): normal (2+)    Active Range of Motion   Cervical/Thoracic Spine   Cervical    Flexion: WFL  Extension: WFL  Left lateral flexion: 25 degrees with pain  Right lateral flexion: 30 degrees with pain  Left rotation: 61 degrees with pain  Right rotation: 55 degrees with pain  Left Shoulder   Normal active range of motion    Right Shoulder   Normal active range of motion    Strength/Myotome Testing     Left Shoulder     Planes of Motion   Abduction: 5   External rotation at 0°: 5     Isolated Muscles   Lower trapezius: 4-   Middle trapezius: 4-   Serratus anterior: 4-     Right Shoulder     Planes of Motion   Abduction: 5   External rotation at 0°: 5     Isolated Muscles   Lower trapezius: 4-   Middle trapezius: 4-   Serratus anterior: 4-     Left Elbow   Flexion: 5  Extension: 5    Right Elbow   Flexion: 5  Extension: 5    Left Wrist/Hand   Wrist extension: 5  Wrist flexion: 5    Right Wrist/Hand   Wrist extension: 5  Wrist flexion: 5    Tests   Cervical   Negative repeated extension and repeated flexion.     Left   Negative cervical distraction and Spurling's sign.     Right   Negative cervical distraction and Spurling's sign.     Thoracic   Negative slump.     Left Shoulder   Negative active compression ([place]), Adson maneuver, drop arm, empty can, full can, Hawkin's, ULTT1, ULTT2, ULTT3 and ULTT4.     Right Shoulder   Negative active compression ([place]), Adson maneuver, drop arm, empty can, full can, Hawkin's, ULTT1, ULTT2, ULTT3 and ULTT4.     Additional Tests Details  Unable to reproduce shoulder pain with special tests         Assessment & Plan      Assessment  Impairments: abnormal or restricted ROM, activity intolerance, impaired physical strength, lacks appropriate home exercise program and pain with function  Assessment details: Mr. Horton is a pleasant 78 year old male who presents with decreased cervical ROM, increased muscle tone and pain which limits his ability to perform ADL's and IADL's. Radicular pain was not reproduced in clinic, but this occurs intermittently as well. He will benefit from PT to improve postural awareness, increase functional ROM and decrease pain so that he can return to normal daily activities.     Prognosis: good  Functional Limitations: sleeping and uncomfortable because of pain  Goals  Plan Goals: Short Term Goals: 2-4 weeks  Patient will:  1. Be independent with initial HEP  2. Be instructed in posture and body mechanics  3. Demo cervical spine rotation AROM improved by 15 degrees bilaterally.    Long Term Goals: 4-6 weeks  Patient will:  1. Report pain of </= 1 with all daily activities  2. Demonstrate improved Bilateral UE/scapular MMT of >/= 4+/5 to allow for performance of ADL's/household management/recreational activities without increased symptoms.  3. Demonstrate no soft tissue restriction in involved musculature to allow for full ROM.  4. Perceived disability </=10% as measured by Neck Disability Index    Plan  Therapy options: will be seen for skilled physical therapy services  Planned modality interventions: electrical stimulation/Russian stimulation, cryotherapy, TENS, traction and ultrasound  Planned therapy interventions: postural training, body mechanics training, flexibility, functional ROM exercises, home exercise program, stretching, strengthening, spinal/joint mobilization and soft tissue mobilization  Frequency: 1-2x per week.  Duration in weeks: 8  Treatment plan discussed with: patient        Manual Therapy:    15     mins  42698;  Therapeutic Exercise:    0     mins  91489;     Neuromuscular Jake:     0    mins  77367;    Therapeutic Activity:     0     mins  38127;     Gait Trainin     mins  99569;     Ultrasound:     0     mins  29348;    Electrical Stimulation:    0     mins  27920 ( );    Timed Treatment:   15   mins   Total Treatment:     55   mins    PT SIGNATURE: Diana Chester PT, DPT          Physical Therapist                               [place] License #[id number]    DATE TREATMENT INITIATED: 2018    Initial Certification  Certification Period: 2019  I certify that the therapy services are furnished while this patient is under my care.  The services outlined above are required by this patient, and will be reviewed every 90 days.     PHYSICIAN: Epley, James, APRN      DATE:     Please sign and return via fax to [phone].. Thank you, [facility].

## 2018-11-16 NOTE — TELEPHONE ENCOUNTER
Thanks Diana!    He does not need a referral for the C-spine  I've already put the order in,  He can go ahead and get this    As far as the mammogram, [facility] referrals will call him, I'll send her a note.    Thanks again!      Dante

## 2018-11-16 NOTE — TELEPHONE ENCOUNTER
----- Message from Diana Chester, PT sent at 11/16/2018  8:48 AM EST -----  Regarding: x-ray  Good morning! I am evaluating Mr. Horton in PT per your order, I saw his order for an xray in the system. He says that he hasn't gotten a call to schedule this yet. Is that still something you would like him to have?    Thank you!  Diana

## 2018-11-19 ENCOUNTER — HOSPITAL ENCOUNTER (OUTPATIENT)
Dept: MAMMOGRAPHY | Facility: HOSPITAL | Age: 78
Discharge: HOME | End: 2018-11-19
Admitting: NURSE PRACTITIONER

## 2018-11-19 PROCEDURE — 77066 DX MAMMO INCL CAD BI: CPT

## 2018-11-30 ENCOUNTER — TREATMENT (OUTPATIENT)
Dept: PHYSICAL THERAPY | Facility: CLINIC | Age: 78
End: 2018-11-30

## 2018-11-30 ENCOUNTER — TELEPHONE (OUTPATIENT)
Dept: FAMILY MEDICINE CLINIC | Facility: CLINIC | Age: 78
End: 2018-11-30

## 2018-11-30 ENCOUNTER — HOSPITAL ENCOUNTER (OUTPATIENT)
Dept: GENERAL RADIOLOGY | Facility: HOSPITAL | Age: 78
Discharge: HOME | End: 2018-11-30
Admitting: NURSE PRACTITIONER

## 2018-11-30 DIAGNOSIS — M54.2 PAIN, NECK: Primary | ICD-10-CM

## 2018-11-30 PROCEDURE — 72040 X-RAY EXAM NECK SPINE 2-3 VW: CPT

## 2018-11-30 PROCEDURE — G0283 ELEC STIM OTHER THAN WOUND: HCPCS | Performed by: PHYSICAL THERAPIST

## 2018-11-30 PROCEDURE — 97035 APP MDLTY 1+ULTRASOUND EA 15: CPT | Performed by: PHYSICAL THERAPIST

## 2018-11-30 PROCEDURE — 97140 MANUAL THERAPY 1/> REGIONS: CPT | Performed by: PHYSICAL THERAPIST

## 2018-11-30 NOTE — TELEPHONE ENCOUNTER
Pt was informed by this office as a result of his Mammogram that you were going to refer her to a Breast specialist. He has not heard anything regarding referral.  I checked his chart and do not see that a referral was interred. Please place a referral so that get process rolling!

## 2018-12-03 ENCOUNTER — TREATMENT (OUTPATIENT)
Dept: PHYSICAL THERAPY | Facility: CLINIC | Age: 78
End: 2018-12-03

## 2018-12-03 DIAGNOSIS — M54.2 PAIN, NECK: Primary | ICD-10-CM

## 2018-12-03 PROCEDURE — 97140 MANUAL THERAPY 1/> REGIONS: CPT | Performed by: PHYSICAL THERAPIST

## 2018-12-03 PROCEDURE — 97035 APP MDLTY 1+ULTRASOUND EA 15: CPT | Performed by: PHYSICAL THERAPIST

## 2018-12-03 PROCEDURE — G0283 ELEC STIM OTHER THAN WOUND: HCPCS | Performed by: PHYSICAL THERAPIST

## 2018-12-03 NOTE — PROGRESS NOTES
" Physical Therapy Daily Progress Note    Visit #3    Subjective     Faustino Horton reports: he had x-ray done, hasn't heard from MD office yet. Current PT Rx gives him some relief, but still \"feels like something is really wrong\" with his neck.       Objective   See Exercise, Manual, and Modality Logs for complete treatment.       Assessment/Plan  X-ray report in chart, advised pt to contact MD office for results. Will continue with current treatment pending MD plan.  Progress per Plan of Care           Manual Therapy:    15     mins  32892;  Therapeutic Exercise:    0     mins  63439;     Neuromuscular Jake:    0    mins  50217;    Therapeutic Activity:     0     mins  26939;     Gait Trainin     mins  75408;     Ultrasound:     10     mins  50694;    Electrical Stimulation:    15     mins  07314 ( );    Timed Treatment:   25   mins   Total Treatment:     40   mins    Diana Chester, PT, DPT  Physical Therapist  [place] License #[id number]                    "

## 2018-12-04 ENCOUNTER — OFFICE VISIT (OUTPATIENT)
Dept: FAMILY MEDICINE CLINIC | Facility: CLINIC | Age: 78
End: 2018-12-04

## 2018-12-04 VITALS
WEIGHT: 216 LBS | HEIGHT: 72 IN | SYSTOLIC BLOOD PRESSURE: 118 MMHG | DIASTOLIC BLOOD PRESSURE: 62 MMHG | BODY MASS INDEX: 29.26 KG/M2 | HEART RATE: 61 BPM | OXYGEN SATURATION: 94 %

## 2018-12-04 DIAGNOSIS — M54.12 CERVICAL RADICULOPATHY: Primary | ICD-10-CM

## 2018-12-04 DIAGNOSIS — N62 GYNECOMASTIA: ICD-10-CM

## 2018-12-04 DIAGNOSIS — M50.30 DDD (DEGENERATIVE DISC DISEASE), CERVICAL: ICD-10-CM

## 2018-12-04 PROCEDURE — 99213 OFFICE O/P EST LOW 20 MIN: CPT | Performed by: NURSE PRACTITIONER

## 2018-12-04 NOTE — PATIENT INSTRUCTIONS
Discharge instructions    Resume physical therapy  Follow-up 1 month  If not improving, referral pain management  Will need a MRI C-spine    If weakness  Paresthesias down the lower extremities  Bowel or bladder incontinence or retention  Emergency room    [facility] referrals  We will assist you with your breast surgeon  She should call you within the next week

## 2018-12-05 DIAGNOSIS — N63.0 BREAST LUMP: Primary | ICD-10-CM

## 2018-12-05 NOTE — PROGRESS NOTES
Subjective   Faustino Horton Jr. is a 78 y.o. male.     Follow-up left neck pain radiculopathy pain rating from his left neck to left shoulder down his left arm midway  No weakness  No lower extremity paresthesias no bowel or bladder change  No recent injury    Started physical therapy, but patient says, they're not able to do anything now  He's upset because he didn't understand about the x-ray.  I ordered a C-spine and instructed him to go to  x-ray after checking out day of service  I believe he misunderstood.  He finally got his x-rays,   November 30, 2018  His C-spine reveals extensive severe degenerative disc disease with extremely decreased Disse space from C3 to C7  Spondylolisthesis as well as facet Arthurpathy  He likely will need an MRI the C-spine  Hopefully he'll respond to physical therapy    He's developed tenderness and bilateral breast mass. Recent mammogram  Consistent gynecomastia. He has tenderness.  He has quite a bit of anxiety he's worried about Breast Cancer  Family history of breast cancer  He's here with his wife today both are quite worried    Mammogram looks benign  I discussed with him previously and again today breast cancer in men is rare, but does occur  Hormonal changes, sometimes medication frequently calls breast enlargement and tenderness in men.  As he has quite a bit of worry regarding this. We discussed possibly send him to a breast surgeon, depending on his mammogram results.    He misunderstood me, and had called back wondering who he was supposed to see.  I responded promptly to his message and ordered a referral to breast surgery Dr. Mauricio  Friday after his call.    He's upset regarding getting a same day appointment today  He called this morning had some initial difficulty getting in.  We apparently called back and worked him in the schedule.    Offered to have my manager speak to him, he declines  But I did apologize for his inconvenience.                   The  following portions of the patient's history were reviewed and updated as appropriate: allergies, current medications, past family history, past medical history, past social history, past surgical history and problem list.    Review of Systems   Constitutional: Negative for fatigue and fever.   HENT: Negative.  Negative for trouble swallowing.    Eyes: Negative.    Respiratory: Negative.  Negative for cough and shortness of breath.    Cardiovascular: Negative for chest pain, palpitations and leg swelling.   Gastrointestinal: Negative.  Negative for abdominal pain.   Genitourinary: Negative.    Musculoskeletal: Positive for neck pain.   Skin: Negative.    Neurological: Negative.  Negative for dizziness.   Psychiatric/Behavioral: Negative.        Objective   Physical Exam   Constitutional: He is oriented to person, place, and time. He appears well-developed and well-nourished.   HENT:   Head: Normocephalic and atraumatic.   Eyes: Conjunctivae are normal. Pupils are equal, round, and reactive to light.   Neck: Neck supple.   Pulmonary/Chest: Effort normal.   Musculoskeletal: He exhibits no edema, tenderness or deformity.   Upper extremity  strength 5+  Full range of motion  NVI  Reflexes present  Mild to moderate decreased range of motion    No cervicale point tenderness  Shoulder without pain range-of-motion   Neurological: He is alert and oriented to person, place, and time. He displays normal reflexes. No sensory deficit. He exhibits normal muscle tone.   Skin: Skin is warm and dry. No rash noted. No erythema.   Psychiatric: He has a normal mood and affect. His behavior is normal. Judgment and thought content normal.   Vitals reviewed.        Assessment/Plan   Faustino was seen today for results for shoulder x-ray and mammogram.    Diagnoses and all orders for this visit:    Cervical radiculopathy    DDD (degenerative disc disease), cervical  Comments:  severe disease     Gynecomastia                         Discharge instructions    Resume physical therapy  Follow-up 1 month  If not improving, referral pain management  Will need a MRI C-spine    If weakness  Paresthesias down the lower extremities  Bowel or bladder incontinence or retention  Emergency room    [facility] referrals  We will assist you with your breast surgeon  She should call you within the next week      Diana,     Mr. Horton wanted to make sure you knew I have viewed his x-rays.  He has extensive severe degenerative changes of his C-spine.  Severe disc space narrowing C-III to C7. As well as some spondylolisthesis.  Hopefully his radiculopathy symptoms will improve with therapy.  He will follow-up in a month will order MRI cervical spine as well as  A referral to either a neurosurgeon or pain management?    I thought had given him specific and clear instructions last visit, he apparently got these confused. Please let me know if there's any issues or anything I can do for you.    Thanks,    rush

## 2018-12-06 ENCOUNTER — TREATMENT (OUTPATIENT)
Dept: PHYSICAL THERAPY | Facility: CLINIC | Age: 78
End: 2018-12-06

## 2018-12-06 DIAGNOSIS — M54.2 PAIN, NECK: Primary | ICD-10-CM

## 2018-12-06 PROCEDURE — G0283 ELEC STIM OTHER THAN WOUND: HCPCS | Performed by: PHYSICAL THERAPIST

## 2018-12-06 PROCEDURE — 97140 MANUAL THERAPY 1/> REGIONS: CPT | Performed by: PHYSICAL THERAPIST

## 2018-12-06 NOTE — PROGRESS NOTES
Physical Therapy Daily Progress Note    Visit #4    Subjective     Faustino Horton reports: he saw MD and got results from x-ray, MD recommends PT for 4 weeks then may refer to       Objective   See Exercise, Manual, and Modality Logs for complete treatment.   See x-ray report in chart; severe DDD    Assessment/Plan  Pt tolerated new manual therapy well without pain. Will assess effect on radicular symptoms and neck pain at next visit and modify as needed. Will also initiate scapular stabilization and c-spine AROM exercises at next visit.  Progress per Plan of Care           Manual Therapy:    25     mins  85094;  Therapeutic Exercise:    0     mins  69558;     Neuromuscular Jake:    0    mins  76395;    Therapeutic Activity:     0     mins  69846;     Gait Trainin     mins  07227;     Ultrasound:     0     mins  38636;    Electrical Stimulation:    15     mins  10175 ( );    Timed Treatment:   25   mins   Total Treatment:     40   mins    Diana Chester PT, DPT  Physical Therapist  [place] License #[id number]

## 2018-12-10 ENCOUNTER — TREATMENT (OUTPATIENT)
Dept: PHYSICAL THERAPY | Facility: CLINIC | Age: 78
End: 2018-12-10

## 2018-12-10 DIAGNOSIS — M54.2 PAIN, NECK: Primary | ICD-10-CM

## 2018-12-10 PROCEDURE — G0283 ELEC STIM OTHER THAN WOUND: HCPCS | Performed by: PHYSICAL THERAPIST

## 2018-12-10 PROCEDURE — 97140 MANUAL THERAPY 1/> REGIONS: CPT | Performed by: PHYSICAL THERAPIST

## 2018-12-10 NOTE — PROGRESS NOTES
" Physical Therapy Daily Progress Note    Visit #5    Subjective     Faustino Horton reports: treatment last session \"helped a lot\", he reports improvement in symptoms for ~24 hours following. Isn't feeling well today because of a medication he is taking.      Objective   See Exercise, Manual, and Modality Logs for complete treatment.       Assessment/Plan  Deferred initiating exercises today since pt isn't feeling well. Will start at next session if able. Increased facet motion and decreased muscle spasm noted with manual therapy today.   Progress per Plan of Care           Manual Therapy:    25     mins  40597;  Therapeutic Exercise:    0     mins  57612;     Neuromuscular Jake:    0    mins  21484;    Therapeutic Activity:     0     mins  68721;     Gait Trainin     mins  10616;     Ultrasound:     0     mins  17196;    Electrical Stimulation:    15     mins  86078 ( );    Timed Treatment:   40   mins   Total Treatment:     50   mins    Diana Chester PT, DPT  Physical Therapist  [place] License #[id number]                    "

## 2018-12-13 ENCOUNTER — TREATMENT (OUTPATIENT)
Dept: PHYSICAL THERAPY | Facility: CLINIC | Age: 78
End: 2018-12-13

## 2018-12-13 DIAGNOSIS — M54.2 PAIN, NECK: Primary | ICD-10-CM

## 2018-12-13 PROCEDURE — 97140 MANUAL THERAPY 1/> REGIONS: CPT | Performed by: PHYSICAL THERAPIST

## 2018-12-13 PROCEDURE — G0283 ELEC STIM OTHER THAN WOUND: HCPCS | Performed by: PHYSICAL THERAPIST

## 2018-12-17 ENCOUNTER — TREATMENT (OUTPATIENT)
Dept: PHYSICAL THERAPY | Facility: CLINIC | Age: 78
End: 2018-12-17

## 2018-12-17 DIAGNOSIS — M54.2 PAIN, NECK: Primary | ICD-10-CM

## 2018-12-17 PROCEDURE — G0283 ELEC STIM OTHER THAN WOUND: HCPCS | Performed by: PHYSICAL THERAPIST

## 2018-12-17 PROCEDURE — 97140 MANUAL THERAPY 1/> REGIONS: CPT | Performed by: PHYSICAL THERAPIST

## 2018-12-17 NOTE — PROGRESS NOTES
Physical Therapy Daily Progress Note    Visit #6    Subjective     Faustino Horton reports: feels overall that therapy is helping his neck, had improvement in symptoms for 2 days after last visit. Still feeling ill because of medication, does not feel like he can start exercise today.      Objective   See Exercise, Manual, and Modality Logs for complete treatment.       Assessment/Plan  Improved muscle tone and facet motion in C-spine. Will initiate exercise in clinic for scapular stabilization and C-spine ROM as able.  Progress per Plan of Care           Manual Therapy:    25     mins  03995;  Therapeutic Exercise:    0     mins  36015;     Neuromuscular Jake:    0    mins  13845;    Therapeutic Activity:     0     mins  96328;     Gait Trainin     mins  48324;     Ultrasound:     0     mins  55838;    Electrical Stimulation:    15     mins  56956 ( );    Timed Treatment:   50   mins   Total Treatment:     50   mins    Diana Chester PT, DPT  Physical Therapist  [place] License #[id number]

## 2018-12-17 NOTE — PROGRESS NOTES
Re-Assessment / Re-Certification    Patient: Faustino Horton Jr.   : 1940  Diagnosis/ICD-10 Code:  Pain, neck [M54.2]  Referring practitioner: James Epley, APRN  Date of Initial Visit: 2018  Today's Date: 2018  Patient seen for 7 sessions      Subjective:   Faustino Horton reports: he feels that he is making progress with therapy. He no longer has sharp pain, and intensity of arm pain is decreased.  Subjective Questionnaire: NDI: 16% limitation (improved from 24% at initial evaluation)  Clinical Progress: improved  Home Program Compliance: Yes  Treatment has included: manual therapy, electrical stimulation, ultrasound and moist heat    Subjective   Objective       Active Range of Motion   Cervical/Thoracic Spine   Cervical    Left lateral flexion: 38 degrees   Right lateral flexion: 37 degrees   Left rotation: 68 degrees   Right rotation: 71 degrees      Assessment/Plan  Progress toward previous goals: Partially Met     Short Term Goals: 2-4 weeks  Patient will:  1. Be independent with initial HEP (MET)  2. Be instructed in posture and body mechanics (MET)  3. Demo cervical spine rotation AROM improved by 15 degrees bilaterally. (PROGRESSING)    Long Term Goals: 4-6 weeks  Patient will:  1. Report pain of </= 1 with all daily activities (NOT MET)  2. Demonstrate improved Bilateral UE/scapular MMT of >/= 4+/5 to allow for performance of ADL's/household management/recreational activities without increased symptoms. (NOT MET)  3. Demonstrate no soft tissue restriction in involved musculature to allow for full ROM. (PROGRESSING)  4. Perceived disability </=10% as measured by Neck Disability Index (PROGRESSING)      Recommendations: Continue as planned  Timeframe: 1 month  Prognosis to achieve goals: good    PT Signature: Diana Chester, PT, DPT                         Physical Therapist                         [place] License #[id number]    Based upon review of the patient's progress and continued therapy plan, it  is my medical opinion that Faustino Horton should continue physical therapy treatment at [facility]  [address] [facility], [address] [address] [phone].    Signature: __________________________________  Epley, James, APRN    Manual Therapy:    30     mins  76730;  Therapeutic Exercise:    0     mins  37609;     Neuromuscular Jake:    0    mins  61674;    Therapeutic Activity:     0     mins  30746;     Gait Trainin     mins  29774;     Ultrasound:     0     mins  77941;    Electrical Stimulation:    15     mins  61137 ( );    Timed Treatment:   30   mins   Total Treatment:     45   mins

## 2018-12-18 ENCOUNTER — TRANSCRIBE ORDERS (OUTPATIENT)
Dept: ADMINISTRATIVE | Facility: HOSPITAL | Age: 78
End: 2018-12-18

## 2018-12-18 DIAGNOSIS — R06.02 SOB (SHORTNESS OF BREATH): Primary | ICD-10-CM

## 2018-12-20 ENCOUNTER — TREATMENT (OUTPATIENT)
Dept: PHYSICAL THERAPY | Facility: CLINIC | Age: 78
End: 2018-12-20

## 2018-12-20 DIAGNOSIS — M54.50 CHRONIC BILATERAL LOW BACK PAIN WITHOUT SCIATICA: ICD-10-CM

## 2018-12-20 DIAGNOSIS — G89.29 CHRONIC BILATERAL LOW BACK PAIN WITHOUT SCIATICA: ICD-10-CM

## 2018-12-20 DIAGNOSIS — M54.2 PAIN, NECK: Primary | ICD-10-CM

## 2018-12-20 PROCEDURE — 97140 MANUAL THERAPY 1/> REGIONS: CPT | Performed by: PHYSICAL THERAPIST

## 2018-12-20 PROCEDURE — G0283 ELEC STIM OTHER THAN WOUND: HCPCS | Performed by: PHYSICAL THERAPIST

## 2018-12-21 ENCOUNTER — OFFICE VISIT (OUTPATIENT)
Dept: MAMMOGRAPHY | Facility: CLINIC | Age: 78
End: 2018-12-21

## 2018-12-21 VITALS
SYSTOLIC BLOOD PRESSURE: 118 MMHG | HEART RATE: 48 BPM | BODY MASS INDEX: 29.08 KG/M2 | TEMPERATURE: 97.6 F | HEIGHT: 72 IN | DIASTOLIC BLOOD PRESSURE: 70 MMHG | WEIGHT: 214.7 LBS | OXYGEN SATURATION: 94 %

## 2018-12-21 DIAGNOSIS — N62 GYNECOMASTIA: Primary | ICD-10-CM

## 2018-12-21 PROCEDURE — 99213 OFFICE O/P EST LOW 20 MIN: CPT | Performed by: SURGERY

## 2018-12-21 NOTE — PROGRESS NOTES
Chief Complaint: Faustino Horton Jr. is a 78 y.o.. female here today for Breast Mass        History of Present Illness:  Patient presents with breast mass.  Is a nice 78-year-old white male with multiple medical problems.  Approximately 5 years ago he began experiencing intermittent pain behind the nipple areolar complex of the right breast.  Recently the pain has become a little more frequent and lasted a little longer.  It was also associated with a new lump.  He has had no nipple discharge and describes the pain as mostly sharp when it occurs.  The lump that he feels is approximately 2.5 cm in greatest diameter.  He has undergone imaging studies which show a lateral gynecomastia but the right being greater than the left.  I have personally reviewed those imaging studies.  He does have adrenal insufficiency and takes steroids.  He does not have a history of liver disease.  Review of his medications does not reveal any the obvious ones we typically see some associated with gynecomastia.      Review of Systems:  Review of Systems   Skin:        The patient denies any noticeable changes to the skin of the breast.    All other systems reviewed and are negative.       Past Medical and Surgical History:  Breast Biopsy History:  Patient has not had a breast biopsy in the past.  Breast Cancer HIstory:  Patient does not have a past medical history of breast cancer.  Breast Operations, and year:  none  Social History     Tobacco Use   Smoking Status Former Smoker   Smokeless Tobacco Never Used   Tobacco Comment    quit 30 years ago         Past Surgical History:   Procedure Laterality Date   • ABDOMINAL AORTIC ANEURYSM REPAIR Bilateral 09/28/2007    Bilateral femoral cut downs with diagnostic and iliofemoral arteriogram with intravascular ultrasound x5, repair of abdominal aortic aneurysm with Porex excluder aortic stent graft, bilateral iliac extension cuffs, aortic and bilateral iliac percutaneous transabdominal  angioplasty-Dr. Dillan Puga   • APPENDECTOMY N/A    • ARTERY SURGERY N/A 04/15/2008    Laparoscopic ligation of inferior mesenteric artery-Dr. Otf Ahn   • COLONOSCOPY N/A 12/06/2002    Mild proctitis, otherwise normal colonoscopy, repeat in 10 years-Dr. Jones Amaya   • COLONOSCOPY N/A 9/12/2017    Procedure: COLONOSCOPY TO CECUM WITH COLD BIOPSY AND HOT SNARE POLYPECTOMIES;  Surgeon: Mark Pike MD;  Location: [facility] ENDOSCOPY;  Service:    • CORONARY ANGIOPLASTY  08/2004   • DUPUYTREN / PALMAR FASCIOTOMY Right 02/19/2010    Small digit and palmar Dupuytren's cord resection with multiple z-plasties, resection cord in the web space and palm at the base of the 4th digit-Dr Arthur Perez   • INCISION AND DRAINAGE PERIRECTAL ABSCESS Left 7/5/2017    Procedure: INCISION AND DRAINAGE OF ARIANNE-RECTAL ABSCESS; SUPERFICIAL FISTULOTOMY;  Surgeon: Mark Pike MD;  Location: [facility] MAIN OR;  Service:    • LAPAROSCOPIC CHOLECYSTECTOMY W/ CHOLANGIOGRAPHY N/A 09/06/2009    Dr. Keagan Montemayor   • UPPER GASTROINTESTINAL ENDOSCOPY N/A 09/15/2009    Normal esophagus, gastric mucosal variant: biopsied, normal 2nd part of the duodenum: biopsied, otherwise normal exam-Dr. Keagan Montemayor       Past Medical History:   Diagnosis Date   • AAA (abdominal aortic aneurysm) (CMS/HCC)    • Acute MI (CMS/HCC)     2004 - stented   • Adrenal insufficiency (CMS/HCC)    • Arthritis    • B12 deficiency    • Back pain    • Cancer (CMS/HCC)     prostate - prostatectomy   • Coronary artery disease    • Diabetes mellitus (CMS/HCC)     oral meds   • Gout    • Hyperlipidemia    • Hypertension    • Hypotension     2-3 years ago was hospitalized 13 times in 1 year span abrupt hypotension   • Hypothyroid    • Low testosterone    • Peripheral nerve disease    • Pneumonia    • Prostate cancer (CMS/HCC)    • Sepsis (CMS/HCC)    • Shoulder pain    • Vertebral compression fracture (CMS/HCC)        Prior Hospitalizations,  other than for surgery or childbirth, and year:  Adrenal insufficiency, Pneumonia, C-Diff    Social History:  Patient is .  Patient has one daughters. and Patient has one sons.    Family History:  Family History   Problem Relation Age of Onset   • Cancer Mother    • Breast cancer Mother    • Heart disease Mother    • Hypertension Father    • Stroke Father    • Other Father         carotid disease   • Cancer Sister    • Breast cancer Sister    • Aneurysm Sister    • Hypertension Sister    • Thyroid disease Sister    • Hyperlipidemia Sister    • Diabetes Brother    • Aneurysm Brother    • Stroke Brother        Vital Signs:  Vitals:    12/21/18 1241   BP: 118/70   Pulse: (!) 48   Temp: 97.6 °F (36.4 °C)   SpO2: 94%       Medications:    Current Outpatient Prescriptions:     Current Outpatient Medications:   •  ACCU-CHEK FASTCLIX LANCETS misc, CHECK BLOOD SUGAR D, Disp: , Rfl: 3  •  ACCU-CHEK SMARTVIEW test strip, USE TO TEST BLOOD SUGAR LEVEL QD, Disp: , Rfl: 3  •  atorvastatin (LIPITOR) 40 MG tablet, Take 40 mg by mouth Daily., Disp: , Rfl:   •  dexamethasone (DECADRON) 0.5 MG tablet, Take 0.5 mg by mouth Daily., Disp: , Rfl:   •  diclofenac (VOLTAREN) 1 % gel gel, Apply 2 g topically to the appropriate area as directed., Disp: , Rfl:   •  fludrocortisone 0.1 MG tablet, Take 0.1 mg by mouth daily., Disp: , Rfl:   •  gabapentin (NEURONTIN) 600 MG tablet, Take 1,200 mg by mouth 2 (two) times a day., Disp: , Rfl:   •  glimepiride (AMARYL) 1 MG tablet, Take 1 mg by mouth Every Evening., Disp: , Rfl:   •  indomethacin (INDOCIN) 25 MG capsule, Take 1 capsule by mouth 3 (Three) Times a Day As Needed for Mild Pain ., Disp: 15 capsule, Rfl: 0  •  levothyroxine (SYNTHROID, LEVOTHROID) 150 MCG tablet, Take 150 mcg by mouth daily., Disp: , Rfl:   •  lisinopril (PRINIVIL,ZESTRIL) 2.5 MG tablet, TAKE 1 TABLET EVERY DAY, Disp: , Rfl:   •  meloxicam (MOBIC) 7.5 MG tablet, Take 1-2 tablets a day by mouth as needed for pain.  Do  not use other nonsteroidal anti-inflammatory agents while taking this medicine, Disp: 30 tablet, Rfl: 0  •  omeprazole (priLOSEC) 20 MG capsule, Take 1 capsule by mouth Daily., Disp: 21 capsule, Rfl: 0  •  potassium chloride (K-DUR) 10 MEQ CR tablet, Take 1 tablet by mouth Daily., Disp: 30 tablet, Rfl: 1  •  vitamin B-12 (CYANOCOBALAMIN) 500 MCG tablet, Take 500 mcg by mouth Daily., Disp: , Rfl:     Physical Examination:  General Appearance:   Patient is in no distress.  She is well kept and has an overweight build.   Psychiatric:  Patient with appropriate mood and affect. Alert and oriented to self, time, and place.    Breast, RIGHT:  small sized, symmetric with the contralateral side.  Breast skin is without erythema, edema, rashes.  There are no visible abnormalities upon inspection during the arm-raising maneuver or with hands on hips in the sitting position. There is no nipple retraction, discharge or nipple/areolar skin changes.There is a firm somewhat rubbery mass on the nipple areolar complex measuring 2.5 x 2 cm.  It is relatively smooth and mobile.  There are no changes detected to the nipple areolar complex.    Breast, LEFT:  small sized, symmetric with the contralateral side.  Breast skin is without erythema, edema, rashes.  There are no visible abnormalities upon inspection during the arm-raising maneuver or with hands on hips in the sitting position. There is no nipple retraction, discharge or nipple/areolar skin changes.There are no masses palpable in the sitting or supine positions.    Lymphatic:  There is no axillary, cervical, infraclavicular, or supraclavicular adenopathy bilaterally.  Eyes:  Pupils are round and reactive to light.  Cardiovascular:  Heart rate and rhythm are regular.  Respiratory:  Lungs are clear bilaterally with no crackles or wheezes in any lung field.  Gastrointestinal:  Abdomen is soft, nondistended, and nontender.  There was no evidence of hepatosplenomegaly.  There are   scars from previous surgery.    Musculoskeletal:  Good strength in all 4 extremities.   There is good range of motion in both shoulders.    Skin:  No new skin lesions or rashes on the skin excluding the breast (see breast exam above).    Assessment:  1. Gynecomastia          Plan:  I do think that the physical examination and imaging studies are most consistent with gynecomastia.  I told him that in most cases we feel that gynecomastia is secondary to a change in the ratio of testosterone to estrogen.  There are certain medications which are associated with this problem but often it is idiopathic.  Nonetheless his quality of life is not lessened by this process.  I would be in favor of just watching it.  I will recheck him in 4-6 months to make sure it is not growing.  He will also call me before then if he experiences any new symptoms or worsening of his pain.      CPT coding:    Next Appointment:  No Follow-up on file.            EMR Dragon/transcription disclaimer:    Much of this encounter note is an electronic transcription/translocation of spoken language to printed text.  The electronic translation of spoken language may permit erroneous, or at times, nonsensical words or phrases to be inadvertently transcribed.  Although I have reviewed the note from such areas, some may still exist.

## 2018-12-22 NOTE — PROGRESS NOTES
Physical Therapy Daily Progress Note    Visit #8    Abel Horton reports: no new complaints      Objective   See Exercise, Manual, and Modality Logs for complete treatment.       Assessment/Plan  Increased tone/trigger point in left levator scapulae today, improved with manual therapy.  Progress per Plan of Care           Manual Therapy:    25     mins  47087;  Therapeutic Exercise:    0     mins  37183;     Neuromuscular Jake:    0    mins  34815;    Therapeutic Activity:     0     mins  28750;     Gait Trainin     mins  52324;     Ultrasound:     0     mins  35134;    Electrical Stimulation:    15     mins  79241 ( );    Timed Treatment:   25   mins   Total Treatment:     40   mins    Diana Chester PT, DPT  Physical Therapist  [place] License #[id number]

## 2019-01-03 ENCOUNTER — OFFICE VISIT (OUTPATIENT)
Dept: FAMILY MEDICINE CLINIC | Facility: CLINIC | Age: 79
End: 2019-01-03

## 2019-01-03 VITALS
SYSTOLIC BLOOD PRESSURE: 120 MMHG | RESPIRATION RATE: 17 BRPM | BODY MASS INDEX: 29.39 KG/M2 | WEIGHT: 217 LBS | HEIGHT: 72 IN | TEMPERATURE: 97.7 F | HEART RATE: 54 BPM | DIASTOLIC BLOOD PRESSURE: 66 MMHG | OXYGEN SATURATION: 98 %

## 2019-01-03 DIAGNOSIS — M50.30 DDD (DEGENERATIVE DISC DISEASE), CERVICAL: ICD-10-CM

## 2019-01-03 DIAGNOSIS — M54.12 CERVICAL RADICULOPATHY: Primary | ICD-10-CM

## 2019-01-03 PROCEDURE — 99213 OFFICE O/P EST LOW 20 MIN: CPT | Performed by: NURSE PRACTITIONER

## 2019-01-03 NOTE — PROGRESS NOTES
Subjective   Faustino Horton Jr. is a 78 y.o. male.     Follow-up cervical radiculopathy  Moderate severe degenerative disc disease  Patient's had 50% of more improvement left cervical radiculopathy pain much better much less frequent  No weakness  No fever no chills no bowel or bladder change  Doesn't think he needs an MRI at this time  Would like to continue physical therapy    Recently saw pulmonary has some fluid on his lungs according to Dr. Benitez  Has an echocardiogram scheduled  Primary function test showed 12% improvement with bronchodilator  Will take a albuterol as needed  CBC shows some anemia macrocytic takes B12 for deficiency  Intermittently 1-3 beers  some days  Repeat CBC next visit       Neck Pain    Pertinent negatives include no chest pain.        The following portions of the patient's history were reviewed and updated as appropriate: allergies, current medications, past family history, past medical history, past social history, past surgical history and problem list.    Review of Systems   Respiratory: Negative for shortness of breath.    Cardiovascular: Negative for chest pain, palpitations and leg swelling.   Musculoskeletal: Positive for neck pain.   All other systems reviewed and are negative.      Objective   Physical Exam   Constitutional: He is oriented to person, place, and time. He appears well-developed and well-nourished. No distress.   HENT:   Head: Normocephalic and atraumatic.   Nose: Nose normal.   Mouth/Throat: Oropharynx is clear and moist.   Eyes: Conjunctivae are normal. Pupils are equal, round, and reactive to light. No scleral icterus.   Neck: Neck supple. No JVD present. No thyromegaly present.   Cardiovascular: Normal rate, regular rhythm and normal heart sounds. Exam reveals no gallop and no friction rub.   No murmur heard.  Pulmonary/Chest: Effort normal and breath sounds normal. No respiratory distress. He has no wheezes. He has no rales.   Abdominal: Soft. Bowel  sounds are normal. He exhibits no distension and no mass. There is no tenderness. There is no guarding. No hernia.   Musculoskeletal: He exhibits no edema or tenderness.   Lymphadenopathy:     He has no cervical adenopathy.   Neurological: He is alert and oriented to person, place, and time. He has normal reflexes.   Skin: Skin is warm and dry. No rash noted. He is not diaphoretic. No erythema.   Psychiatric: He has a normal mood and affect. His behavior is normal. Judgment and thought content normal.   Vitals reviewed.        Assessment/Plan   Faustino was seen today for neck pain.    Diagnoses and all orders for this visit:    Cervical radiculopathy  -     Ambulatory Referral to Physical Therapy Evaluate and treat    DDD (degenerative disc disease), cervical  -     Ambulatory Referral to Physical Therapy Evaluate and treat                  LDL recommend less than 70  Consider 80 mg Lipitor  Follow-up physical therapy  Any worsening from this point on should trigger MRI C-spine  Or does not continue to improve  He does not wish to have this at this time  Recheck in 6 weeks  CBC next visit for anemia    CBC  TIBC  Reticular count  Immunofixation electrophoresis serum and random urine

## 2019-01-07 ENCOUNTER — APPOINTMENT (OUTPATIENT)
Dept: GENERAL RADIOLOGY | Facility: HOSPITAL | Age: 79
End: 2019-01-07

## 2019-01-07 ENCOUNTER — HOSPITAL ENCOUNTER (INPATIENT)
Facility: HOSPITAL | Age: 79
LOS: 3 days | Discharge: HOME | End: 2019-01-10
Attending: EMERGENCY MEDICINE | Admitting: INTERNAL MEDICINE

## 2019-01-07 ENCOUNTER — APPOINTMENT (OUTPATIENT)
Dept: CT IMAGING | Facility: HOSPITAL | Age: 79
End: 2019-01-07

## 2019-01-07 DIAGNOSIS — A41.9 SEPTIC SHOCK (HCC): Primary | ICD-10-CM

## 2019-01-07 DIAGNOSIS — D72.819 LEUKOPENIA, UNSPECIFIED TYPE: ICD-10-CM

## 2019-01-07 DIAGNOSIS — R65.21 SEPTIC SHOCK (HCC): Primary | ICD-10-CM

## 2019-01-07 DIAGNOSIS — I95.9 HYPOTENSION, UNSPECIFIED HYPOTENSION TYPE: ICD-10-CM

## 2019-01-07 LAB
ALBUMIN SERPL-MCNC: 3.4 G/DL (ref 3.5–5.2)
ALBUMIN/GLOB SERPL: 1.2 G/DL
ALP SERPL-CCNC: 73 U/L (ref 39–117)
ALT SERPL W P-5'-P-CCNC: 105 U/L (ref 1–41)
AMORPH URATE CRY URNS QL MICRO: ABNORMAL /HPF
ANION GAP SERPL CALCULATED.3IONS-SCNC: 15.4 MMOL/L
AST SERPL-CCNC: 149 U/L (ref 1–40)
BACTERIA UR QL AUTO: ABNORMAL /HPF
BASOPHILS # BLD AUTO: 0 10*3/MM3 (ref 0–0.2)
BASOPHILS NFR BLD AUTO: 0 % (ref 0–1.5)
BILIRUB SERPL-MCNC: 0.4 MG/DL (ref 0.1–1.2)
BILIRUB UR QL STRIP: NEGATIVE
BUN BLD-MCNC: 21 MG/DL (ref 8–23)
BUN/CREAT SERPL: 11.3 (ref 7–25)
CALCIUM SPEC-SCNC: 8.4 MG/DL (ref 8.6–10.5)
CHLORIDE SERPL-SCNC: 104 MMOL/L (ref 98–107)
CLARITY UR: ABNORMAL
CO2 SERPL-SCNC: 22.6 MMOL/L (ref 22–29)
COLOR UR: ABNORMAL
CORTIS SERPL-MCNC: 7.07 MCG/DL
CREAT BLD-MCNC: 1.86 MG/DL (ref 0.76–1.27)
CRP SERPL-MCNC: 1.96 MG/DL (ref 0–0.5)
D-LACTATE SERPL-SCNC: 4.3 MMOL/L (ref 0.5–2)
DEPRECATED RDW RBC AUTO: 46.3 FL (ref 37–54)
EOSINOPHIL # BLD AUTO: 0.03 10*3/MM3 (ref 0–0.7)
EOSINOPHIL NFR BLD AUTO: 1 % (ref 0.3–6.2)
ERYTHROCYTE [DISTWIDTH] IN BLOOD BY AUTOMATED COUNT: 12.7 % (ref 11.5–14.5)
ERYTHROCYTE [SEDIMENTATION RATE] IN BLOOD: 8 MM/HR (ref 0–20)
GFR SERPL CREATININE-BSD FRML MDRD: 35 ML/MIN/1.73
GLOBULIN UR ELPH-MCNC: 2.8 GM/DL
GLUCOSE BLD-MCNC: 83 MG/DL (ref 65–99)
GLUCOSE BLDC GLUCOMTR-MCNC: 237 MG/DL (ref 70–130)
GLUCOSE BLDC GLUCOMTR-MCNC: 87 MG/DL (ref 70–130)
GLUCOSE UR STRIP-MCNC: NEGATIVE MG/DL
GRAN CASTS URNS QL MICRO: ABNORMAL /LPF
HCT VFR BLD AUTO: 39.7 % (ref 40.4–52.2)
HGB BLD-MCNC: 13.4 G/DL (ref 13.7–17.6)
HGB UR QL STRIP.AUTO: NEGATIVE
HOLD SPECIMEN: NORMAL
HYALINE CASTS UR QL AUTO: ABNORMAL /LPF
IMM GRANULOCYTES # BLD AUTO: 0.04 10*3/MM3 (ref 0–0.03)
IMM GRANULOCYTES NFR BLD AUTO: 1.4 % (ref 0–0.5)
INR PPP: 1.08 (ref 0.9–1.1)
KETONES UR QL STRIP: ABNORMAL
LEUKOCYTE ESTERASE UR QL STRIP.AUTO: NEGATIVE
LIPASE SERPL-CCNC: 27 U/L (ref 13–60)
LYMPHOCYTES # BLD AUTO: 0.31 10*3/MM3 (ref 0.9–4.8)
LYMPHOCYTES NFR BLD AUTO: 10.8 % (ref 19.6–45.3)
MAGNESIUM SERPL-MCNC: 1.9 MG/DL (ref 1.6–2.4)
MCH RBC QN AUTO: 33.6 PG (ref 27–32.7)
MCHC RBC AUTO-ENTMCNC: 33.8 G/DL (ref 32.6–36.4)
MCV RBC AUTO: 99.5 FL (ref 79.8–96.2)
MONOCYTES # BLD AUTO: 0.01 10*3/MM3 (ref 0.2–1.2)
MONOCYTES NFR BLD AUTO: 0.3 % (ref 5–12)
NEUTROPHILS # BLD AUTO: 2.51 10*3/MM3 (ref 1.9–8.1)
NEUTROPHILS NFR BLD AUTO: 87.9 % (ref 42.7–76)
NITRITE UR QL STRIP: NEGATIVE
PH UR STRIP.AUTO: <=5 [PH] (ref 5–8)
PLATELET # BLD AUTO: 140 10*3/MM3 (ref 140–500)
PMV BLD AUTO: 10.2 FL (ref 6–12)
POTASSIUM BLD-SCNC: 3.2 MMOL/L (ref 3.5–5.2)
PROCALCITONIN SERPL-MCNC: 15.68 NG/ML (ref 0.1–0.25)
PROT SERPL-MCNC: 6.2 G/DL (ref 6–8.5)
PROT UR QL STRIP: ABNORMAL
PROTHROMBIN TIME: 13.8 SECONDS (ref 11.7–14.2)
RBC # BLD AUTO: 3.99 10*6/MM3 (ref 4.6–6)
RBC # UR: ABNORMAL /HPF
REF LAB TEST METHOD: ABNORMAL
SODIUM BLD-SCNC: 142 MMOL/L (ref 136–145)
SP GR UR STRIP: >=1.03 (ref 1–1.03)
SQUAMOUS #/AREA URNS HPF: ABNORMAL /HPF
T4 FREE SERPL-MCNC: 0.9 NG/DL (ref 0.93–1.7)
TROPONIN T SERPL-MCNC: <0.01 NG/ML (ref 0–0.03)
TSH SERPL DL<=0.05 MIU/L-ACNC: 15.7 MIU/ML (ref 0.27–4.2)
UROBILINOGEN UR QL STRIP: ABNORMAL
WBC NRBC COR # BLD: 2.86 10*3/MM3 (ref 4.5–10.7)
WBC UR QL AUTO: ABNORMAL /HPF

## 2019-01-07 PROCEDURE — 84443 ASSAY THYROID STIM HORMONE: CPT | Performed by: EMERGENCY MEDICINE

## 2019-01-07 PROCEDURE — 25010000002 PIPERACILLIN SOD-TAZOBACTAM PER 1 G: Performed by: EMERGENCY MEDICINE

## 2019-01-07 PROCEDURE — 83605 ASSAY OF LACTIC ACID: CPT | Performed by: EMERGENCY MEDICINE

## 2019-01-07 PROCEDURE — 93010 ELECTROCARDIOGRAM REPORT: CPT | Performed by: INTERNAL MEDICINE

## 2019-01-07 PROCEDURE — 80053 COMPREHEN METABOLIC PANEL: CPT | Performed by: EMERGENCY MEDICINE

## 2019-01-07 PROCEDURE — 83735 ASSAY OF MAGNESIUM: CPT | Performed by: EMERGENCY MEDICINE

## 2019-01-07 PROCEDURE — 87086 URINE CULTURE/COLONY COUNT: CPT | Performed by: INTERNAL MEDICINE

## 2019-01-07 PROCEDURE — 87040 BLOOD CULTURE FOR BACTERIA: CPT | Performed by: EMERGENCY MEDICINE

## 2019-01-07 PROCEDURE — 25010000002 VANCOMYCIN 10 G RECONSTITUTED SOLUTION: Performed by: EMERGENCY MEDICINE

## 2019-01-07 PROCEDURE — 85610 PROTHROMBIN TIME: CPT | Performed by: EMERGENCY MEDICINE

## 2019-01-07 PROCEDURE — 36415 COLL VENOUS BLD VENIPUNCTURE: CPT | Performed by: EMERGENCY MEDICINE

## 2019-01-07 PROCEDURE — 71045 X-RAY EXAM CHEST 1 VIEW: CPT

## 2019-01-07 PROCEDURE — 84439 ASSAY OF FREE THYROXINE: CPT | Performed by: EMERGENCY MEDICINE

## 2019-01-07 PROCEDURE — 25010000002 HYDROCORTISONE SODIUM SUCCINATE 100 MG RECONSTITUTED SOLUTION: Performed by: INTERNAL MEDICINE

## 2019-01-07 PROCEDURE — 84484 ASSAY OF TROPONIN QUANT: CPT | Performed by: EMERGENCY MEDICINE

## 2019-01-07 PROCEDURE — 25010000002 HEPARIN (PORCINE) PER 1000 UNITS: Performed by: INTERNAL MEDICINE

## 2019-01-07 PROCEDURE — 83690 ASSAY OF LIPASE: CPT | Performed by: EMERGENCY MEDICINE

## 2019-01-07 PROCEDURE — 25010000003 HYDROCORTISONE SOD SUCCINATE PF 250 MG RECONSTITUTED SOLUTION: Performed by: EMERGENCY MEDICINE

## 2019-01-07 PROCEDURE — 84145 PROCALCITONIN (PCT): CPT | Performed by: EMERGENCY MEDICINE

## 2019-01-07 PROCEDURE — 82962 GLUCOSE BLOOD TEST: CPT

## 2019-01-07 PROCEDURE — 99291 CRITICAL CARE FIRST HOUR: CPT

## 2019-01-07 PROCEDURE — 25010000002 CEFTRIAXONE PER 250 MG: Performed by: INTERNAL MEDICINE

## 2019-01-07 PROCEDURE — 86140 C-REACTIVE PROTEIN: CPT | Performed by: EMERGENCY MEDICINE

## 2019-01-07 PROCEDURE — 85652 RBC SED RATE AUTOMATED: CPT | Performed by: EMERGENCY MEDICINE

## 2019-01-07 PROCEDURE — 81001 URINALYSIS AUTO W/SCOPE: CPT | Performed by: EMERGENCY MEDICINE

## 2019-01-07 PROCEDURE — 25010000002 IOPAMIDOL 61 % SOLUTION: Performed by: EMERGENCY MEDICINE

## 2019-01-07 PROCEDURE — 82533 TOTAL CORTISOL: CPT | Performed by: EMERGENCY MEDICINE

## 2019-01-07 PROCEDURE — 74177 CT ABD & PELVIS W/CONTRAST: CPT

## 2019-01-07 PROCEDURE — 93005 ELECTROCARDIOGRAM TRACING: CPT | Performed by: EMERGENCY MEDICINE

## 2019-01-07 PROCEDURE — 85025 COMPLETE CBC W/AUTO DIFF WBC: CPT | Performed by: EMERGENCY MEDICINE

## 2019-01-07 RX ORDER — DEXTROSE MONOHYDRATE 25 G/50ML
25 INJECTION, SOLUTION INTRAVENOUS
Status: DISCONTINUED | OUTPATIENT
Start: 2019-01-07 | End: 2019-01-10 | Stop reason: HOSPADM

## 2019-01-07 RX ORDER — GLIPIZIDE 5 MG/1
2.5 TABLET ORAL
Status: DISCONTINUED | OUTPATIENT
Start: 2019-01-08 | End: 2019-01-10 | Stop reason: HOSPADM

## 2019-01-07 RX ORDER — ATORVASTATIN CALCIUM 20 MG/1
40 TABLET, FILM COATED ORAL DAILY
Status: DISCONTINUED | OUTPATIENT
Start: 2019-01-08 | End: 2019-01-10 | Stop reason: HOSPADM

## 2019-01-07 RX ORDER — GABAPENTIN 400 MG/1
1200 CAPSULE ORAL EVERY 12 HOURS SCHEDULED
Status: DISCONTINUED | OUTPATIENT
Start: 2019-01-07 | End: 2019-01-10 | Stop reason: HOSPADM

## 2019-01-07 RX ORDER — NOREPINEPHRINE BIT/0.9 % NACL 8 MG/250ML
.02-.3 INFUSION BOTTLE (ML) INTRAVENOUS
Status: DISCONTINUED | OUTPATIENT
Start: 2019-01-07 | End: 2019-01-09

## 2019-01-07 RX ORDER — SODIUM CHLORIDE 9 MG/ML
125 INJECTION, SOLUTION INTRAVENOUS CONTINUOUS
Status: DISCONTINUED | OUTPATIENT
Start: 2019-01-07 | End: 2019-01-09

## 2019-01-07 RX ORDER — LEVOTHYROXINE SODIUM 0.15 MG/1
150 TABLET ORAL
Status: DISCONTINUED | OUTPATIENT
Start: 2019-01-08 | End: 2019-01-08

## 2019-01-07 RX ORDER — NICOTINE POLACRILEX 4 MG
15 LOZENGE BUCCAL
Status: DISCONTINUED | OUTPATIENT
Start: 2019-01-07 | End: 2019-01-10 | Stop reason: HOSPADM

## 2019-01-07 RX ORDER — CEFTRIAXONE SODIUM 1 G/50ML
1 INJECTION, SOLUTION INTRAVENOUS EVERY 24 HOURS
Status: DISCONTINUED | OUTPATIENT
Start: 2019-01-07 | End: 2019-01-08

## 2019-01-07 RX ORDER — HEPARIN SODIUM 5000 [USP'U]/ML
5000 INJECTION, SOLUTION INTRAVENOUS; SUBCUTANEOUS EVERY 8 HOURS SCHEDULED
Status: DISCONTINUED | OUTPATIENT
Start: 2019-01-07 | End: 2019-01-10 | Stop reason: HOSPADM

## 2019-01-07 RX ADMIN — CEFTRIAXONE SODIUM 1 G: 1 INJECTION, SOLUTION INTRAVENOUS at 21:00

## 2019-01-07 RX ADMIN — GABAPENTIN 1200 MG: 400 CAPSULE ORAL at 20:53

## 2019-01-07 RX ADMIN — HEPARIN SODIUM 5000 UNITS: 5000 INJECTION INTRAVENOUS; SUBCUTANEOUS at 22:30

## 2019-01-07 RX ADMIN — TAZOBACTAM SODIUM AND PIPERACILLIN SODIUM 3.38 G: 375; 3 INJECTION, SOLUTION INTRAVENOUS at 17:05

## 2019-01-07 RX ADMIN — VANCOMYCIN HYDROCHLORIDE 2000 MG: 10 INJECTION, POWDER, LYOPHILIZED, FOR SOLUTION INTRAVENOUS at 17:15

## 2019-01-07 RX ADMIN — IOPAMIDOL 85 ML: 612 INJECTION, SOLUTION INTRAVENOUS at 16:34

## 2019-01-07 RX ADMIN — SODIUM CHLORIDE 2952 ML: 9 INJECTION, SOLUTION INTRAVENOUS at 15:35

## 2019-01-07 RX ADMIN — NOREPINEPHRINE BITARTRATE 0.02 MCG/KG/MIN: 8 SOLUTION at 16:45

## 2019-01-07 RX ADMIN — HYDROCORTISONE SODIUM SUCCINATE 100 MG: 100 INJECTION, POWDER, FOR SOLUTION INTRAMUSCULAR; INTRAVENOUS at 21:00

## 2019-01-07 RX ADMIN — HYDROCORTISONE SODIUM SUCCINATE 250 MG: 250 INJECTION, POWDER, FOR SOLUTION INTRAMUSCULAR; INTRAVENOUS at 15:53

## 2019-01-07 RX ADMIN — SODIUM CHLORIDE 125 ML/HR: 9 INJECTION, SOLUTION INTRAVENOUS at 17:48

## 2019-01-07 NOTE — ED PROVIDER NOTES
" EMERGENCY DEPARTMENT ENCOUNTER    CHIEF COMPLAINT  Chief Complaint: Near - Syncope  History given by: pt and EMS  History limited by: none  Room Number: 42/42  PMD: Epley, James, APRN  Cardiology: Dr Ramachandran    HPI:  Pt is a 78 y.o. male who presents complaining of near syncopal episode today while standing after using the bathroom PTA per EMS. Pt admits to fecal incontinence, back pain (x 4 days), pain in posterior bilateral legs (x 4 days), and abdominal cramping but denies any black or bloody stool. Pt admits to a history of adrenal insuffiencey and reports he always takes his medication for it but was unable to take it this morning. Pt reports he had AAA repaired in 2005.Pt admits that he has had episodes of hypotension in the past but is unsure as to why the episodes occur.    Duration:  PTA  Onset: sudden  Timing: constant  Quality: \"near-syncope\"  Intensity/Severity: moderate  Progression: resolved  Associated Symptoms: fecal incontinence, pain in posterior bilateral legs (x 4 days), back pain (x 4 days), abdominal cramping  Aggravating Factors: none  Alleviating Factors: none  Previous Episodes: none  Treatment before arrival: none    PAST MEDICAL HISTORY  Active Ambulatory Problems     Diagnosis Date Noted   • Chronic coronary artery disease 04/17/2016   • Gout 04/17/2016   • Hyperlipidemia 04/17/2016   • Hypothyroidism 04/17/2016   • Peripheral vascular disease (CMS/[facility]) 04/17/2016   • Adrenal insufficiency (CMS/[facility]) 05/17/2016   • Spondylosis of lumbar region without myelopathy or radiculopathy 03/02/2017   • Sepsis (CMS/[facility]) 06/24/2017   • Pleural effusions (right > left) 06/26/2017   • C. difficile colitis 07/05/2017   • Abscess, perirectal 07/06/2017   • Diabetes mellitus with hyperglycemia (CMS/HCC) 07/06/2017   • Dehydration 07/26/2017   • Hypokalemia 08/03/2017   • Health care maintenance 09/19/2017   • Hyperglycemia 03/20/2018   • Prostate cancer screening 03/20/2018   • Cervical radiculopathy " 12/04/2018   • DDD (degenerative disc disease), cervical 12/04/2018     Resolved Ambulatory Problems     Diagnosis Date Noted   • Sepsis associated hypotension (CMS/HCC) 05/07/2016   • Bacterial pneumonia 05/08/2016   • Wheezing 05/08/2016   • Acute kidney injury (CMS/HCC) 05/08/2016   • Pneumonia of right lung due to infectious organism 05/17/2016   • Bronchitis 10/31/2016   • Nausea & vomiting 06/24/2017   • Hypotension 06/24/2017   • Community acquired bacterial pneumonia 06/24/2017   • Septic embolism (CMS/HCC) 06/24/2017   • Hypokalemia 06/26/2017   • Sinus bradycardia 06/26/2017   • Perirectal abscess 09/05/2017     Past Medical History:   Diagnosis Date   • AAA (abdominal aortic aneurysm) (CMS/HCC)    • Acute MI (CMS/HCC)    • Adrenal insufficiency (CMS/HCC)    • Arthritis    • B12 deficiency    • Back pain    • Cancer (CMS/HCC)    • Coronary artery disease    • Diabetes mellitus (CMS/HCC)    • Gout    • Hyperlipidemia    • Hypertension    • Hypotension    • Hypothyroid    • Low testosterone    • Peripheral nerve disease    • Pneumonia    • Prostate cancer (CMS/HCC)    • Sepsis (CMS/HCC)    • Shoulder pain    • Vertebral compression fracture (CMS/HCC)        PAST SURGICAL HISTORY  Past Surgical History:   Procedure Laterality Date   • ABDOMINAL AORTIC ANEURYSM REPAIR Bilateral 09/28/2007    Bilateral femoral cut downs with diagnostic and iliofemoral arteriogram with intravascular ultrasound x5, repair of abdominal aortic aneurysm with Porex excluder aortic stent graft, bilateral iliac extension cuffs, aortic and bilateral iliac percutaneous transabdominal angioplasty-Dr. Dillan Puga   • APPENDECTOMY N/A    • ARTERY SURGERY N/A 04/15/2008    Laparoscopic ligation of inferior mesenteric artery-Dr. Otf Ahn   • COLONOSCOPY N/A 12/06/2002    Mild proctitis, otherwise normal colonoscopy, repeat in 10 years-Dr. Jones Amaya   • COLONOSCOPY N/A 9/12/2017    Procedure: COLONOSCOPY TO CECUM WITH COLD BIOPSY  AND HOT SNARE POLYPECTOMIES;  Surgeon: Mark Pike MD;  [place] [facility] [place];  Service:    • CORONARY ANGIOPLASTY  08/2004   • DUPUYTREN / PALMAR FASCIOTOMY Right 02/19/2010    Small digit and palmar Dupuytren's cord resection with multiple z-plasties, resection cord in the web space and palm at the base of the 4th digit-Dr Arthur Perez   • INCISION AND DRAINAGE PERIRECTAL ABSCESS Left 7/5/2017    Procedure: INCISION AND DRAINAGE OF ARIANNE-RECTAL ABSCESS; SUPERFICIAL FISTULOTOMY;  Surgeon: Mark Pike MD;  [place] [facility] [place] [facility] [place];  Service:    • LAPAROSCOPIC CHOLECYSTECTOMY W/ CHOLANGIOGRAPHY N/A 09/06/2009    Dr. Keagan Montemayor   • UPPER GASTROINTESTINAL ENDOSCOPY N/A 09/15/2009    Normal esophagus, gastric mucosal variant: biopsied, normal 2nd part of the duodenum: biopsied, otherwise normal exam-Dr. Keagan Montemayor       FAMILY HISTORY  Family History   Problem Relation Age of Onset   • Cancer Mother    • Breast cancer Mother    • Heart disease Mother    • Hypertension Father    • Stroke Father    • Other Father         carotid disease   • Cancer Sister    • Breast cancer Sister    • Aneurysm Sister    • Hypertension Sister    • Thyroid disease Sister    • Hyperlipidemia Sister    • Diabetes Brother    • Aneurysm Brother    • Stroke Brother        SOCIAL HISTORY  Social History     Socioeconomic History   • Marital status:      Spouse name: Not on file   • Number of children: Not on file   • Years of education: Not on file   • Highest education level: Not on file   Social Needs   • Financial resource strain: Not on file   • Food insecurity - worry: Not on file   • Food insecurity - inability: Not on file   • Transportation needs - medical: Not on file   • Transportation needs - non-medical: Not on file   Occupational History   • Not on file   Tobacco Use   • Smoking status: Former Smoker   • Smokeless tobacco: Never Used   • Tobacco comment: quit 30 years ago  "  Substance and Sexual Activity   • Alcohol use: Yes     Comment: 2-3 beers daily/Caffeine use   • Drug use: No   • Sexual activity: Defer   Other Topics Concern   • Not on file   Social History Narrative   • Not on file       ALLERGIES  Losartan; Acetylcysteine; Allopurinol; Baclofen; Hydrocodone-acetaminophen; Levaquin [levofloxacin]; Nsaids; and Oxycodone    REVIEW OF SYSTEMS  Review of Systems   Constitutional: Negative for activity change, appetite change and fever.   HENT: Negative for congestion and sore throat.    Eyes: Negative.    Respiratory: Negative for cough and shortness of breath.    Cardiovascular: Negative for chest pain and leg swelling.   Gastrointestinal: Positive for abdominal pain (\"cramping\"). Negative for blood in stool, diarrhea and vomiting.   Endocrine: Negative.    Genitourinary: Negative for decreased urine volume and dysuria.        Fecal incontinence   Musculoskeletal: Positive for back pain (x 4 days) and myalgias (bilateral posterior legs x 4 days). Negative for neck pain.   Skin: Negative for rash and wound.   Allergic/Immunologic: Negative.    Neurological: Positive for syncope (near). Negative for weakness, numbness and headaches.   Hematological: Negative.    Psychiatric/Behavioral: Negative.    All other systems reviewed and are negative.      PHYSICAL EXAM  ED Triage Vitals [01/07/19 1512]   Temp Heart Rate Resp BP SpO2   96.2 °F (35.7 °C) 60 -- (!) 80/55 99 %      Temp src Heart Rate Source Patient Position BP Location FiO2 (%)   Tympanic -- Sitting -- --       Physical Exam   Constitutional: He is oriented to person, place, and time. He appears distressed (mild).   HENT:   Head: Normocephalic and atraumatic.   Mouth/Throat: Mucous membranes are dry.   Eyes: EOM are normal. Pupils are equal, round, and reactive to light.   Neck: Normal range of motion. Neck supple.   Cardiovascular: Normal rate, regular rhythm, normal heart sounds, intact distal pulses and normal pulses.   HR " 60   Pulmonary/Chest: Effort normal and breath sounds normal. No respiratory distress.   Abdominal: Soft. Bowel sounds are hypoactive. There is no tenderness. There is no rebound and no guarding.   Musculoskeletal: Normal range of motion. He exhibits no edema (pedal).   Neurological: He is alert and oriented to person, place, and time. He has normal sensation and normal strength.   Skin: Skin is dry. No erythema.   Cool to the touch   Psychiatric: Mood and affect normal.   Nursing note and vitals reviewed.      LAB RESULTS  Lab Results (last 24 hours)     Procedure Component Value Units Date/Time    CBC & Differential [[id number]] Collected:  01/07/19 1535    Specimen:  Blood Updated:  01/07/19 3553    Narrative:       The following orders were created for panel order CBC & Differential.  Procedure                               Abnormality         Status                     ---------                               -----------         ------                     CBC Auto Differential[[id number]]        Abnormal            Final result                 Please view results for these tests on the individual orders.    Comprehensive Metabolic Panel [[id number]]  (Abnormal) Collected:  01/07/19 1535    Specimen:  Blood Updated:  01/07/19 1622     Glucose 83 mg/dL      BUN 21 mg/dL      Creatinine 1.86 mg/dL      Sodium 142 mmol/L      Potassium 3.2 mmol/L      Chloride 104 mmol/L      CO2 22.6 mmol/L      Calcium 8.4 mg/dL      Total Protein 6.2 g/dL      Albumin 3.40 g/dL      ALT (SGPT) 105 U/L      AST (SGOT) 149 U/L      Alkaline Phosphatase 73 U/L      Total Bilirubin 0.4 mg/dL      eGFR Non African Amer 35 mL/min/1.73      Globulin 2.8 gm/dL      A/G Ratio 1.2 g/dL      BUN/Creatinine Ratio 11.3     Anion Gap 15.4 mmol/L     Narrative:       The MDRD GFR formula is only valid for adults with stable renal function between ages 18 and 70.    Protime-INR [[id number]]  (Normal) Collected:  01/07/19 1535    Specimen:  Blood  "Updated:  01/07/19 1609     Protime 13.8 Seconds      INR 1.08    Lipase [[id number]]  (Normal) Collected:  01/07/19 1535    Specimen:  Blood Updated:  01/07/19 1622     Lipase 27 U/L     Troponin [[id number]]  (Normal) Collected:  01/07/19 1535    Specimen:  Blood Updated:  01/07/19 1628     Troponin T <0.010 ng/mL     Narrative:       Troponin T Reference Ranges:  Less than 0.03 ng/mL:    Negative for AMI  0.03 to 0.09 ng/mL:      Indeterminant for AMI  Greater than 0.09 ng/mL: Positive for AMI    Blood Culture - Blood, Arm, Right [[id number]] Collected:  01/07/19 1535    Specimen:  Blood from Arm, Right Updated:  01/07/19 1540    Blood Culture - Blood, Arm, Left [[id number]] Collected:  01/07/19 1535    Specimen:  Blood from Arm, Left Updated:  01/07/19 1540    Procalcitonin [[id number]]  (Abnormal) Collected:  01/07/19 1535    Specimen:  Blood Updated:  01/07/19 1650     Procalcitonin 15.68 ng/mL     Narrative:       As a Marker for Sepsis (Non-Neonates):   1. <0.5 ng/mL represents a low risk of severe sepsis and/or septic shock.  1. >2 ng/mL represents a high risk of severe sepsis and/or septic shock.    As a Marker for Lower Respiratory Tract Infections that require antibiotic therapy:  PCT on Admission     Antibiotic Therapy             6-12 Hrs later  > 0.5                Strongly Recommended            >0.25 - <0.5         Recommended  0.1 - 0.25           Discouraged                   Remeasure/reassess PCT  <0.1                 Strongly Discouraged          Remeasure/reassess PCT      As 28 day mortality risk marker: \"Change in Procalcitonin Result\" (> 80 % or <=80 %) if Day 0 (or Day 1) and Day 4 values are available. Refer to http://www.[facility]-pct-calculator.com/   Change in PCT <=80 %   A decrease of PCT levels below or equal to 80 % defines a positive change in PCT test result representing a higher risk for 28-day all-cause mortality of patients diagnosed with severe sepsis or septic shock.  Change in PCT " > 80 %   A decrease of PCT levels of more than 80 % defines a negative change in PCT result representing a lower risk for 28-day all-cause mortality of patients diagnosed with severe sepsis or septic shock.                Magnesium [[id number]]  (Normal) Collected:  01/07/19 1535    Specimen:  Blood Updated:  01/07/19 1622     Magnesium 1.9 mg/dL     TSH [[id number]]  (Abnormal) Collected:  01/07/19 1535    Specimen:  Blood Updated:  01/07/19 1628     TSH 15.700 mIU/mL     T4, Free [[id number]]  (Abnormal) Collected:  01/07/19 1535    Specimen:  Blood Updated:  01/07/19 1628     Free T4 0.90 ng/dL     Cortisol [[id number]]  (Normal) Collected:  01/07/19 1535    Specimen:  Blood Updated:  01/07/19 1628     Cortisol 7.07 mcg/dL     Narrative:       Cortisol Reference Ranges:    Cortisol 6AM - 10AM Range: 6.02-18.40 mcg/dl  Cortisol 4PM - 8PM Range: 2.68-10.50 mcg/dl  Critical AM/PM:    Less than 2 mcg/dl    CBC Auto Differential [[id number]]  (Abnormal) Collected:  01/07/19 1535    Specimen:  Blood Updated:  01/07/19 1553     WBC 2.86 10*3/mm3      RBC 3.99 10*6/mm3      Hemoglobin 13.4 g/dL      Hematocrit 39.7 %      MCV 99.5 fL      MCH 33.6 pg      MCHC 33.8 g/dL      RDW 12.7 %      RDW-SD 46.3 fl      MPV 10.2 fL      Platelets 140 10*3/mm3      Neutrophil % 87.9 %      Lymphocyte % 10.8 %      Monocyte % 0.3 %      Eosinophil % 1.0 %      Basophil % 0.0 %      Immature Grans % 1.4 %      Neutrophils, Absolute 2.51 10*3/mm3      Lymphocytes, Absolute 0.31 10*3/mm3      Monocytes, Absolute 0.01 10*3/mm3      Eosinophils, Absolute 0.03 10*3/mm3      Basophils, Absolute 0.00 10*3/mm3      Immature Grans, Absolute 0.04 10*3/mm3     Sedimentation Rate [[id number]]  (Normal) Collected:  01/07/19 1535    Specimen:  Blood Updated:  01/07/19 1730     Sed Rate 8 mm/hr     C-reactive Protein [[id number]]  (Abnormal) Collected:  01/07/19 1535    Specimen:  Blood Updated:  01/07/19 1736     C-Reactive Protein 1.96 mg/dL     Lactic  Acid, Plasma [[id number]]  (Abnormal) Collected:  01/07/19 1536    Specimen:  Blood Updated:  01/07/19 1617     Lactate 4.3 mmol/L     Lactic Acid, Reflex Timer (This will reflex a repeat order 3-3:15 hours after ordered.) [[id number]] Collected:  01/07/19 1536    Specimen:  Blood Updated:  01/07/19 1617          I ordered the above labs and reviewed the results    RADIOLOGY  XR Chest 1 View   Final Result   No acute disease.       This report was finalized on 1/7/2019 4:34 PM by Dr. Keagan Duke M.D.          CT Abdomen Pelvis With Contrast    (Results Pending)      CT abd/pelvis shows increased bilateral perinephric stranding without hydronephrosis that could be c/w pyelonephritis. Pt's bladder is decompressed. DJD of L-Spine with canal narrowing at L3-4 is noted. AAA is stable.    I ordered the above noted radiological studies. Interpreted by radiologist. Discussed with radiologist (Dr. Daniel). Reviewed by me in PACS.       PROCEDURES  Critical Care  Performed by: Keagan Cast MD  Authorized by: Keagan Cast MD     Critical care provider statement:     Critical care time (minutes):  45    Critical care time was exclusive of:  Separately billable procedures and treating other patients    Critical care was necessary to treat or prevent imminent or life-threatening deterioration of the following conditions:  Sepsis and shock    Critical care was time spent personally by me on the following activities:  Obtaining history from patient or surrogate, examination of patient, evaluation of patient's response to treatment, discussions with consultants, development of treatment plan with patient or surrogate, blood draw for specimens, ordering and performing treatments and interventions, ordering and review of laboratory studies, ordering and review of radiographic studies, pulse oximetry, re-evaluation of patient's condition and review of old charts          EKG    EKG time: 1555  Rhythm/Rate: NSR, 63  Low  voltage  PRWP  No Acute Ischemia  Non-Specific ST-T changes    unchanged compared to prior in June 2017    Interpreted Contemporaneously by me.  Independently viewed by me    PROGRESS AND CONSULTS       1527  Ordered labs, abd/pel CT, chest XR, and EKG. Ordered 30mL/kg sepsis IVF bolus and hydrocortisone due to pt adrenal insuffiencey.     1549  Nurse reports pt is now complaining of blurry vision. Nurse reports pt has not been given hydrocortisone yet. Rechecked patient who is complaining of nausea. Pt blood pressure is now 82/51. Discussed plan to give hydrocortisone STAT. Pt understands and agrees with the plan. All questions have been answered.    1640- Per RN, the pt's BP=75/33 after IVF bolus. Ordered levophed.     1653- Ordered zosyn and vancomycin for sepsis.    1700- Ordered Sed rate and CRP for further evaluation.    1717- Placed call to pulmonology for admission.    1725- Discussed the pt's case with Dr. Henderson (pulmonology), who agrees to admit the pt to the ICU.    1732- Rechecked pt. Pt continues to complain of bilateral leg pain but denies any urinary symptoms. BP=97/64. On re-exam, the pt has intact distal pulses, distal capillary refill is <2 seconds and his skin is warm to the touch. Pt is A+OX3, heart is RRR in the 70's and there is no erythema, warmth or signs of infection. Notified pt and family of the pt's lab and imaging results, including the pt's elevated lactic acid, elevated procalcitonin and CT abd/pelvis results, and that I need a urine sample from the patient. Discussed the plan to admit the pt for IV abx, further evaluation and treatment. Pt and family agree with the plan and all questions were addressed.    SEPTIC SHOCK FOCUSED EXAM ATTESTATION    I attest that I have reassessed tissue perfusion after the fluid bolus given.    Dr. Keagan Cast  01/07/19  5:34 PM    MEDICAL DECISION MAKING  Results were reviewed/discussed with the patient and they were also made aware of online  access. Pt also made aware that some labs, such as cultures, will not be resulted during ER visit and follow up with PMD is necessary.     MDM  Number of Diagnoses or Management Options  Hypotension, unspecified hypotension type:   Leukopenia, unspecified type:   Septic shock (CMS/HCC):      Amount and/or Complexity of Data Reviewed  Clinical lab tests: ordered and reviewed (Lactic acid=4.3, procalcitonin=15.68)  Tests in the radiology section of CPT®: ordered and reviewed (CT abd/pelvis shows increased bilateral perinephric stranding without hydronephrosis that could be c/w pyelonephritis. Pt's bladder is decompressed. DJD of L-Spine with canal narrowing at L3-4 is noted. AAA is stable.)  Tests in the medicine section of CPT®: ordered and reviewed (See EKG procedure note)  Discussion of test results with the performing providers: yes (Dr. Daniel)  Decide to obtain previous medical records or to obtain history from someone other than the patient: yes  Obtain history from someone other than the patient: yes (EMS, family)  Review and summarize past medical records: yes (Seen by PCP four days ago for cervical radiculopathy.)  Discuss the patient with other providers: yes (Dr. Henderson (pulmonology))  Independent visualization of images, tracings, or specimens: yes    Critical Care  Total time providing critical care: 30-74 minutes         DIAGNOSIS  Final diagnoses:   Septic shock (CMS/HCC)   Leukopenia, unspecified type   Hypotension, unspecified hypotension type       DISPOSITION  ADMISSION    Discussed treatment plan and reason for admission with pt/family and admitting physician.  Pt/family voiced understanding of the plan for admission for further testing/treatment as needed.     Latest Documented Vital Signs:  As of 5:41 PM  BP- 97/64 HR- 70 Temp- 97.8 °F (36.6 °C) (Oral) O2 sat- 94%    --  Documentation assistance provided by candida Bryant and Zhanna Mckee for Dr Cast.  Information recorded by  the scribe was done at my direction and has been verified and validated by me.       Zhanna Mckee  01/07/19 1553       Chelsea Bryant  01/07/19 1741       Keagan Cast MD  01/07/19 204

## 2019-01-07 NOTE — ED NOTES
Pt attempting to provide urine sample, pt refusing straight cath at this time     Dimas, Jacklyn, BLANCA  01/07/19 4032

## 2019-01-08 LAB
ALBUMIN SERPL-MCNC: 3 G/DL (ref 3.5–5.2)
ALBUMIN/GLOB SERPL: 1 G/DL
ALP SERPL-CCNC: 55 U/L (ref 39–117)
ALT SERPL W P-5'-P-CCNC: 73 U/L (ref 1–41)
ANION GAP SERPL CALCULATED.3IONS-SCNC: 11 MMOL/L
AST SERPL-CCNC: 63 U/L (ref 1–40)
BILIRUB SERPL-MCNC: 0.3 MG/DL (ref 0.1–1.2)
BUN BLD-MCNC: 19 MG/DL (ref 8–23)
BUN/CREAT SERPL: 14.4 (ref 7–25)
CALCIUM SPEC-SCNC: 7.5 MG/DL (ref 8.6–10.5)
CHLORIDE SERPL-SCNC: 107 MMOL/L (ref 98–107)
CO2 SERPL-SCNC: 20 MMOL/L (ref 22–29)
CREAT BLD-MCNC: 1.32 MG/DL (ref 0.76–1.27)
D-LACTATE SERPL-SCNC: 3.5 MMOL/L (ref 0.5–2)
DEPRECATED RDW RBC AUTO: 46.8 FL (ref 37–54)
ERYTHROCYTE [DISTWIDTH] IN BLOOD BY AUTOMATED COUNT: 12.9 % (ref 11.5–14.5)
GFR SERPL CREATININE-BSD FRML MDRD: 53 ML/MIN/1.73
GLOBULIN UR ELPH-MCNC: 3 GM/DL
GLUCOSE BLD-MCNC: 192 MG/DL (ref 65–99)
GLUCOSE BLDC GLUCOMTR-MCNC: 161 MG/DL (ref 70–130)
GLUCOSE BLDC GLUCOMTR-MCNC: 173 MG/DL (ref 70–130)
GLUCOSE BLDC GLUCOMTR-MCNC: 197 MG/DL (ref 70–130)
GLUCOSE BLDC GLUCOMTR-MCNC: 239 MG/DL (ref 70–130)
GLUCOSE BLDC GLUCOMTR-MCNC: 285 MG/DL (ref 70–130)
HCT VFR BLD AUTO: 36.1 % (ref 40.4–52.2)
HGB BLD-MCNC: 12.2 G/DL (ref 13.7–17.6)
MCH RBC QN AUTO: 33.6 PG (ref 27–32.7)
MCHC RBC AUTO-ENTMCNC: 33.8 G/DL (ref 32.6–36.4)
MCV RBC AUTO: 99.4 FL (ref 79.8–96.2)
PLATELET # BLD AUTO: 148 10*3/MM3 (ref 140–500)
PMV BLD AUTO: 10.4 FL (ref 6–12)
POTASSIUM BLD-SCNC: 4.2 MMOL/L (ref 3.5–5.2)
PROCALCITONIN SERPL-MCNC: 11.49 NG/ML (ref 0.1–0.25)
PROT SERPL-MCNC: 6 G/DL (ref 6–8.5)
RBC # BLD AUTO: 3.63 10*6/MM3 (ref 4.6–6)
SODIUM BLD-SCNC: 138 MMOL/L (ref 136–145)
WBC NRBC COR # BLD: 10.09 10*3/MM3 (ref 4.5–10.7)

## 2019-01-08 PROCEDURE — 25010000003 CEFTRIAXONE PER 250 MG: Performed by: INTERNAL MEDICINE

## 2019-01-08 PROCEDURE — 99223 1ST HOSP IP/OBS HIGH 75: CPT | Performed by: INTERNAL MEDICINE

## 2019-01-08 PROCEDURE — 25010000002 HYDROCORTISONE SODIUM SUCCINATE 100 MG RECONSTITUTED SOLUTION: Performed by: INTERNAL MEDICINE

## 2019-01-08 PROCEDURE — 83605 ASSAY OF LACTIC ACID: CPT | Performed by: EMERGENCY MEDICINE

## 2019-01-08 PROCEDURE — 85027 COMPLETE CBC AUTOMATED: CPT | Performed by: INTERNAL MEDICINE

## 2019-01-08 PROCEDURE — 84145 PROCALCITONIN (PCT): CPT | Performed by: INTERNAL MEDICINE

## 2019-01-08 PROCEDURE — 86622 BRUCELLA ANTIBODY: CPT | Performed by: INTERNAL MEDICINE

## 2019-01-08 PROCEDURE — 63710000001 INSULIN LISPRO (HUMAN) PER 5 UNITS: Performed by: INTERNAL MEDICINE

## 2019-01-08 PROCEDURE — 25010000002 HEPARIN (PORCINE) PER 1000 UNITS: Performed by: INTERNAL MEDICINE

## 2019-01-08 PROCEDURE — 25010000002 VANCOMYCIN PER 500 MG: Performed by: INTERNAL MEDICINE

## 2019-01-08 PROCEDURE — 25010000002 VANCOMYCIN 10 G RECONSTITUTED SOLUTION: Performed by: INTERNAL MEDICINE

## 2019-01-08 PROCEDURE — 82962 GLUCOSE BLOOD TEST: CPT

## 2019-01-08 PROCEDURE — 80053 COMPREHEN METABOLIC PANEL: CPT | Performed by: INTERNAL MEDICINE

## 2019-01-08 RX ORDER — PANTOPRAZOLE SODIUM 40 MG/1
40 TABLET, DELAYED RELEASE ORAL
Status: DISCONTINUED | OUTPATIENT
Start: 2019-01-08 | End: 2019-01-10 | Stop reason: HOSPADM

## 2019-01-08 RX ORDER — SACCHAROMYCES BOULARDII 250 MG
500 CAPSULE ORAL 2 TIMES DAILY
Status: DISCONTINUED | OUTPATIENT
Start: 2019-01-08 | End: 2019-01-10 | Stop reason: HOSPADM

## 2019-01-08 RX ORDER — LEVOTHYROXINE SODIUM 0.1 MG/1
200 TABLET ORAL
Status: DISCONTINUED | OUTPATIENT
Start: 2019-01-09 | End: 2019-01-10

## 2019-01-08 RX ORDER — CEFTRIAXONE SODIUM 2 G/50ML
2 INJECTION, SOLUTION INTRAVENOUS EVERY 24 HOURS
Status: COMPLETED | OUTPATIENT
Start: 2019-01-08 | End: 2019-01-10

## 2019-01-08 RX ORDER — VANCOMYCIN HYDROCHLORIDE 1 G/200ML
1000 INJECTION, SOLUTION INTRAVENOUS EVERY 12 HOURS
Status: DISCONTINUED | OUTPATIENT
Start: 2019-01-08 | End: 2019-01-10

## 2019-01-08 RX ORDER — HYDROCODONE BITARTRATE AND ACETAMINOPHEN 7.5; 325 MG/1; MG/1
1 TABLET ORAL EVERY 6 HOURS PRN
Status: DISCONTINUED | OUTPATIENT
Start: 2019-01-08 | End: 2019-01-10 | Stop reason: HOSPADM

## 2019-01-08 RX ADMIN — INSULIN LISPRO 10 UNITS: 100 INJECTION, SOLUTION INTRAVENOUS; SUBCUTANEOUS at 01:22

## 2019-01-08 RX ADMIN — INSULIN LISPRO 11 UNITS: 100 INJECTION, SOLUTION INTRAVENOUS; SUBCUTANEOUS at 05:00

## 2019-01-08 RX ADMIN — HYDROCORTISONE SODIUM SUCCINATE 100 MG: 100 INJECTION, POWDER, FOR SOLUTION INTRAMUSCULAR; INTRAVENOUS at 03:00

## 2019-01-08 RX ADMIN — INSULIN LISPRO 3 UNITS: 100 INJECTION, SOLUTION INTRAVENOUS; SUBCUTANEOUS at 08:29

## 2019-01-08 RX ADMIN — GABAPENTIN 1200 MG: 400 CAPSULE ORAL at 08:29

## 2019-01-08 RX ADMIN — Medication 500 MG: at 12:06

## 2019-01-08 RX ADMIN — ATORVASTATIN CALCIUM 40 MG: 20 TABLET, FILM COATED ORAL at 08:29

## 2019-01-08 RX ADMIN — HYDROCORTISONE SODIUM SUCCINATE 100 MG: 100 INJECTION, POWDER, FOR SOLUTION INTRAMUSCULAR; INTRAVENOUS at 08:29

## 2019-01-08 RX ADMIN — INSULIN LISPRO 16 UNITS: 100 INJECTION, SOLUTION INTRAVENOUS; SUBCUTANEOUS at 20:51

## 2019-01-08 RX ADMIN — VANCOMYCIN HYDROCHLORIDE 1000 MG: 1 INJECTION, SOLUTION INTRAVENOUS at 23:54

## 2019-01-08 RX ADMIN — VANCOMYCIN HYDROCHLORIDE 1750 MG: 10 INJECTION, POWDER, LYOPHILIZED, FOR SOLUTION INTRAVENOUS at 12:10

## 2019-01-08 RX ADMIN — Medication 500 MG: at 20:49

## 2019-01-08 RX ADMIN — GLIPIZIDE 2.5 MG: 5 TABLET ORAL at 06:53

## 2019-01-08 RX ADMIN — GABAPENTIN 1200 MG: 400 CAPSULE ORAL at 20:49

## 2019-01-08 RX ADMIN — LEVOTHYROXINE SODIUM 150 MCG: 150 TABLET ORAL at 06:51

## 2019-01-08 RX ADMIN — CEFTRIAXONE SODIUM 2 G: 2 INJECTION, SOLUTION INTRAVENOUS at 12:06

## 2019-01-08 RX ADMIN — PANTOPRAZOLE SODIUM 40 MG: 40 TABLET, DELAYED RELEASE ORAL at 12:10

## 2019-01-08 RX ADMIN — HYDROCORTISONE SODIUM SUCCINATE 50 MG: 100 INJECTION, POWDER, FOR SOLUTION INTRAMUSCULAR; INTRAVENOUS at 16:12

## 2019-01-08 RX ADMIN — INSULIN LISPRO 6 UNITS: 100 INJECTION, SOLUTION INTRAVENOUS; SUBCUTANEOUS at 12:06

## 2019-01-08 RX ADMIN — HEPARIN SODIUM 5000 UNITS: 5000 INJECTION INTRAVENOUS; SUBCUTANEOUS at 16:12

## 2019-01-08 RX ADMIN — HEPARIN SODIUM 5000 UNITS: 5000 INJECTION INTRAVENOUS; SUBCUTANEOUS at 21:00

## 2019-01-08 RX ADMIN — HYDROCODONE BITARTRATE AND ACETAMINOPHEN 1 TABLET: 7.5; 325 TABLET ORAL at 16:12

## 2019-01-08 RX ADMIN — HEPARIN SODIUM 5000 UNITS: 5000 INJECTION INTRAVENOUS; SUBCUTANEOUS at 06:53

## 2019-01-08 RX ADMIN — INSULIN LISPRO 4 UNITS: 100 INJECTION, SOLUTION INTRAVENOUS; SUBCUTANEOUS at 16:44

## 2019-01-08 NOTE — PROGRESS NOTES
"[facility] PULMONARY CARE         Dr Stanley Sayied   LOS: 1 day   Patient Care Team:  Epley, James, APRN as PCP - General (Family Medicine)    Chief Complaint: Severe sepsis with septic shock likely pyelonephritis underlying history of adrenal insufficiency and secondary acute kidney injury and shock liver.    Interval History: Events noted chart reviewed.  Feels better this morning.  Denies any issues this morning.    REVIEW OF SYSTEMS:   CARDIOVASCULAR: No chest pain, chest pressure or chest discomfort. No palpitations or edema.   RESPIRATORY: No shortness of breath, cough or sputum.   GASTROINTESTINAL: No anorexia, nausea, vomiting or diarrhea. No abdominal pain or blood.   HEMATOLOGIC: No bleeding or bruising.     Ventilator/Non-Invasive Ventilation Settings (From admission, onward)    None            Vital Signs  Temp:  [96.2 °F (35.7 °C)-98.1 °F (36.7 °C)] 97.7 °F (36.5 °C)  Heart Rate:  [51-75] 59  Resp:  [20-22] 20  BP: ()/() 125/73    Intake/Output Summary (Last 24 hours) at 1/8/2019 0910  Last data filed at 1/8/2019 0900  Gross per 24 hour   Intake 1861 ml   Output 1000 ml   Net 861 ml     Flowsheet Rows      First Filed Value   Admission Height  182.9 cm (72\") Documented at 01/07/2019 1512   Admission Weight  104 kg (228 lb 4.8 oz) Documented at 01/07/2019 1512          Physical Exam:   General Appearance:    Alert, cooperative, in no acute distress   Lungs:     Clear to auscultation,respirations regular, even and                  unlabored    Heart:    Regular rhythm and normal rate, normal S1 and S2, no            murmur, no gallop, no rub, no click   Chest Wall:    No abnormalities observed   Abdomen:     Normal bowel sounds, no masses, no organomegaly, soft        non-tender, non-distended, no guarding, no rebound                tenderness   Extremities:   Moves all extremities well, no edema, no cyanosis, no             redness     Results Review:        Results from last 7 days "   Lab Units  01/08/19   0449  01/07/19   1535   SODIUM mmol/L  138  142   POTASSIUM mmol/L  4.2  3.2*   CHLORIDE mmol/L  107  104   CO2 mmol/L  20.0*  22.6   BUN mg/dL  19  21   CREATININE mg/dL  1.32*  1.86*   GLUCOSE mg/dL  192*  83   CALCIUM mg/dL  7.5*  8.4*     Results from last 7 days   Lab Units  01/07/19   1535   TROPONIN T ng/mL  <0.010     Results from last 7 days   Lab Units  01/08/19   0449  01/07/19   1535   WBC 10*3/mm3  10.09  2.86*   HEMOGLOBIN g/dL  12.2*  13.4*   HEMATOCRIT %  36.1*  39.7*   PLATELETS 10*3/mm3  148  140     Results from last 7 days   Lab Units  01/07/19   1535   INR   1.08         Results from last 7 days   Lab Units  01/07/19   1535   MAGNESIUM mg/dL  1.9               I reviewed the patient's new clinical results.  I personally viewed and interpreted the patient's CXR        Medication Review:     atorvastatin 40 mg Oral Daily   ceftriaxone 1 g Intravenous Q24H   gabapentin 1,200 mg Oral Q12H   glipiZIDE 2.5 mg Oral QAM AC   heparin (porcine) 5,000 Units Subcutaneous Q8H   hydrocortisone sodium succinate 100 mg Intravenous Q6H   insulin lispro 0-20 Units Subcutaneous Q4H   levothyroxine 150 mcg Oral Q AM         norepinephrine 0.02-0.3 mcg/kg/min Last Rate: Stopped (01/08/19 0200)   Sodium chloride 125 mL/hr Last Rate: 125 mL/hr (01/07/19 1748)       ASSESSMENT:   PCCM Problems  Septic shock  Pyelonephritis likely  Exacerbation of known adrenal insufficiency  Unusual leg pains for past 3 days, ?  Epidural process  Acute kidney injury  Acute hypokalemia  Elevated LFTs, ? shock liver  Relative leukopenia  Relevant Medical Diagnoses  MARIA TERESA, intolerant of CPAP  Diabetes mellitus 2  Hypothyroidism  Known adrenal insufficiency   Previous C. difficile infection  CAD with previous stent          PLAN:  Shock improved with fluids and hydrocortisone  Continue antibiotics.  Await ID input  Likely source pyelonephritis  Wean down steroid as tolerated  Endocrine consult for adrenal  insufficiency  Creatinine improved  Replace electrolytes  Leukopenia improved with steroids  Discussed plan of care with the patient  Clinically much improved  We will transfer patient out of the ICU today    Addendum  Discussed with infectious diseases Dr. Rey.  Plans for MRI of the back and neck rule out epidural abscess.  In the next 24 hours    Lizeth Lara MD  01/08/19  9:10 AM

## 2019-01-08 NOTE — CONSULTS
78 y.o.  Patient Care Team:  Epley, James, APRN as PCP - General (Family Medicine)      Chief Complaint   Patient presents with   • Syncope   • Hypotension   Adrenal insufficiency and hypothyroidism    HPI   Patient is a 78-year-old white male admitted to the intensive care unit for sudden onset weakness and lightheadedness.  Patient is presyncopal at home    Patient reported that he woke up in the morning and felt very weak and did not take his dexamethasone in the morning  Subsequently a few hours later he tried to go to the bathroom and could not get up from the commode and felt lightheaded and fell to the floor  She did not lose consciousness  Patient was brought to the emergency room and was hypotensive and received IV fluids and also received Levophed  Patient has history of adrenal insufficiency and received a 250 mg hydrocortisone IV with significant improvement in symptoms  Patient is currently on hydrocortisone 100 mg IV every 6 hours    Patient reports that he was diagnosed with adrenal insufficiency approximately 10 years ago  He subsequently went to a [facility] and is being managed by endocrinology at [place]  His currently getting dexamethasone 0.5 mg daily in the morning  He also gets fludrocortisone 50 µg daily in the morning  His thyroid dose was adjusted within the past 6 months  He is currently getting 150 µg daily  The dose was reduced from 175 µg 6 months ago    Patient is currently being suspected to have urinary tract infection is being treated accordingly  Patient also has type 2 diabetes mellitus is currently on medication  Patient is a smoker but quit smoking several years ago  He has obstructive sleep apnea but was not able to tolerate CPAP and returned the equipment      Past Medical History:   Diagnosis Date   • AAA (abdominal aortic aneurysm) (CMS/[facility])    • Acute MI (CMS/HCC)     2004 - stented   • Adrenal insufficiency (CMS/[facility])    • Arthritis    • B12 deficiency    • Back pain    •  Cancer (CMS/HCC)     prostate - prostatectomy   • Coronary artery disease    • Diabetes mellitus (CMS/HCC)     oral meds   • Gout    • Hyperlipidemia    • Hypertension    • Hypotension     2-3 years ago was hospitalized 13 times in 1 year span abrupt hypotension   • Hypothyroid    • Low testosterone    • Peripheral nerve disease    • Pneumonia    • Prostate cancer (CMS/HCC)    • Sepsis (CMS/HCC)    • Shoulder pain    • Vertebral compression fracture (CMS/HCC)        Family History   Problem Relation Age of Onset   • Cancer Mother    • Breast cancer Mother    • Heart disease Mother    • Hypertension Father    • Stroke Father    • Other Father         carotid disease   • Cancer Sister    • Breast cancer Sister    • Aneurysm Sister    • Hypertension Sister    • Thyroid disease Sister    • Hyperlipidemia Sister    • Diabetes Brother    • Aneurysm Brother    • Stroke Brother        Social History     Socioeconomic History   • Marital status:      Spouse name: Not on file   • Number of children: Not on file   • Years of education: Not on file   • Highest education level: Not on file   Social Needs   • Financial resource strain: Not on file   • Food insecurity - worry: Not on file   • Food insecurity - inability: Not on file   • Transportation needs - medical: Not on file   • Transportation needs - non-medical: Not on file   Occupational History   • Not on file   Tobacco Use   • Smoking status: Former Smoker   • Smokeless tobacco: Never Used   • Tobacco comment: quit 30 years ago   Substance and Sexual Activity   • Alcohol use: Yes     Comment: 2-3 beers daily/Caffeine use   • Drug use: No   • Sexual activity: Defer   Other Topics Concern   • Not on file   Social History Narrative   • Not on file       Allergies   Allergen Reactions   • Losartan Other (See Comments)     Other reaction(s): facial swelling, itching   • Acetylcysteine Hives   • Allopurinol Hives   • Baclofen Itching   • Hydrocodone-Acetaminophen  Swelling   • Levaquin [Levofloxacin] Itching   • Nsaids Other (See Comments)     Reaction: due to renal dysfunction   • Oxycodone Other (See Comments)     Other reaction(s): n/v; headache         Current Facility-Administered Medications:   •  atorvastatin (LIPITOR) tablet 40 mg, 40 mg, Oral, Daily, Negrito Romero MD, 40 mg at 01/08/19 0829  •  cefTRIAXone (ROCEPHIN) IVPB 2 g, 2 g, Intravenous, Q24H, Bhavik Rey MD, Last Rate: 100 mL/hr at 01/08/19 1206, 2 g at 01/08/19 1206  •  dextrose (D50W) 25 g/ 50mL Intravenous Solution 25 g, 25 g, Intravenous, Q15 Min PRN, Negrito Romero MD  •  dextrose (GLUTOSE) oral gel 15 g, 15 g, Oral, Q15 Min PRN, Negrito Romero MD  •  gabapentin (NEURONTIN) capsule 1,200 mg, 1,200 mg, Oral, Q12H, Negrito Romero MD, 1,200 mg at 01/08/19 0829  •  glipiZIDE (GLUCOTROL) tablet 2.5 mg, 2.5 mg, Oral, QAM AC, Negrito Romero MD, 2.5 mg at 01/08/19 0653  •  glucagon (human recombinant) (GLUCAGEN DIAGNOSTIC) injection 1 mg, 1 mg, Subcutaneous, PRN, Negrito Romero MD  •  heparin (porcine) 5000 UNIT/ML injection 5,000 Units, 5,000 Units, Subcutaneous, Q8H, Negrito Romero MD, 5,000 Units at 01/08/19 1612  •  HYDROcodone-acetaminophen (NORCO) 7.5-325 MG per tablet 1 tablet, 1 tablet, Oral, Q6H PRN, Lizeth Lara MD, 1 tablet at 01/08/19 1612  •  [START ON 1/9/2019] hydrocortisone sodium succinate (Solu-CORTEF) injection 50 mg, 50 mg, Intravenous, Q8H, Laith Jimenez MD  •  insulin lispro (humaLOG) injection 0-20 Units, 0-20 Units, Subcutaneous, Q4H, Lizeth Lara MD, 4 Units at 01/08/19 1644  •  levothyroxine (SYNTHROID, LEVOTHROID) tablet 150 mcg, 150 mcg, Oral, Q AM, Negrito Romero MD, 150 mcg at 01/08/19 0651  •  norepinephrine (LEVOPHED) 8 mg/250 mL (32 mcg/mL) in sodium chloride 0.9% infusion (premix), 0.02-0.3 mcg/kg/min, Intravenous, Titrated, Keagan Cast MD, Stopped at 01/08/19 0200  •  pantoprazole (PROTONIX) EC tablet 40 mg, 40 mg, Oral, Q AM, Lizeth Lara MD, 40 mg at  01/08/19 1210  •  Pharmacy to dose vancomycin, , Does not apply, Continuous PRN, Bhavik Rey MD  •  saccharomyces boulardii (FLORASTOR) capsule 500 mg, 500 mg, Oral, BID, Bhavik Rey MD, 500 mg at 01/08/19 1206  •  Sodium chloride 0.9 % infusion, 125 mL/hr, Intravenous, Continuous, Keagan Cast MD, Last Rate: 125 mL/hr at 01/07/19 1748, 125 mL/hr at 01/07/19 1748  •  vancomycin (VANCOCIN) in iso-osmotic dextrose IVPB 1 g (premix) 200 mL, 1,000 mg, Intravenous, Q12H, Bhavik Rey MD         Review of Systems   Constitutional: Positive for fatigue.   Eyes: Negative.    Respiratory: Positive for shortness of breath.    Cardiovascular: Negative.    Gastrointestinal: Positive for abdominal distention, abdominal pain and nausea.   Endocrine: Negative.    Genitourinary: Negative.    Neurological: Positive for dizziness and light-headedness.   Psychiatric/Behavioral: Negative.    All other systems reviewed and are negative.    Objective     Vital Signs  Temp:  [97.7 °F (36.5 °C)-98.4 °F (36.9 °C)] 98 °F (36.7 °C)  Heart Rate:  [54-75] 58  Resp:  [20] 20  BP: ()/() 133/84    Physical Exam  Physical Exam   Constitutional: He is oriented to person, place, and time. He appears well-developed and well-nourished.   HENT:   Head: Normocephalic and atraumatic.   Eyes: EOM are normal. Pupils are equal, round, and reactive to light.   Neck: Normal range of motion. Neck supple. No thyromegaly present.   Cardiovascular: Normal rate, regular rhythm, normal heart sounds and intact distal pulses.   Pulmonary/Chest: Effort normal and breath sounds normal.   Abdominal: Soft. Bowel sounds are normal. He exhibits distension. There is no tenderness.   Musculoskeletal: Normal range of motion. He exhibits no edema.   Neurological: He is alert and oriented to person, place, and time.   Skin: Skin is warm and dry.   Psychiatric: He has a normal mood and affect. His behavior is normal.    Nursing note and vitals reviewed.      Results Review:    I reviewed the patient's new clinical results.  Glucose   Date/Time Value Ref Range Status   01/08/2019 1636 173 (H) 70 - 130 mg/dL Final   01/08/2019 1048 197 (H) 70 - 130 mg/dL Final   01/08/2019 0716 161 (H) 70 - 130 mg/dL Final   01/08/2019 0258 239 (H) 70 - 130 mg/dL Final   01/07/2019 2317 237 (H) 70 - 130 mg/dL Final   01/07/2019 1822 87 70 - 130 mg/dL Final     Lab Results (last 72 hours)     Procedure Component Value Units Date/Time    POC Glucose Once [[id number]]  (Abnormal) Collected:  01/08/19 1636    Specimen:  Blood Updated:  01/08/19 1644     Glucose 173 mg/dL     Blood Culture - Blood, Arm, Right [[id number]] Collected:  01/07/19 1535    Specimen:  Blood from Arm, Right Updated:  01/08/19 1545     Blood Culture No growth at 24 hours    Blood Culture - Blood, Arm, Left [[id number]] Collected:  01/07/19 1535    Specimen:  Blood from Arm, Left Updated:  01/08/19 1545     Blood Culture No growth at 24 hours    POC Glucose Once [[id number]]  (Abnormal) Collected:  01/08/19 1048    Specimen:  Blood Updated:  01/08/19 1051     Glucose 197 mg/dL     Urine Culture - Urine, Urine, Clean Catch [[id number]]  (Normal) Collected:  01/07/19 1746    Specimen:  Urine, Clean Catch Updated:  01/08/19 0958     Urine Culture Culture in progress    POC Glucose Once [[id number]]  (Abnormal) Collected:  01/08/19 0716    Specimen:  Blood Updated:  01/08/19 0720     Glucose 161 mg/dL     Procalcitonin [[id number]]  (Abnormal) Collected:  01/08/19 0449    Specimen:  Blood Updated:  01/08/19 0637     Procalcitonin 11.49 ng/mL     Narrative:       As a Marker for Sepsis (Non-Neonates):   1. <0.5 ng/mL represents a low risk of severe sepsis and/or septic shock.  1. >2 ng/mL represents a high risk of severe sepsis and/or septic shock.    As a Marker for Lower Respiratory Tract Infections that require antibiotic therapy:  PCT on Admission     Antibiotic Therapy             6-12  "Hrs later  > 0.5                Strongly Recommended            >0.25 - <0.5         Recommended  0.1 - 0.25           Discouraged                   Remeasure/reassess PCT  <0.1                 Strongly Discouraged          Remeasure/reassess PCT      As 28 day mortality risk marker: \"Change in Procalcitonin Result\" (> 80 % or <=80 %) if Day 0 (or Day 1) and Day 4 values are available. Refer to [email or web address] [email or web address] [email or web address]/   Change in PCT <=80 %   A decrease of PCT levels below or equal to 80 % defines a positive change in PCT test result representing a higher risk for 28-day all-cause mortality of patients diagnosed with severe sepsis or septic shock.  Change in PCT > 80 %   A decrease of PCT levels of more than 80 % defines a negative change in PCT result representing a lower risk for 28-day all-cause mortality of patients diagnosed with severe sepsis or septic shock.                Comprehensive Metabolic Panel [[id number]]  (Abnormal) Collected:  01/08/19 0449    Specimen:  Blood Updated:  01/08/19 0617     Glucose 192 mg/dL      BUN 19 mg/dL      Creatinine 1.32 mg/dL      Sodium 138 mmol/L      Potassium 4.2 mmol/L      Chloride 107 mmol/L      CO2 20.0 mmol/L      Calcium 7.5 mg/dL      Total Protein 6.0 g/dL      Albumin 3.00 g/dL      ALT (SGPT) 73 U/L      AST (SGOT) 63 U/L      Alkaline Phosphatase 55 U/L      Total Bilirubin 0.3 mg/dL      eGFR Non African Amer 53 mL/min/1.73      Globulin 3.0 gm/dL      A/G Ratio 1.0 g/dL      BUN/Creatinine Ratio 14.4     Anion Gap 11.0 mmol/L     Narrative:       The MDRD GFR formula is only valid for adults with stable renal function between ages 18 and 70.    CBC (No Diff) [[id number]]  (Abnormal) Collected:  01/08/19 0449    Specimen:  Blood Updated:  01/08/19 0558     WBC 10.09 10*3/mm3      RBC 3.63 10*6/mm3      Hemoglobin 12.2 g/dL      Hematocrit 36.1 %      MCV 99.4 fL      MCH 33.6 pg      MCHC 33.8 g/dL      RDW 12.9 %      RDW-SD 46.8 fl      MPV " 10.4 fL      Platelets 148 10*3/mm3     Lactic Acid, Reflex [[id number]]  (Abnormal) Collected:  01/08/19 0449    Specimen:  Blood Updated:  01/08/19 0554     Lactate 3.5 mmol/L     Brucella Antibody IgG / IgM [[id number]] Collected:  01/08/19 0449    Specimen:  Blood Updated:  01/08/19 0533    POC Glucose Once [[id number]]  (Abnormal) Collected:  01/08/19 0258    Specimen:  Blood Updated:  01/08/19 0259     Glucose 239 mg/dL     POC Glucose Once [[id number]]  (Abnormal) Collected:  01/07/19 2317    Specimen:  Blood Updated:  01/07/19 2319     Glucose 237 mg/dL     Lactic Acid, Reflex Timer (This will reflex a repeat order 3-3:15 hours after ordered.) [[id number]] Collected:  01/07/19 1536    Specimen:  Blood Updated:  01/07/19 1930     Extra Tube Hold for add-ons.     Comment: Auto resulted.       Urinalysis, Microscopic Only - Urine, Catheter [[id number]]  (Abnormal) Collected:  01/07/19 1746    Specimen:  Urine, Catheter Updated:  01/07/19 1836     RBC, UA 0-2 /HPF      WBC, UA 3-5 /HPF      Bacteria, UA 1+ /HPF      Squamous Epithelial Cells, UA 0-2 /HPF      Hyaline Casts, UA 3-6 /LPF      Granular Casts, UA 0-2 /LPF      Amorphous Crystals, UA Moderate/2+ /HPF      Methodology Manual Light Microscopy    Urinalysis With Microscopic If Indicated (No Culture) - Urine, Catheter [[id number]]  (Abnormal) Collected:  01/07/19 1746    Specimen:  Urine, Catheter Updated:  01/07/19 1826     Color, UA Dark Yellow     Appearance, UA Turbid     pH, UA <=5.0     Specific Gravity, UA >=1.030     Glucose, UA Negative     Ketones, UA Trace     Bilirubin, UA Negative     Blood, UA Negative     Protein,  mg/dL (2+)     Leuk Esterase, UA Negative     Nitrite, UA Negative     Urobilinogen, UA 1.0 E.U./dL    POC Glucose Once [[id number]]  (Normal) Collected:  01/07/19 1822    Specimen:  Blood Updated:  01/07/19 1824     Glucose 87 mg/dL     C-reactive Protein [[id number]]  (Abnormal) Collected:  01/07/19 1535    Specimen:  Blood  "Updated:  01/07/19 1736     C-Reactive Protein 1.96 mg/dL     Sedimentation Rate [[id number]]  (Normal) Collected:  01/07/19 1535    Specimen:  Blood Updated:  01/07/19 1730     Sed Rate 8 mm/hr     Procalcitonin [[id number]]  (Abnormal) Collected:  01/07/19 1535    Specimen:  Blood Updated:  01/07/19 1650     Procalcitonin 15.68 ng/mL     Narrative:       As a Marker for Sepsis (Non-Neonates):   1. <0.5 ng/mL represents a low risk of severe sepsis and/or septic shock.  1. >2 ng/mL represents a high risk of severe sepsis and/or septic shock.    As a Marker for Lower Respiratory Tract Infections that require antibiotic therapy:  PCT on Admission     Antibiotic Therapy             6-12 Hrs later  > 0.5                Strongly Recommended            >0.25 - <0.5         Recommended  0.1 - 0.25           Discouraged                   Remeasure/reassess PCT  <0.1                 Strongly Discouraged          Remeasure/reassess PCT      As 28 day mortality risk marker: \"Change in Procalcitonin Result\" (> 80 % or <=80 %) if Day 0 (or Day 1) and Day 4 values are available. Refer to http://www.[employer]pct-calculator.com/   Change in PCT <=80 %   A decrease of PCT levels below or equal to 80 % defines a positive change in PCT test result representing a higher risk for 28-day all-cause mortality of patients diagnosed with severe sepsis or septic shock.  Change in PCT > 80 %   A decrease of PCT levels of more than 80 % defines a negative change in PCT result representing a lower risk for 28-day all-cause mortality of patients diagnosed with severe sepsis or septic shock.                Troponin [[id number]]  (Normal) Collected:  01/07/19 1535    Specimen:  Blood Updated:  01/07/19 1628     Troponin T <0.010 ng/mL     Narrative:       Troponin T Reference Ranges:  Less than 0.03 ng/mL:    Negative for AMI  0.03 to 0.09 ng/mL:      Indeterminant for AMI  Greater than 0.09 ng/mL: Positive for AMI    TSH [[id number]]  (Abnormal) " Collected:  01/07/19 1535    Specimen:  Blood Updated:  01/07/19 1628     TSH 15.700 mIU/mL     T4, Free [[id number]]  (Abnormal) Collected:  01/07/19 1535    Specimen:  Blood Updated:  01/07/19 1628     Free T4 0.90 ng/dL     Cortisol [[id number]]  (Normal) Collected:  01/07/19 1535    Specimen:  Blood Updated:  01/07/19 1628     Cortisol 7.07 mcg/dL     Narrative:       Cortisol Reference Ranges:    Cortisol 6AM - 10AM Range: 6.02-18.40 mcg/dl  Cortisol 4PM - 8PM Range: 2.68-10.50 mcg/dl  Critical AM/PM:    Less than 2 mcg/dl    Comprehensive Metabolic Panel [[id number]]  (Abnormal) Collected:  01/07/19 1535    Specimen:  Blood Updated:  01/07/19 1622     Glucose 83 mg/dL      BUN 21 mg/dL      Creatinine 1.86 mg/dL      Sodium 142 mmol/L      Potassium 3.2 mmol/L      Chloride 104 mmol/L      CO2 22.6 mmol/L      Calcium 8.4 mg/dL      Total Protein 6.2 g/dL      Albumin 3.40 g/dL      ALT (SGPT) 105 U/L      AST (SGOT) 149 U/L      Alkaline Phosphatase 73 U/L      Total Bilirubin 0.4 mg/dL      eGFR Non African Amer 35 mL/min/1.73      Globulin 2.8 gm/dL      A/G Ratio 1.2 g/dL      BUN/Creatinine Ratio 11.3     Anion Gap 15.4 mmol/L     Narrative:       The MDRD GFR formula is only valid for adults with stable renal function between ages 18 and 70.    Lipase [[id number]]  (Normal) Collected:  01/07/19 1535    Specimen:  Blood Updated:  01/07/19 1622     Lipase 27 U/L     Magnesium [[id number]]  (Normal) Collected:  01/07/19 1535    Specimen:  Blood Updated:  01/07/19 1622     Magnesium 1.9 mg/dL     Lactic Acid, Plasma [[id number]]  (Abnormal) Collected:  01/07/19 1536    Specimen:  Blood Updated:  01/07/19 1617     Lactate 4.3 mmol/L     Protime-INR [[id number]]  (Normal) Collected:  01/07/19 1535    Specimen:  Blood Updated:  01/07/19 1609     Protime 13.8 Seconds      INR 1.08    CBC & Differential [[id number]] Collected:  01/07/19 1535    Specimen:  Blood Updated:  01/07/19 1553    Narrative:       The following  orders were created for panel order CBC & Differential.  Procedure                               Abnormality         Status                     ---------                               -----------         ------                     CBC Auto Differential[[id number]]        Abnormal            Final result                 Please view results for these tests on the individual orders.    CBC Auto Differential [[id number]]  (Abnormal) Collected:  01/07/19 1535    Specimen:  Blood Updated:  01/07/19 1553     WBC 2.86 10*3/mm3      RBC 3.99 10*6/mm3      Hemoglobin 13.4 g/dL      Hematocrit 39.7 %      MCV 99.5 fL      MCH 33.6 pg      MCHC 33.8 g/dL      RDW 12.7 %      RDW-SD 46.3 fl      MPV 10.2 fL      Platelets 140 10*3/mm3      Neutrophil % 87.9 %      Lymphocyte % 10.8 %      Monocyte % 0.3 %      Eosinophil % 1.0 %      Basophil % 0.0 %      Immature Grans % 1.4 %      Neutrophils, Absolute 2.51 10*3/mm3      Lymphocytes, Absolute 0.31 10*3/mm3      Monocytes, Absolute 0.01 10*3/mm3      Eosinophils, Absolute 0.03 10*3/mm3      Basophils, Absolute 0.00 10*3/mm3      Immature Grans, Absolute 0.04 10*3/mm3           Imaging Results (last 72 hours)     Procedure Component Value Units Date/Time    CT Abdomen Pelvis With Contrast [[id number]] Collected:  01/07/19 1823     Updated:  01/07/19 1834    Narrative:       CT ABDOMEN AND PELVIS WITH IV CONTRAST     HISTORY: 78-year-old male with back and abdominal pain for several days.  History of AAA repair in 2005.     TECHNIQUE: CT abdomen and pelvis with IV contrast.     COMPARISON: CT angiogram abdomen and pelvis 03/14/2018.      FINDINGS: Heart size is enlarged. There is mild dependent basilar  atelectasis. There is diffuse fat infiltration of the liver.  Cholecystectomy clips are present. Splenic size appears normal. Adrenal  glands, and pancreas appear within normal limits. There is bilateral  perinephric stranding that is increased compared to prior exam of  03/14/2018.  A right lower pole posterolateral exophytic cyst measures  1.7 cm. There is no hydronephrosis.     Aortobiiliac stent graft is present. Native sac diameter measures 6.5 AP  dimension when measured in the sagittal plane by 5.8 cm greatest  transverse dimension measured in the axial plane, and these measurements  are not changed. Native sac of the abdominal aortic aneurysm extends  approximately 9 cm in length and this is without change. There is subtle  linear increased density material within the native sac extending  posterior to the biiliac component, and this does not appear changed and  there is no compelling evidence for endoleak. No perianeurysmal  inflammation is evident.       There is no bowel dilatation or evidence for bowel obstruction. There is  no ascites. Urinary bladder is mostly decompressed. Osteoarthritis is  present in both hips, greater on the right. There is degenerative disc  disease within the lumbar spine with disc space narrowing greatest at  L2-3 and L3-4 where there is vacuum phenomena and endplate spur  formation. There is a mild levoscoliotic curvature of the lumbar spine.       Impression:       1.  Bilateral perinephric stranding is increased compared to the prior  CT 03/2018. This could indicate pyelonephritis in the proper clinical  setting though is not specific and there is no hydronephrosis or other  evidence for upper tract infection.  2.  Aortobiiliac stent graft placement without interval change. The  native sac measures up to 6.5 cm in AP dimension.  3.  Cholecystectomy.  4.  Diffuse fat infiltration of the liver.  5.  Osteoarthritis both hips, greater on the right. Levoscoliotic  curvature of the lumbar spine with advanced degenerative disc disease at  L2-3 and L3-4.     Findings discussed with Dr. Cast in the emergency department,  01/07/2019 at 5:18 p.m.     Radiation dose reduction techniques were utilized, including automated  exposure control and exposure modulation  based on body size.     This report was finalized on 1/7/2019 6:31 PM by Dr. Keagan Duke M.D.       XR Chest 1 View [[id number]] Collected:  01/07/19 1606     Updated:  01/07/19 1637    Narrative:       AP PORTABLE CHEST     HISTORY: Syncope, hypotension.      COMPARISON: CT angiogram of the chest 03/14/2018, AP chest 07/05/2017.     FINDINGS: The cardiomediastinal silhouette is within normal limits.  Lungs appear clear and there is no evidence for pulmonary edema, pleural  effusion or infiltrate. Cardiac monitoring leads are noted.       Impression:       No acute disease.     This report was finalized on 1/7/2019 4:34 PM by Dr. Keagan Duke M.D.             Assessment/Plan     Patient Active Problem List    Diagnosis   • Septic shock (CMS/HCC) [A41.9, R65.21]   • Cervical radiculopathy [M54.12]   • DDD (degenerative disc disease), cervical [M50.30]   • Hyperglycemia [R73.9]   • Prostate cancer screening [Z12.5]   • Health care maintenance [Z00.00]   • Hypokalemia [E87.6]   • Dehydration [E86.0]   • Abscess, perirectal [K61.1]   • Diabetes mellitus with hyperglycemia (CMS/HCC) [E11.65]   • C. difficile colitis [A04.72]   • Pleural effusions (right > left) [J90]   • Sepsis (CMS/HCC) [A41.9]   • Spondylosis of lumbar region without myelopathy or radiculopathy [M47.816]   • Adrenal insufficiency (CMS/HCC) [E27.40]   • Chronic coronary artery disease [I25.10]   • Gout [M10.9]   • Hyperlipidemia [E78.5]   • Hypothyroidism [E03.9]   • Peripheral vascular disease (CMS/HCC) [I73.9]     Adrenal insufficiency  Septic shock  Hypothyroidism  Uncontrolled type 2 diabetes mellitus  Uncontrolled type 2 diabetes mellitus with neuropathy  Severe peripheral vascular disease    Patient is currently receiving IV fluids  IV hydrocortisone 100 mg every 6 hours  Patient's blood pressure appears to be stable  I will decrease it to 50 mg every 8 hours and monitor hemodynamic status closely  Will continue to taper as quickly as  "possible and will start dexamethasone as per home regimen    TSH is elevated at 13 and it was relatively normal 6 months ago  Patient denies any noncompliance    I will recheck thyroid function again before deciding to change the dose next line patient reports that his dose was reduced 6 months ago to 150 down from 175    Patient's blood sugars appear to be stable at this point  Will continue to monitor Accu-Cheks  He's currently on 2.5 mg glipizide daily in the morning    Thank you for the consultation and I will follow with you  The total floor time spent for old record and lab review and floor time was more than 110 min of which greater than 60 min of time (greater than 50% of the total time)  was spent face to face with the patient counseling and coordination of care on recommended evaluation and treatment options, instructions for management/treatment and /or follow up and importance of compliance with chosen management or treatment options    Laith Jimenez MD FACE.  01/08/19  6:01 PM        EMR Dragon / transcription disclaimer:     \"Dictated utilizing Dragon dictation\".   "

## 2019-01-08 NOTE — PROGRESS NOTES
"Pharmacokinetic Evaluation - Vancomycin    Faustino Horton Jr. is a 78 y.o. male on vancomycin pharmacy to dose.  MRN: [id number]  : 1940    Day of vancomycin therapy:   Indication: sepsis  Consulted by: Dr. Rey  Goal trough: 15-20 mcg/ml  Current dose: tbd  Other antimicrobials: ceftriaxone 2g iv q24    Blood pressure 125/73, pulse 59, temperature 97.9 °F (36.6 °C), temperature source Oral, resp. rate 20, height 182.9 cm (72\"), weight 102 kg (224 lb 6.9 oz), SpO2 94 %.  Results from last 7 days   Lab Units  19   0449  19   1535   CREATININE mg/dL  1.32*  1.86*     Estimated Creatinine Clearance: 58.9 mL/min (A) (by C-G formula based on SCr of 1.32 mg/dL (H)).  Results from last 7 days   Lab Units  19   0449  19   1535   WBC 10*3/mm3  10.09  2.86*   HEMOGLOBIN g/dL  12.2*  13.4*   HEMATOCRIT %  36.1*  39.7*   PLATELETS 10*3/mm3  148  140       Procal: 15.8->11.49  Other relevant labs/chart info: LA 4.3->3.5       Cultures:      Microbiology Results (last 10 days)     Procedure Component Value - Date/Time    Urine Culture - Urine, Urine, Clean Catch [[id number]]  (Normal) Collected:  19 1746    Lab Status:  Preliminary result Specimen:  Urine, Clean Catch Updated:  19 0958     Urine Culture Culture in progress    Blood Culture - Blood, Arm, Right [[id number]] Collected:  19 153    Lab Status:  Preliminary result Specimen:  Blood from Arm, Right Updated:  19 0345     Blood Culture No growth at less than 24 hours    Blood Culture - Blood, Arm, Left [[id number]] Collected:  19 153    Lab Status:  Preliminary result Specimen:  Blood from Arm, Left Updated:  19 1130     Blood Culture No growth at less than 24 hours            Dosing hx (include troughs if drawn):  1/7  2g 1715.  1/8  1750 mg x 1 (17 mg/kg) 1300. 1g q12 start 2300.    Assessment:  Received 2g load yesterday. ID consulted this am and continued vanc therapy x 10 more days. 1750 mg " iv ordered this am. Will continue with 1g iv q12 to start at 2300 tonight and will check trough with 4th overall dose 1/9 1100, may not be at steady state.     Plan:  1) Continue vancomycin 1000 mg every 12 hours to start at 2300 tonight.  2) Next trough on 1/9 at 1030 after 3 total doses.  3) Encourage adequate hydration if appropriate. Monitor for decreased UOP, rash or other signs of vancomycin intolerance.    Thanks for this consult, will follow until Jose harrison.D, BCCCP  \

## 2019-01-08 NOTE — H&P
[facility]  Phone: [phone]  Negrito Romero MD      Subjective   LOS: 0 days     78-year-old male who felt weak and very lightheaded today.  He was presyncopal.  He therefore came into the emergency room and was noted to have a low blood pressure.  He was given fluid bolus and started on Levophed.  He has known adrenal insufficiency and received a dose of 250 mg hydrocortisone.  He is improved now.  He feels better with less dizziness lightheadedness.  He denies any chest pain.  He states he had a similar presyncopal episode about 2 years ago.  This seems to have occurred around the time of another infection around his buttocks and pneumonia.  It seems likely therefore that he is having relative worsening of his adrenal insufficiency related to infections.    Patient has had a CT of his abdomen since admission.  This shows some subtle evidence of pyelonephritis.  Patient denies any urinary symptoms such as burning micturition.  He denies any fever or chills.  For the last 3 days he has had pain shooting down both his legs.  He has chronic intermittent low back pain which is not severe.  This has not had been worse recently.  He denies any cough or phlegm.  He has not had many recent diarrhea and nausea or vomiting.    Medical history for the patient includes previous C. difficile infection.  He had an MI in 2005 with placement of stent and no subsequent episodes.  He has type 2 diabetes mellitus which is well controlled according to the patient.  He did have an episode of renal failure back in 2017 when he was at [facility].  It sounds like this may have been related to another hypotensive event possibly due to unrecognized adrenal insufficiency at that time.  He takes lisinopril daily for renal protective effect.    Patient informs me that he also has been having some shortness of breath and recently saw Dr. Benitez at the [facility] office.  He quit smoking when he was  42 years old and had only a few cigarettes a day from age 22 on words to age 42.  He is pending an echocardiogram study as ordered by Dr. Benitez.  Patient denies any leg edema.    Patient does have sleep apnea but is intolerant of the CPAP and returned it within 2 weeks of usage.  He has had a AAA repair in the past.  He has hypothyroidism which is treated with medication.    Faustino Sol Roe  reports that he drinks alcohol.,  reports that he has quit smoking. he has never used smokeless tobacco.     Past Hx:  has a past medical history of AAA (abdominal aortic aneurysm) (CMS/HCC), Acute MI (CMS/HCC), Adrenal insufficiency (CMS/HCC), Arthritis, B12 deficiency, Back pain, Cancer (CMS/HCC), Coronary artery disease, Diabetes mellitus (CMS/HCC), Gout, Hyperlipidemia, Hypertension, Hypotension, Hypothyroid, Low testosterone, Peripheral nerve disease, Pneumonia, Prostate cancer (CMS/HCC), Sepsis (CMS/HCC), Shoulder pain, and Vertebral compression fracture (CMS/HCC).  Surg Hx:  has a past surgical history that includes Appendectomy (N/A); Incision and drainage perirectal abscess (Left, 7/5/2017); Dupuytren / palmar fasciotomy (Right, 02/19/2010); Laparoscopic cholecystectomy w/ cholangiography (N/A, 09/06/2009); Artery surgery (N/A, 04/15/2008); AAA repair, open (Bilateral, 09/28/2007); Colonoscopy (N/A, 12/06/2002); Upper gastrointestinal endoscopy (N/A, 09/15/2009); Colonoscopy (N/A, 9/12/2017); and Coronary angioplasty (08/2004).  FH: family history includes Aneurysm in his brother and sister; Breast cancer in his mother and sister; Cancer in his mother and sister; Diabetes in his brother; Heart disease in his mother; Hyperlipidemia in his sister; Hypertension in his father and sister; Other in his father; Stroke in his brother and father; Thyroid disease in his sister.  SH:  reports that he has quit smoking. he has never used smokeless tobacco. He reports that he drinks alcohol. He reports that he does not use  drugs.    Medications Prior to Admission   Medication Sig Dispense Refill Last Dose   • atorvastatin (LIPITOR) 40 MG tablet Take 40 mg by mouth Daily.   Taking   • dexamethasone (DECADRON) 0.5 MG tablet Take 0.5 mg by mouth Daily.   Taking   • diclofenac (VOLTAREN) 1 % gel gel Apply 2 g topically to the appropriate area as directed.   Taking   • fludrocortisone 0.1 MG tablet Take 0.1 mg by mouth daily.   Taking   • gabapentin (NEURONTIN) 600 MG tablet Take 1,200 mg by mouth 2 (two) times a day.   Taking   • glimepiride (AMARYL) 1 MG tablet Take 1 mg by mouth Every Evening.   Taking   • indomethacin (INDOCIN) 25 MG capsule Take 1 capsule by mouth 3 (Three) Times a Day As Needed for Mild Pain . 15 capsule 0 Taking   • levothyroxine (SYNTHROID, LEVOTHROID) 150 MCG tablet Take 150 mcg by mouth daily.   Taking   • lisinopril (PRINIVIL,ZESTRIL) 2.5 MG tablet TAKE 1 TABLET EVERY DAY   Taking   • vitamin B-12 (CYANOCOBALAMIN) 500 MCG tablet Take 500 mcg by mouth Daily.   Taking   • ACCU-CHEK FASTCLIX LANCETS misc CHECK BLOOD SUGAR D  3 Taking   • ACCU-CHEK SMARTVIEW test strip USE TO TEST BLOOD SUGAR LEVEL QD  3 Taking   • meloxicam (MOBIC) 7.5 MG tablet Take 1-2 tablets a day by mouth as needed for pain.  Do not use other nonsteroidal anti-inflammatory agents while taking this medicine 30 tablet 0 Taking   • omeprazole (priLOSEC) 20 MG capsule Take 1 capsule by mouth Daily. 21 capsule 0 Taking   • potassium chloride (K-DUR) 10 MEQ CR tablet Take 1 tablet by mouth Daily. 30 tablet 1 Taking     Allergies   Allergen Reactions   • Losartan Other (See Comments)     Other reaction(s): facial swelling, itching   • Acetylcysteine Hives   • Allopurinol Hives   • Baclofen Itching   • Hydrocodone-Acetaminophen Swelling   • Levaquin [Levofloxacin] Itching   • Nsaids Other (See Comments)     Reaction: due to renal dysfunction   • Oxycodone Other (See Comments)     Other reaction(s): n/v; headache       Review of Systems    Constitutional: Negative for chills and fever.   HENT: Negative for congestion, postnasal drip, sore throat and trouble swallowing.    Eyes: Negative for redness and visual disturbance.   Respiratory: Positive for shortness of breath. Negative for cough and wheezing.    Cardiovascular: Negative for chest pain, palpitations and leg swelling.   Gastrointestinal: Negative for abdominal pain, diarrhea, nausea and vomiting.   Endocrine: Negative for cold intolerance and heat intolerance.   Genitourinary: Negative for frequency and urgency.   Musculoskeletal: Positive for myalgias (leg pains). Negative for arthralgias and back pain.   Skin: Negative for pallor and rash.   Neurological: Positive for dizziness, syncope (pre-syncope) and light-headedness. Negative for seizures and headaches.   Psychiatric/Behavioral: Negative for behavioral problems. The patient is not nervous/anxious.      Vital Signs past 24hrs  BP range: BP: ()/(33-90) 130/73  Pulse range: Heart Rate:  [51-75] 69  Resp rate range: Resp:  [20-22] 20  Temp range: Temp (24hrs), Av °F (36.1 °C), Min:96.2 °F (35.7 °C), Max:97.8 °F (36.6 °C)    Oxygen range: SpO2:  [92 %-99 %] 95 %; Flow (L/min):  [2-4] 2;   Device (Oxygen Therapy): nasal cannula  104 kg (228 lb 4.8 oz); Body mass index is 30.96 kg/m².  No intake/output data recorded.    Adult male who is in no acute distress.  Looks very comfortable and does not require supplemental oxygen.  Saturation is 95% on room air.  Pupils equal and reactive to light.  Oropharynx class IV Mallampati airway.  No posterior pharyngeal discharge.  Nasopharynx without discharge and septum midline.  JVP not elevated.  Trachea midline and thyroid not enlarged.  Lungs reveal bilateral air entry not clear to auscultation no rales rhonchi or wheeze.  Percussion note resonant chest expansion equal no chest wall deformity or tenderness.  Heart examination S1-S2 present rhythm regular no murmurs appreciated.  No edema in  the lower extremities.  Abdomen is obese, soft, nontender.  Bowel sounds present no liver spleen enlargement.  No peripheral cyanosis clubbing.  Very careful percussion of the paravertebral spaces does not elicit any pain or tenderness.  Palpation along the spine does not elicit any tenderness.  Neurologically patient is able to move all 4 extremities and power is intact.  I could not elicit any numbness or sensory loss in his lower extremities either.  No cervical, axillary, inguinal adenopathy.    Results Review:    I have reviewed the laboratory and imaging data from current admission. My annotations are as noted in assessment and plan.    Medication Review:  I have reviewed the current MAR. My annotations are as noted in assessment and plan.    Plan   PCCM Problems  Septic shock  Pyelonephritis likely  Exacerbation of known adrenal insufficiency  Unusual leg pains for past 3 days, ?  Epidural process  Acute kidney injury  Acute hypokalemia  Elevated LFTs, ? shock liver  Relative leukopenia  Relevant Medical Diagnoses  MARIA TERESA, intolerant of CPAP  Diabetes mellitus 2  Hypothyroidism  Known adrenal insufficiency   Previous C. difficile infection  CAD with previous stent    Plan of Treatment  Septic shock made worse by adrenal insufficiency.  This seems to be evidence of pyelonephritis on the CT abdomen.  Patient has no symptoms.  I will try and get micro-to run a urine culture on the urine analysis down there.  I will consult ID.  I'm somewhat concerned about his unusual leg pains for the last 3 days.  There could be an issue of an epidural process.    Patient has known adrenal insufficiency made worse by sepsis.  Give hydrocortisone 100 mg IV every 6 hours.  He already received 250 mg of hydrocortisone.    Acute kidney injury noted.  Patient reports a history of acute renal failure in the past.  We will monitor renal function very closely.  Give IV fluids tonight.  If his renal function does not improve we will  consult renal in the morning.     Potassium is noted to be low.  However with renal insufficiency I will not replace.  Recheck in the morning.    Elevated LFTs probably from some shock liver.  Again reassess in the morning.    Relative leukopenia is somewhat puzzling.  Await ID input.  Recheck in the morning.    We will use ICU sliding scale for diabetes management.  Will replace patient's Synthroid.    No evidence of any acute coronary syndrome.    Previous C. difficile infection history.  Watch for any diarrhea.    I spoke with his wife at the bedside.    I spent +35 mins critical care time in care of this patient outside of any procedures.             Part of this note may be an electronic transcription/translation of spoken language to printed text using the Dragon Dictation System.

## 2019-01-08 NOTE — PLAN OF CARE
Problem: Patient Care Overview  Goal: Plan of Care Review  Outcome: Ongoing (interventions implemented as appropriate)   01/07/19 1945   Coping/Psychosocial   Plan of Care Reviewed With patient;family   Plan of Care Review   Progress no change       Problem: Fall Risk (Adult)  Goal: Identify Related Risk Factors and Signs and Symptoms  Outcome: Ongoing (interventions implemented as appropriate)   01/07/19 1945   Fall Risk (Adult)   Related Risk Factors (Fall Risk) age-related changes;history of falls;homeostatic imbalance;sleep pattern alteration;slippery/uneven surfaces;environment unfamiliar   Signs and Symptoms (Fall Risk) presence of risk factors     Goal: Absence of Fall  Outcome: Ongoing (interventions implemented as appropriate)   01/07/19 1945   Fall Risk (Adult)   Absence of Fall making progress toward outcome       Problem: Syncope (Adult)  Goal: Identify Related Risk Factors and Signs and Symptoms  Outcome: Ongoing (interventions implemented as appropriate)   01/07/19 1945   Syncope (Adult)   Related Risk Factors (Syncope) hypotension     Goal: Physical Safety/Health Maintenance  Outcome: Ongoing (interventions implemented as appropriate)   01/07/19 1945   Syncope (Adult)   Physical Safety/Health Maintenance making progress toward outcome     Goal: Optimal Emotional/Functional [place]  Outcome: Ongoing (interventions implemented as appropriate)   01/07/19 1945   Syncope (Adult)   Optimal Emotional/Functional [place] making progress toward outcome       Problem: Skin Injury Risk (Adult)  Goal: Identify Related Risk Factors and Signs and Symptoms  Outcome: Ongoing (interventions implemented as appropriate)   01/07/19 1945   Skin Injury Risk (Adult)   Related Risk Factors (Skin Injury Risk) advanced age;body weight extremes;critical care admission     Goal: Skin Health and Integrity  Outcome: Ongoing (interventions implemented as appropriate)   01/07/19 1945   Skin Injury Risk (Adult)   Skin Health  and Integrity making progress toward outcome

## 2019-01-08 NOTE — CONSULTS
Referring Provider: Dominic Henderson MD  [id number]  [address] [address]    Reason for Consultation: sepsis    History of present illness:  Mr Horton is a 78 YOM with sdrenal insufficiency on chronic dexamethasone who I am asked to evaluate and give opinion for sepsis. History is obtained from the patient, Dr Romero, and review of the old medical records which I summarize/synthesize as follows: He presented to the ER on 1/7/19 with near syncope. This is an old problem for him for which he saw specialists at [place] and had been resolved so he is concerned it has returned again.  He had been having sharp constant radiating down both legs back pain for the past few days. No alleviating factors. Worse w/ movement. He was also having sharp cervical neck pain and was supposed to be going to PT today.    In the ER he was hypothermic and hypotensive. Labs notable for WBC 2, elevated LA and procal to 15. He had CT that showed possible B pyelonephritis that UA bland. He denies dysuria, and flank pain. He has a history of prostate CA and prostatectomy.    He was started on stress dose steroids due to chronic renal insufficiency. He was given vancomyc and Zosyn then changed to ceftriaxone. He has evidence of JACINDA and nephrology is consulted.    This morning his BP has improved and procal is coming down. He will be moved out of the unit.    Of note, he also has a history of C diff.    PMH:  AAA s/p stent graft  Adrenal insufficiency on chronic dexamethasone  C diff  Prostate CA  DM2  Gout  HTN  HLD  CAD  Hypothyroidism  Vertebral compression fracture  Ivett-rectal abscess    Past Surgical History:   Procedure Laterality Date   • ABDOMINAL AORTIC ANEURYSM REPAIR Bilateral 09/28/2007    Bilateral femoral cut downs with diagnostic and iliofemoral arteriogram with intravascular ultrasound x5, repair of abdominal aortic aneurysm with Porex excluder aortic stent graft, bilateral iliac extension cuffs, aortic and  bilateral iliac percutaneous transabdominal angioplasty-Dr. Dillan Puga   • APPENDECTOMY N/A    • ARTERY SURGERY N/A 04/15/2008    Laparoscopic ligation of inferior mesenteric artery-Dr. Otf Ahn   • COLONOSCOPY N/A 12/06/2002    Mild proctitis, otherwise normal colonoscopy, repeat in 10 years-Dr. Jones Amaya   • COLONOSCOPY N/A 9/12/2017    Procedure: COLONOSCOPY TO CECUM WITH COLD BIOPSY AND HOT SNARE POLYPECTOMIES;  Surgeon: Mark Pike MD;  Location: [facility] ENDOSCOPY;  Service:    • CORONARY ANGIOPLASTY  08/2004   • DUPUYTREN / PALMAR FASCIOTOMY Right 02/19/2010    Small digit and palmar Dupuytren's cord resection with multiple z-plasties, resection cord in the web space and palm at the base of the 4th digit-Dr Arthur Perez   • INCISION AND DRAINAGE PERIRECTAL ABSCESS Left 7/5/2017    Procedure: INCISION AND DRAINAGE OF ARIANNE-RECTAL ABSCESS; SUPERFICIAL FISTULOTOMY;  Surgeon: Mark Pike MD;  Location: [facility] OR;  Service:    • LAPAROSCOPIC CHOLECYSTECTOMY W/ CHOLANGIOGRAPHY N/A 09/06/2009    Dr. Keagan Montemayor   • UPPER GASTROINTESTINAL ENDOSCOPY N/A 09/15/2009    Normal esophagus, gastric mucosal variant: biopsied, normal 2nd part of the duodenum: biopsied, otherwise normal exam-Dr. Keagan Montemayor       Social History:    Retired auto sales    Family History:  Mom: living @ 94  Sister: ESRD    Antibiotic allergies:   1. Levofloxacin - itching    Medications:    Current Facility-Administered Medications:   •  atorvastatin (LIPITOR) tablet 40 mg, 40 mg, Oral, Daily, Negrito Romero MD, 40 mg at 01/08/19 0829  •  cefTRIAXone (ROCEPHIN) IVPB 1 g, 1 g, Intravenous, Q24H, Negrito Romero MD, Last Rate: 100 mL/hr at 01/07/19 2100, 1 g at 01/07/19 2100  •  dextrose (D50W) 25 g/ 50mL Intravenous Solution 25 g, 25 g, Intravenous, Q15 Min PRN, Negrito Romero MD  •  dextrose (GLUTOSE) oral gel 15 g, 15 g, Oral, Q15 Min PRN, Negrito Romero MD  •  gabapentin (NEURONTIN) capsule  1,200 mg, 1,200 mg, Oral, Q12H, Negrito Romero MD, 1,200 mg at 01/08/19 0829  •  glipiZIDE (GLUCOTROL) tablet 2.5 mg, 2.5 mg, Oral, QAM AC, Negrito Romero MD, 2.5 mg at 01/08/19 0653  •  glucagon (human recombinant) (GLUCAGEN DIAGNOSTIC) injection 1 mg, 1 mg, Subcutaneous, PRN, Negrito Romero MD  •  heparin (porcine) 5000 UNIT/ML injection 5,000 Units, 5,000 Units, Subcutaneous, Q8H, Negrito Romero MD, 5,000 Units at 01/08/19 0653  •  hydrocortisone sodium succinate (Solu-CORTEF) injection 50 mg, 50 mg, Intravenous, Q6H, Lizeth Lara MD  •  insulin lispro (humaLOG) injection 0-20 Units, 0-20 Units, Subcutaneous, Q4H, Lizeth Lara MD  •  levothyroxine (SYNTHROID, LEVOTHROID) tablet 150 mcg, 150 mcg, Oral, Q AM, Negrito Romero MD, 150 mcg at 01/08/19 0651  •  norepinephrine (LEVOPHED) 8 mg/250 mL (32 mcg/mL) in sodium chloride 0.9% infusion (premix), 0.02-0.3 mcg/kg/min, Intravenous, Titrated, Keagan Cast MD, Stopped at 01/08/19 0200  •  Sodium chloride 0.9 % infusion, 125 mL/hr, Intravenous, Continuous, Keagan Cast MD, Last Rate: 125 mL/hr at 01/07/19 1748, 125 mL/hr at 01/07/19 1748    Review of Systems  All systems were reviewed and are negative unless otherwise stated above in the HPI    Objective   Vital Signs   Temp:  [96.2 °F (35.7 °C)-98.1 °F (36.7 °C)] 97.9 °F (36.6 °C)  Heart Rate:  [51-75] 59  Resp:  [20-22] 20  BP: ()/() 125/73    Physical Exam:   General: awake, alert, NAD   Head: Normocephalic, atraumatic  Eyes: PERRL, EOMI, no scleral icterus, no conjunctival pallor, no conjunctival hemorrhages.   ENT: MMM, OP clear, no thrush. Fair dentition.   Neck: Supple, pain with rotation  Cardiovascular: mild bradycardia, RR, no murmurs, rubs, or gallops; no LE edema  Respiratory: Lungs are clear to auscultation bilaterally, no rales or wheezing; normal work of breathing on ambient air  GI: Abdomen is soft, non-tender, non-distended, normal bowel sounds in all four quadrants; no  hepatosplenomegaly, no masses palpated  : no Ley catheter present; no CVA tenderness  Musculoskeletal: no joint abnormalities, normal musculature  Skin: No rashes, lesions, or embolic phenomenon  Neurological: Alert and oriented x 3, cranial nerves 2-12 grossly intact, motor strength 5/5 in all four extremities  Psychiatric: Normal mood and affect   Lymph: no pre-auricular, post-auricular, submandibular, cervical, supraclavicular  LAD  Vasc: no cyanosis    Labs:     Lab Results   Component Value Date    WBC 10.09 01/08/2019    HGB 12.2 (L) 01/08/2019    HCT 36.1 (L) 01/08/2019    MCV 99.4 (H) 01/08/2019     01/08/2019       Lab Results   Component Value Date    GLUCOSE 192 (H) 01/08/2019    BUN 19 01/08/2019    CREATININE 1.32 (H) 01/08/2019    EGFRIFNONA 53 (L) 01/08/2019    EGFRIFAFRI 72 03/20/2018    BCR 14.4 01/08/2019    CO2 20.0 (L) 01/08/2019    CALCIUM 7.5 (L) 01/08/2019    PROTENTOTREF 7.3 03/20/2018    ALBUMIN 3.00 (L) 01/08/2019    LABIL2 1.5 03/20/2018    AST 63 (H) 01/08/2019    ALT 73 (H) 01/08/2019     Lab Results   Component Value Date    CRP 1.96 (H) 01/07/2019     Lab Results   Component Value Date    SEDRATE 8 01/07/2019     UA 3-5 WBCs  TSH 15.7  LA 3.5  Procal 15.6--->11    Microbiology:  1/7 BCx: pending  1/7 UCx: pending    Radiology (personally reviewed images/report):  -CT A/P with B perinephric stranding could indicate pyelonephritis per radiologist; aortobiiliac stent graft is present without signs of inflammation, fatty liver  -CXR negative for pneumonia    Assessment/Plan   1. Sepsis due to undetermined organism  2. Acute kidney injury  3.Elevated procalcitonin  4. Elevated TSH secondary to hypothyroidism  5. PVD s/p aortobiiliac stent graft placement  6. 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 Lower back pain and neck pain    I have concern his blood cultures will be positive based on his presentation and elevated procalcitonin. Though kidneys abnormal on imaging,  he has no  symptoms and UA is bland. I am concerned about his neck pain and lower back pain even though strength is good in both sites. Therefore I am going to order an MRI C and L spine w/w/o contrast but will have to wait one more day to give his renal function to fully recover. In the meantime, I will have him on vancomycin and ceftriaxone while awaiting culture results. I will start a probiotic given history of C diff. He is on stress dose steroids for adrenal insufficiency.    Thank you for this consult. ID will follow. Case/plan d/w Dr Lara.

## 2019-01-08 NOTE — PLAN OF CARE
Problem: Patient Care Overview  Goal: Plan of Care Review  Outcome: Ongoing (interventions implemented as appropriate)   01/08/19 1828   Coping/Psychosocial   Plan of Care Reviewed With patient;spouse   Plan of Care Review   Progress improving   OTHER   Outcome Summary Patient status has greatly improved. No pressors infusing, B/P stable. Only complaint is some Left sided shoulder pain which is chronic and currently going to PT when not in hospital. VSS, orders to go to floor, but no beds available. Will continue to assess frequently for any changes.       Problem: Fall Risk (Adult)  Goal: Identify Related Risk Factors and Signs and Symptoms  Outcome: Ongoing (interventions implemented as appropriate)   01/08/19 1828   Fall Risk (Adult)   Related Risk Factors (Fall Risk) confusion/agitation;depression/anxiety;fatigue/slow reaction;gait/mobility problems;environment unfamiliar   Signs and Symptoms (Fall Risk) presence of risk factors     Goal: Absence of Fall  Outcome: Ongoing (interventions implemented as appropriate)   01/08/19 1828   Fall Risk (Adult)   Absence of Fall making progress toward outcome       Problem: Syncope (Adult)  Goal: Identify Related Risk Factors and Signs and Symptoms  Outcome: Ongoing (interventions implemented as appropriate)   01/08/19 1828   Syncope (Adult)   Related Risk Factors (Syncope) anemia     Goal: Physical Safety/Health Maintenance  Outcome: Ongoing (interventions implemented as appropriate)   01/08/19 1828   Syncope (Adult)   Physical Safety/Health Maintenance making progress toward outcome     Goal: Optimal Emotional/Functional [place]  Outcome: Ongoing (interventions implemented as appropriate)   01/08/19 1828   Syncope (Adult)   Optimal Emotional/Functional [place] making progress toward outcome       Problem: Skin Injury Risk (Adult)  Goal: Identify Related Risk Factors and Signs and Symptoms  Outcome: Ongoing (interventions implemented as appropriate)   01/08/19  1828   Skin Injury Risk (Adult)   Related Risk Factors (Skin Injury Risk) advanced age;cognitive impairment;critical care admission;hospitalization prolonged;infection;mobility impaired     Goal: Skin Health and Integrity  Outcome: Ongoing (interventions implemented as appropriate)   01/08/19 1828   Skin Injury Risk (Adult)   Skin Health and Integrity making progress toward outcome

## 2019-01-08 NOTE — PLAN OF CARE
Problem: Patient Care Overview  Goal: Plan of Care Review  Outcome: Ongoing (interventions implemented as appropriate)   01/08/19 0625   Coping/Psychosocial   Plan of Care Reviewed With patient   Plan of Care Review   Progress improving   OTHER   Outcome Summary Patient is off of levophed and his BP has been stable. He has been complaining of a small dull headache periodically. He seems to be in good spirits and doing well. VSS       Problem: Fall Risk (Adult)  Goal: Identify Related Risk Factors and Signs and Symptoms  Outcome: Ongoing (interventions implemented as appropriate)   01/07/19 1945   Fall Risk (Adult)   Related Risk Factors (Fall Risk) age-related changes;history of falls;homeostatic imbalance;sleep pattern alteration;slippery/uneven surfaces;environment unfamiliar   Signs and Symptoms (Fall Risk) presence of risk factors     Goal: Absence of Fall  Outcome: Ongoing (interventions implemented as appropriate)   01/08/19 0625   Fall Risk (Adult)   Absence of Fall making progress toward outcome       Problem: Syncope (Adult)  Goal: Identify Related Risk Factors and Signs and Symptoms  Outcome: Ongoing (interventions implemented as appropriate)   01/07/19 1945   Syncope (Adult)   Related Risk Factors (Syncope) hypotension     Goal: Physical Safety/Health Maintenance  Outcome: Ongoing (interventions implemented as appropriate)   01/08/19 0625   Syncope (Adult)   Physical Safety/Health Maintenance making progress toward outcome     Goal: Optimal Emotional/Functional [place]  Outcome: Ongoing (interventions implemented as appropriate)   01/08/19 0625   Syncope (Adult)   Optimal Emotional/Functional [place] making progress toward outcome       Problem: Skin Injury Risk (Adult)  Goal: Identify Related Risk Factors and Signs and Symptoms  Outcome: Ongoing (interventions implemented as appropriate)   01/07/19 1945   Skin Injury Risk (Adult)   Related Risk Factors (Skin Injury Risk) advanced age;body  weight extremes;critical care admission     Goal: Skin Health and Integrity  Outcome: Ongoing (interventions implemented as appropriate)   01/08/19 0633   Skin Injury Risk (Adult)   Skin Health and Integrity making progress toward outcome

## 2019-01-09 ENCOUNTER — APPOINTMENT (OUTPATIENT)
Dept: MRI IMAGING | Facility: HOSPITAL | Age: 79
End: 2019-01-09
Attending: INTERNAL MEDICINE

## 2019-01-09 LAB
ALBUMIN SERPL-MCNC: 3.1 G/DL (ref 3.5–5.2)
ALBUMIN/GLOB SERPL: 0.9 G/DL
ALP SERPL-CCNC: 55 U/L (ref 39–117)
ALT SERPL W P-5'-P-CCNC: 55 U/L (ref 1–41)
ANION GAP SERPL CALCULATED.3IONS-SCNC: 9.4 MMOL/L
AST SERPL-CCNC: 35 U/L (ref 1–40)
BACTERIA SPEC AEROBE CULT: NO GROWTH
BILIRUB SERPL-MCNC: 0.3 MG/DL (ref 0.1–1.2)
BUN BLD-MCNC: 15 MG/DL (ref 8–23)
BUN/CREAT SERPL: 14.9 (ref 7–25)
CALCIUM SPEC-SCNC: 8.5 MG/DL (ref 8.6–10.5)
CHLORIDE SERPL-SCNC: 106 MMOL/L (ref 98–107)
CO2 SERPL-SCNC: 22.6 MMOL/L (ref 22–29)
CREAT BLD-MCNC: 1.01 MG/DL (ref 0.76–1.27)
DEPRECATED RDW RBC AUTO: 48.3 FL (ref 37–54)
ERYTHROCYTE [DISTWIDTH] IN BLOOD BY AUTOMATED COUNT: 12.9 % (ref 11.5–14.5)
GFR SERPL CREATININE-BSD FRML MDRD: 72 ML/MIN/1.73
GLOBULIN UR ELPH-MCNC: 3.4 GM/DL
GLUCOSE BLD-MCNC: 159 MG/DL (ref 65–99)
GLUCOSE BLDC GLUCOMTR-MCNC: 123 MG/DL (ref 70–130)
GLUCOSE BLDC GLUCOMTR-MCNC: 129 MG/DL (ref 70–130)
GLUCOSE BLDC GLUCOMTR-MCNC: 142 MG/DL (ref 70–130)
GLUCOSE BLDC GLUCOMTR-MCNC: 143 MG/DL (ref 70–130)
GLUCOSE BLDC GLUCOMTR-MCNC: 146 MG/DL (ref 70–130)
GLUCOSE BLDC GLUCOMTR-MCNC: 165 MG/DL (ref 70–130)
GLUCOSE BLDC GLUCOMTR-MCNC: 166 MG/DL (ref 70–130)
GLUCOSE BLDC GLUCOMTR-MCNC: 203 MG/DL (ref 70–130)
HCT VFR BLD AUTO: 36.2 % (ref 40.4–52.2)
HGB BLD-MCNC: 12 G/DL (ref 13.7–17.6)
MAGNESIUM SERPL-MCNC: 2.5 MG/DL (ref 1.6–2.4)
MCH RBC QN AUTO: 34 PG (ref 27–32.7)
MCHC RBC AUTO-ENTMCNC: 33.1 G/DL (ref 32.6–36.4)
MCV RBC AUTO: 102.5 FL (ref 79.8–96.2)
PHOSPHATE SERPL-MCNC: 2.2 MG/DL (ref 2.5–4.5)
PLATELET # BLD AUTO: 130 10*3/MM3 (ref 140–500)
PMV BLD AUTO: 10.4 FL (ref 6–12)
POTASSIUM BLD-SCNC: 4 MMOL/L (ref 3.5–5.2)
PROT SERPL-MCNC: 6.5 G/DL (ref 6–8.5)
RBC # BLD AUTO: 3.53 10*6/MM3 (ref 4.6–6)
SODIUM BLD-SCNC: 138 MMOL/L (ref 136–145)
T4 FREE SERPL-MCNC: 0.73 NG/DL (ref 0.93–1.7)
TSH SERPL DL<=0.05 MIU/L-ACNC: 4.49 MIU/ML (ref 0.27–4.2)
VANCOMYCIN TROUGH SERPL-MCNC: 12.3 MCG/ML (ref 5–20)
WBC NRBC COR # BLD: 8.94 10*3/MM3 (ref 4.5–10.7)

## 2019-01-09 PROCEDURE — 25010000002 LORAZEPAM PER 2 MG: Performed by: INTERNAL MEDICINE

## 2019-01-09 PROCEDURE — 63710000001 INSULIN LISPRO (HUMAN) PER 5 UNITS: Performed by: INTERNAL MEDICINE

## 2019-01-09 PROCEDURE — 25010000002 VANCOMYCIN PER 500 MG: Performed by: INTERNAL MEDICINE

## 2019-01-09 PROCEDURE — 93005 ELECTROCARDIOGRAM TRACING: CPT | Performed by: INTERNAL MEDICINE

## 2019-01-09 PROCEDURE — 80053 COMPREHEN METABOLIC PANEL: CPT | Performed by: INTERNAL MEDICINE

## 2019-01-09 PROCEDURE — 72158 MRI LUMBAR SPINE W/O & W/DYE: CPT

## 2019-01-09 PROCEDURE — 25010000002 HYDROCORTISONE SODIUM SUCCINATE 100 MG RECONSTITUTED SOLUTION: Performed by: INTERNAL MEDICINE

## 2019-01-09 PROCEDURE — 84100 ASSAY OF PHOSPHORUS: CPT | Performed by: INTERNAL MEDICINE

## 2019-01-09 PROCEDURE — 99233 SBSQ HOSP IP/OBS HIGH 50: CPT | Performed by: INTERNAL MEDICINE

## 2019-01-09 PROCEDURE — 25010000002 HEPARIN (PORCINE) PER 1000 UNITS: Performed by: INTERNAL MEDICINE

## 2019-01-09 PROCEDURE — 0 GADOBENATE DIMEGLUMINE 529 MG/ML SOLUTION: Performed by: INTERNAL MEDICINE

## 2019-01-09 PROCEDURE — 83735 ASSAY OF MAGNESIUM: CPT | Performed by: INTERNAL MEDICINE

## 2019-01-09 PROCEDURE — 82962 GLUCOSE BLOOD TEST: CPT

## 2019-01-09 PROCEDURE — 84439 ASSAY OF FREE THYROXINE: CPT | Performed by: INTERNAL MEDICINE

## 2019-01-09 PROCEDURE — A9577 INJ MULTIHANCE: HCPCS | Performed by: INTERNAL MEDICINE

## 2019-01-09 PROCEDURE — 25010000003 CEFTRIAXONE PER 250 MG: Performed by: INTERNAL MEDICINE

## 2019-01-09 PROCEDURE — 84443 ASSAY THYROID STIM HORMONE: CPT | Performed by: INTERNAL MEDICINE

## 2019-01-09 PROCEDURE — 93010 ELECTROCARDIOGRAM REPORT: CPT | Performed by: INTERNAL MEDICINE

## 2019-01-09 PROCEDURE — 85027 COMPLETE CBC AUTOMATED: CPT | Performed by: INTERNAL MEDICINE

## 2019-01-09 PROCEDURE — 80202 ASSAY OF VANCOMYCIN: CPT | Performed by: INTERNAL MEDICINE

## 2019-01-09 RX ORDER — HYDROCORTISONE 10 MG/1
20 TABLET ORAL 2 TIMES DAILY WITH MEALS
Status: DISCONTINUED | OUTPATIENT
Start: 2019-01-09 | End: 2019-01-10

## 2019-01-09 RX ORDER — LISINOPRIL 10 MG/1
10 TABLET ORAL ONCE
Status: COMPLETED | OUTPATIENT
Start: 2019-01-09 | End: 2019-01-09

## 2019-01-09 RX ORDER — LISINOPRIL 10 MG/1
10 TABLET ORAL
Status: DISCONTINUED | OUTPATIENT
Start: 2019-01-10 | End: 2019-01-10 | Stop reason: HOSPADM

## 2019-01-09 RX ORDER — LORAZEPAM 2 MG/ML
1 INJECTION INTRAMUSCULAR ONCE
Status: COMPLETED | OUTPATIENT
Start: 2019-01-09 | End: 2019-01-09

## 2019-01-09 RX ORDER — LISINOPRIL 5 MG/1
5 TABLET ORAL
Status: DISCONTINUED | OUTPATIENT
Start: 2019-01-09 | End: 2019-01-09

## 2019-01-09 RX ADMIN — Medication 500 MG: at 21:40

## 2019-01-09 RX ADMIN — INSULIN LISPRO 1 UNITS: 100 INJECTION, SOLUTION INTRAVENOUS; SUBCUTANEOUS at 04:00

## 2019-01-09 RX ADMIN — LISINOPRIL 5 MG: 5 TABLET ORAL at 11:44

## 2019-01-09 RX ADMIN — HYDROCORTISONE SODIUM SUCCINATE 50 MG: 100 INJECTION, POWDER, FOR SOLUTION INTRAMUSCULAR; INTRAVENOUS at 08:02

## 2019-01-09 RX ADMIN — HYDROCODONE BITARTRATE AND ACETAMINOPHEN 1 TABLET: 7.5; 325 TABLET ORAL at 11:41

## 2019-01-09 RX ADMIN — GLIPIZIDE 2.5 MG: 5 TABLET ORAL at 06:34

## 2019-01-09 RX ADMIN — HYDROCORTISONE 20 MG: 10 TABLET ORAL at 17:46

## 2019-01-09 RX ADMIN — VANCOMYCIN HYDROCHLORIDE 1000 MG: 1 INJECTION, SOLUTION INTRAVENOUS at 11:49

## 2019-01-09 RX ADMIN — LORAZEPAM 1 MG: 2 INJECTION INTRAMUSCULAR; INTRAVENOUS at 10:00

## 2019-01-09 RX ADMIN — LEVOTHYROXINE SODIUM 200 MCG: 100 TABLET ORAL at 06:34

## 2019-01-09 RX ADMIN — INSULIN LISPRO 7 UNITS: 100 INJECTION, SOLUTION INTRAVENOUS; SUBCUTANEOUS at 17:05

## 2019-01-09 RX ADMIN — CEFTRIAXONE SODIUM 2 G: 2 INJECTION, SOLUTION INTRAVENOUS at 11:49

## 2019-01-09 RX ADMIN — PANTOPRAZOLE SODIUM 40 MG: 40 TABLET, DELAYED RELEASE ORAL at 06:34

## 2019-01-09 RX ADMIN — INSULIN LISPRO 1 UNITS: 100 INJECTION, SOLUTION INTRAVENOUS; SUBCUTANEOUS at 21:42

## 2019-01-09 RX ADMIN — LISINOPRIL 10 MG: 10 TABLET ORAL at 19:46

## 2019-01-09 RX ADMIN — HYDROCORTISONE SODIUM SUCCINATE 50 MG: 100 INJECTION, POWDER, FOR SOLUTION INTRAMUSCULAR; INTRAVENOUS at 01:04

## 2019-01-09 RX ADMIN — POTASSIUM & SODIUM PHOSPHATES POWDER PACK 280-160-250 MG 2 PACKET: 280-160-250 PACK at 15:51

## 2019-01-09 RX ADMIN — GABAPENTIN 1200 MG: 400 CAPSULE ORAL at 08:02

## 2019-01-09 RX ADMIN — INSULIN LISPRO 2 UNITS: 100 INJECTION, SOLUTION INTRAVENOUS; SUBCUTANEOUS at 08:09

## 2019-01-09 RX ADMIN — ATORVASTATIN CALCIUM 40 MG: 20 TABLET, FILM COATED ORAL at 08:02

## 2019-01-09 RX ADMIN — HEPARIN SODIUM 5000 UNITS: 5000 INJECTION INTRAVENOUS; SUBCUTANEOUS at 21:41

## 2019-01-09 RX ADMIN — Medication 500 MG: at 08:02

## 2019-01-09 RX ADMIN — HYDROCODONE BITARTRATE AND ACETAMINOPHEN 1 TABLET: 7.5; 325 TABLET ORAL at 00:23

## 2019-01-09 RX ADMIN — GADOBENATE DIMEGLUMINE 20 ML: 529 INJECTION, SOLUTION INTRAVENOUS at 10:59

## 2019-01-09 RX ADMIN — HYDROCORTISONE 20 MG: 10 TABLET ORAL at 11:43

## 2019-01-09 RX ADMIN — HEPARIN SODIUM 5000 UNITS: 5000 INJECTION INTRAVENOUS; SUBCUTANEOUS at 06:34

## 2019-01-09 RX ADMIN — HEPARIN SODIUM 5000 UNITS: 5000 INJECTION INTRAVENOUS; SUBCUTANEOUS at 15:51

## 2019-01-09 RX ADMIN — HYDROCODONE BITARTRATE AND ACETAMINOPHEN 1 TABLET: 7.5; 325 TABLET ORAL at 21:40

## 2019-01-09 RX ADMIN — INSULIN LISPRO 3 UNITS: 100 INJECTION, SOLUTION INTRAVENOUS; SUBCUTANEOUS at 12:51

## 2019-01-09 RX ADMIN — GABAPENTIN 1200 MG: 400 CAPSULE ORAL at 21:40

## 2019-01-09 NOTE — PROGRESS NOTES
LOS: 2 days     Chief Complaint:  Follow-up sepsis    Interval History:  No acute events. Neck pain resolved. Lower back pain persists though radiation is gone. No fevers. Off of pressors. BCx surprisingly negative. No dysuria or flank pain.    ROS: no n/v/d    Vital Signs  Temp:  [97.8 °F (36.6 °C)-98.4 °F (36.9 °C)] 97.8 °F (36.6 °C)  Heart Rate:  [46-58] 46  BP: (133-154)/() 154/101    Physical Exam:  General: awake, alert, NAD   Head: Normocephalic  Eyes:  no scleral icterus  ENT: MMM, OP clear, no thrush. Fair dentition.   Neck: Supple  Cardiovascular: bradycardia, RR, no murmurs, rubs, or gallops; no LE edema  Respiratory: L normal work of breathing on ambient air  GI: Abdomen is soft, non-tender, non-distended  : no Ley catheter present; no CVA tenderness  Musculoskeletal: no joint abnormalities, normal musculature  Skin: No rashes, lesions, or embolic phenomenon  Neurological: Alert and oriented x 3, motor strength 5/5 in all four extremities  Psychiatric: Normal mood and affect     Antibiotics:  •  cefTRIAXone (ROCEPHIN) IVPB 2 g, 2 g, Intravenous, Q24H, Bhavik Rey MD, Last Rate: 100 mL/hr at 01/08/19 1206, 2 g at 01/08/19 1206  •  vancomycin (VANCOCIN) in iso-osmotic dextrose IVPB 1 g (premix) 200 mL, 1,000 mg, Intravenous, Q12H, Bhavik Rey MD, 1,000 mg at 01/08/19 2354    LABS:  CBC, CMP, LA, micro reviewed today  Lab Results   Component Value Date    WBC 8.94 01/09/2019    HGB 12.0 (L) 01/09/2019    HCT 36.2 (L) 01/09/2019    .5 (H) 01/09/2019     (L) 01/09/2019     Lab Results   Component Value Date    GLUCOSE 159 (H) 01/09/2019    BUN 15 01/09/2019    CREATININE 1.01 01/09/2019    EGFRIFNONA 72 01/09/2019    EGFRIFAFRI 72 03/20/2018    BCR 14.9 01/09/2019    CO2 22.6 01/09/2019    CALCIUM 8.5 (L) 01/09/2019    PROTENTOTREF 7.3 03/20/2018    ALBUMIN 3.10 (L) 01/09/2019    LABIL2 1.5 03/20/2018    AST 35 01/09/2019    ALT 55 (H) 01/09/2019    CRP  1.96 (H) 01/07/2019     Lab Results   Component Value Date    GABI 7.00 06/26/2017     LA down to 3.5  UA 3-5 WBCs  TSH 15.7  Procal 15.6--->11     Microbiology:  1/7 BCx: NGTD  1/7 UCx: in progress    Radiology (prior):  -CT A/P with B perinephric stranding could indicate pyelonephritis per radiologist; aortobiiliac stent graft is present without signs of inflammation, fatty liver    Assessment/Plan   1. Sepsis due to undetermined organism  2. Acute kidney injury  3.Elevated procalcitonin  4. Elevated TSH secondary to hypothyroidism  5. PVD s/p aortobiiliac stent graft placement  6. Adrenal insufficiency  7. Hepatitis due to #1 - improved  8. Lower back pain and neck pain     Neck pain resolved. Lower back pain persists. Crt normalized so will order MRI L spine w/w/o contrast today. Surprisingly BCx remain negative. UCx is pending but no urinary/flank symptoms despite CT findings with perinephric stranding.  Continue empiric vancomycin with goal trough 15-20 and ceftriaxone 2 g IV q24h. Continue probiotic given history of C diff. No current loose stools. He is on stress dose steroids for adrenal insufficiency. I will repeat a procalcitonin tomorrow.     Thank you for this consult. ID will follow. Case and plan d/w Dr Lara.

## 2019-01-09 NOTE — PROGRESS NOTES
78 y.o.   LOS: 2 days   Patient Care Team:  Epley, James, APRN as PCP - General (Family Medicine)    Chief Complaint:  Adrenal insufficiency and septic shock    Chief Complaint   Patient presents with   • Syncope   • Hypotension       Subjective     HPI   Patient is feeling much better  He tolerated the decrease in hydrocortisone to 50 mg every 8 hours  I'll decrease further  Patient's blood pressure is elevated  He may be able to switch to hydrocortisone orally 20 mg twice daily along with fludrocortisone  I will recheck thyroid levels this morning    Interval History:    Patient is a 78-year-old white male admitted to the intensive care unit for sudden onset weakness and lightheadedness.  Patient is presyncopal at home     Patient reported that he woke up in the morning and felt very weak and did not take his dexamethasone in the morning  Subsequently a few hours later he tried to go to the bathroom and could not get up from the commode and felt lightheaded and fell to the floor  She did not lose consciousness  Patient was brought to the emergency room and was hypotensive and received IV fluids and also received Levophed  Patient has history of adrenal insufficiency and received a 250 mg hydrocortisone IV with significant improvement in symptoms  Patient is currently on hydrocortisone 100 mg IV every 6 hours     Patient reports that he was diagnosed with adrenal insufficiency approximately 10 years ago  He subsequently went to a [facility] and is being managed by endocrinology at [facility]  His currently getting dexamethasone 0.5 mg daily in the morning  He also gets fludrocortisone 50 µg daily in the morning  His thyroid dose was adjusted within the past 6 months  He is currently getting 150 µg daily  The dose was reduced from 175 µg 6 months ago     Patient is currently being suspected to have urinary tract infection is being treated accordingly  Patient also has type 2 diabetes mellitus is currently on  medication  Patient is a smoker but quit smoking several years ago  He has obstructive sleep apnea but was not able to tolerate CPAP and returned the equipment        Review of Systems:      Review of Systems   Constitutional: Negative.    Respiratory: Negative.    Cardiovascular: Negative.    Gastrointestinal: Negative.    Musculoskeletal: Positive for back pain.   Neurological: Positive for numbness.   All other systems reviewed and are negative.    Objective     Vital Signs   Temp:  [97.8 °F (36.6 °C)-98.4 °F (36.9 °C)] 97.8 °F (36.6 °C)  Heart Rate:  [46-58] 47  BP: (133-167)/() 167/101    Physical Exam:  Physical Exam   Constitutional: He is oriented to person, place, and time.   HENT:   Head: Normocephalic and atraumatic.   Eyes: EOM are normal. Pupils are equal, round, and reactive to light.   Neck: Normal range of motion. Neck supple.   Cardiovascular: Normal rate, regular rhythm, normal heart sounds and intact distal pulses.   Pulmonary/Chest: Effort normal and breath sounds normal.   Abdominal: Soft. Bowel sounds are normal. He exhibits distension.   Musculoskeletal: Normal range of motion. He exhibits no edema.   Neurological: He is alert and oriented to person, place, and time.   Skin: Skin is warm and dry.   Nursing note and vitals reviewed.  Results Review:     I reviewed the patient's new clinical results.      Glucose   Date/Time Value Ref Range Status   01/09/2019 0642 159 (H) 65 - 99 mg/dL Final   01/08/2019 0449 192 (H) 65 - 99 mg/dL Final   01/07/2019 1535 83 65 - 99 mg/dL Final     Lab Results (last 72 hours)     Procedure Component Value Units Date/Time    Magnesium [[id number]]  (Abnormal) Collected:  01/09/19 0642    Specimen:  Blood Updated:  01/09/19 0803     Magnesium 2.5 mg/dL     Phosphorus [[id number]]  (Abnormal) Collected:  01/09/19 0642    Specimen:  Blood Updated:  01/09/19 0741     Phosphorus 2.2 mg/dL     Comprehensive Metabolic Panel [[id number]]  (Abnormal) Collected:   01/09/19 0642    Specimen:  Blood Updated:  01/09/19 0741     Glucose 159 mg/dL      BUN 15 mg/dL      Creatinine 1.01 mg/dL      Sodium 138 mmol/L      Potassium 4.0 mmol/L      Chloride 106 mmol/L      CO2 22.6 mmol/L      Calcium 8.5 mg/dL      Total Protein 6.5 g/dL      Albumin 3.10 g/dL      ALT (SGPT) 55 U/L      AST (SGOT) 35 U/L      Alkaline Phosphatase 55 U/L      Total Bilirubin 0.3 mg/dL      eGFR Non African Amer 72 mL/min/1.73      Globulin 3.4 gm/dL      A/G Ratio 0.9 g/dL      BUN/Creatinine Ratio 14.9     Anion Gap 9.4 mmol/L     Narrative:       The MDRD GFR formula is only valid for adults with stable renal function between ages 18 and 70.    POC Glucose Once [[id number]]  (Abnormal) Collected:  01/09/19 0727    Specimen:  Blood Updated:  01/09/19 0729     Glucose 143 mg/dL     CBC (No Diff) [[id number]]  (Abnormal) Collected:  01/09/19 0642    Specimen:  Blood Updated:  01/09/19 0711     WBC 8.94 10*3/mm3      RBC 3.53 10*6/mm3      Hemoglobin 12.0 g/dL      Hematocrit 36.2 %      .5 fL      MCH 34.0 pg      MCHC 33.1 g/dL      RDW 12.9 %      RDW-SD 48.3 fl      MPV 10.4 fL      Platelets 130 10*3/mm3     POC Glucose Once [[id number]]  (Abnormal) Collected:  01/09/19 0650    Specimen:  Blood Updated:  01/09/19 0652     Glucose 165 mg/dL     POC Glucose Once [[id number]]  (Abnormal) Collected:  01/09/19 0417    Specimen:  Blood Updated:  01/09/19 0418     Glucose 142 mg/dL     POC Glucose Once [[id number]]  (Normal) Collected:  01/09/19 0056    Specimen:  Blood Updated:  01/09/19 0058     Glucose 123 mg/dL     POC Glucose Once [[id number]]  (Abnormal) Collected:  01/08/19 1924    Specimen:  Blood Updated:  01/08/19 1927     Glucose 285 mg/dL     POC Glucose Once [[id number]]  (Abnormal) Collected:  01/08/19 1636    Specimen:  Blood Updated:  01/08/19 1644     Glucose 173 mg/dL     Blood Culture - Blood, Arm, Right [[id number]] Collected:  01/07/19 1535    Specimen:  Blood from Arm, Right Updated:  " 01/08/19 1545     Blood Culture No growth at 24 hours    Blood Culture - Blood, Arm, Left [[id number]] Collected:  01/07/19 1535    Specimen:  Blood from Arm, Left Updated:  01/08/19 1545     Blood Culture No growth at 24 hours    POC Glucose Once [[id number]]  (Abnormal) Collected:  01/08/19 1048    Specimen:  Blood Updated:  01/08/19 1051     Glucose 197 mg/dL     Urine Culture - Urine, Urine, Clean Catch [[id number]]  (Normal) Collected:  01/07/19 1746    Specimen:  Urine, Clean Catch Updated:  01/08/19 0958     Urine Culture Culture in progress    POC Glucose Once [[id number]]  (Abnormal) Collected:  01/08/19 0716    Specimen:  Blood Updated:  01/08/19 0720     Glucose 161 mg/dL     Procalcitonin [[id number]]  (Abnormal) Collected:  01/08/19 0449    Specimen:  Blood Updated:  01/08/19 0637     Procalcitonin 11.49 ng/mL     Narrative:       As a Marker for Sepsis (Non-Neonates):   1. <0.5 ng/mL represents a low risk of severe sepsis and/or septic shock.  1. >2 ng/mL represents a high risk of severe sepsis and/or septic shock.    As a Marker for Lower Respiratory Tract Infections that require antibiotic therapy:  PCT on Admission     Antibiotic Therapy             6-12 Hrs later  > 0.5                Strongly Recommended            >0.25 - <0.5         Recommended  0.1 - 0.25           Discouraged                   Remeasure/reassess PCT  <0.1                 Strongly Discouraged          Remeasure/reassess PCT      As 28 day mortality risk marker: \"Change in Procalcitonin Result\" (> 80 % or <=80 %) if Day 0 (or Day 1) and Day 4 values are available. Refer to http://www.[facility]-pct-calculator.com/   Change in PCT <=80 %   A decrease of PCT levels below or equal to 80 % defines a positive change in PCT test result representing a higher risk for 28-day all-cause mortality of patients diagnosed with severe sepsis or septic shock.  Change in PCT > 80 %   A decrease of PCT levels of more than 80 % defines a negative change " in PCT result representing a lower risk for 28-day all-cause mortality of patients diagnosed with severe sepsis or septic shock.                Comprehensive Metabolic Panel [[id number]]  (Abnormal) Collected:  01/08/19 0449    Specimen:  Blood Updated:  01/08/19 0617     Glucose 192 mg/dL      BUN 19 mg/dL      Creatinine 1.32 mg/dL      Sodium 138 mmol/L      Potassium 4.2 mmol/L      Chloride 107 mmol/L      CO2 20.0 mmol/L      Calcium 7.5 mg/dL      Total Protein 6.0 g/dL      Albumin 3.00 g/dL      ALT (SGPT) 73 U/L      AST (SGOT) 63 U/L      Alkaline Phosphatase 55 U/L      Total Bilirubin 0.3 mg/dL      eGFR Non African Amer 53 mL/min/1.73      Globulin 3.0 gm/dL      A/G Ratio 1.0 g/dL      BUN/Creatinine Ratio 14.4     Anion Gap 11.0 mmol/L     Narrative:       The MDRD GFR formula is only valid for adults with stable renal function between ages 18 and 70.    CBC (No Diff) [[id number]]  (Abnormal) Collected:  01/08/19 0449    Specimen:  Blood Updated:  01/08/19 0558     WBC 10.09 10*3/mm3      RBC 3.63 10*6/mm3      Hemoglobin 12.2 g/dL      Hematocrit 36.1 %      MCV 99.4 fL      MCH 33.6 pg      MCHC 33.8 g/dL      RDW 12.9 %      RDW-SD 46.8 fl      MPV 10.4 fL      Platelets 148 10*3/mm3     Lactic Acid, Reflex [[id number]]  (Abnormal) Collected:  01/08/19 0449    Specimen:  Blood Updated:  01/08/19 0554     Lactate 3.5 mmol/L     Brucella Antibody IgG / IgM [[id number]] Collected:  01/08/19 0449    Specimen:  Blood Updated:  01/08/19 0533    POC Glucose Once [[id number]]  (Abnormal) Collected:  01/08/19 0258    Specimen:  Blood Updated:  01/08/19 0259     Glucose 239 mg/dL     POC Glucose Once [[id number]]  (Abnormal) Collected:  01/07/19 2317    Specimen:  Blood Updated:  01/07/19 2319     Glucose 237 mg/dL     Lactic Acid, Reflex Timer (This will reflex a repeat order 3-3:15 hours after ordered.) [[id number]] Collected:  01/07/19 1536    Specimen:  Blood Updated:  01/07/19 1930     Extra Tube Hold for  add-ons.     Comment: Auto resulted.       Urinalysis, Microscopic Only - Urine, Catheter [[id number]]  (Abnormal) Collected:  01/07/19 1746    Specimen:  Urine, Catheter Updated:  01/07/19 1836     RBC, UA 0-2 /HPF      WBC, UA 3-5 /HPF      Bacteria, UA 1+ /HPF      Squamous Epithelial Cells, UA 0-2 /HPF      Hyaline Casts, UA 3-6 /LPF      Granular Casts, UA 0-2 /LPF      Amorphous Crystals, UA Moderate/2+ /HPF      Methodology Manual Light Microscopy    Urinalysis With Microscopic If Indicated (No Culture) - Urine, Catheter [[id number]]  (Abnormal) Collected:  01/07/19 1746    Specimen:  Urine, Catheter Updated:  01/07/19 1826     Color, UA Dark Yellow     Appearance, UA Turbid     pH, UA <=5.0     Specific Gravity, UA >=1.030     Glucose, UA Negative     Ketones, UA Trace     Bilirubin, UA Negative     Blood, UA Negative     Protein,  mg/dL (2+)     Leuk Esterase, UA Negative     Nitrite, UA Negative     Urobilinogen, UA 1.0 E.U./dL    POC Glucose Once [[id number]]  (Normal) Collected:  01/07/19 1822    Specimen:  Blood Updated:  01/07/19 1824     Glucose 87 mg/dL     C-reactive Protein [[id number]]  (Abnormal) Collected:  01/07/19 1535    Specimen:  Blood Updated:  01/07/19 1736     C-Reactive Protein 1.96 mg/dL     Sedimentation Rate [[id number]]  (Normal) Collected:  01/07/19 1535    Specimen:  Blood Updated:  01/07/19 1730     Sed Rate 8 mm/hr     Procalcitonin [[id number]]  (Abnormal) Collected:  01/07/19 1535    Specimen:  Blood Updated:  01/07/19 1650     Procalcitonin 15.68 ng/mL     Narrative:       As a Marker for Sepsis (Non-Neonates):   1. <0.5 ng/mL represents a low risk of severe sepsis and/or septic shock.  1. >2 ng/mL represents a high risk of severe sepsis and/or septic shock.    As a Marker for Lower Respiratory Tract Infections that require antibiotic therapy:  PCT on Admission     Antibiotic Therapy             6-12 Hrs later  > 0.5                Strongly Recommended            >0.25 - <0.5  "        Recommended  0.1 - 0.25           Discouraged                   Remeasure/reassess PCT  <0.1                 Strongly Discouraged          Remeasure/reassess PCT      As 28 day mortality risk marker: \"Change in Procalcitonin Result\" (> 80 % or <=80 %) if Day 0 (or Day 1) and Day 4 values are available. Refer to http://www.[facility]-pct-calculator.com/   Change in PCT <=80 %   A decrease of PCT levels below or equal to 80 % defines a positive change in PCT test result representing a higher risk for 28-day all-cause mortality of patients diagnosed with severe sepsis or septic shock.  Change in PCT > 80 %   A decrease of PCT levels of more than 80 % defines a negative change in PCT result representing a lower risk for 28-day all-cause mortality of patients diagnosed with severe sepsis or septic shock.                Troponin [[id number]]  (Normal) Collected:  01/07/19 1535    Specimen:  Blood Updated:  01/07/19 1628     Troponin T <0.010 ng/mL     Narrative:       Troponin T Reference Ranges:  Less than 0.03 ng/mL:    Negative for AMI  0.03 to 0.09 ng/mL:      Indeterminant for AMI  Greater than 0.09 ng/mL: Positive for AMI    TSH [[id number]]  (Abnormal) Collected:  01/07/19 1535    Specimen:  Blood Updated:  01/07/19 1628     TSH 15.700 mIU/mL     T4, Free [[id number]]  (Abnormal) Collected:  01/07/19 1535    Specimen:  Blood Updated:  01/07/19 1628     Free T4 0.90 ng/dL     Cortisol [[id number]]  (Normal) Collected:  01/07/19 1535    Specimen:  Blood Updated:  01/07/19 1628     Cortisol 7.07 mcg/dL     Narrative:       Cortisol Reference Ranges:    Cortisol 6AM - 10AM Range: 6.02-18.40 mcg/dl  Cortisol 4PM - 8PM Range: 2.68-10.50 mcg/dl  Critical AM/PM:    Less than 2 mcg/dl    Comprehensive Metabolic Panel [[id number]]  (Abnormal) Collected:  01/07/19 1535    Specimen:  Blood Updated:  01/07/19 1622     Glucose 83 mg/dL      BUN 21 mg/dL      Creatinine 1.86 mg/dL      Sodium 142 mmol/L      Potassium 3.2 mmol/L  "     Chloride 104 mmol/L      CO2 22.6 mmol/L      Calcium 8.4 mg/dL      Total Protein 6.2 g/dL      Albumin 3.40 g/dL      ALT (SGPT) 105 U/L      AST (SGOT) 149 U/L      Alkaline Phosphatase 73 U/L      Total Bilirubin 0.4 mg/dL      eGFR Non African Amer 35 mL/min/1.73      Globulin 2.8 gm/dL      A/G Ratio 1.2 g/dL      BUN/Creatinine Ratio 11.3     Anion Gap 15.4 mmol/L     Narrative:       The MDRD GFR formula is only valid for adults with stable renal function between ages 18 and 70.    Lipase [[id number]]  (Normal) Collected:  01/07/19 1535    Specimen:  Blood Updated:  01/07/19 1622     Lipase 27 U/L     Magnesium [[id number]]  (Normal) Collected:  01/07/19 1535    Specimen:  Blood Updated:  01/07/19 1622     Magnesium 1.9 mg/dL     Lactic Acid, Plasma [[id number]]  (Abnormal) Collected:  01/07/19 1536    Specimen:  Blood Updated:  01/07/19 1617     Lactate 4.3 mmol/L     Protime-INR [[id number]]  (Normal) Collected:  01/07/19 1535    Specimen:  Blood Updated:  01/07/19 1609     Protime 13.8 Seconds      INR 1.08    CBC & Differential [[id number]] Collected:  01/07/19 1535    Specimen:  Blood Updated:  01/07/19 1553    Narrative:       The following orders were created for panel order CBC & Differential.  Procedure                               Abnormality         Status                     ---------                               -----------         ------                     CBC Auto Differential[[id number]]        Abnormal            Final result                 Please view results for these tests on the individual orders.    CBC Auto Differential [[id number]]  (Abnormal) Collected:  01/07/19 1535    Specimen:  Blood Updated:  01/07/19 1553     WBC 2.86 10*3/mm3      RBC 3.99 10*6/mm3      Hemoglobin 13.4 g/dL      Hematocrit 39.7 %      MCV 99.5 fL      MCH 33.6 pg      MCHC 33.8 g/dL      RDW 12.7 %      RDW-SD 46.3 fl      MPV 10.2 fL      Platelets 140 10*3/mm3      Neutrophil % 87.9 %      Lymphocyte % 10.8  %      Monocyte % 0.3 %      Eosinophil % 1.0 %      Basophil % 0.0 %      Immature Grans % 1.4 %      Neutrophils, Absolute 2.51 10*3/mm3      Lymphocytes, Absolute 0.31 10*3/mm3      Monocytes, Absolute 0.01 10*3/mm3      Eosinophils, Absolute 0.03 10*3/mm3      Basophils, Absolute 0.00 10*3/mm3      Immature Grans, Absolute 0.04 10*3/mm3         Imaging Results (last 72 hours)     Procedure Component Value Units Date/Time    CT Abdomen Pelvis With Contrast [[id number]] Collected:  01/07/19 1823     Updated:  01/07/19 1834    Narrative:       CT ABDOMEN AND PELVIS WITH IV CONTRAST     HISTORY: 78-year-old male with back and abdominal pain for several days.  History of AAA repair in 2005.     TECHNIQUE: CT abdomen and pelvis with IV contrast.     COMPARISON: CT angiogram abdomen and pelvis 03/14/2018.      FINDINGS: Heart size is enlarged. There is mild dependent basilar  atelectasis. There is diffuse fat infiltration of the liver.  Cholecystectomy clips are present. Splenic size appears normal. Adrenal  glands, and pancreas appear within normal limits. There is bilateral  perinephric stranding that is increased compared to prior exam of  03/14/2018. A right lower pole posterolateral exophytic cyst measures  1.7 cm. There is no hydronephrosis.     Aortobiiliac stent graft is present. Native sac diameter measures 6.5 AP  dimension when measured in the sagittal plane by 5.8 cm greatest  transverse dimension measured in the axial plane, and these measurements  are not changed. Native sac of the abdominal aortic aneurysm extends  approximately 9 cm in length and this is without change. There is subtle  linear increased density material within the native sac extending  posterior to the biiliac component, and this does not appear changed and  there is no compelling evidence for endoleak. No perianeurysmal  inflammation is evident.       There is no bowel dilatation or evidence for bowel obstruction. There is  no ascites.  Urinary bladder is mostly decompressed. Osteoarthritis is  present in both hips, greater on the right. There is degenerative disc  disease within the lumbar spine with disc space narrowing greatest at  L2-3 and L3-4 where there is vacuum phenomena and endplate spur  formation. There is a mild levoscoliotic curvature of the lumbar spine.       Impression:       1.  Bilateral perinephric stranding is increased compared to the prior  CT 03/2018. This could indicate pyelonephritis in the proper clinical  setting though is not specific and there is no hydronephrosis or other  evidence for upper tract infection.  2.  Aortobiiliac stent graft placement without interval change. The  native sac measures up to 6.5 cm in AP dimension.  3.  Cholecystectomy.  4.  Diffuse fat infiltration of the liver.  5.  Osteoarthritis both hips, greater on the right. Levoscoliotic  curvature of the lumbar spine with advanced degenerative disc disease at  L2-3 and L3-4.     Findings discussed with Dr. aCst in the emergency department,  01/07/2019 at 5:18 p.m.     Radiation dose reduction techniques were utilized, including automated  exposure control and exposure modulation based on body size.     This report was finalized on 1/7/2019 6:31 PM by Dr. Keagan Duke M.D.       XR Chest 1 View [[id number]] Collected:  01/07/19 1606     Updated:  01/07/19 1637    Narrative:       AP PORTABLE CHEST     HISTORY: Syncope, hypotension.      COMPARISON: CT angiogram of the chest 03/14/2018, AP chest 07/05/2017.     FINDINGS: The cardiomediastinal silhouette is within normal limits.  Lungs appear clear and there is no evidence for pulmonary edema, pleural  effusion or infiltrate. Cardiac monitoring leads are noted.       Impression:       No acute disease.     This report was finalized on 1/7/2019 4:34 PM by Dr. Keagan Duke M.D.             Medication Review:       Current Facility-Administered Medications:   •  atorvastatin (LIPITOR) tablet  40 mg, 40 mg, Oral, Daily, Negrito Romero MD, 40 mg at 01/09/19 0802  •  cefTRIAXone (ROCEPHIN) IVPB 2 g, 2 g, Intravenous, Q24H, Bhavik Rey MD, Last Rate: 100 mL/hr at 01/08/19 1206, 2 g at 01/08/19 1206  •  dextrose (D50W) 25 g/ 50mL Intravenous Solution 25 g, 25 g, Intravenous, Q15 Min PRN, Negrito Romero MD  •  dextrose (GLUTOSE) oral gel 15 g, 15 g, Oral, Q15 Min PRN, Negrito Romero MD  •  gabapentin (NEURONTIN) capsule 1,200 mg, 1,200 mg, Oral, Q12H, Negrito Romero MD, 1,200 mg at 01/09/19 0802  •  glipiZIDE (GLUCOTROL) tablet 2.5 mg, 2.5 mg, Oral, QAM AC, Negrito Romero MD, 2.5 mg at 01/09/19 0634  •  glucagon (human recombinant) (GLUCAGEN DIAGNOSTIC) injection 1 mg, 1 mg, Subcutaneous, PRN, Negrito Romero MD  •  heparin (porcine) 5000 UNIT/ML injection 5,000 Units, 5,000 Units, Subcutaneous, Q8H, Negrito Romero MD, 5,000 Units at 01/09/19 0634  •  HYDROcodone-acetaminophen (NORCO) 7.5-325 MG per tablet 1 tablet, 1 tablet, Oral, Q6H PRN, Lizeth Lara MD, 1 tablet at 01/09/19 0023  •  hydrocortisone (CORTEF) tablet 20 mg, 20 mg, Oral, BID With Meals, Laith Jimenez MD  •  insulin lispro (humaLOG) injection 0-20 Units, 0-20 Units, Subcutaneous, Q4H, Lizeth Lara MD, 2 Units at 01/09/19 0809  •  levothyroxine (SYNTHROID, LEVOTHROID) tablet 200 mcg, 200 mcg, Oral, Q AM, Laith Jimenez MD, 200 mcg at 01/09/19 0634  •  LORazepam (ATIVAN) injection 1 mg, 1 mg, Intravenous, Once, Lizeth Lara MD  •  pantoprazole (PROTONIX) EC tablet 40 mg, 40 mg, Oral, Q AM, Lizeth Lara MD, 40 mg at 01/09/19 0634  •  Pharmacy to dose vancomycin, , Does not apply, Continuous PRN, Bhavik Rey MD  •  saccharomyces boulardii (FLORASTOR) capsule 500 mg, 500 mg, Oral, BID, Bhavik Rey MD, 500 mg at 01/09/19 0802  •  vancomycin (VANCOCIN) in iso-osmotic dextrose IVPB 1 g (premix) 200 mL, 1,000 mg, Intravenous, Q12H, Bhavik Rey MD, 1,000 mg at 01/08/19  2354    Assessment/Plan     Patient Active Problem List   Diagnosis   • Chronic coronary artery disease   • Gout   • Hyperlipidemia   • Hypothyroidism   • Peripheral vascular disease (CMS/HCC)   • Adrenal insufficiency (CMS/HCC)   • Spondylosis of lumbar region without myelopathy or radiculopathy   • Sepsis (CMS/HCC)   • Pleural effusions (right > left)   • C. 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  Severe peripheral vascular disease    Patient tolerated the decrease hydrocortisone dose  I will decrease him further  He is currently getting 25 mg every 8 hours as per the intensivist  His blood pressure is rather high and the systolic in the 160 range and diastolic in the 100 range    I think patient is ready to switch to hydrocortisone orally 20 mg twice a day  He will continue the fludrocortisone 100 µg daily  After rechecking the thyroid levels I may adjust his thyroid dose once again  Patient verbalized understanding  Patient is due for an MRI of the spine to evaluate for pain and possible infectious source  Discussed with Dr. Diaz    The total floor time spent  was more than 35 min of which greater than 20 min of time (greater than 50% of the total time)  was spent face to face with the patient counseling and coordination of care on recommended evaluation and treatment options, instructions for management/treatment and /or follow up and importance of compliance with chosen management or treatment options      Laith Jimenez MD FACE.  01/09/19  9:43 AM      EMR Dragon / transcription  "disclaimer:     \"Dictated utilizing Dragon dictation\".   "

## 2019-01-09 NOTE — PROGRESS NOTES
"Pharmacokinetic Evaluation - Vancomycin    Faustino Horton Jr. is a 78 y.o. male on vancomycin pharmacy to dose.  MRN: [id number]  : 1940    Day of vancomycin therapy: 3/11  Indication: sepsis  Consulted by: Dr. Rey  Goal trough: 15-20 mcg/ml  Current dose: tbd  Other antimicrobials: ceftriaxone 2g iv q24        Blood pressure (!) 150/106, pulse (!) 47, temperature 97.5 °F (36.4 °C), temperature source Oral, resp. rate 20, height 182.9 cm (72\"), weight 103 kg (227 lb 1.2 oz), SpO2 94 %.  Results from last 7 days   Lab Units  19   0642  19   04419   1535   CREATININE mg/dL  1.01  1.32*  1.86*     Estimated Creatinine Clearance: 77.3 mL/min (by C-G formula based on SCr of 1.01 mg/dL).  Results from last 7 days   Lab Units  19   0642  19   0449  19   1535   WBC 10*3/mm3  8.94  10.09  2.86*   HEMOGLOBIN g/dL  12.0*  12.2*  13.4*   HEMATOCRIT %  36.2*  36.1*  39.7*   PLATELETS 10*3/mm3  130*  148  140       Procal: 15.8->11.49  Other relevant labs/chart info: LA 4.3->3.5      Cultures:      Microbiology Results (last 10 days)     Procedure Component Value - Date/Time    Urine Culture - Urine, Urine, Clean Catch [[id number]]  (Normal) Collected:  19 1746    Lab Status:  Final result Specimen:  Urine, Clean Catch Updated:  19 1017     Urine Culture No growth    Blood Culture - Blood, Arm, Right [[id number]] Collected:  19 1535    Lab Status:  Preliminary result Specimen:  Blood from Arm, Right Updated:  19 1545     Blood Culture No growth at 24 hours    Blood Culture - Blood, Arm, Left [[id number]] Collected:  19 1535    Lab Status:  Preliminary result Specimen:  Blood from Arm, Left Updated:  19 1545     Blood Culture No growth at 24 hours            Dosing hx (include troughs if drawn):    2g 1715.    1750 mg x 1 (17 mg/kg) 1300. 1g q12 2354    1148 trough=12.3 mcg/ml,  1149, 2300        Assessment:  ·  ID following for sepsis. " Repeat pct in am. BC ngtd.   · AM trough level=12.3 mcg/ml. Not at steady state. Making progress towards therapeutic level and scr is trending down.  · Recheck level tomorrow once at steady state and reassess    Plan:  1) Continue vancomycin 1000 mg every 12 hours.  2) recheck trough level 1/10 1030 once at steady state  3) Encourage adequate hydration if appropriate. Monitor for decreased UOP, rash or other signs of vancomycin intolerance.    Thanks for this consult, will follow until Jose harrison Pharm.D, BCCCP

## 2019-01-09 NOTE — PROGRESS NOTES
"      [facility]         Dr Stanley Sayied   LOS: 2 days   Patient Care Team:  Epley, James, APRN as PCP - General (Family Medicine)    Chief Complaint: Severe sepsis with septic shock likely pyelonephritis underlying history of adrenal insufficiency and secondary acute kidney injury and shock liver.    Interval History: Feels much better this morning.  Sitting up.  Still has some back pain but denies any neck pain.    REVIEW OF SYSTEMS:   CARDIOVASCULAR: No chest pain, chest pressure or chest discomfort. No palpitations or edema.   RESPIRATORY: No shortness of breath, cough or sputum.   GASTROINTESTINAL: No anorexia, nausea, vomiting or diarrhea. No abdominal pain or blood.   HEMATOLOGIC: No bleeding or bruising.     Ventilator/Non-Invasive Ventilation Settings (From admission, onward)    None            Vital Signs  Temp:  [97.8 °F (36.6 °C)-98.4 °F (36.9 °C)] 97.8 °F (36.6 °C)  Heart Rate:  [46-58] 46  BP: (133-154)/() 154/101    Intake/Output Summary (Last 24 hours) at 1/9/2019 0906  Last data filed at 1/9/2019 0700  Gross per 24 hour   Intake 2367 ml   Output 500 ml   Net 1867 ml     Flowsheet Rows      First Filed Value   Admission Height  182.9 cm (72\") Documented at 01/07/2019 1512   Admission Weight  104 kg (228 lb 4.8 oz) Documented at 01/07/2019 1512          Physical Exam:   General Appearance:    Alert, cooperative, in no acute distress   Lungs:     Clear to auscultation,respirations regular, even and                  unlabored    Heart:    Regular rhythm and normal rate, normal S1 and S2, no            murmur, no gallop, no rub, no click   Chest Wall:    No abnormalities observed   Abdomen:     Normal bowel sounds, no masses, no organomegaly, soft        non-tender, non-distended, no guarding, no rebound                tenderness  Back mild tenderness thoracic and some lumbar spine.     Extremities:   Moves all extremities well, no edema, no cyanosis, no             redness "     Results Review:        Results from last 7 days   Lab Units  01/09/19   0642  01/08/19   0449  01/07/19   1535   SODIUM mmol/L  138  138  142   POTASSIUM mmol/L  4.0  4.2  3.2*   CHLORIDE mmol/L  106  107  104   CO2 mmol/L  22.6  20.0*  22.6   BUN mg/dL  15  19  21   CREATININE mg/dL  1.01  1.32*  1.86*   GLUCOSE mg/dL  159*  192*  83   CALCIUM mg/dL  8.5*  7.5*  8.4*     Results from last 7 days   Lab Units  01/07/19   1535   TROPONIN T ng/mL  <0.010     Results from last 7 days   Lab Units  01/09/19   0642  01/08/19   0449  01/07/19   1535   WBC 10*3/mm3  8.94  10.09  2.86*   HEMOGLOBIN g/dL  12.0*  12.2*  13.4*   HEMATOCRIT %  36.2*  36.1*  39.7*   PLATELETS 10*3/mm3  130*  148  140     Results from last 7 days   Lab Units  01/07/19   1535   INR   1.08         Results from last 7 days   Lab Units  01/09/19   0642   MAGNESIUM mg/dL  2.5*               I reviewed the patient's new clinical results.  I personally viewed and interpreted the patient's CXR        Medication Review:     atorvastatin 40 mg Oral Daily   ceftriaxone 2 g Intravenous Q24H   gabapentin 1,200 mg Oral Q12H   glipiZIDE 2.5 mg Oral QAM AC   heparin (porcine) 5,000 Units Subcutaneous Q8H   hydrocortisone sodium succinate 50 mg Intravenous Q8H   insulin lispro 0-20 Units Subcutaneous Q4H   levothyroxine 200 mcg Oral Q AM   pantoprazole 40 mg Oral Q AM   saccharomyces boulardii 500 mg Oral BID   vancomycin 1,000 mg Intravenous Q12H         norepinephrine 0.02-0.3 mcg/kg/min Last Rate: Stopped (01/08/19 0200)   Pharmacy to dose vancomycin     Sodium chloride 125 mL/hr Last Rate: 125 mL/hr (01/07/19 4878)       ASSESSMENT:   PCCM Problems  Septic shock resolved  Source of sepsis unclear.  Back pain  Exacerbation of known adrenal insufficiency  Unusual leg/back pains for past 3 days, ?  Epidural process  Acute kidney injury improved  Acute hypokalemia  Elevated LFTs, ? shock liver  Relative leukopenia improved  Relevant Medical Diagnoses  MARIA TERESA,  intolerant of CPAP  Diabetes mellitus 2  Hypothyroidism  Known adrenal insufficiency   Previous C. difficile infection  CAD with previous stent          PLAN:  Shock improved with fluids and hydrocortisone  Antibiotics per infectious diseases.  Discussed with Dr. Rey wants to rule out epidural abscess.  MRI today  Wean steroids as tolerated.  We'll transition to home medications  Creatinine improved  Replace electrolytes  Leukopenia improved with steroids  Discussed plan of care with the patient  Awaiting bed to transfer out of the ICU    Lizeth Lara MD  01/09/19  9:06 AM

## 2019-01-09 NOTE — PLAN OF CARE
Problem: Patient Care Overview  Goal: Plan of Care Review  Outcome: Ongoing (interventions implemented as appropriate)   01/09/19 1834   Coping/Psychosocial   Plan of Care Reviewed With patient;spouse   Plan of Care Review   Progress improving   OTHER   Outcome Summary Patient status slightly improving today. BP is up so restarted lisinopril. Some shoulder pain reported and treated with pain meds. Still awaiting bed on the floor, had MRI today on spine. Will continue to assess frequently for any changes.       Problem: Fall Risk (Adult)  Goal: Identify Related Risk Factors and Signs and Symptoms  Outcome: Ongoing (interventions implemented as appropriate)   01/09/19 1834   Fall Risk (Adult)   Related Risk Factors (Fall Risk) history of falls;environment unfamiliar   Signs and Symptoms (Fall Risk) presence of risk factors     Goal: Absence of Fall  Outcome: Ongoing (interventions implemented as appropriate)   01/09/19 1834   Fall Risk (Adult)   Absence of Fall making progress toward outcome       Problem: Syncope (Adult)  Goal: Identify Related Risk Factors and Signs and Symptoms  Outcome: Ongoing (interventions implemented as appropriate)   01/09/19 1834   Syncope (Adult)   Signs and Symptoms (Syncope) dizziness     Goal: Physical Safety/Health Maintenance  Outcome: Ongoing (interventions implemented as appropriate)   01/09/19 1834   Syncope (Adult)   Physical Safety/Health Maintenance making progress toward outcome     Goal: Optimal Emotional/Functional [place]  Outcome: Ongoing (interventions implemented as appropriate)   01/09/19 1834   Syncope (Adult)   Optimal Emotional/Functional [place] making progress toward outcome       Problem: Skin Injury Risk (Adult)  Goal: Identify Related Risk Factors and Signs and Symptoms  Outcome: Ongoing (interventions implemented as appropriate)   01/09/19 1834   Skin Injury Risk (Adult)   Related Risk Factors (Skin Injury Risk) critical care admission;cognitive  impairment     Goal: Skin Health and Integrity  Outcome: Ongoing (interventions implemented as appropriate)   01/09/19 1086   Skin Injury Risk (Adult)   Skin Health and Integrity making progress toward outcome

## 2019-01-09 NOTE — PROGRESS NOTES
Discharge Planning Assessment  [facility]     Patient Name: Faustino Horton Jr.  MRN: [id number]  Today's Date: 1/9/2019    Admit Date: 1/7/2019    Discharge Needs Assessment     Row Name 01/09/19 0942       Living Environment    Lives With  spouse    Name(s) of Who Lives With Patient  Lyric Horton [phone]    Current Living Arrangements  home/apartment/condo    Primary Care Provided by  self    Provides Primary Care For  no one    Family Caregiver if Needed  spouse    Family Caregiver Names  Lyric [phone]    Quality of Family Relationships  supportive    Able to Return to Prior Arrangements  yes       Resource/Environmental Concerns    Resource/Environmental Concerns  none    Transportation Concerns  car, none       Transition Planning    Patient/Family Anticipates Transition to  home    Patient/Family Anticipated Services at Transition  none       Discharge Needs Assessment    Readmission Within the Last 30 Days  no previous admission in last 30 days    Concerns to be Addressed  no discharge needs identified    Equipment Currently Used at Home  none    Anticipated Changes Related to Illness  none    Equipment Needed After Discharge  none    Offered/Gave Vendor List  no        Discharge Plan     Row Name 01/09/19 0906       Plan    Plan  Home denies needs    Patient/Family in Agreement with Plan  yes    Plan Comments  IMM 1/7/2019.  Met with patient and Lyric Horton wife/[facility] [phone] at bedside, patient granted me permission to speak while wife remained in the room.  Prior to admission patient was independent with all aspects of his ADL's.  Does not use any special or adaptive equipment at home. Patient uses the [facility] in [place], denies issue obtaining or taking medications.  Patient has a living will and his wife Lyric Horton listed as health care surrogate.  Discharge plans are to return home, denies needs and wife will transport. Will continue to monitor for  new or changing discharge needs.        Destination      No service coordination in this encounter.      Durable Medical Equipment      No service coordination in this encounter.      Dialysis/Infusion      No service coordination in this encounter.      Home Medical Care      No service coordination in this encounter.      Community Resources      No service coordination in this encounter.          Demographic Summary     Row Name 01/09/19 0941       General Information    Admission Type  inpatient    Arrived From  emergency department    Required Notices Provided  Important Message from Medicare    Referral Source  admission list    Reason for Consult  discharge planning    Preferred Language  English     Used During This Interaction  no       Contact Information    Permission Granted to Share Info With  family/designee Lyric Horton [phone]        Functional Status     Row Name 01/09/19 0941       Functional Status    Usual Activity Tolerance  good    Current Activity Tolerance  good       Functional Status, IADL    Medications  independent       Mental Status    General Appearance WDL  WDL;appearance    General Appearance  well-kept, clean       Employment/    Employment Status  retired        Psychosocial    No documentation.       Abuse/Neglect    No documentation.       Legal    No documentation.       Substance Abuse    No documentation.       Patient Forms    No documentation.           Gabbie Lopez RN

## 2019-01-10 VITALS
HEIGHT: 72 IN | RESPIRATION RATE: 18 BRPM | BODY MASS INDEX: 30.76 KG/M2 | SYSTOLIC BLOOD PRESSURE: 148 MMHG | TEMPERATURE: 98 F | OXYGEN SATURATION: 94 % | HEART RATE: 47 BPM | DIASTOLIC BLOOD PRESSURE: 89 MMHG | WEIGHT: 227.07 LBS

## 2019-01-10 LAB
ANION GAP SERPL CALCULATED.3IONS-SCNC: 9.6 MMOL/L
BRUCELLA IGG SER QL IA: NEGATIVE
BRUCELLA IGM SER QL IA: NEGATIVE
BUN BLD-MCNC: 12 MG/DL (ref 8–23)
BUN/CREAT SERPL: 14 (ref 7–25)
CALCIUM SPEC-SCNC: 9 MG/DL (ref 8.6–10.5)
CHLORIDE SERPL-SCNC: 105 MMOL/L (ref 98–107)
CO2 SERPL-SCNC: 25.4 MMOL/L (ref 22–29)
CREAT BLD-MCNC: 0.86 MG/DL (ref 0.76–1.27)
DEPRECATED RDW RBC AUTO: 47.3 FL (ref 37–54)
ERYTHROCYTE [DISTWIDTH] IN BLOOD BY AUTOMATED COUNT: 12.7 % (ref 11.5–14.5)
GFR SERPL CREATININE-BSD FRML MDRD: 86 ML/MIN/1.73
GLUCOSE BLD-MCNC: 117 MG/DL (ref 65–99)
GLUCOSE BLDC GLUCOMTR-MCNC: 122 MG/DL (ref 70–130)
GLUCOSE BLDC GLUCOMTR-MCNC: 144 MG/DL (ref 70–130)
HCT VFR BLD AUTO: 38.1 % (ref 40.4–52.2)
HGB BLD-MCNC: 12.6 G/DL (ref 13.7–17.6)
MCH RBC QN AUTO: 33.6 PG (ref 27–32.7)
MCHC RBC AUTO-ENTMCNC: 33.1 G/DL (ref 32.6–36.4)
MCV RBC AUTO: 101.6 FL (ref 79.8–96.2)
PHOSPHATE SERPL-MCNC: 2.4 MG/DL (ref 2.5–4.5)
PLATELET # BLD AUTO: 144 10*3/MM3 (ref 140–500)
PMV BLD AUTO: 10.6 FL (ref 6–12)
POTASSIUM BLD-SCNC: 3.6 MMOL/L (ref 3.5–5.2)
PROCALCITONIN SERPL-MCNC: 3.4 NG/ML (ref 0.1–0.25)
RBC # BLD AUTO: 3.75 10*6/MM3 (ref 4.6–6)
SODIUM BLD-SCNC: 140 MMOL/L (ref 136–145)
WBC NRBC COR # BLD: 10.84 10*3/MM3 (ref 4.5–10.7)

## 2019-01-10 PROCEDURE — 99232 SBSQ HOSP IP/OBS MODERATE 35: CPT | Performed by: INTERNAL MEDICINE

## 2019-01-10 PROCEDURE — 84100 ASSAY OF PHOSPHORUS: CPT | Performed by: INTERNAL MEDICINE

## 2019-01-10 PROCEDURE — 25010000002 HYDRALAZINE PER 20 MG: Performed by: INTERNAL MEDICINE

## 2019-01-10 PROCEDURE — 99233 SBSQ HOSP IP/OBS HIGH 50: CPT | Performed by: INTERNAL MEDICINE

## 2019-01-10 PROCEDURE — 25010000002 VANCOMYCIN PER 500 MG: Performed by: INTERNAL MEDICINE

## 2019-01-10 PROCEDURE — 36415 COLL VENOUS BLD VENIPUNCTURE: CPT | Performed by: INTERNAL MEDICINE

## 2019-01-10 PROCEDURE — 80048 BASIC METABOLIC PNL TOTAL CA: CPT | Performed by: INTERNAL MEDICINE

## 2019-01-10 PROCEDURE — 25010000002 HEPARIN (PORCINE) PER 1000 UNITS: Performed by: INTERNAL MEDICINE

## 2019-01-10 PROCEDURE — 25010000003 CEFTRIAXONE PER 250 MG: Performed by: INTERNAL MEDICINE

## 2019-01-10 PROCEDURE — 84145 PROCALCITONIN (PCT): CPT | Performed by: INTERNAL MEDICINE

## 2019-01-10 PROCEDURE — 82962 GLUCOSE BLOOD TEST: CPT

## 2019-01-10 PROCEDURE — 85027 COMPLETE CBC AUTOMATED: CPT | Performed by: INTERNAL MEDICINE

## 2019-01-10 RX ORDER — ACETAMINOPHEN 325 MG/1
650 TABLET ORAL EVERY 6 HOURS PRN
Qty: 25 TABLET | Refills: 0 | Status: SHIPPED | OUTPATIENT
Start: 2019-01-10 | End: 2019-01-24

## 2019-01-10 RX ORDER — ACETAMINOPHEN 325 MG/1
650 TABLET ORAL EVERY 6 HOURS PRN
Status: DISCONTINUED | OUTPATIENT
Start: 2019-01-10 | End: 2019-01-10 | Stop reason: HOSPADM

## 2019-01-10 RX ORDER — CEFDINIR 300 MG/1
300 CAPSULE ORAL EVERY 12 HOURS SCHEDULED
Status: DISCONTINUED | OUTPATIENT
Start: 2019-01-11 | End: 2019-01-10 | Stop reason: HOSPADM

## 2019-01-10 RX ORDER — FLUDROCORTISONE ACETATE 0.1 MG/1
0.1 TABLET ORAL DAILY
Status: DISCONTINUED | OUTPATIENT
Start: 2019-01-10 | End: 2019-01-10 | Stop reason: HOSPADM

## 2019-01-10 RX ORDER — HYDRALAZINE HYDROCHLORIDE 20 MG/ML
10 INJECTION INTRAMUSCULAR; INTRAVENOUS EVERY 4 HOURS PRN
Status: DISCONTINUED | OUTPATIENT
Start: 2019-01-10 | End: 2019-01-10 | Stop reason: HOSPADM

## 2019-01-10 RX ORDER — DEXAMETHASONE 0.5 MG/1
0.5 TABLET ORAL
Status: DISCONTINUED | OUTPATIENT
Start: 2019-01-10 | End: 2019-01-10 | Stop reason: HOSPADM

## 2019-01-10 RX ORDER — CEFDINIR 300 MG/1
300 CAPSULE ORAL EVERY 12 HOURS SCHEDULED
Qty: 6 CAPSULE | Refills: 0 | Status: SHIPPED | OUTPATIENT
Start: 2019-01-11 | End: 2019-01-14

## 2019-01-10 RX ORDER — LEVOTHYROXINE SODIUM 175 UG/1
175 TABLET ORAL
Status: DISCONTINUED | OUTPATIENT
Start: 2019-01-11 | End: 2019-01-10 | Stop reason: HOSPADM

## 2019-01-10 RX ADMIN — HYDRALAZINE HYDROCHLORIDE 10 MG: 20 INJECTION INTRAMUSCULAR; INTRAVENOUS at 00:43

## 2019-01-10 RX ADMIN — DEXAMETHASONE 0.5 MG: 0.5 TABLET ORAL at 11:09

## 2019-01-10 RX ADMIN — ACETAMINOPHEN 650 MG: 325 TABLET ORAL at 06:30

## 2019-01-10 RX ADMIN — VANCOMYCIN HYDROCHLORIDE 1000 MG: 1 INJECTION, SOLUTION INTRAVENOUS at 00:44

## 2019-01-10 RX ADMIN — CEFTRIAXONE SODIUM 2 G: 2 INJECTION, SOLUTION INTRAVENOUS at 12:43

## 2019-01-10 RX ADMIN — LISINOPRIL 10 MG: 10 TABLET ORAL at 10:59

## 2019-01-10 RX ADMIN — PANTOPRAZOLE SODIUM 40 MG: 40 TABLET, DELAYED RELEASE ORAL at 06:46

## 2019-01-10 RX ADMIN — GABAPENTIN 1200 MG: 400 CAPSULE ORAL at 10:58

## 2019-01-10 RX ADMIN — POTASSIUM & SODIUM PHOSPHATES POWDER PACK 280-160-250 MG 2 PACKET: 280-160-250 PACK at 10:59

## 2019-01-10 RX ADMIN — ATORVASTATIN CALCIUM 40 MG: 20 TABLET, FILM COATED ORAL at 10:57

## 2019-01-10 RX ADMIN — ACETAMINOPHEN 650 MG: 325 TABLET ORAL at 00:43

## 2019-01-10 RX ADMIN — GLIPIZIDE 2.5 MG: 5 TABLET ORAL at 06:45

## 2019-01-10 RX ADMIN — LEVOTHYROXINE SODIUM 200 MCG: 100 TABLET ORAL at 06:46

## 2019-01-10 RX ADMIN — Medication 500 MG: at 10:59

## 2019-01-10 RX ADMIN — HEPARIN SODIUM 5000 UNITS: 5000 INJECTION INTRAVENOUS; SUBCUTANEOUS at 06:45

## 2019-01-10 RX ADMIN — FLUDROCORTISONE ACETATE 0.1 MG: 0.1 TABLET ORAL at 10:58

## 2019-01-10 NOTE — PROGRESS NOTES
Case Management Discharge Note    Final Note: DC'd home via private auto. Benita Serrano RN    Destination      No service has been selected for the patient.      Durable Medical Equipment      No service has been selected for the patient.      Dialysis/Infusion      No service has been selected for the patient.      Home Medical Care      No service has been selected for the patient.      Community Resources      No service has been selected for the patient.        Other: Other(private auto)    Final Discharge Disposition Code: 01 - home or self-care

## 2019-01-10 NOTE — PROGRESS NOTES
LOS: 3 days     Chief Complaint:  Follow-up sepsis    Interval History:  No acute events. MRI l spine was negative for infection though did show herniated discs and end plate changes c/w DJD. Patient says he feels back to his baseline. He confirms his bradycardia is chronic.    ROS: no n/v/d    Vital Signs  Temp:  [97.5 °F (36.4 °C)-97.6 °F (36.4 °C)] 97.5 °F (36.4 °C)  Heart Rate:  [42-55] 46  Resp:  [18-20] 18  BP: (150-181)/() 155/91    Physical Exam:  General: awake, alert, NAD   Head: Normocephalic  Eyes:  no scleral icterus  ENT: MMM, OP clear, no thrush. Fair dentition.   Neck: Supple  Cardiovascular: bradycardic, RR, no murmurs, rubs, or gallops; no LE edema  Respiratory: L normal work of breathing on ambient air  GI: Abdomen is soft, non-tender, non-distended  : no Ley catheter present; no CVA tenderness  Musculoskeletal: no joint abnormalities, normal musculature  Skin: No rashes, lesions, or embolic phenomenon  Neurological: Alert and oriented x 3, motor strength 5/5 in all four extremities  Psychiatric: Normal mood and affect     Antibiotics:  •  cefTRIAXone   •  vancomycin     LABS:  CBC, BMP, micro reviewed today  Lab Results   Component Value Date    WBC 10.84 (H) 01/10/2019    HGB 12.6 (L) 01/10/2019    HCT 38.1 (L) 01/10/2019    .6 (H) 01/10/2019     01/10/2019     Lab Results   Component Value Date    GLUCOSE 117 (H) 01/10/2019    BUN 12 01/10/2019    CREATININE 0.86 01/10/2019    EGFRIFNONA 86 01/10/2019    EGFRIFAFRI 72 03/20/2018    BCR 14.0 01/10/2019    CO2 25.4 01/10/2019    CALCIUM 9.0 01/10/2019    PROTENTOTREF 7.3 03/20/2018    ALBUMIN 3.10 (L) 01/09/2019    LABIL2 1.5 03/20/2018    AST 35 01/09/2019    ALT 55 (H) 01/09/2019    CRP 1.96 (H) 01/07/2019     Lab Results   Component Value Date    [facility] 12.30 01/09/2019     LA down to 3.5  UA 3-5 WBCs  TSH 15.7  Procal 15.6--->11--->3     Microbiology:  1/7 BCx: NGTD  1/7 UCx: negative    Radiology (d/w  radiologist Dr Richard):  MRI L spine was negative for infection though did show herniated discs and end plate changes c/w DJD.     Assessment/Plan   1. Sepsis due to undetermined organism  2. Acute kidney injury  3.Elevated procalcitonin  4. Elevated TSH secondary to hypothyroidism  5. PVD s/p aortobiiliac stent graft placement  6. Adrenal insufficiency on stress dose steroids  7. 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 Lower back pain and neck pain     Blood and urine cultures are negative. Imaging negative for infection. MRI L spine without evidence of infection. Procal has come down from 15-->3. WBC slightly elevated but has been on stress dose steroids for adrenal insufficiency. I favor stopping vancomycin today. I will have him receive his ceftriaxone dose of 2 g around 1200 today as scheduled. Then tomorrow he will change to cefdinir 300 mg PO BID with stop date 1/13/19 to complete a 7-day course of antibiotics. Continue probiotic given history of C diff. No current loose stools.     Thank you for this consult. ID will follow though suspect he is nearing discharge.

## 2019-01-10 NOTE — PROGRESS NOTES
78 y.o.   LOS: 3 days   Patient Care Team:  Epley, James, APRN as PCP - General (Family Medicine)    Chief Complaint:      Chief Complaint   Patient presents with   • Syncope   • Hypotension       Subjective     HPI  Patient is feeling much better  Tolerated the decrease in hydrocortisone dose  He is currently receiving hydrocortisone 20 twice daily along with fludrocortisone  Hemodynamically stable    Interval History:    Patient is a 78-year-old white male admitted to the intensive care unit for sudden onset weakness and lightheadedness.  Patient is presyncopal at home     Patient reported that he woke up in the morning and felt very weak and did not take his dexamethasone in the morning  Subsequently a few hours later he tried to go to the bathroom and could not get up from the commode and felt lightheaded and fell to the floor  She did not lose consciousness  Patient was brought to the emergency room and was hypotensive and received IV fluids and also received Levophed  Patient has history of adrenal insufficiency and received a 250 mg hydrocortisone IV with significant improvement in symptoms  Patient is currently on hydrocortisone 100 mg IV every 6 hours     Patient reports that he was diagnosed with adrenal insufficiency approximately 10 years ago  He subsequently went to a [facility] and is being managed by endocrinology at [place]  His currently getting dexamethasone 0.5 mg daily in the morning  He also gets fludrocortisone 50 µg daily in the morning  His thyroid dose was adjusted within the past 6 months  He is currently getting 150 µg daily  The dose was reduced from 175 µg 6 months ago     Patient is currently being suspected to have urinary tract infection is being treated accordingly  Patient also has type 2 diabetes mellitus is currently on medication  Patient is a smoker but quit smoking several years ago  He has obstructive sleep apnea but was not able to tolerate CPAP and returned the  equipment           Review of Systems:      Review of Systems   Constitutional: Negative.    Respiratory: Negative.    Cardiovascular: Negative.    Gastrointestinal: Negative.    Musculoskeletal: Positive for back pain.   Neurological: Positive for numbness.   All other systems reviewed and are negative.    Objective     Vital Signs   Temp:  [97.5 °F (36.4 °C)-97.6 °F (36.4 °C)] 97.5 °F (36.4 °C)  Heart Rate:  [42-55] 46  Resp:  [18-20] 18  BP: (150-181)/() 155/91    Physical Exam:  Physical Exam   Constitutional: He is oriented to person, place, and time.   HENT:   Head: Normocephalic and atraumatic.   Eyes: EOM are normal. Pupils are equal, round, and reactive to light.   Neck: Normal range of motion. Neck supple.   Cardiovascular: Normal rate, regular rhythm, normal heart sounds and intact distal pulses.   Pulmonary/Chest: Effort normal and breath sounds normal.   Abdominal: Soft. Bowel sounds are normal. He exhibits distension.   Musculoskeletal: Normal range of motion. He exhibits no edema.   Neurological: He is alert and oriented to person, place, and time.   Skin: Skin is warm and dry.   Nursing note and vitals reviewed.  Results Review:     I reviewed the patient's new clinical results.      Glucose   Date/Time Value Ref Range Status   01/10/2019 0352 117 (H) 65 - 99 mg/dL Final   01/09/2019 0642 159 (H) 65 - 99 mg/dL Final   01/08/2019 0449 192 (H) 65 - 99 mg/dL Final   01/07/2019 1535 83 65 - 99 mg/dL Final     Lab Results (last 72 hours)     Procedure Component Value Units Date/Time    Procalcitonin [[id number]]  (Abnormal) Collected:  01/10/19 0352    Specimen:  Blood Updated:  01/10/19 0828     Procalcitonin 3.40 ng/mL     Narrative:       As a Marker for Sepsis (Non-Neonates):   1. <0.5 ng/mL represents a low risk of severe sepsis and/or septic shock.  1. >2 ng/mL represents a high risk of severe sepsis and/or septic shock.    As a Marker for Lower Respiratory Tract Infections that require  "antibiotic therapy:  PCT on Admission     Antibiotic Therapy             6-12 Hrs later  > 0.5                Strongly Recommended            >0.25 - <0.5         Recommended  0.1 - 0.25           Discouraged                   Remeasure/reassess PCT  <0.1                 Strongly Discouraged          Remeasure/reassess PCT      As 28 day mortality risk marker: \"Change in Procalcitonin Result\" (> 80 % or <=80 %) if Day 0 (or Day 1) and Day 4 values are available. Refer to http://[email or web address]cal[facility]com/   Change in PCT <=80 %   A decrease of PCT levels below or equal to 80 % defines a positive change in PCT test result representing a higher risk for 28-day all-cause mortality of patients diagnosed with severe sepsis or septic shock.  Change in PCT > 80 %   A decrease of PCT levels of more than 80 % defines a negative change in PCT result representing a lower risk for 28-day all-cause mortality of patients diagnosed with severe sepsis or septic shock.                POC Glucose Once [[id number]]  (Normal) Collected:  01/10/19 0635    Specimen:  Blood Updated:  01/10/19 0636     Glucose 122 mg/dL     Basic Metabolic Panel [[id number]]  (Abnormal) Collected:  01/10/19 0352    Specimen:  Blood Updated:  01/10/19 0440     Glucose 117 mg/dL      BUN 12 mg/dL      Creatinine 0.86 mg/dL      Sodium 140 mmol/L      Potassium 3.6 mmol/L      Chloride 105 mmol/L      CO2 25.4 mmol/L      Calcium 9.0 mg/dL      eGFR Non African Amer 86 mL/min/1.73      BUN/Creatinine Ratio 14.0     Anion Gap 9.6 mmol/L     Narrative:       The MDRD GFR formula is only valid for adults with stable renal function between ages 18 and 70.    Phosphorus [[id number]]  (Abnormal) Collected:  01/10/19 0352    Specimen:  Blood Updated:  01/10/19 0440     Phosphorus 2.4 mg/dL     CBC (No Diff) [[id number]]  (Abnormal) Collected:  01/10/19 0352    Specimen:  Blood Updated:  01/10/19 0419     WBC 10.84 10*3/mm3      RBC 3.75 10*6/mm3      Hemoglobin 12.6 " g/dL      Hematocrit 38.1 %      .6 fL      MCH 33.6 pg      MCHC 33.1 g/dL      RDW 12.7 %      RDW-SD 47.3 fl      MPV 10.6 fL      Platelets 144 10*3/mm3     POC Glucose Once [[id number]]  (Normal) Collected:  01/09/19 2310    Specimen:  Blood Updated:  01/09/19 2311     Glucose 129 mg/dL     POC Glucose Once [[id number]]  (Abnormal) Collected:  01/09/19 2122    Specimen:  Blood Updated:  01/09/19 2124     Glucose 146 mg/dL     POC Glucose Once [[id number]]  (Abnormal) Collected:  01/09/19 1653    Specimen:  Blood Updated:  01/09/19 1703     Glucose 203 mg/dL     Blood Culture - Blood, Arm, Left [[id number]] Collected:  01/07/19 1535    Specimen:  Blood from Arm, Left Updated:  01/09/19 1545     Blood Culture No growth at 2 days    Blood Culture - Blood, Arm, Right [[id number]] Collected:  01/07/19 1535    Specimen:  Blood from Arm, Right Updated:  01/09/19 1545     Blood Culture No growth at 2 days    POC Glucose Once [[id number]]  (Abnormal) Collected:  01/09/19 1244    Specimen:  Blood Updated:  01/09/19 1320     Glucose 166 mg/dL     Vancomycin, Trough Vancomycin dose due at 1100 1/9. Please make sure Vancomycin is not infusing before drawing the vanc level. Thanks [[id number]]  (Normal) Collected:  01/09/19 1148    Specimen:  Blood Updated:  01/09/19 1228     Vancomycin Trough 12.30 mcg/mL     TSH [[id number]]  (Abnormal) Collected:  01/09/19 0642    Specimen:  Blood Updated:  01/09/19 1044     TSH 4.490 mIU/mL     T4, Free [[id number]]  (Abnormal) Collected:  01/09/19 0642    Specimen:  Blood Updated:  01/09/19 1044     Free T4 0.73 ng/dL     Urine Culture - Urine, Urine, Clean Catch [[id number]]  (Normal) Collected:  01/07/19 1746    Specimen:  Urine, Clean Catch Updated:  01/09/19 1017     Urine Culture No growth    Magnesium [[id number]]  (Abnormal) Collected:  01/09/19 0642    Specimen:  Blood Updated:  01/09/19 0803     Magnesium 2.5 mg/dL     Phosphorus [[id number]]  (Abnormal) Collected:  01/09/19 0642     Specimen:  Blood Updated:  01/09/19 0741     Phosphorus 2.2 mg/dL     Comprehensive Metabolic Panel [[id number]]  (Abnormal) Collected:  01/09/19 0642    Specimen:  Blood Updated:  01/09/19 0741     Glucose 159 mg/dL      BUN 15 mg/dL      Creatinine 1.01 mg/dL      Sodium 138 mmol/L      Potassium 4.0 mmol/L      Chloride 106 mmol/L      CO2 22.6 mmol/L      Calcium 8.5 mg/dL      Total Protein 6.5 g/dL      Albumin 3.10 g/dL      ALT (SGPT) 55 U/L      AST (SGOT) 35 U/L      Alkaline Phosphatase 55 U/L      Total Bilirubin 0.3 mg/dL      eGFR Non African Amer 72 mL/min/1.73      Globulin 3.4 gm/dL      A/G Ratio 0.9 g/dL      BUN/Creatinine Ratio 14.9     Anion Gap 9.4 mmol/L     Narrative:       The MDRD GFR formula is only valid for adults with stable renal function between ages 18 and 70.    POC Glucose Once [[id number]]  (Abnormal) Collected:  01/09/19 0727    Specimen:  Blood Updated:  01/09/19 0729     Glucose 143 mg/dL     CBC (No Diff) [[id number]]  (Abnormal) Collected:  01/09/19 0642    Specimen:  Blood Updated:  01/09/19 0711     WBC 8.94 10*3/mm3      RBC 3.53 10*6/mm3      Hemoglobin 12.0 g/dL      Hematocrit 36.2 %      .5 fL      MCH 34.0 pg      MCHC 33.1 g/dL      RDW 12.9 %      RDW-SD 48.3 fl      MPV 10.4 fL      Platelets 130 10*3/mm3     POC Glucose Once [[id number]]  (Abnormal) Collected:  01/09/19 0650    Specimen:  Blood Updated:  01/09/19 0652     Glucose 165 mg/dL     POC Glucose Once [[id number]]  (Abnormal) Collected:  01/09/19 0417    Specimen:  Blood Updated:  01/09/19 0418     Glucose 142 mg/dL     POC Glucose Once [[id number]]  (Normal) Collected:  01/09/19 0056    Specimen:  Blood Updated:  01/09/19 0058     Glucose 123 mg/dL     POC Glucose Once [[id number]]  (Abnormal) Collected:  01/08/19 1924    Specimen:  Blood Updated:  01/08/19 1927     Glucose 285 mg/dL     POC Glucose Once [[id number]]  (Abnormal) Collected:  01/08/19 1636    Specimen:  Blood Updated:  01/08/19 164      "Glucose 173 mg/dL     POC Glucose Once [[id number]]  (Abnormal) Collected:  01/08/19 1048    Specimen:  Blood Updated:  01/08/19 1051     Glucose 197 mg/dL     POC Glucose Once [[id number]]  (Abnormal) Collected:  01/08/19 0716    Specimen:  Blood Updated:  01/08/19 0720     Glucose 161 mg/dL     Procalcitonin [[id number]]  (Abnormal) Collected:  01/08/19 0449    Specimen:  Blood Updated:  01/08/19 0637     Procalcitonin 11.49 ng/mL     Narrative:       As a Marker for Sepsis (Non-Neonates):   1. <0.5 ng/mL represents a low risk of severe sepsis and/or septic shock.  1. >2 ng/mL represents a high risk of severe sepsis and/or septic shock.    As a Marker for Lower Respiratory Tract Infections that require antibiotic therapy:  PCT on Admission     Antibiotic Therapy             6-12 Hrs later  > 0.5                Strongly Recommended            >0.25 - <0.5         Recommended  0.1 - 0.25           Discouraged                   Remeasure/reassess PCT  <0.1                 Strongly Discouraged          Remeasure/reassess PCT      As 28 day mortality risk marker: \"Change in Procalcitonin Result\" (> 80 % or <=80 %) if Day 0 (or Day 1) and Day 4 values are available. Refer to http://www.[employer]-pct-calculator.com/   Change in PCT <=80 %   A decrease of PCT levels below or equal to 80 % defines a positive change in PCT test result representing a higher risk for 28-day all-cause mortality of patients diagnosed with severe sepsis or septic shock.  Change in PCT > 80 %   A decrease of PCT levels of more than 80 % defines a negative change in PCT result representing a lower risk for 28-day all-cause mortality of patients diagnosed with severe sepsis or septic shock.                Comprehensive Metabolic Panel [[id number]]  (Abnormal) Collected:  01/08/19 0449    Specimen:  Blood Updated:  01/08/19 0617     Glucose 192 mg/dL      BUN 19 mg/dL      Creatinine 1.32 mg/dL      Sodium 138 mmol/L      Potassium 4.2 mmol/L      Chloride " 107 mmol/L      CO2 20.0 mmol/L      Calcium 7.5 mg/dL      Total Protein 6.0 g/dL      Albumin 3.00 g/dL      ALT (SGPT) 73 U/L      AST (SGOT) 63 U/L      Alkaline Phosphatase 55 U/L      Total Bilirubin 0.3 mg/dL      eGFR Non African Amer 53 mL/min/1.73      Globulin 3.0 gm/dL      A/G Ratio 1.0 g/dL      BUN/Creatinine Ratio 14.4     Anion Gap 11.0 mmol/L     Narrative:       The MDRD GFR formula is only valid for adults with stable renal function between ages 18 and 70.    CBC (No Diff) [[id number]]  (Abnormal) Collected:  01/08/19 0449    Specimen:  Blood Updated:  01/08/19 0558     WBC 10.09 10*3/mm3      RBC 3.63 10*6/mm3      Hemoglobin 12.2 g/dL      Hematocrit 36.1 %      MCV 99.4 fL      MCH 33.6 pg      MCHC 33.8 g/dL      RDW 12.9 %      RDW-SD 46.8 fl      MPV 10.4 fL      Platelets 148 10*3/mm3     Lactic Acid, Reflex [[id number]]  (Abnormal) Collected:  01/08/19 0449    Specimen:  Blood Updated:  01/08/19 0554     Lactate 3.5 mmol/L     Brucella Antibody IgG / IgM [[id number]] Collected:  01/08/19 0449    Specimen:  Blood Updated:  01/08/19 0533    POC Glucose Once [[id number]]  (Abnormal) Collected:  01/08/19 0258    Specimen:  Blood Updated:  01/08/19 0259     Glucose 239 mg/dL     POC Glucose Once [[id number]]  (Abnormal) Collected:  01/07/19 2317    Specimen:  Blood Updated:  01/07/19 2319     Glucose 237 mg/dL     Lactic Acid, Reflex Timer (This will reflex a repeat order 3-3:15 hours after ordered.) [[id number]] Collected:  01/07/19 1536    Specimen:  Blood Updated:  01/07/19 1930     Extra Tube Hold for add-ons.     Comment: Auto resulted.       Urinalysis, Microscopic Only - Urine, Catheter [[id number]]  (Abnormal) Collected:  01/07/19 1746    Specimen:  Urine, Catheter Updated:  01/07/19 1836     RBC, UA 0-2 /HPF      WBC, UA 3-5 /HPF      Bacteria, UA 1+ /HPF      Squamous Epithelial Cells, UA 0-2 /HPF      Hyaline Casts, UA 3-6 /LPF      Granular Casts, UA 0-2 /LPF      Amorphous Crystals, UA  "Moderate/2+ /HPF      Methodology Manual Light Microscopy    Urinalysis With Microscopic If Indicated (No Culture) - Urine, Catheter [[id number]]  (Abnormal) Collected:  01/07/19 1746    Specimen:  Urine, Catheter Updated:  01/07/19 1826     Color, UA Dark Yellow     Appearance, UA Turbid     pH, UA <=5.0     Specific Gravity, UA >=1.030     Glucose, UA Negative     Ketones, UA Trace     Bilirubin, UA Negative     Blood, UA Negative     Protein,  mg/dL (2+)     Leuk Esterase, UA Negative     Nitrite, UA Negative     Urobilinogen, UA 1.0 E.U./dL    POC Glucose Once [[id number]]  (Normal) Collected:  01/07/19 1822    Specimen:  Blood Updated:  01/07/19 1824     Glucose 87 mg/dL     C-reactive Protein [[id number]]  (Abnormal) Collected:  01/07/19 1535    Specimen:  Blood Updated:  01/07/19 1736     C-Reactive Protein 1.96 mg/dL     Sedimentation Rate [[id number]]  (Normal) Collected:  01/07/19 1535    Specimen:  Blood Updated:  01/07/19 1730     Sed Rate 8 mm/hr     Procalcitonin [[id number]]  (Abnormal) Collected:  01/07/19 1535    Specimen:  Blood Updated:  01/07/19 1650     Procalcitonin 15.68 ng/mL     Narrative:       As a Marker for Sepsis (Non-Neonates):   1. <0.5 ng/mL represents a low risk of severe sepsis and/or septic shock.  1. >2 ng/mL represents a high risk of severe sepsis and/or septic shock.    As a Marker for Lower Respiratory Tract Infections that require antibiotic therapy:  PCT on Admission     Antibiotic Therapy             6-12 Hrs later  > 0.5                Strongly Recommended            >0.25 - <0.5         Recommended  0.1 - 0.25           Discouraged                   Remeasure/reassess PCT  <0.1                 Strongly Discouraged          Remeasure/reassess PCT      As 28 day mortality risk marker: \"Change in Procalcitonin Result\" (> 80 % or <=80 %) if Day 0 (or Day 1) and Day 4 values are available. Refer to http://www.[facility]-pct-calculator.com/   Change in PCT <=80 %   A decrease of " PCT levels below or equal to 80 % defines a positive change in PCT test result representing a higher risk for 28-day all-cause mortality of patients diagnosed with severe sepsis or septic shock.  Change in PCT > 80 %   A decrease of PCT levels of more than 80 % defines a negative change in PCT result representing a lower risk for 28-day all-cause mortality of patients diagnosed with severe sepsis or septic shock.                Troponin [[id number]]  (Normal) Collected:  01/07/19 1535    Specimen:  Blood Updated:  01/07/19 1628     Troponin T <0.010 ng/mL     Narrative:       Troponin T Reference Ranges:  Less than 0.03 ng/mL:    Negative for AMI  0.03 to 0.09 ng/mL:      Indeterminant for AMI  Greater than 0.09 ng/mL: Positive for AMI    TSH [[id number]]  (Abnormal) Collected:  01/07/19 1535    Specimen:  Blood Updated:  01/07/19 1628     TSH 15.700 mIU/mL     T4, Free [[id number]]  (Abnormal) Collected:  01/07/19 1535    Specimen:  Blood Updated:  01/07/19 1628     Free T4 0.90 ng/dL     Cortisol [[id number]]  (Normal) Collected:  01/07/19 1535    Specimen:  Blood Updated:  01/07/19 1628     Cortisol 7.07 mcg/dL     Narrative:       Cortisol Reference Ranges:    Cortisol 6AM - 10AM Range: 6.02-18.40 mcg/dl  Cortisol 4PM - 8PM Range: 2.68-10.50 mcg/dl  Critical AM/PM:    Less than 2 mcg/dl    Comprehensive Metabolic Panel [[id number]]  (Abnormal) Collected:  01/07/19 1535    Specimen:  Blood Updated:  01/07/19 1622     Glucose 83 mg/dL      BUN 21 mg/dL      Creatinine 1.86 mg/dL      Sodium 142 mmol/L      Potassium 3.2 mmol/L      Chloride 104 mmol/L      CO2 22.6 mmol/L      Calcium 8.4 mg/dL      Total Protein 6.2 g/dL      Albumin 3.40 g/dL      ALT (SGPT) 105 U/L      AST (SGOT) 149 U/L      Alkaline Phosphatase 73 U/L      Total Bilirubin 0.4 mg/dL      eGFR Non African Amer 35 mL/min/1.73      Globulin 2.8 gm/dL      A/G Ratio 1.2 g/dL      BUN/Creatinine Ratio 11.3     Anion Gap 15.4 mmol/L     Narrative:        The MDRD GFR formula is only valid for adults with stable renal function between ages 18 and 70.    Lipase [[id number]]  (Normal) Collected:  01/07/19 1535    Specimen:  Blood Updated:  01/07/19 1622     Lipase 27 U/L     Magnesium [[id number]]  (Normal) Collected:  01/07/19 1535    Specimen:  Blood Updated:  01/07/19 1622     Magnesium 1.9 mg/dL     Lactic Acid, Plasma [[id number]]  (Abnormal) Collected:  01/07/19 1536    Specimen:  Blood Updated:  01/07/19 1617     Lactate 4.3 mmol/L     Protime-INR [[id number]]  (Normal) Collected:  01/07/19 1535    Specimen:  Blood Updated:  01/07/19 1609     Protime 13.8 Seconds      INR 1.08    CBC & Differential [[id number]] Collected:  01/07/19 1535    Specimen:  Blood Updated:  01/07/19 1553    Narrative:       The following orders were created for panel order CBC & Differential.  Procedure                               Abnormality         Status                     ---------                               -----------         ------                     CBC Auto Differential[[id number]]        Abnormal            Final result                 Please view results for these tests on the individual orders.    CBC Auto Differential [[id number]]  (Abnormal) Collected:  01/07/19 1535    Specimen:  Blood Updated:  01/07/19 1553     WBC 2.86 10*3/mm3      RBC 3.99 10*6/mm3      Hemoglobin 13.4 g/dL      Hematocrit 39.7 %      MCV 99.5 fL      MCH 33.6 pg      MCHC 33.8 g/dL      RDW 12.7 %      RDW-SD 46.3 fl      MPV 10.2 fL      Platelets 140 10*3/mm3      Neutrophil % 87.9 %      Lymphocyte % 10.8 %      Monocyte % 0.3 %      Eosinophil % 1.0 %      Basophil % 0.0 %      Immature Grans % 1.4 %      Neutrophils, Absolute 2.51 10*3/mm3      Lymphocytes, Absolute 0.31 10*3/mm3      Monocytes, Absolute 0.01 10*3/mm3      Eosinophils, Absolute 0.03 10*3/mm3      Basophils, Absolute 0.00 10*3/mm3      Immature Grans, Absolute 0.04 10*3/mm3         Imaging Results (last 72 hours)     Procedure  Component Value Units Date/Time    MRI Lumbar Spine With & Without Contrast [[id number]] Collected:  01/09/19 1257     Updated:  01/09/19 1430    Narrative:       MRI EXAMINATION LUMBAR SPINE WITH AND WITHOUT CONTRAST     HISTORY: Back pain, sepsis, evaluate for infection.     COMPARISON: No prior MRI examination of the lumbar spine is available  for comparison.     FINDINGS: The alignment of the lumbar spine is within normal limits.  There is moderate to severe loss of disc height at L2-L3 and L3-L4.  Moderate endplate degenerative changes are noted at L3-L4. There is  irregularity involving the endplates at L2-L3 with adjacent edema,  nonspecific but also likely degenerative in nature.     After contrast administration there was mild enhancement involving the  endplates at L2-L3. There is a mild degree of increased signal intensity  involving the disc posteriorly at L2-L3 to a lesser extent L4-L5 on the  sagittal T2 sequence, nonspecific but likely degenerative in nature.      The CT examination of the abdomen and pelvis performed on 01/07/2019  demonstrated vacuum disc effect at L2-L3 and L3-L4.     A horizontal plane of signal loss is noted on the sagittal T1 sequence  involving the L1 vertebral body and there is enhancement involving the  L1 vertebral body. There is no evidence of adjacent stranding. The  appearance is nonspecific but suggesting a subacute fracture with  minimal loss of vertical height. Clinical correlation is recommended.     L1-L2: There is a very mild broad-based disc osteophyte complex with no  evidence of herniation.     L2-L3: Mild facet degenerative disease is present bilaterally.  Broad-based disc osteophyte complex is present which is slightly more  prominent to the right. Disc material extends inferiorly consistent with  inferiorly directed disc herniation to the right of midline. This  results in lateral recess narrowing on the right. There is mild neural  foraminal compromise  bilaterally secondary to loss of disc height and  eccentric with small disc osteophyte complex into the neural foramen.     L3-L4: Moderate facet degenerative disease is present bilaterally. There  is a mild broad-based disc osteophyte complex which is slightly more  prominent to the left. There is no evidence of focal herniation. There  is mild neural foraminal compromise on the left and mild to moderate  neural foraminal compromise on the right secondary to loss of disc  height and extension of disc osteophyte complex into the neural foramen.     L4-L5: Moderate facet degenerative disease is present bilaterally. There  is a mild broad-based disc osteophyte complex with no evidence of  herniation. There is mild neural foraminal compromise on the right and  mild to moderate neural foraminal compromise on the left secondary to  loss of disc height and extension of disc osteophyte complex into the  neural foramen.     L5-S1: Mild facet degenerative disease is present bilaterally. There is  mild broad-based disc osteophyte complex resulting in mild flattening of  the ventral surface of the thecal sac. There is mild to moderate neural  foraminal compromise on the left secondary to the retrolisthesis of L5  upon S1. Disc material extends inferiorly to the left approaching and  mildly abutting the traversing left S1 nerve root. There is partial  visualization of an abdominal aortic aneurysm which has been treated  with a stent graft. Evaluation is limited on this current examination.       Impression:       1. 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  2. There is edema and endplate degenerative changes and enhancement at  L2-L3 and endplate degenerative changes without enhancement at L3-L4. An  early discitis cannot be entirely excluded at L2-L3 but this is thought  to be less likely. There is  vacuum disc effect present at L2-L3 on the  CT examination of 01/07/2019. There is an inferiorly directed disc  herniation to the right of midline at L2-L3 and a smaller inferiorly  directed disc herniation to the left at L5-S1. Multilevel neural  foraminal compromise is noted as described above.   3. There is an area of signal loss appreciated on the sagittal T1  sequence paralleling the superior endplate at L1. There is mild edema  and enhancement at the same location. The findings are suggestive of a  subacute fracture. An obvious fracture on the CT examination 01/07/2019  is not evident. Further evaluation could be performed with a bone scan.  4. If the patient's back pain increases a repeat MRI examination of the  lumbar spine with and without contrast is recommended.  5. The above information was called to and discussed with Dr. Rey  on 01/09/2019.     This report was finalized on 1/9/2019 2:27 PM by Dr. Jones Richard M.D.       CT Abdomen Pelvis With Contrast [[id number]] Collected:  01/07/19 1823     Updated:  01/07/19 1834    Narrative:       CT ABDOMEN AND PELVIS WITH IV CONTRAST     HISTORY: 78-year-old male with back and abdominal pain for several days.  History of AAA repair in 2005.     TECHNIQUE: CT abdomen and pelvis with IV contrast.     COMPARISON: CT angiogram abdomen and pelvis 03/14/2018.      FINDINGS: Heart size is enlarged. There is mild dependent basilar  atelectasis. There is diffuse fat infiltration of the liver.  Cholecystectomy clips are present. Splenic size appears normal. Adrenal  glands, and pancreas appear within normal limits. There is bilateral  perinephric stranding that is increased compared to prior exam of  03/14/2018. A right lower pole posterolateral exophytic cyst measures  1.7 cm. There is no hydronephrosis.     Aortobiiliac stent graft is present. Native sac diameter measures 6.5 AP  dimension when measured in the sagittal plane by 5.8 cm greatest  transverse  dimension measured in the axial plane, and these measurements  are not changed. Native sac of the abdominal aortic aneurysm extends  approximately 9 cm in length and this is without change. There is subtle  linear increased density material within the native sac extending  posterior to the biiliac component, and this does not appear changed and  there is no compelling evidence for endoleak. No perianeurysmal  inflammation is evident.       There is no bowel dilatation or evidence for bowel obstruction. There is  no ascites. Urinary bladder is mostly decompressed. Osteoarthritis is  present in both hips, greater on the right. There is degenerative disc  disease within the lumbar spine with disc space narrowing greatest at  L2-3 and L3-4 where there is vacuum phenomena and endplate spur  formation. There is a mild levoscoliotic curvature of the lumbar spine.       Impression:       1.  Bilateral perinephric stranding is increased compared to the prior  CT 03/2018. This could indicate pyelonephritis in the proper clinical  setting though is not specific and there is no hydronephrosis or other  evidence for upper tract infection.  2.  Aortobiiliac stent graft placement without interval change. The  native sac measures up to 6.5 cm in AP dimension.  3.  Cholecystectomy.  4.  Diffuse fat infiltration of the liver.  5.  Osteoarthritis both hips, greater on the right. Levoscoliotic  curvature of the lumbar spine with advanced degenerative disc disease at  L2-3 and L3-4.     Findings discussed with Dr. Cast in the emergency department,  01/07/2019 at 5:18 p.m.     Radiation dose reduction techniques were utilized, including automated  exposure control and exposure modulation based on body size.     This report was finalized on 1/7/2019 6:31 PM by Dr. Keagan Duke M.D.       XR Chest 1 View [[id number]] Collected:  01/07/19 1606     Updated:  01/07/19 1637    Narrative:       AP PORTABLE CHEST     HISTORY: Syncope,  hypotension.      COMPARISON: CT angiogram of the chest 03/14/2018, AP chest 07/05/2017.     FINDINGS: The cardiomediastinal silhouette is within normal limits.  Lungs appear clear and there is no evidence for pulmonary edema, pleural  effusion or infiltrate. Cardiac monitoring leads are noted.       Impression:       No acute disease.     This report was finalized on 1/7/2019 4:34 PM by Dr. Keagan Duke M.D.             Medication Review:       Current Facility-Administered Medications:   •  acetaminophen (TYLENOL) tablet 650 mg, 650 mg, Oral, Q6H PRN, Negrito Romero MD, 650 mg at 01/10/19 0630  •  atorvastatin (LIPITOR) tablet 40 mg, 40 mg, Oral, Daily, Negrito Romero MD, 40 mg at 01/09/19 0802  •  [START ON 1/11/2019] cefdinir (OMNICEF) capsule 300 mg, 300 mg, Oral, Q12H, Bhavik Rey MD  •  cefTRIAXone (ROCEPHIN) IVPB 2 g, 2 g, Intravenous, Q24H, Bhavik Rey MD, Last Rate: 100 mL/hr at 01/09/19 1149, 2 g at 01/09/19 1149  •  dexamethasone (DECADRON) tablet 0.5 mg, 0.5 mg, Oral, Daily With Breakfast, Laith Jimenez MD  •  dextrose (D50W) 25 g/ 50mL Intravenous Solution 25 g, 25 g, Intravenous, Q15 Min PRN, Negrito Romero MD  •  dextrose (GLUTOSE) oral gel 15 g, 15 g, Oral, Q15 Min PRN, Negrito Romero MD  •  fludrocortisone tablet 0.1 mg, 0.1 mg, Oral, Daily, Lizeth Lara MD  •  gabapentin (NEURONTIN) capsule 1,200 mg, 1,200 mg, Oral, Q12H, Negrito Romero MD, 1,200 mg at 01/09/19 2140  •  glipiZIDE (GLUCOTROL) tablet 2.5 mg, 2.5 mg, Oral, QAM AC, Negrito Romero MD, 2.5 mg at 01/10/19 0645  •  glucagon (human recombinant) (GLUCAGEN DIAGNOSTIC) injection 1 mg, 1 mg, Subcutaneous, PRN, Negrito Romero MD  •  heparin (porcine) 5000 UNIT/ML injection 5,000 Units, 5,000 Units, Subcutaneous, Q8H, Negrito Romero MD, 5,000 Units at 01/10/19 0645  •  hydrALAZINE (APRESOLINE) injection 10 mg, 10 mg, Intravenous, Q4H PRN, Negrito Romero MD, 10 mg at 01/10/19 0043  •  HYDROcodone-acetaminophen  (NORCO) 7.5-325 MG per tablet 1 tablet, 1 tablet, Oral, Q6H PRN, Lizeth Lara MD, 1 tablet at 01/09/19 2140  •  insulin lispro (humaLOG) injection 0-20 Units, 0-20 Units, Subcutaneous, Q4H, Lizeth Lara MD, 1 Units at 01/09/19 2142  •  [START ON 1/11/2019] levothyroxine (SYNTHROID, LEVOTHROID) tablet 175 mcg, 175 mcg, Oral, Q AM, Laith Jimenez MD  •  lisinopril (PRINIVIL,ZESTRIL) tablet 10 mg, 10 mg, Oral, Q24H, Lizeth Lara MD  •  pantoprazole (PROTONIX) EC tablet 40 mg, 40 mg, Oral, Q AM, Lizeth Lara MD, 40 mg at 01/10/19 0646  •  potassium & sodium phosphates (PHOS-NAK) oral packet, 2 packet, Oral, TID AC, Lizeth Lara MD, 2 packet at 01/09/19 1551  •  saccharomyces boulardii (FLORASTOR) capsule 500 mg, 500 mg, Oral, BID, Bhavik Rey MD, 500 mg at 01/09/19 2140    Assessment/Plan     Patient Active Problem List   Diagnosis   • Chronic coronary artery disease   • Gout   • Hyperlipidemia   • Hypothyroidism   • Peripheral vascular disease (CMS/HCC)   • Adrenal insufficiency (CMS/HCC)   • Spondylosis of lumbar region without myelopathy or radiculopathy   • Sepsis (CMS/HCC)   • Pleural effusions (right > left)   • C. 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  Severe peripheral vascular disease       Patient tolerated the decrease hydrocortisone dose  I will decrease him further  He is currently getting 25 mg every 8 hours as per the intensivist  His blood pressure is rather high and the systolic in the 160 range and diastolic in the 100 range     I think patient is ready to  "switch to hydrocortisone orally 20 mg twice a day  He will continue the fludrocortisone 100 µg daily  After rechecking the thyroid levels I may adjust his thyroid dose once again  Patient verbalized understanding  Patient is due for an MRI of the spine to evaluate for pain and possible infectious source  Discussed with Dr. Diaz    The total floor time spent  was more than 35 min of which greater than 20 min of time (greater than 50% of the total time)  was spent face to face with the patient counseling and coordination of care on recommended evaluation and treatment options, instructions for management/treatment and /or follow up and importance of compliance with chosen management or treatment options    Laith Jimenez MD FACE.  01/10/19  8:56 AM      EMR Dragon / transcription disclaimer:     \"Dictated utilizing Dragon dictation\".   "

## 2019-01-10 NOTE — DISCHARGE SUMMARY
PHYSICIAN DISCHARGE SUMMARY                                                                        [facility]    Patient Identification:  Name: Faustino Horton Jr.  Age: 78 y.o.  Sex: male  :  1940  MRN: [id number]  Primary Care Physician: Epley, James, APRN    Admit date: 2019  Discharge date and time: No discharge date for patient encounter.   Discharged Condition: stable    Discharge Diagnoses:  Septic shock (CMS/HCC)   PCCM Problems  Septic shock resolved  Source of sepsis unclear.  Back pain  Exacerbation of known adrenal insufficiency  Unusual leg/back pains for past 3 days, ?  Epidural process  Acute kidney injury improved  Acute hypokalemia  Elevated LFTs, ? shock liver  Relative leukopenia improved  Relevant Medical Diagnoses  MARIA TERESA, intolerant of CPAP  Diabetes mellitus 2  Hypothyroidism  Known adrenal insufficiency   Previous C. difficile infection  CAD with previous stent        Hospital Course: Faustino Horton Jr. presented to [facility]    Patient admitted by the partner Dr. Lund with septic shock of unclear source.  Patient also had known adrenal insufficiency and his shock was exacerbated.  He was started on stress dose steroids and broad-spectrum antibiotics.  ID was consulted to sepsis.  ID did extensive workup including MRI of the LS-spine and no epidural abscess was noted.  His blood and urine cultures remain negative.  Pro-Eligio improved significantly.  ID recommended patient to take further 3 more days of Cefdnir to complete 7 day course of antibiotic.  Probiotic was continued for his history of C. difficile.  At this time patient is stable to be discharged back to home.  Endocrine saw the patient and taper down his steroid per his home dose.  Patient will follow-up with his primary care physician and endocrinology as per the recommendations  Consults:   IP CONSULT TO PULMONOLOGY  IP  CONSULT TO INFECTIOUS DISEASES  IP CONSULT TO ENDOCRINOLOGY    Significant Diagnostic Studies:   [unfilled]    Discharge Exam:  Alert and oriented x 4, in NAD  Supple neck, midline trach  RRR, no m/r/g, no edema  Clear bilaterally, no wheezing, nonlabored  No clubbing or cyanosis     Disposition:  Home    Patient Instructions:   [unfilled]  Follow-up Information     Epley, James, APRN .    Specialty:  Family Medicine  Contact information:  [address] [place] [address]  [phone]                 [unfilled]     Medication Reconciliation: Please see electronically completed Med Rec.    Total time spent discharging patient including evaluation, medication reconciliation, arranging follow up, and post hospitalization instructions and education total time exceeds 30 minutes.    Signed:  Lizeth Lara MD  1/10/2019  3:14 PM

## 2019-01-10 NOTE — PROGRESS NOTES
Continued Stay Note  [facility]     Patient Name: Faustino Horton Jr.  MRN: [id number]  Today's Date: 1/10/2019    Admit Date: 1/7/2019    Discharge Plan     Row Name 01/10/19 1339       Plan    Plan  home with wife- no needs    Patient/Family in Agreement with Plan  yes    Plan Comments  Spoke with patient and wife in room.  Patient reports that he is up ad marco.  He denies any needs and plans to return home.  CCP will follow. Benita Serrano RN        Discharge Codes    No documentation.             Benita Serrano RN

## 2019-01-10 NOTE — PLAN OF CARE
Problem: Patient Care Overview  Goal: Plan of Care Review  Outcome: Ongoing (interventions implemented as appropriate)   01/10/19 0432   Coping/Psychosocial   Plan of Care Reviewed With patient   Plan of Care Review   Progress improving      01/10/19 0432   Coping/Psychosocial   Plan of Care Reviewed With patient   Plan of Care Review   Progress improving   OTHER   Outcome Summary arrived to unit; bp remained elevated; orders received and bp much improved; hr remains in low to mid 40's throughout shift; will continue to monitor     Goal: Individualization and Mutuality  Outcome: Ongoing (interventions implemented as appropriate)    Goal: Discharge Needs Assessment  Outcome: Ongoing (interventions implemented as appropriate)      Problem: Fall Risk (Adult)  Goal: Identify Related Risk Factors and Signs and Symptoms  Outcome: Ongoing (interventions implemented as appropriate)    Goal: Absence of Fall  Outcome: Ongoing (interventions implemented as appropriate)      Problem: Syncope (Adult)  Goal: Identify Related Risk Factors and Signs and Symptoms  Outcome: Ongoing (interventions implemented as appropriate)    Goal: Physical Safety/Health Maintenance  Outcome: Ongoing (interventions implemented as appropriate)    Goal: Optimal Emotional/Functional Cottle  Outcome: Ongoing (interventions implemented as appropriate)      Problem: Skin Injury Risk (Adult)  Goal: Identify Related Risk Factors and Signs and Symptoms  Outcome: Ongoing (interventions implemented as appropriate)    Goal: Skin Health and Integrity  Outcome: Ongoing (interventions implemented as appropriate)

## 2019-01-10 NOTE — PLAN OF CARE
Problem: Patient Care Overview  Goal: Plan of Care Review  Outcome: Ongoing (interventions implemented as appropriate)   01/10/19 1526   Coping/Psychosocial   Plan of Care Reviewed With patient   Plan of Care Review   Progress improving   OTHER   Outcome Summary pt vss, no complaints of pain. pt restign well. HR still in tehw 40s, chronic brdycardia. pressures improving from yesterday. last dose IV abx given at lunch. home this afternoon.      Goal: Individualization and Mutuality  Outcome: Ongoing (interventions implemented as appropriate)    Goal: Discharge Needs Assessment  Outcome: Ongoing (interventions implemented as appropriate)      Problem: Fall Risk (Adult)  Goal: Identify Related Risk Factors and Signs and Symptoms  Outcome: Ongoing (interventions implemented as appropriate)    Goal: Absence of Fall  Outcome: Ongoing (interventions implemented as appropriate)      Problem: Syncope (Adult)  Goal: Identify Related Risk Factors and Signs and Symptoms  Outcome: Ongoing (interventions implemented as appropriate)    Goal: Physical Safety/Health Maintenance  Outcome: Ongoing (interventions implemented as appropriate)    Goal: Optimal Emotional/Functional [place]eller  Outcome: Ongoing (interventions implemented as appropriate)      Problem: Skin Injury Risk (Adult)  Goal: Identify Related Risk Factors and Signs and Symptoms  Outcome: Ongoing (interventions implemented as appropriate)    Goal: Skin Health and Integrity  Outcome: Ongoing (interventions implemented as appropriate)

## 2019-01-11 ENCOUNTER — APPOINTMENT (OUTPATIENT)
Dept: GENERAL RADIOLOGY | Facility: HOSPITAL | Age: 79
End: 2019-01-11

## 2019-01-11 ENCOUNTER — HOSPITAL ENCOUNTER (EMERGENCY)
Facility: HOSPITAL | Age: 79
Discharge: HOME | End: 2019-01-11
Attending: EMERGENCY MEDICINE | Admitting: EMERGENCY MEDICINE

## 2019-01-11 ENCOUNTER — READMISSION MANAGEMENT (OUTPATIENT)
Dept: CALL CENTER | Facility: HOSPITAL | Age: 79
End: 2019-01-11

## 2019-01-11 ENCOUNTER — APPOINTMENT (OUTPATIENT)
Dept: CT IMAGING | Facility: HOSPITAL | Age: 79
End: 2019-01-11

## 2019-01-11 VITALS
HEART RATE: 44 BPM | TEMPERATURE: 97.5 F | RESPIRATION RATE: 16 BRPM | HEIGHT: 72 IN | WEIGHT: 215 LBS | SYSTOLIC BLOOD PRESSURE: 162 MMHG | OXYGEN SATURATION: 95 % | BODY MASS INDEX: 29.12 KG/M2 | DIASTOLIC BLOOD PRESSURE: 87 MMHG

## 2019-01-11 DIAGNOSIS — R51.9 GENERALIZED HEADACHE: Primary | ICD-10-CM

## 2019-01-11 DIAGNOSIS — I10 ESSENTIAL HYPERTENSION: ICD-10-CM

## 2019-01-11 LAB
ALBUMIN SERPL-MCNC: 3.8 G/DL (ref 3.5–5.2)
ALBUMIN/GLOB SERPL: 1 G/DL
ALP SERPL-CCNC: 62 U/L (ref 39–117)
ALT SERPL W P-5'-P-CCNC: 38 U/L (ref 1–41)
ANION GAP SERPL CALCULATED.3IONS-SCNC: 13.3 MMOL/L
AST SERPL-CCNC: 22 U/L (ref 1–40)
BASOPHILS # BLD AUTO: 0.03 10*3/MM3 (ref 0–0.2)
BASOPHILS NFR BLD AUTO: 0.5 % (ref 0–1.5)
BILIRUB SERPL-MCNC: 0.5 MG/DL (ref 0.1–1.2)
BUN BLD-MCNC: 13 MG/DL (ref 8–23)
BUN/CREAT SERPL: 14.3 (ref 7–25)
CALCIUM SPEC-SCNC: 9.4 MG/DL (ref 8.6–10.5)
CHLORIDE SERPL-SCNC: 102 MMOL/L (ref 98–107)
CO2 SERPL-SCNC: 26.7 MMOL/L (ref 22–29)
CREAT BLD-MCNC: 0.91 MG/DL (ref 0.76–1.27)
DEPRECATED RDW RBC AUTO: 45.3 FL (ref 37–54)
EOSINOPHIL # BLD AUTO: 0.41 10*3/MM3 (ref 0–0.7)
EOSINOPHIL NFR BLD AUTO: 6.4 % (ref 0.3–6.2)
ERYTHROCYTE [DISTWIDTH] IN BLOOD BY AUTOMATED COUNT: 12.5 % (ref 11.5–14.5)
GFR SERPL CREATININE-BSD FRML MDRD: 81 ML/MIN/1.73
GLOBULIN UR ELPH-MCNC: 3.7 GM/DL
GLUCOSE BLD-MCNC: 114 MG/DL (ref 65–99)
HCT VFR BLD AUTO: 42.7 % (ref 40.4–52.2)
HGB BLD-MCNC: 14.3 G/DL (ref 13.7–17.6)
HOLD SPECIMEN: NORMAL
HOLD SPECIMEN: NORMAL
IMM GRANULOCYTES # BLD AUTO: 0.05 10*3/MM3 (ref 0–0.03)
IMM GRANULOCYTES NFR BLD AUTO: 0.8 % (ref 0–0.5)
LYMPHOCYTES # BLD AUTO: 2.17 10*3/MM3 (ref 0.9–4.8)
LYMPHOCYTES NFR BLD AUTO: 34 % (ref 19.6–45.3)
MCH RBC QN AUTO: 33.5 PG (ref 27–32.7)
MCHC RBC AUTO-ENTMCNC: 33.5 G/DL (ref 32.6–36.4)
MCV RBC AUTO: 100 FL (ref 79.8–96.2)
MONOCYTES # BLD AUTO: 0.64 10*3/MM3 (ref 0.2–1.2)
MONOCYTES NFR BLD AUTO: 10 % (ref 5–12)
NEUTROPHILS # BLD AUTO: 3.14 10*3/MM3 (ref 1.9–8.1)
NEUTROPHILS NFR BLD AUTO: 49.1 % (ref 42.7–76)
NT-PROBNP SERPL-MCNC: 1714 PG/ML (ref 0–1800)
PLATELET # BLD AUTO: 175 10*3/MM3 (ref 140–500)
PMV BLD AUTO: 11 FL (ref 6–12)
POTASSIUM BLD-SCNC: 3.4 MMOL/L (ref 3.5–5.2)
PROCALCITONIN SERPL-MCNC: 1.6 NG/ML (ref 0.1–0.25)
PROT SERPL-MCNC: 7.5 G/DL (ref 6–8.5)
RBC # BLD AUTO: 4.27 10*6/MM3 (ref 4.6–6)
SODIUM BLD-SCNC: 142 MMOL/L (ref 136–145)
WBC NRBC COR # BLD: 6.39 10*3/MM3 (ref 4.5–10.7)
WHOLE BLOOD HOLD SPECIMEN: NORMAL
WHOLE BLOOD HOLD SPECIMEN: NORMAL

## 2019-01-11 PROCEDURE — 25010000002 HYDROMORPHONE PER 4 MG: Performed by: EMERGENCY MEDICINE

## 2019-01-11 PROCEDURE — 85025 COMPLETE CBC W/AUTO DIFF WBC: CPT | Performed by: EMERGENCY MEDICINE

## 2019-01-11 PROCEDURE — 96374 THER/PROPH/DIAG INJ IV PUSH: CPT

## 2019-01-11 PROCEDURE — 93010 ELECTROCARDIOGRAM REPORT: CPT | Performed by: INTERNAL MEDICINE

## 2019-01-11 PROCEDURE — 96375 TX/PRO/DX INJ NEW DRUG ADDON: CPT

## 2019-01-11 PROCEDURE — 93005 ELECTROCARDIOGRAM TRACING: CPT | Performed by: EMERGENCY MEDICINE

## 2019-01-11 PROCEDURE — 83880 ASSAY OF NATRIURETIC PEPTIDE: CPT | Performed by: EMERGENCY MEDICINE

## 2019-01-11 PROCEDURE — 99284 EMERGENCY DEPT VISIT MOD MDM: CPT

## 2019-01-11 PROCEDURE — 25010000002 ONDANSETRON PER 1 MG: Performed by: EMERGENCY MEDICINE

## 2019-01-11 PROCEDURE — 84145 PROCALCITONIN (PCT): CPT | Performed by: EMERGENCY MEDICINE

## 2019-01-11 PROCEDURE — 71046 X-RAY EXAM CHEST 2 VIEWS: CPT

## 2019-01-11 PROCEDURE — 70450 CT HEAD/BRAIN W/O DYE: CPT

## 2019-01-11 PROCEDURE — 80053 COMPREHEN METABOLIC PANEL: CPT | Performed by: EMERGENCY MEDICINE

## 2019-01-11 RX ORDER — ONDANSETRON 2 MG/ML
4 INJECTION INTRAMUSCULAR; INTRAVENOUS ONCE
Status: COMPLETED | OUTPATIENT
Start: 2019-01-11 | End: 2019-01-11

## 2019-01-11 RX ORDER — HYDROMORPHONE HYDROCHLORIDE 1 MG/ML
0.5 INJECTION, SOLUTION INTRAMUSCULAR; INTRAVENOUS; SUBCUTANEOUS ONCE
Status: COMPLETED | OUTPATIENT
Start: 2019-01-11 | End: 2019-01-11

## 2019-01-11 RX ORDER — SODIUM CHLORIDE 0.9 % (FLUSH) 0.9 %
10 SYRINGE (ML) INJECTION AS NEEDED
Status: DISCONTINUED | OUTPATIENT
Start: 2019-01-11 | End: 2019-01-11 | Stop reason: HOSPADM

## 2019-01-11 RX ORDER — AMLODIPINE BESYLATE 2.5 MG/1
2.5 TABLET ORAL DAILY
Qty: 30 TABLET | Refills: 0 | Status: SHIPPED | OUTPATIENT
Start: 2019-01-11 | End: 2019-01-14 | Stop reason: SDUPTHER

## 2019-01-11 RX ADMIN — ONDANSETRON HYDROCHLORIDE 4 MG: 2 SOLUTION INTRAMUSCULAR; INTRAVENOUS at 09:06

## 2019-01-11 RX ADMIN — HYDROMORPHONE HYDROCHLORIDE 0.5 MG: 1 INJECTION, SOLUTION INTRAMUSCULAR; INTRAVENOUS; SUBCUTANEOUS at 08:34

## 2019-01-11 RX ADMIN — SODIUM CHLORIDE, PRESERVATIVE FREE 10 ML: 5 INJECTION INTRAVENOUS at 08:34

## 2019-01-11 NOTE — ED PROVIDER NOTES
" EMERGENCY DEPARTMENT ENCOUNTER    Room Number:  02/02  PCP: Epley, James, APRN  Historian: Patient, Family      HPI  Chief Complaint: Headache  Context: Faustino Horton Jr. is a 78 y.o. male with h/o adrenal insufficiency for which pt is currently taking Decadron and Fludrocortisone. Pt reports that he was admitted 01/07/19-01/10/19 at this facility secondary to septic shock, adrenal insufficiency, and back pain. During the admission, pt was hypotensive, for which pt was treated. Upon discharge from the hospital, pt's systolic blood pressure was 150 and pt was feeling relatively well. Pt states that last night (after pt had been discharged from the hospital), pt developed a \"throbbing\" generalized headache (gradual in onset). Upon development of pt's headache, pt checked his blood pressure and noted that it was 190/86. Pt reports that he has also had dyspnea, diarrhea, and worsening of his chronic lower back pain. Pt denies documented fever, chest pain, abdominal pain, focal weakness/numbness, speech/visual difficulties, dizziness/lightheadedness, neck pain/stiffness, nausea, vomiting, and palpitations. Pt reports that at baseline, pt's HR is in the 40s-50s. There are no other complaints at this time.       Pain Location: Generalized head  Radiation: None  Character: \"throbbing\"  Duration: Onset last night  Severity: Moderate  Progression: Waxing and waning  Aggravating Factors: Possibly having elevated BP  Alleviating Factors: Nothing        MEDICAL RECORD REVIEW    Pt was admitted 01/07/19-01/10/19 secondary to septic shock, adrenal insufficiency, and back pain. Per hospital course:  Patient admitted by the partner Dr. Lund with septic shock of unclear source.  Patient also had known adrenal insufficiency and his shock was exacerbated.  He was started on stress dose steroids and broad-spectrum antibiotics.  ID was consulted to sepsis.  ID did extensive workup including MRI of the LS-spine and no epidural abscess " was noted.  His blood and urine cultures remain negative.  Pro-Eligio improved significantly.  ID recommended patient to take further 3 more days of Cefdnir to complete 7 day course of antibiotic.  Probiotic was continued for his history of C. difficile.  At this time patient is stable to be discharged back to home.  Endocrine saw the patient and taper down his steroid per his home dose.  Patient will follow-up with his primary care physician and endocrinology as per the recommendations        Pt had an MRI L-Spine performed on 01/09/19 that showed:  1. 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  2. There is edema and endplate degenerative changes and enhancement at  L2-L3 and endplate degenerative changes without enhancement at L3-L4. An  early discitis cannot be entirely excluded at L2-L3 but this is thought  to be less likely. There is vacuum disc effect present at L2-L3 on the  CT examination of 01/07/2019. There is an inferiorly directed disc  herniation to the right of midline at L2-L3 and a smaller inferiorly  directed disc herniation to the left at L5-S1. Multilevel neural  foraminal compromise is noted as described above.   3. There is an area of signal loss appreciated on the sagittal T1  sequence paralleling the superior endplate at L1. There is mild edema  and enhancement at the same location. The findings are suggestive of a  subacute fracture. An obvious fracture on the CT examination 01/07/2019  is not evident. Further evaluation could be performed with a bone scan.  4. If the patient's back pain increases a repeat MRI examination of the  lumbar spine with and without contrast is recommended.  5. The above information was called to and discussed with Dr. Rey  on 01/09/2019.      Pt's procalcitonin was 11.49 about 3 days ago and 3.40 yesterday.        PAST MEDICAL  HISTORY  Active Ambulatory Problems     Diagnosis Date Noted   • Chronic coronary artery disease 04/17/2016   • Gout 04/17/2016   • Hyperlipidemia 04/17/2016   • Hypothyroidism 04/17/2016   • Peripheral vascular disease (CMS/[facility]) 04/17/2016   • Adrenal insufficiency (CMS/[facility]) 05/17/2016   • Spondylosis of lumbar region without myelopathy or radiculopathy 03/02/2017   • Sepsis (CMS/[facility]) 06/24/2017   • Pleural effusions (right > left) 06/26/2017   • C. difficile colitis 07/05/2017   • Abscess, perirectal 07/06/2017   • Diabetes mellitus with hyperglycemia (CMS/[facility]) 07/06/2017   • Dehydration 07/26/2017   • Hypokalemia 08/03/2017   • Health care maintenance 09/19/2017   • Hyperglycemia 03/20/2018   • Prostate cancer screening 03/20/2018   • Cervical radiculopathy 12/04/2018   • DDD (degenerative disc disease), cervical 12/04/2018   • Septic shock (CMS/[facility]) 01/07/2019     Resolved Ambulatory Problems     Diagnosis Date Noted   • Sepsis associated hypotension (CMS/[facility]) 05/07/2016   • Bacterial pneumonia 05/08/2016   • Wheezing 05/08/2016   • Acute kidney injury (CMS/[facility]) 05/08/2016   • Pneumonia of right lung due to infectious organism 05/17/2016   • Bronchitis 10/31/2016   • Nausea & vomiting 06/24/2017   • Hypotension 06/24/2017   • Community acquired bacterial pneumonia 06/24/2017   • Septic embolism (CMS/[facility]) 06/24/2017   • Hypokalemia 06/26/2017   • Sinus bradycardia 06/26/2017   • Perirectal abscess 09/05/2017     Past Medical History:   Diagnosis Date   • AAA (abdominal aortic aneurysm) (CMS/[facility])    • Acute MI (CMS/[facility])    • Adrenal insufficiency (CMS/[facility])    • Arthritis    • B12 deficiency    • Back pain    • Cancer (CMS/[facility])    • Coronary artery disease    • Diabetes mellitus (CMS/[facility])    • Gout    • Hyperlipidemia    • Hypertension    • Hypotension    • Hypothyroid    • Low testosterone    • Peripheral nerve disease    • Pneumonia    • Prostate cancer (CMS/[facility])    • Sepsis (CMS/[facility])    • Shoulder pain    •  Vertebral compression fracture (CMS/[facility])          PAST SURGICAL HISTORY  Past Surgical History:   Procedure Laterality Date   • ABDOMINAL AORTIC ANEURYSM REPAIR Bilateral 09/28/2007    Bilateral femoral cut downs with diagnostic and iliofemoral arteriogram with intravascular ultrasound x5, repair of abdominal aortic aneurysm with Porex excluder aortic stent graft, bilateral iliac extension cuffs, aortic and bilateral iliac percutaneous transabdominal angioplasty-Dr. Dillan Puga   • APPENDECTOMY N/A    • ARTERY SURGERY N/A 04/15/2008    Laparoscopic ligation of inferior mesenteric artery-Dr. Otf Ahn   • COLONOSCOPY N/A 12/06/2002    Mild proctitis, otherwise normal colonoscopy, repeat in 10 years-Dr. Jones mAaya   • COLONOSCOPY N/A 9/12/2017    Procedure: COLONOSCOPY TO CECUM WITH COLD BIOPSY AND HOT SNARE POLYPECTOMIES;  Surgeon: Mark Pike MD;  Location: [facility] ENDOSCOPY;  Service:    • CORONARY ANGIOPLASTY  08/2004   • DUPUYTREN / PALMAR FASCIOTOMY Right 02/19/2010    Small digit and palmar Dupuytren's cord resection with multiple z-plasties, resection cord in the web space and palm at the base of the 4th digit-Dr Arthur Perez   • INCISION AND DRAINAGE PERIRECTAL ABSCESS Left 7/5/2017    Procedure: INCISION AND DRAINAGE OF ARIANNE-RECTAL ABSCESS; SUPERFICIAL FISTULOTOMY;  Surgeon: Mark Pike MD;  Location: [facility] OR;  Service:    • LAPAROSCOPIC CHOLECYSTECTOMY W/ CHOLANGIOGRAPHY N/A 09/06/2009    Dr. Keagan Montemayor   • UPPER GASTROINTESTINAL ENDOSCOPY N/A 09/15/2009    Normal esophagus, gastric mucosal variant: biopsied, normal 2nd part of the duodenum: biopsied, otherwise normal exam-Dr. Keagan Montemayor         FAMILY HISTORY  Family History   Problem Relation Age of Onset   • Cancer Mother    • Breast cancer Mother    • Heart disease Mother    • Hypertension Father    • Stroke Father    • Other Father         carotid disease   • Cancer Sister    • Breast cancer  Sister    • Aneurysm Sister    • Hypertension Sister    • Thyroid disease Sister    • Hyperlipidemia Sister    • Diabetes Brother    • Aneurysm Brother    • Stroke Brother          SOCIAL HISTORY  Social History     Socioeconomic History   • Marital status:      Spouse name: Not on file   • Number of children: Not on file   • Years of education: Not on file   • Highest education level: Not on file   Social Needs   • Financial resource strain: Not on file   • Food insecurity - worry: Not on file   • Food insecurity - inability: Not on file   • Transportation needs - medical: Not on file   • Transportation needs - non-medical: Not on file   Occupational History   • Not on file   Tobacco Use   • Smoking status: Former Smoker   • Smokeless tobacco: Never Used   • Tobacco comment: quit 30 years ago   Substance and Sexual Activity   • Alcohol use: Yes     Comment: 2-3 beers daily/Caffeine use   • Drug use: No   • Sexual activity: Defer   Other Topics Concern   • Not on file   Social History Narrative   • Not on file         ALLERGIES  Losartan; Acetylcysteine; Allopurinol; Baclofen; Hydrocodone-acetaminophen; Levaquin [levofloxacin]; Nsaids; and Oxycodone        REVIEW OF SYSTEMS  Review of Systems   Constitutional: Negative for chills and fever.   HENT: Negative for congestion, rhinorrhea and sore throat.    Eyes: Negative for pain and visual disturbance.   Respiratory: Positive for shortness of breath. Negative for cough.    Cardiovascular: Negative for chest pain, palpitations and leg swelling.   Gastrointestinal: Positive for diarrhea. Negative for abdominal pain, nausea and vomiting.   Genitourinary: Negative for difficulty urinating, dysuria, flank pain and frequency.   Musculoskeletal: Positive for back pain. Negative for myalgias, neck pain and neck stiffness.   Skin: Negative for rash.   Neurological: Positive for headaches. Negative for dizziness, speech difficulty, weakness, light-headedness and  numbness.   Psychiatric/Behavioral: Negative.    All other systems reviewed and are negative.           PHYSICAL EXAM  ED Triage Vitals   Temp Heart Rate Resp BP SpO2   01/11/19 0751 01/11/19 0751 01/11/19 0801 01/11/19 0801 01/11/19 0751   97.5 °F (36.4 °C) 57 18 169/96 94 %      Temp src Heart Rate Source Patient Position BP Location FiO2 (%)   01/11/19 0751 01/11/19 0751 -- -- --   Tympanic Monitor          Physical Exam   Constitutional: He is oriented to person, place, and time.  Non-toxic appearance. No distress.   HENT:   Head: Normocephalic.   Mouth/Throat: Mucous membranes are normal.   Eyes: EOM are normal. Pupils are equal, round, and reactive to light.   Neck: Normal range of motion. Neck supple. No neck rigidity.   No meningismus.    Cardiovascular: Regular rhythm and normal heart sounds. Bradycardia present.   Pulmonary/Chest: Effort normal and breath sounds normal. No respiratory distress. He has no decreased breath sounds. He has no wheezes. He has no rhonchi. He has no rales.   Abdominal: Soft. There is no tenderness. There is no rebound and no guarding.   Musculoskeletal: Normal range of motion. He exhibits tenderness (lower back).   Neurological: He is alert and oriented to person, place, and time. He has normal sensation.   Skin: Skin is warm and dry.   Psychiatric: Mood and affect normal.   Nursing note and vitals reviewed.          LAB RESULTS  Recent Results (from the past 24 hour(s))   POC Glucose Once    Collection Time: 01/10/19 12:18 PM   Result Value Ref Range    Glucose 144 (H) 70 - 130 mg/dL   Light Blue Top    Collection Time: 01/11/19  8:04 AM   Result Value Ref Range    Extra Tube hold for add-on    Green Top (Gel)    Collection Time: 01/11/19  8:04 AM   Result Value Ref Range    Extra Tube Hold for add-ons.    Lavender Top    Collection Time: 01/11/19  8:04 AM   Result Value Ref Range    Extra Tube hold for add-on    Gold Top - SST    Collection Time: 01/11/19  8:04 AM   Result  Value Ref Range    Extra Tube Hold for add-ons.    Comprehensive Metabolic Panel    Collection Time: 01/11/19  8:04 AM   Result Value Ref Range    Glucose 114 (H) 65 - 99 mg/dL    BUN 13 8 - 23 mg/dL    Creatinine 0.91 0.76 - 1.27 mg/dL    Sodium 142 136 - 145 mmol/L    Potassium 3.4 (L) 3.5 - 5.2 mmol/L    Chloride 102 98 - 107 mmol/L    CO2 26.7 22.0 - 29.0 mmol/L    Calcium 9.4 8.6 - 10.5 mg/dL    Total Protein 7.5 6.0 - 8.5 g/dL    Albumin 3.80 3.50 - 5.20 g/dL    ALT (SGPT) 38 1 - 41 U/L    AST (SGOT) 22 1 - 40 U/L    Alkaline Phosphatase 62 39 - 117 U/L    Total Bilirubin 0.5 0.1 - 1.2 mg/dL    eGFR Non African Amer 81 >60 mL/min/1.73    Globulin 3.7 gm/dL    A/G Ratio 1.0 g/dL    BUN/Creatinine Ratio 14.3 7.0 - 25.0    Anion Gap 13.3 mmol/L   Procalcitonin    Collection Time: 01/11/19  8:04 AM   Result Value Ref Range    Procalcitonin 1.60 (C) 0.10 - 0.25 ng/mL   BNP    Collection Time: 01/11/19  8:04 AM   Result Value Ref Range    proBNP 1,714.0 0.0-1,800.0 pg/mL   CBC Auto Differential    Collection Time: 01/11/19  8:04 AM   Result Value Ref Range    WBC 6.39 4.50 - 10.70 10*3/mm3    RBC 4.27 (L) 4.60 - 6.00 10*6/mm3    Hemoglobin 14.3 13.7 - 17.6 g/dL    Hematocrit 42.7 40.4 - 52.2 %    .0 (H) 79.8 - 96.2 fL    MCH 33.5 (H) 27.0 - 32.7 pg    MCHC 33.5 32.6 - 36.4 g/dL    RDW 12.5 11.5 - 14.5 %    RDW-SD 45.3 37.0 - 54.0 fl    MPV 11.0 6.0 - 12.0 fL    Platelets 175 140 - 500 10*3/mm3    Neutrophil % 49.1 42.7 - 76.0 %    Lymphocyte % 34.0 19.6 - 45.3 %    Monocyte % 10.0 5.0 - 12.0 %    Eosinophil % 6.4 (H) 0.3 - 6.2 %    Basophil % 0.5 0.0 - 1.5 %    Immature Grans % 0.8 (H) 0.0 - 0.5 %    Neutrophils, Absolute 3.14 1.90 - 8.10 10*3/mm3    Lymphocytes, Absolute 2.17 0.90 - 4.80 10*3/mm3    Monocytes, Absolute 0.64 0.20 - 1.20 10*3/mm3    Eosinophils, Absolute 0.41 0.00 - 0.70 10*3/mm3    Basophils, Absolute 0.03 0.00 - 0.20 10*3/mm3    Immature Grans, Absolute 0.05 (H) 0.00 - 0.03 10*3/mm3        Ordered the above labs and reviewed the results.        RADIOLOGY  CT Head Without Contrast   Preliminary Result   No evidence of hemorrhage, hydrocephalus or of abnormal   extra-axial fluid. Area of decreased attenuation involving the right   parietal lobe laterally, well demarcated suggesting a small remote   cortical infarct measuring 16 x 7 mm in the transverse plane. The   etiology for the patient's headaches is uncertain. Further evaluation   could be performed with MRI examination of the brain. The above   information was called to and discussed with Dr. Moody.           Radiation dose reduction techniques were utilized, including automated   exposure control and exposure modulation based on body size.              XR Chest 2 View   Final Result   1. No significant change since previous study of 1/7/2019.       This report was finalized on 1/11/2019 9:29 AM by Dr. Derik Niño M.D.               Ordered the above noted radiological studies. Reviewed by me in PACS.  Spoke with Dr. Richard (radiologist) regarding CT Head scan results.          PROCEDURES  Procedures      EKG:          EKG time: 08:38 AM  Rhythm/Rate: Sinus bradycardia rate 46  P waves and UT: Normal P waves  QRS, axis: Normal QRS   ST and T waves: Normal ST     Interpreted Contemporaneously by me, independently viewed  Unchanged compared to prior 01/09/19        MEDICATIONS GIVEN IN ER  Medications   sodium chloride 0.9 % flush 10 mL (10 mL Intravenous Given 1/11/19 0834)   HYDROmorphone (DILAUDID) injection 0.5 mg (0.5 mg Intravenous Given 1/11/19 0834)   ondansetron (ZOFRAN) injection 4 mg (4 mg Intravenous Given 1/11/19 0906)             PROGRESS AND CONSULTS  ED Course as of Jan 11 1123   Fri Jan 11, 2019   1033 10:33 AM  Patient with headache and high blood pressure.  Just discharged for sepsis.  PCT continues to improve.  Is on large doses of steroids that may be causing elevated blood pressure.  Use to have amlodipine at  home for blood pressure.  Will refill low dose amlodipine.  Follow up with PMD.  States his heart rate is always in the 40s and his wife agrees.  [SL]      ED Course User Index  [SL] Truman Moody MD       8:31 AM:  Procalcitonin, blood work, CT Head, CXR, and EKG ordered for further evaluation. Dilaudid and Zofran ordered to treat for pt's headache.     10:15 AM:  Rechecked pt. Pt is resting comfortably and appears in no acute distress. Pt reports that his headache has improved somewhat after administration of pain medicine.    Informed pt that his CT Head shows a small remote cortical infarct, but nothing acute. Pt's CXR is negative acute. Pt's procalcitonin was 11.49 about 3 days ago and 3.40 yesterday; today, pt's procalcitonin is 1.60. Suspect that pt's steroids may be contributing to pt's elevated BP. Pt states that he was previously prescribed with rx for Norvasc several years ago, but has not taken it for at least the last 2 years - Pt will be prescribed with rx for Norvasc. Pt was advised to f/u with his PMD. Strict RTER warnings given. Pt agrees with plan for discharge.     10:32 AM:  Pt's BP is currently 165/99. Pt's O2 sat is 95% on room air.         MEDICAL DECISION MAKING      MDM  Number of Diagnoses or Management Options     Amount and/or Complexity of Data Reviewed  Clinical lab tests: ordered and reviewed (BNP is 1,714.)  Tests in the radiology section of CPT®: ordered and reviewed (CT Head:  No evidence of hemorrhage, hydrocephalus or of abnormal  extra-axial fluid. Area of decreased attenuation involving the right  parietal lobe laterally, well demarcated suggesting a small remote  cortical infarct measuring 16 x 7 mm in the transverse plane. The  etiology for the patient's headaches is uncertain. Further evaluation  could be performed with MRI examination of the brain.)  Discussion of test results with the performing providers: yes (CT Head results d/w radiologist.   )  Decide to obtain  previous medical records or to obtain history from someone other than the patient: yes    Patient Progress  Patient progress: stable             DIAGNOSIS  Final diagnoses:   Generalized headache   Essential hypertension           DISPOSITION  Pt discharged.    DISCHARGE    Patient discharged in stable condition.    Reviewed implications of results, diagnosis, meds, responsibility to follow up, warning signs and symptoms of possible worsening, potential complications and reasons to return to ER.    Patient/Family voiced understanding of above instructions.    Discussed plan for discharge, as there is no emergent indication for admission. Pt/family is agreeable and understands need for follow up and repeat testing. Pt is aware that discharge does not mean that nothing is wrong but it indicates no emergency is present that requires admission and they must continue care with follow-up as given below or physician of their choice.     FOLLOW-UP  Epley, James, APRN  [address]  [place] [address]  [phone]    Schedule an appointment as soon as possible for a visit            Medication List      New Prescriptions    amLODIPine 2.5 MG tablet  Commonly known as:  NORVASC  Take 1 tablet by mouth Daily.            Latest Documented Vital Signs:  As of 11:04 AM  BP- 162/87 HR- (!) 44 Temp- 97.5 °F (36.4 °C) (Tympanic) O2 sat- 95%      --  Documentation assistance provided by ana laura Isaac for Dr. Fransisco MD.  Information recorded by the scribe was done at my direction and has been verified and validated by me.           José Miguel Isaac  01/11/19 [id number]       Truman Moody MD  01/11/19 [id number]

## 2019-01-11 NOTE — OUTREACH NOTE
Prep Survey      Responses   Facility patient discharged from?  [facility]   Is patient eligible?  Yes   Discharge diagnosis  Septic shock, known adrenal insufficiency, Back pain   Does the patient have one of the following disease processes/diagnoses(primary or secondary)?  Sepsis   Does the patient have Home health ordered?  No   Is there a DME ordered?  No   Prep survey completed?  Yes          Kira Avalos RN

## 2019-01-11 NOTE — ED TRIAGE NOTES
Pt states he was hospitalized this week for hypotension, pt was dc'd yesterday and carol was 150 systolic. Today, bp at home was 190/86 and he has a headache. Pt aslo c/o mid back pain. No known injury. Pt had MRI on back when he was in the hospital.

## 2019-01-12 LAB
BACTERIA SPEC AEROBE CULT: NORMAL
BACTERIA SPEC AEROBE CULT: NORMAL

## 2019-01-14 ENCOUNTER — TREATMENT (OUTPATIENT)
Dept: PHYSICAL THERAPY | Facility: CLINIC | Age: 79
End: 2019-01-14

## 2019-01-14 ENCOUNTER — READMISSION MANAGEMENT (OUTPATIENT)
Dept: CALL CENTER | Facility: HOSPITAL | Age: 79
End: 2019-01-14

## 2019-01-14 ENCOUNTER — OFFICE VISIT (OUTPATIENT)
Dept: FAMILY MEDICINE CLINIC | Facility: CLINIC | Age: 79
End: 2019-01-14

## 2019-01-14 VITALS
OXYGEN SATURATION: 94 % | HEIGHT: 72 IN | HEART RATE: 59 BPM | BODY MASS INDEX: 28.99 KG/M2 | WEIGHT: 214 LBS | SYSTOLIC BLOOD PRESSURE: 131 MMHG | DIASTOLIC BLOOD PRESSURE: 86 MMHG

## 2019-01-14 DIAGNOSIS — G89.29 CHRONIC BILATERAL LOW BACK PAIN WITHOUT SCIATICA: ICD-10-CM

## 2019-01-14 DIAGNOSIS — M54.9 BACK PAIN WITHOUT RADIATION: Primary | ICD-10-CM

## 2019-01-14 DIAGNOSIS — M54.2 PAIN, NECK: Primary | ICD-10-CM

## 2019-01-14 DIAGNOSIS — E27.40 ADRENAL INSUFFICIENCY (HCC): ICD-10-CM

## 2019-01-14 DIAGNOSIS — M54.50 CHRONIC BILATERAL LOW BACK PAIN WITHOUT SCIATICA: ICD-10-CM

## 2019-01-14 DIAGNOSIS — M51.27 LUMBOSACRAL DISC HERNIATION: ICD-10-CM

## 2019-01-14 PROBLEM — I10 HYPERTENSION: Status: ACTIVE | Noted: 2019-01-14

## 2019-01-14 PROCEDURE — G0283 ELEC STIM OTHER THAN WOUND: HCPCS | Performed by: PHYSICAL THERAPIST

## 2019-01-14 PROCEDURE — 99214 OFFICE O/P EST MOD 30 MIN: CPT | Performed by: NURSE PRACTITIONER

## 2019-01-14 PROCEDURE — 97140 MANUAL THERAPY 1/> REGIONS: CPT | Performed by: PHYSICAL THERAPIST

## 2019-01-14 RX ORDER — AMLODIPINE BESYLATE 2.5 MG/1
2.5 TABLET ORAL DAILY
Qty: 30 TABLET | Refills: 0 | Status: SHIPPED | OUTPATIENT
Start: 2019-01-14 | End: 2019-01-15 | Stop reason: SDUPTHER

## 2019-01-14 NOTE — PATIENT INSTRUCTIONS
Discharge instructions  Continue present medication  Check blood pressure daily  If top number or systolic  Less than 110  Hold amlodipine and lisinopril    Call me blood pressure readings  In one week  And in 3 weeks    If you're systolic or top number is less than 90  This is quite low  Hold blood pressure medication  Push fluids  Falls precaution  Emergency room for evaluation and fluid replenishment      T97.8      Back Pain, Adult  Back pain is very common. The pain often gets better over time. The cause of back pain is usually not dangerous. Most people can learn to manage their back pain on their own.  Follow these instructions at home:  Watch your back pain for any changes. The following actions may help to lessen any pain you are feeling:  · Stay active. Start with short walks on flat ground if you can. Try to walk farther each day.  · Exercise regularly as told by your doctor. Exercise helps your back heal faster. It also helps avoid future injury by keeping your muscles strong and flexible.  · Do not sit, drive, or  one place for more than 30 minutes.  · Do not stay in bed. Resting more than 1-2 days can slow down your recovery.  · Be careful when you bend or lift an object. Use good form when lifting:  ? Bend at your knees.  ? Keep the object close to your body.  ? Do not twist.  · Sleep on a firm mattress. Lie on your side, and bend your knees. If you lie on your back, put a pillow under your knees.  · Take medicines only as told by your doctor.  · Put ice on the injured area.  ? Put ice in a plastic bag.  ? Place a towel between your skin and the bag.  ? Leave the ice on for 20 minutes, 2-3 times a day for the first 2-3 days. After that, you can switch between ice and heat packs.  · Avoid feeling anxious or stressed. Find good ways to deal with stress, such as exercise.  · Maintain a healthy weight. Extra weight puts stress on your back.    Contact a doctor if:  · You have pain that does not go  away with rest or medicine.  · You have worsening pain that goes down into your legs or buttocks.  · You have pain that does not get better in one week.  · You have pain at night.  · You lose weight.  · You have a fever or chills.  Get help right away if:  · You cannot control when you poop (bowel movement) or pee (urinate).  · Your arms or legs feel weak.  · Your arms or legs lose feeling (numbness).  · You feel sick to your stomach (nauseous) or throw up (vomit).  · You have belly (abdominal) pain.  · You feel like you may pass out (faint).  This information is not intended to replace advice given to you by your health care provider. Make sure you discuss any questions you have with your health care provider.  Document Released: 06/05/2009 Document Revised: 05/25/2017 Document Reviewed: 04/21/2015  [employer] Interactive Patient Education © 2018 [employer]    Herniated Disk  A herniated disk is when a disk in your spine bulges out too far. There is a disk with a spongy center in between each pair of bones in the spine (vertebrae). These disks act as shock absorbers when you move. A herniated disk can cause pain and muscle weakness. This can happen anywhere in the back or neck.  Follow these instructions at home:  Medicines  · Take over-the-counter and prescription medicines only as told by your doctor.  · Do not drive or use heavy machinery while taking prescription pain medicine.  Activity  · Rest as told by your doctor.  · After your rest period:  ? Return to your normal activities. Slowly start exercising as told by your doctor. Ask what activities are safe for you.  ? Use good posture.  ? Avoid movements that cause pain.  ? Do not lift anything that is heavier than 10 lb (4.5 kg) until your doctor says this is safe.  ? Do not sit or stand for a long time without moving.  ? Do not sit for a long time without getting up and moving around.  · Do exercises (physical therapy) as told.  · Try to strengthen your  back and belly (abdomen) with exercises like crunches, swimming, or walking.  General instructions  · Do not use any products that contain nicotine or tobacco, such as cigarettes and e-cigarettes. If you need help quitting, ask your doctor.  · Do not wear high-heeled shoes.  · Do not sleep on your belly.  · If you are overweight, work with your doctor to lose weight safely.  · To prevent or treat constipation while you are taking prescription pain medicine, your doctor may recommend that you:  ? Drink enough fluid to keep your pee (urine) clear or pale yellow.  ? Take over-the-counter or prescription medicines.  ? Eat foods that are high in fiber. These include fresh fruits and vegetables, whole grains, and beans.  ? Limit foods that are high in fat and processed sugars. These include fried and sweet foods.  · Keep all follow-up visits as told by your doctor. This is important.  How is this prevented?  · Stay at a healthy weight.  · Try to avoid stress.  · Stay in shape. Do at least 150 minutes of moderate-intensity exercise each week, such as fast walking or water aerobics.  · When lifting objects:  ? Keep your feet as far apart as your shoulders (shoulder-width apart) or farther apart.  ? Tighten your belly muscles.  ? Bend your knees and hips and keep your spine neutral. Lift using the strength of your legs, not your back. Do not lock your knees straight out.  ? Always ask for help to lift heavy or awkward objects.  Contact a doctor if:  · You have back pain or neck pain that does not get better after 6 weeks.  · You have very bad pain.  · You get any of these problems in any part of your body:  ? Tingling.  ? Weakness.  ? Loss of feeling (numbness).  Get help right away if:  · You cannot move your arms or legs.  · You cannot control when you pee (urinate) or poop (have a bowel movement).  · You feel dizzy.  · You faint.  · You have trouble breathing.  This information is not intended to replace advice given to  you by your health care provider. Make sure you discuss any questions you have with your health care provider.  Document Released: 05/04/2015 Document Revised: 08/16/2017 Document Reviewed: 06/15/2017  [employer] Interactive Patient Education © 2017 [employer]

## 2019-01-14 NOTE — OUTREACH NOTE
Sepsis Week 1 Survey      Responses   Facility patient discharged from?  [place]   Does the patient have one of the following disease processes/diagnoses(primary or secondary)?  Sepsis   Is there a successful TCM telephone encounter documented?  No   Week 1 attempt successful?  No   Unsuccessful attempts  Attempt 1          Juanito Conde RN

## 2019-01-14 NOTE — PROGRESS NOTES
Physical Therapy Daily Progress Note    Visit #9    Subjective     Faustino Horton reports: he was hospitalized last week for episode of very low blood pressure. Pt states the doctor initially though it was septic shock, but he had no infection. Was discharged home without knowing what caused the episode. OK'd to return to full activity and PT. Pt states he had really been improving with PT, and elected to continue PT rather than having an MRI of his neck. Pt states symptoms worsened during hospital stay because of uncomfortable bed.       Objective   See Exercise, Manual, and Modality Logs for complete treatment.       Assessment/Plan  Resumed previous treatment with no adverse reaction.   Progress per Plan of Care, reassess next visit           Manual Therapy:    30     mins  75405;  Therapeutic Exercise:    0     mins  61637;     Neuromuscular Jake:    0    mins  35494;    Therapeutic Activity:     0     mins  39063;     Gait Trainin     mins  85861;     Ultrasound:     0     mins  96230;    Electrical Stimulation:    15     mins  41282 ( );    Timed Treatment:   30   mins   Total Treatment:     45   mins    Diana Chester PT, DPT  Physical Therapist  [place] License #[id number]

## 2019-01-14 NOTE — PROGRESS NOTES
Subjective   Faustino Horton Jr. is a 78 y.o. male.     Recent admission hospital for hypotension weakness initially thought septic however  Ultimately no infection source was found  He was given steroids and treated with fluids  In the hospital several days felt better discharge but had to return shortly after  And blood pressure actually elevated 162/87  Started amlodipine low-dose 2.5 mg and told to follow-up here  Already takes 2.5 mg lisinopril renal dose  Here to recheck blood pressure    Presently feels good  He's been doing fine since discharge  .  Blood pressure had been high as well home after being discharged initially  I reviewed admission notes and labs    History adrenal insufficiency and is being managed by endocrinology [place]  Takes daily Decadron 0.5   chronic back pain low back no change  Low mid to right back pain  No pain originating T12-L1  Recent MRI with and without possible acute abnormality or fracture L1  Patient has no pain in this location is nontender    His pain approximately L3-L4 right sided  No bowel or bladder change no associated fever or weakness  Mild to moderate has been gone for some time requesting referral pain management consider injection according to patient he discusses with her doctors in the hospital per patient history             The following portions of the patient's history were reviewed and updated as appropriate: allergies, current medications, past family history, past medical history, past social history, past surgical history and problem list.    Review of Systems   Constitutional: Negative for fatigue and fever.   HENT: Negative.  Negative for trouble swallowing.    Eyes: Negative.    Respiratory: Negative.  Negative for cough and shortness of breath.    Cardiovascular: Negative for chest pain, palpitations and leg swelling.   Gastrointestinal: Negative.  Negative for abdominal pain.   Genitourinary: Negative.    Musculoskeletal: Negative.    Skin:  Negative.    Neurological: Negative.  Negative for dizziness and confusion.   Psychiatric/Behavioral: Negative.        Objective   Physical Exam   Constitutional: He is oriented to person, place, and time. He appears well-developed and well-nourished. No distress.   HENT:   Head: Normocephalic and atraumatic.   Nose: Nose normal.   Mouth/Throat: Oropharynx is clear and moist.   Eyes: Conjunctivae are normal. Pupils are equal, round, and reactive to light. No scleral icterus.   Neck: Neck supple. No JVD present. No thyromegaly present.   Cardiovascular: Normal rate, regular rhythm and normal heart sounds. Exam reveals no gallop and no friction rub.   No murmur heard.  Pulmonary/Chest: Effort normal and breath sounds normal. No respiratory distress. He has no wheezes. He has no rales.   Abdominal: Soft. Bowel sounds are normal. He exhibits no distension and no mass. There is no tenderness. There is no guarding. No hernia.   Musculoskeletal: He exhibits no edema or tenderness.   Lymphadenopathy:     He has no cervical adenopathy.   Neurological: He is alert and oriented to person, place, and time. He has normal reflexes.   Skin: Skin is warm and dry. No rash noted. He is not diaphoretic. No erythema.   Psychiatric: He has a normal mood and affect. His behavior is normal. Judgment and thought content normal.   Vitals reviewed.        Assessment/Plan   Faustino was seen today for hospital follow-up on low blood pressure.    Diagnoses and all orders for this visit:    Back pain without radiation  -     Ambulatory Referral to Pain Management    Lumbosacral disc herniation  -     Ambulatory Referral to Pain Management    Adrenal insufficiency (CMS/HCC)    Other orders  -     amLODIPine (NORVASC) 2.5 MG tablet; Take 1 tablet by mouth Daily.              Discharge instructions  Continue present medication  Check blood pressure daily  If top number or systolic  Less than 110  Hold amlodipine and lisinopril    Call me blood  pressure readings  In one week  And in 3 weeks    If you're systolic or top number is less than 90  This is quite low  Hold blood pressure medication  Push fluids  Falls precaution  Emergency room for evaluation and fluid replenishment      T97.8

## 2019-01-15 ENCOUNTER — READMISSION MANAGEMENT (OUTPATIENT)
Dept: CALL CENTER | Facility: HOSPITAL | Age: 79
End: 2019-01-15

## 2019-01-15 RX ORDER — AMLODIPINE BESYLATE 2.5 MG/1
2.5 TABLET ORAL DAILY
Qty: 90 TABLET | Refills: 2 | Status: SHIPPED | OUTPATIENT
Start: 2019-01-15 | End: 2019-02-11

## 2019-01-15 RX ORDER — AMLODIPINE BESYLATE 2.5 MG/1
2.5 TABLET ORAL DAILY
Qty: 90 TABLET | Refills: 2 | Status: SHIPPED | OUTPATIENT
Start: 2019-01-15 | End: 2019-01-15 | Stop reason: SDUPTHER

## 2019-01-15 NOTE — OUTREACH NOTE
Sepsis Week 1 Survey      Responses   Facility patient discharged from?  [place]   Does the patient have one of the following disease processes/diagnoses(primary or secondary)?  Sepsis   Is there a successful TCM telephone encounter documented?  No   Week 1 attempt successful?  Yes   Call start time  1005   Call end time  1013   Discharge diagnosis  Septic shock, known adrenal insufficiency, Back pain   Is patient permission given to speak with other caregiver?  Yes   List who call center can speak with  anyone   Meds reviewed with patient/caregiver?  Yes   Is the patient having any side effects they believe may be caused by any medication additions or changes?  No   Does the patient have all medications related to this admission filled (includes all antibiotics, inhalers, nebulizers,steroids,etc.)  Yes   Is the patient taking all medications as directed (includes completed medication regime)?  Yes   Comments regarding appointments  Pt has already seen pcp.   Does the patient have a primary care provider?   Yes   Comments regarding PCP  James Epley APRN   Does the patient have an appointment with their PCP within 7 days of discharge?  Yes   Has the patient kept scheduled appointments due by today?  Yes   Psychosocial issues?  No   Did the patient receive a copy of their discharge instructions?  Yes   Nursing interventions  Reviewed instructions with patient   What is the patient's perception of their health status since discharge?  Improving   Nursing interventions  Nurse provided patient education   Is the patient/caregiver able to teach back Sepsis?  S - Sleepy, difficult to arouse,confused   Nursing interventions  Nurse provided reassurance to patient   Is patient/caregiver able to teach back steps to recovery at home?  Rest and regain strength, Eat a balanced diet   Is the patient/caregiver able to teach back signs and symptoms of worsening condition:  Rapid heart rate (>90), Altered mental  status(confusion/coma)   Is the patient/caregiver able to teach back the hierarchy of who to call/visit for symptoms/problems? PCP, Specialist, Home health nurse, Urgent Care, ED, 911  Yes   Week 1 call completed?  Yes   Revoked  No further contact(revokes)-requires comment   Graduated/Revoked comments  Pt is doing very well and states he is back to his baseline with residual effects. Pt has already seen pcp for follow up.          Romina Moreno RN

## 2019-01-16 ENCOUNTER — HOSPITAL ENCOUNTER (OUTPATIENT)
Dept: CARDIOLOGY | Facility: HOSPITAL | Age: 79
Discharge: HOME | End: 2019-01-16
Attending: INTERNAL MEDICINE | Admitting: INTERNAL MEDICINE

## 2019-01-16 VITALS
OXYGEN SATURATION: 96 % | WEIGHT: 215 LBS | DIASTOLIC BLOOD PRESSURE: 83 MMHG | BODY MASS INDEX: 29.12 KG/M2 | HEART RATE: 54 BPM | SYSTOLIC BLOOD PRESSURE: 152 MMHG | HEIGHT: 72 IN

## 2019-01-16 DIAGNOSIS — R06.02 SOB (SHORTNESS OF BREATH): ICD-10-CM

## 2019-01-16 LAB
AORTIC DIMENSIONLESS INDEX: 0.9 (DI)
BH CV ECHO MEAS - ACS: 2.3 CM
BH CV ECHO MEAS - AO MAX PG (FULL): -0.47 MMHG
BH CV ECHO MEAS - AO MAX PG: 5.3 MMHG
BH CV ECHO MEAS - AO MEAN PG (FULL): 0 MMHG
BH CV ECHO MEAS - AO MEAN PG: 3 MMHG
BH CV ECHO MEAS - AO ROOT AREA (BSA CORRECTED): 1.9
BH CV ECHO MEAS - AO ROOT AREA: 13.2 CM^2
BH CV ECHO MEAS - AO ROOT DIAM: 4.1 CM
BH CV ECHO MEAS - AO V2 MAX: 115 CM/SEC
BH CV ECHO MEAS - AO V2 MEAN: 77 CM/SEC
BH CV ECHO MEAS - AO V2 VTI: 29 CM
BH CV ECHO MEAS - ASC AORTA: 4.2 CM
BH CV ECHO MEAS - AVA(I,A): 3.8 CM^2
BH CV ECHO MEAS - AVA(I,D): 3.8 CM^2
BH CV ECHO MEAS - AVA(V,A): 4.3 CM^2
BH CV ECHO MEAS - AVA(V,D): 4.3 CM^2
BH CV ECHO MEAS - BSA(HAYCOCK): 2.2 M^2
BH CV ECHO MEAS - BSA: 2.2 M^2
BH CV ECHO MEAS - BZI_BMI: 29.2 KILOGRAMS/M^2
BH CV ECHO MEAS - BZI_METRIC_HEIGHT: 182.9 CM
BH CV ECHO MEAS - BZI_METRIC_WEIGHT: 97.5 KG
BH CV ECHO MEAS - EDV(CUBED): 152 ML
BH CV ECHO MEAS - EDV(MOD-SP2): 134 ML
BH CV ECHO MEAS - EDV(MOD-SP4): 126 ML
BH CV ECHO MEAS - EDV(TEICH): 137.5 ML
BH CV ECHO MEAS - EF(CUBED): 78.2 %
BH CV ECHO MEAS - EF(MOD-BP): 58 %
BH CV ECHO MEAS - EF(MOD-SP2): 59.7 %
BH CV ECHO MEAS - EF(MOD-SP4): 53.2 %
BH CV ECHO MEAS - EF(TEICH): 69.9 %
BH CV ECHO MEAS - ESV(CUBED): 33.1 ML
BH CV ECHO MEAS - ESV(MOD-SP2): 54 ML
BH CV ECHO MEAS - ESV(MOD-SP4): 59 ML
BH CV ECHO MEAS - ESV(TEICH): 41.3 ML
BH CV ECHO MEAS - FS: 39.8 %
BH CV ECHO MEAS - IVS/LVPW: 1
BH CV ECHO MEAS - IVSD: 0.9 CM
BH CV ECHO MEAS - LA DIMENSION: 4.2 CM
BH CV ECHO MEAS - LA/AO: 1
BH CV ECHO MEAS - LAT PEAK E' VEL: 11 CM/SEC
BH CV ECHO MEAS - LV DIASTOLIC VOL/BSA (35-75): 57.4 ML/M^2
BH CV ECHO MEAS - LV MASS(C)D: 176.6 GRAMS
BH CV ECHO MEAS - LV MASS(C)DI: 80.4 GRAMS/M^2
BH CV ECHO MEAS - LV MAX PG: 5.8 MMHG
BH CV ECHO MEAS - LV MEAN PG: 3 MMHG
BH CV ECHO MEAS - LV SYSTOLIC VOL/BSA (12-30): 26.9 ML/M^2
BH CV ECHO MEAS - LV V1 MAX: 120 CM/SEC
BH CV ECHO MEAS - LV V1 MEAN: 73.7 CM/SEC
BH CV ECHO MEAS - LV V1 VTI: 26.2 CM
BH CV ECHO MEAS - LVIDD: 5.3 CM
BH CV ECHO MEAS - LVIDS: 3.2 CM
BH CV ECHO MEAS - LVLD AP2: 8.5 CM
BH CV ECHO MEAS - LVLD AP4: 8.3 CM
BH CV ECHO MEAS - LVLS AP2: 7.3 CM
BH CV ECHO MEAS - LVLS AP4: 7.3 CM
BH CV ECHO MEAS - LVOT AREA (M): 4.2 CM^2
BH CV ECHO MEAS - LVOT AREA: 4.2 CM^2
BH CV ECHO MEAS - LVOT DIAM: 2.3 CM
BH CV ECHO MEAS - LVPWD: 0.9 CM
BH CV ECHO MEAS - MED PEAK E' VEL: 10 CM/SEC
BH CV ECHO MEAS - MR MAX PG: 83.2 MMHG
BH CV ECHO MEAS - MR MAX VEL: 456 CM/SEC
BH CV ECHO MEAS - MV A DUR: 0.24 SEC
BH CV ECHO MEAS - MV A MAX VEL: 88.3 CM/SEC
BH CV ECHO MEAS - MV DEC SLOPE: 333.5 CM/SEC^2
BH CV ECHO MEAS - MV DEC TIME: 0.25 SEC
BH CV ECHO MEAS - MV E MAX VEL: 68.9 CM/SEC
BH CV ECHO MEAS - MV E/A: 0.78
BH CV ECHO MEAS - MV MAX PG: 4.3 MMHG
BH CV ECHO MEAS - MV MEAN PG: 1 MMHG
BH CV ECHO MEAS - MV P1/2T MAX VEL: 86.9 CM/SEC
BH CV ECHO MEAS - MV P1/2T: 76.3 MSEC
BH CV ECHO MEAS - MV V2 MAX: 104 CM/SEC
BH CV ECHO MEAS - MV V2 MEAN: 53.3 CM/SEC
BH CV ECHO MEAS - MV V2 VTI: 35.3 CM
BH CV ECHO MEAS - MVA P1/2T LCG: 2.5 CM^2
BH CV ECHO MEAS - MVA(P1/2T): 2.9 CM^2
BH CV ECHO MEAS - MVA(VTI): 3.1 CM^2
BH CV ECHO MEAS - PA ACC TIME: 0.11 SEC
BH CV ECHO MEAS - PA MAX PG (FULL): 2.4 MMHG
BH CV ECHO MEAS - PA MAX PG: 3.7 MMHG
BH CV ECHO MEAS - PA PR(ACCEL): 29.1 MMHG
BH CV ECHO MEAS - PA V2 MAX: 95.6 CM/SEC
BH CV ECHO MEAS - PULM A REVS DUR: 0.23 SEC
BH CV ECHO MEAS - PULM A REVS VEL: 33.8 CM/SEC
BH CV ECHO MEAS - PULM DIAS VEL: 44.8 CM/SEC
BH CV ECHO MEAS - PULM S/D: 1.4
BH CV ECHO MEAS - PULM SYS VEL: 61.9 CM/SEC
BH CV ECHO MEAS - PVA(V,A): 2.9 CM^2
BH CV ECHO MEAS - PVA(V,D): 2.9 CM^2
BH CV ECHO MEAS - QP/QS: 0.64
BH CV ECHO MEAS - RAP SYSTOLE: 3 MMHG
BH CV ECHO MEAS - RV MAX PG: 1.3 MMHG
BH CV ECHO MEAS - RV MEAN PG: 1 MMHG
BH CV ECHO MEAS - RV V1 MAX: 56.2 CM/SEC
BH CV ECHO MEAS - RV V1 MEAN: 38.3 CM/SEC
BH CV ECHO MEAS - RV V1 VTI: 14.2 CM
BH CV ECHO MEAS - RVOT AREA: 4.9 CM^2
BH CV ECHO MEAS - RVOT DIAM: 2.5 CM
BH CV ECHO MEAS - RVSP: 14.7 MMHG
BH CV ECHO MEAS - SI(AO): 174.3 ML/M^2
BH CV ECHO MEAS - SI(CUBED): 54.1 ML/M^2
BH CV ECHO MEAS - SI(LVOT): 49.5 ML/M^2
BH CV ECHO MEAS - SI(MOD-SP2): 36.4 ML/M^2
BH CV ECHO MEAS - SI(MOD-SP4): 30.5 ML/M^2
BH CV ECHO MEAS - SI(TEICH): 43.8 ML/M^2
BH CV ECHO MEAS - SV(AO): 382.9 ML
BH CV ECHO MEAS - SV(CUBED): 118.8 ML
BH CV ECHO MEAS - SV(LVOT): 108.9 ML
BH CV ECHO MEAS - SV(MOD-SP2): 80 ML
BH CV ECHO MEAS - SV(MOD-SP4): 67 ML
BH CV ECHO MEAS - SV(RVOT): 69.7 ML
BH CV ECHO MEAS - SV(TEICH): 96.2 ML
BH CV ECHO MEAS - TAPSE (>1.6): 2.5 CM2
BH CV ECHO MEAS - TR MAX VEL: 171 CM/SEC
BH CV ECHO MEASUREMENTS AVERAGE E/E' RATIO: 6.56
BH CV VAS BP RIGHT ARM: NORMAL MMHG
BH CV XLRA - RV BASE: 3.5 CM
BH CV XLRA - TDI S': 17 CM/SEC
LEFT ATRIUM VOLUME INDEX: 34 ML/M2
LEFT ATRIUM VOLUME: 68 CM3
MAXIMAL PREDICTED HEART RATE: 144 BPM
STRESS TARGET HR: 122 BPM

## 2019-01-16 PROCEDURE — 93306 TTE W/DOPPLER COMPLETE: CPT

## 2019-01-16 PROCEDURE — 93306 TTE W/DOPPLER COMPLETE: CPT | Performed by: INTERNAL MEDICINE

## 2019-01-21 ENCOUNTER — TREATMENT (OUTPATIENT)
Dept: PHYSICAL THERAPY | Facility: CLINIC | Age: 79
End: 2019-01-21

## 2019-01-21 DIAGNOSIS — G89.29 CHRONIC BILATERAL LOW BACK PAIN WITHOUT SCIATICA: ICD-10-CM

## 2019-01-21 DIAGNOSIS — M54.50 CHRONIC BILATERAL LOW BACK PAIN WITHOUT SCIATICA: ICD-10-CM

## 2019-01-21 DIAGNOSIS — M54.2 PAIN, NECK: Primary | ICD-10-CM

## 2019-01-21 PROCEDURE — 97140 MANUAL THERAPY 1/> REGIONS: CPT | Performed by: PHYSICAL THERAPIST

## 2019-01-21 PROCEDURE — G0283 ELEC STIM OTHER THAN WOUND: HCPCS | Performed by: PHYSICAL THERAPIST

## 2019-01-21 NOTE — PROGRESS NOTES
Re-Assessment / Re-Certification    Patient: Faustino Horton Jr.   : 1940  Diagnosis/ICD-10 Code:  Pain, neck [M54.2]  Referring practitioner: James Epley, APRN  Date of Initial Visit: 2018  Today's Date: 2019  Patient seen for 10 sessions      Subjective:   Faustino Horton reports: he felt like he was really making progress, then symptoms worsened with hospitalization.   Subjective Questionnaire: NDI: 26% limitation (had improved at last reassessment, but worsened during recent illness/hospitalization)  Clinical Progress: improved since eval, with exacerbation after recent illness  Home Program Compliance: Yes  Treatment has included: manual therapy, electrical stimulation, ultrasound and moist heat    Subjective   Objective       Active Range of Motion   Cervical/Thoracic Spine   Cervical    Left lateral flexion: 35 degrees   Right lateral flexion: 34 degrees   Left rotation: 65 degrees   Right rotation: 62 degrees      Assessment/Plan  Progress toward previous goals: Partially Met     Short Term Goals: 2-4 weeks  Patient will:  1. Be independent with initial HEP (MET)  2. Be instructed in posture and body mechanics (MET)  3. Demo cervical spine rotation AROM improved by 15 degrees bilaterally. (PROGRESSING)    Long Term Goals: 4-6 weeks  Patient will:  1. Report pain of </= 1 with all daily activities (NOT MET)  2. Demonstrate improved Bilateral UE/scapular MMT of >/= 4+/5 to allow for performance of ADL's/household management/recreational activities without increased symptoms. (NOT MET)  3. Demonstrate no soft tissue restriction in involved musculature to allow for full ROM. (PROGRESSING)  4. Perceived disability </=10% as measured by Neck Disability Index (PROGRESSING)      Recommendations: Continue as planned  Timeframe: 1 month  Prognosis to achieve goals: good    PT Signature: Diana Chester PT, DPT                         Physical Therapist                         [place] License #[id number]    Based  upon review of the patient's progress and continued therapy plan, it is my medical opinion that Faustino Horton should continue physical therapy treatment at [facility]  [address] [place] [id number] [phone].    Signature: __________________________________  Epley, James, APRN    Manual Therapy:    30     mins  03964;  Therapeutic Exercise:    0     mins  08063;     Neuromuscular Jake:    0    mins  75353;    Therapeutic Activity:     0     mins  02985;     Gait Trainin     mins  24856;     Ultrasound:     0     mins  88171;    Electrical Stimulation:    15     mins  29763 ( );    Timed Treatment:   30   mins   Total Treatment:     45   mins

## 2019-01-23 ENCOUNTER — TELEPHONE (OUTPATIENT)
Dept: FAMILY MEDICINE CLINIC | Facility: CLINIC | Age: 79
End: 2019-01-23

## 2019-01-23 ENCOUNTER — OFFICE VISIT (OUTPATIENT)
Dept: FAMILY MEDICINE CLINIC | Facility: CLINIC | Age: 79
End: 2019-01-23

## 2019-01-23 VITALS
BODY MASS INDEX: 28.71 KG/M2 | WEIGHT: 212 LBS | HEIGHT: 72 IN | OXYGEN SATURATION: 94 % | HEART RATE: 63 BPM | SYSTOLIC BLOOD PRESSURE: 124 MMHG | DIASTOLIC BLOOD PRESSURE: 60 MMHG

## 2019-01-23 DIAGNOSIS — I10 ESSENTIAL HYPERTENSION: ICD-10-CM

## 2019-01-23 DIAGNOSIS — R19.7 DIARRHEA OF PRESUMED INFECTIOUS ORIGIN: Primary | ICD-10-CM

## 2019-01-23 PROCEDURE — 99214 OFFICE O/P EST MOD 30 MIN: CPT | Performed by: NURSE PRACTITIONER

## 2019-01-23 RX ORDER — METRONIDAZOLE 500 MG/1
500 TABLET ORAL 3 TIMES DAILY
Qty: 21 TABLET | Refills: 0 | Status: SHIPPED | OUTPATIENT
Start: 2019-01-23 | End: 2019-02-19

## 2019-01-23 NOTE — TELEPHONE ENCOUNTER
"Regarding: FW: Visit Follow-Up Question  Contact: [phone]      ----- Message -----  From: Faustino Horton Jr.  Sent: 1/23/2019   9:48 AM  To: [facility]  Subject: Visit Follow-Up Question                         ----- Message from Mychart, Generic sent at 1/23/2019  9:48 AM EST -----    Dante, my blood pressure is 135 over 80 this morning. I am having a problem with my bowl movement. I go small amounts and go 6-7 times a day, this is not normal, I hope I do not have \"C-Diff\" Any Suggestions?    Bill  "

## 2019-01-23 NOTE — TELEPHONE ENCOUNTER
If patient is having frequent diarrhea loose stools  Return office as soon as possible  Or urgent care for evaluation  And to rule out C. Difficile      If increasing abdominal pain pain  Fever chills weakness  Etc. urgent care ER    Thanks

## 2019-01-23 NOTE — PATIENT INSTRUCTIONS
Discharge instructions  Bananas rice applesauce toast  Smaller more frequent meals    Flagyl 500 mg 3 times a day ×7 days  If fever abdominal pain weakness  Emergency room  Recheck if symptoms not much better next week    Return office stool specimen

## 2019-01-23 NOTE — PROGRESS NOTES
Subjective   Faustino Horton Jr. is a 78 y.o. male.     Patient complains of loose stools over the last week  Approximate 6 per day small quantity semisolid  He had recent hospitalization and was discharged after being on antibiotics  Advised to take a probiotic  Hospitalized for sepsis  He's been doing well without chest pain shortness of breath dizziness and his blood pressures controlled nicely  He is concerned C. difficile as he is had 2 previous episodes  Never drug resistant to his knowledge    No fever no chills  Occasionally some mild discomfort left lower abdomen but no persistent pain  No increasing weakness nausea vomiting malaise or other signs and symptoms of systemic infection    Keeping fluids down doing well otherwise  Just didn't want to wait until it got bad      Diarrhea           The following portions of the patient's history were reviewed and updated as appropriate: allergies, current medications, past family history, past medical history, past social history, past surgical history and problem list.    Review of Systems   Gastrointestinal: Positive for diarrhea.   All other systems reviewed and are negative.      Objective   Physical Exam   Constitutional: He is oriented to person, place, and time. He appears well-developed and well-nourished. No distress.   Pleasant appears well   HENT:   Head: Normocephalic and atraumatic.   Nose: Nose normal.   Mouth/Throat: Oropharynx is clear and moist.   Eyes: Conjunctivae are normal. Pupils are equal, round, and reactive to light. No scleral icterus.   Neck: Neck supple. No JVD present. No thyromegaly present.   Cardiovascular: Normal rate, regular rhythm and normal heart sounds. Exam reveals no gallop and no friction rub.   No murmur heard.  Pulmonary/Chest: Effort normal and breath sounds normal. No respiratory distress. He has no wheezes. He has no rales.   Abdominal: Soft. Bowel sounds are normal. He exhibits no distension and no mass. There is  tenderness. There is no guarding. No hernia.   Essentially normal exam I'll sounds are normal  Borderline tenderness minimal if any left lower quadrant no guarding no rebound abdomen soft no masses   Musculoskeletal: He exhibits no edema or tenderness.   Lymphadenopathy:     He has no cervical adenopathy.   Neurological: He is alert and oriented to person, place, and time. He has normal reflexes.   Skin: Skin is warm and dry. No rash noted. He is not diaphoretic. No erythema.   Psychiatric: He has a normal mood and affect. His behavior is normal. Judgment and thought content normal.   Vitals reviewed.        Assessment/Plan   Faustino was seen today for diarrhea.    Diagnoses and all orders for this visit:    Diarrhea of presumed infectious origin  -     Clostridium Difficile Toxin, PCR - Stool, Per Rectum  -     Basic Metabolic Panel    Essential hypertension  -     Clostridium Difficile Toxin, PCR - Stool, Per Rectum  -     Basic Metabolic Panel    Other orders  -     metroNIDAZOLE (FLAGYL) 500 MG tablet; Take 1 tablet by mouth 3 (Three) Times a Day.                Risk-benefit  Discuss waiting for results  Or empiric treatment  Likely this is related to infectious process  He is increased risk of C. Difficile  He has no signs or symptoms of serious infection presently  Should he not have C. difficile, the mere fact that he's taken Flagyl which changes normal josey  Could increase his risk of C. difficile in the next few weeks  Also discussed the possibility of antibiotic associated diarrhea  Not be in C. Difficile    I think is best to go ahead and treat to wait for his test to come back        Should he develop increased pain  Fever chills weakness emergency room immediately    Flagyl with caution  Avoid alcohol  Brat diet

## 2019-01-24 ENCOUNTER — OFFICE VISIT (OUTPATIENT)
Dept: PAIN MEDICINE | Facility: CLINIC | Age: 79
End: 2019-01-24

## 2019-01-24 ENCOUNTER — TREATMENT (OUTPATIENT)
Dept: PHYSICAL THERAPY | Facility: CLINIC | Age: 79
End: 2019-01-24

## 2019-01-24 VITALS
HEART RATE: 55 BPM | WEIGHT: 213.8 LBS | OXYGEN SATURATION: 95 % | BODY MASS INDEX: 28.96 KG/M2 | HEIGHT: 72 IN | TEMPERATURE: 97.3 F | DIASTOLIC BLOOD PRESSURE: 71 MMHG | SYSTOLIC BLOOD PRESSURE: 124 MMHG | RESPIRATION RATE: 18 BRPM

## 2019-01-24 DIAGNOSIS — M54.50 CHRONIC BILATERAL LOW BACK PAIN WITHOUT SCIATICA: Primary | ICD-10-CM

## 2019-01-24 DIAGNOSIS — M54.2 PAIN, NECK: Primary | ICD-10-CM

## 2019-01-24 DIAGNOSIS — G89.29 CHRONIC BILATERAL LOW BACK PAIN WITHOUT SCIATICA: Primary | ICD-10-CM

## 2019-01-24 LAB
BUN SERPL-MCNC: 16 MG/DL (ref 8–23)
BUN/CREAT SERPL: 15.5 (ref 7–25)
CALCIUM SERPL-MCNC: 9.7 MG/DL (ref 8.6–10.5)
CHLORIDE SERPL-SCNC: 99 MMOL/L (ref 98–107)
CO2 SERPL-SCNC: 26 MMOL/L (ref 22–29)
CREAT SERPL-MCNC: 1.03 MG/DL (ref 0.76–1.27)
GLUCOSE SERPL-MCNC: 121 MG/DL (ref 65–99)
POC AMPHETAMINES: NEGATIVE
POC BARBITURATES: NEGATIVE
POC BENZODIAZEPHINES: NEGATIVE
POC COCAINE: NEGATIVE
POC METHADONE: NEGATIVE
POC METHAMPHETAMINE SCREEN URINE: NEGATIVE
POC OPIATES: NEGATIVE
POC OXYCODONE: NEGATIVE
POC PHENCYCLIDINE: NEGATIVE
POC PROPOXYPHENE: NEGATIVE
POC THC: NEGATIVE
POC TRICYCLIC ANTIDEPRESSANTS: NEGATIVE
POTASSIUM SERPL-SCNC: 4.3 MMOL/L (ref 3.5–5.2)
SODIUM SERPL-SCNC: 142 MMOL/L (ref 136–145)

## 2019-01-24 PROCEDURE — G0283 ELEC STIM OTHER THAN WOUND: HCPCS | Performed by: PHYSICAL THERAPIST

## 2019-01-24 PROCEDURE — 99204 OFFICE O/P NEW MOD 45 MIN: CPT | Performed by: PAIN MEDICINE

## 2019-01-24 PROCEDURE — 80305 DRUG TEST PRSMV DIR OPT OBS: CPT | Performed by: PAIN MEDICINE

## 2019-01-24 PROCEDURE — 97140 MANUAL THERAPY 1/> REGIONS: CPT | Performed by: PHYSICAL THERAPIST

## 2019-01-24 RX ORDER — OXYCODONE HYDROCHLORIDE AND ACETAMINOPHEN 5; 325 MG/1; MG/1
1 TABLET ORAL 2 TIMES DAILY PRN
Qty: 60 TABLET | Refills: 0 | Status: SHIPPED | OUTPATIENT
Start: 2019-01-24 | End: 2019-05-06

## 2019-01-24 NOTE — PROGRESS NOTES
"CHIEF COMPLAINT: Back Pain    Faustino Horton Jr. is a 78 y.o. male.   He was referred here by Epley, James, APRN. He presents to the office for evaluation and treatment of Back Pain    HPI  Back Pain  Mr. Horton states that his back pain increased 2 weeks ago after being in the hospital for 5 days earlier this months for low BP.   Prior to hospitalization pain was very mild, intermittent but tolerable. After discharge, pain is now constant and is not tolerable. Unable to \"get around\" any more. Cant get any relief. Pain is waking him up. Trouble getting out bed.   Complained of increased low back pain in hospital, updated imaging in hospital.     The patient states their pain is a 7 on a scale of 1-10.  The patient describes this pain as constant sharp, pressure and soreness.  The pain is located in bilateral low back and does not radiate. This painful problem is aggravated by standing and sitting and is alleviated by nothing.    On daily steroids for adrenal insufficiency.     Past pain medications: lortab - ineffective    Current pain medications: none    Past therapies:  Physical Therapy: yes (back pain -didn't help, currently for neck pain)  Chiropractor: yes (back pain -didn't help)  Massage Therapy: no  TENS: no  Neck or back surgery: no  Past pain management: no    Previous Injections: elbow injection for tennis elbow- 20 years ago)    PEG Assessment   What number best describes your pain on average in the past week? 8  What number best describes how, during the past week, pain has interfered with your enjoyment of life? 9  What number best describes how, during the past week, pain has interfered with your general activity? 7      Current Outpatient Medications:   •  amLODIPine (NORVASC) 2.5 MG tablet, Take 1 tablet by mouth Daily., Disp: 90 tablet, Rfl: 2  •  atorvastatin (LIPITOR) 40 MG tablet, Take 40 mg by mouth Daily., Disp: , Rfl:   •  dexamethasone (DECADRON) 0.5 MG tablet, Take 0.5 mg by mouth Daily., " Disp: , Rfl:   •  diclofenac (VOLTAREN) 1 % gel gel, Apply 2 g topically to the appropriate area as directed., Disp: , Rfl:   •  fludrocortisone 0.1 MG tablet, Take 0.1 mg by mouth daily., Disp: , Rfl:   •  gabapentin (NEURONTIN) 600 MG tablet, Take 1,200 mg by mouth 2 (two) times a day., Disp: , Rfl:   •  glimepiride (AMARYL) 1 MG tablet, Take 1 mg by mouth Every Evening., Disp: , Rfl:   •  levothyroxine (SYNTHROID, LEVOTHROID) 150 MCG tablet, Take 150 mcg by mouth daily., Disp: , Rfl:   •  lisinopril (PRINIVIL,ZESTRIL) 2.5 MG tablet, TAKE 1 TABLET EVERY DAY, Disp: , Rfl:   •  metroNIDAZOLE (FLAGYL) 500 MG tablet, Take 1 tablet by mouth 3 (Three) Times a Day., Disp: 21 tablet, Rfl: 0  •  vitamin B-12 (CYANOCOBALAMIN) 500 MCG tablet, Take 500 mcg by mouth Daily., Disp: , Rfl:   •  ACCU-CHEK FASTCLIX LANCETS [facility], CHECK BLOOD SUGAR D, Disp: , Rfl: 3  •  ACCU-CHEK SMARTVIEW test strip, USE TO TEST BLOOD SUGAR LEVEL QD, Disp: , Rfl: 3  •  oxyCODONE-acetaminophen (PERCOCET) 5-325 MG per tablet, Take 1 tablet by mouth 2 (Two) Times a Day As Needed (pain)., Disp: 60 tablet, Rfl: 0  •  potassium chloride (K-DUR) 10 MEQ CR tablet, Take 1 tablet by mouth Daily., Disp: 30 tablet, Rfl: 1    REVIEW OF PERTINENT MEDICAL DATA  Chart reviewed and summarization of all medical records up to new patient visit performed.  ER visits reviewed.  Admitted for septic shock.  Complaints of low back pain while in hospital.  Imaging performed.    IMAGING  MRI EXAMINATION LUMBAR SPINE WITH AND WITHOUT CONTRAST  FINDINGS: The alignment of the lumbar spine is within normal limits.  There is moderate to severe loss of disc height at L2-L3 and L3-L4.  Moderate endplate degenerative changes are noted at L3-L4. There is  irregularity involving the endplates at L2-L3 with adjacent edema,  nonspecific but also likely degenerative in nature.   After contrast administration there was mild enhancement involving the  endplates at L2-L3. There is a mild  degree of increased signal intensity  involving the disc posteriorly at L2-L3 to a lesser extent L4-L5 on the  sagittal T2 sequence, nonspecific but likely degenerative in nature.    The CT examination of the abdomen and pelvis performed on 01/07/2019  demonstrated vacuum disc effect at L2-L3 and L3-L4.   A horizontal plane of signal loss is noted on the sagittal T1 sequence  involving the L1 vertebral body and there is enhancement involving the  L1 vertebral body. There is no evidence of adjacent stranding. The  appearance is nonspecific but suggesting a subacute fracture with  minimal loss of vertical height. Clinical correlation is recommended.   L1-L2: There is a very mild broad-based disc osteophyte complex with no  evidence of herniation.   L2-L3: Mild facet degenerative disease is present bilaterally.  Broad-based disc osteophyte complex is present which is slightly more  prominent to the right. Disc material extends inferiorly consistent with  inferiorly directed disc herniation to the right of midline. This  results in lateral recess narrowing on the right. There is mild neural  foraminal compromise bilaterally secondary to loss of disc height and  eccentric with small disc osteophyte complex into the neural foramen.   L3-L4: Moderate facet degenerative disease is present bilaterally. There  is a mild broad-based disc osteophyte complex which is slightly more  prominent to the left. There is no evidence of focal herniation. There  is mild neural foraminal compromise on the left and mild to moderate  neural foraminal compromise on the right secondary to loss of disc  height and extension of disc osteophyte complex into the neural foramen.   L4-L5: Moderate facet degenerative disease is present bilaterally. There  is a mild broad-based disc osteophyte complex with no evidence of  herniation. There is mild neural foraminal compromise on the right and  mild to moderate neural foraminal compromise on the left  secondary to  loss of disc height and extension of disc osteophyte complex into the  neural foramen.   L5-S1: Mild facet degenerative disease is present bilaterally. There is  mild broad-based disc osteophyte complex resulting in mild flattening of  the ventral surface of the thecal sac. There is mild to moderate neural  foraminal compromise on the left secondary to the retrolisthesis of L5  upon S1. Disc material extends inferiorly to the left approaching and  mildly abutting the traversing left S1 nerve root. There is partial  visualization of an abdominal aortic aneurysm which has been treated  with a stent graft. Evaluation is limited on this current examination.   IMPRESSION:  1. 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  2. There is edema and endplate degenerative changes and enhancement at  L2-L3 and endplate degenerative changes without enhancement at L3-L4. An  early discitis cannot be entirely excluded at L2-L3 but this is thought  to be less likely. There is vacuum disc effect present at L2-L3 on the  CT examination of 01/07/2019. There is an inferiorly directed disc  herniation to the right of midline at L2-L3 and a smaller inferiorly  directed disc herniation to the left at L5-S1. Multilevel neural  foraminal compromise is noted as described above.   3. There is an area of signal loss appreciated on the sagittal T1  sequence paralleling the superior endplate at L1. There is mild edema  and enhancement at the same location. The findings are suggestive of a  subacute fracture. An obvious fracture on the CT examination 01/07/2019  is not evident. Further evaluation could be performed with a bone scan.  4. If the patient's back pain increases a repeat MRI examination of the  lumbar spine with and without contrast is recommended.  5. The above information was called to and  "discussed with Dr. Rey  on 01/09/2019.    I personally reviewed the images of lumbar MRI while patient was in the office.  Findings discussed with patient.      PFSH:  The following portions of the patient's history were reviewed and updated as appropriate: problem list, past medical history, past surgery history, social history, family history, medications, and allergies    Review of Systems   Constitutional: Positive for fatigue.   HENT: Positive for hearing loss. Negative for congestion.    Eyes: Negative for visual disturbance.   Respiratory: Positive for shortness of breath. Negative for cough and wheezing.    Cardiovascular: Negative.    Gastrointestinal: Positive for diarrhea. Negative for constipation.   Genitourinary: Negative for difficulty urinating.   Musculoskeletal: Positive for back pain.   Skin: Negative for rash and wound.   Neurological: Positive for numbness (bilateral feet). Negative for weakness.   Hematological: Does not bruise/bleed easily.   Psychiatric/Behavioral: Negative for sleep disturbance and suicidal ideas. The patient is not nervous/anxious.    All other systems reviewed and are negative.      Vitals:    01/24/19 1421   BP: 124/71   Pulse: 55   Resp: 18   Temp: 97.3 °F (36.3 °C)   SpO2: 95%   Weight: 97 kg (213 lb 12.8 oz)   Height: 182.9 cm (72\")   PainSc:   7   PainLoc: Back       Physical Exam   Constitutional: He appears well-developed and well-nourished. No distress.   HENT:   Head: Normocephalic and atraumatic.   Nose: Nose normal.   Mouth/Throat: Oropharynx is clear and moist.   Eyes: Conjunctivae and EOM are normal.   Neck: Normal range of motion. Neck supple.   Cardiovascular: Normal rate, regular rhythm and normal heart sounds.   Pulmonary/Chest: Effort normal and breath sounds normal. No stridor. No respiratory distress.   Abdominal: Soft. Bowel sounds are normal. He exhibits no distension. There is no tenderness.   Musculoskeletal:        Lumbar back: He exhibits " decreased range of motion, tenderness and pain.   +bilateral lumbar facet tenderness  +bilateral lumbar facet loading    Neurological: He is alert. He has normal strength. No cranial nerve deficit or sensory deficit. Coordination and gait normal.   Skin: Skin is warm and dry. No rash noted. He is not diaphoretic.   Psychiatric: He has a normal mood and affect. His speech is normal and behavior is normal.   Nursing note and vitals reviewed.    Back Exam     Tests   Straight leg raise right: negative  Straight leg raise left: negative          Neurologic Exam     Mental Status   Speech: speech is normal     Cranial Nerves     CN III, IV, VI   Extraocular motions are normal.     Motor Exam     Strength   Strength 5/5 throughout.       No results found for: POCMETH, POCAMPHET, POCBARBITUR, POCBENZO, POCCOCAINE, POCMETHADO, POCOPIATES, POCOXYCODO, POCPHENCYC, POCPROPOXY, POCTHC, POCTRICYC    Comments: Reviewed POC today      Date of last FARA reviewed : 01/24/19   Comments: Dori Harmon was seen today for back pain.    Diagnoses and all orders for this visit:    Chronic bilateral low back pain without sciatica  -     Case Request    Other orders  -     oxyCODONE-acetaminophen (PERCOCET) 5-325 MG per tablet; Take 1 tablet by mouth 2 (Two) Times a Day As Needed (pain).      Requested Prescriptions     Signed Prescriptions Disp Refills   • oxyCODONE-acetaminophen (PERCOCET) 5-325 MG per tablet 60 tablet 0     Sig: Take 1 tablet by mouth 2 (Two) Times a Day As Needed (pain).     - Imaging reviewed with patient.  There is a possible subacute fracture at the L1 vertebral body, his pain seems significantly lower than this so I am doubtful that this is the source of his pain, but could be a possibility.  His pain seems to originate more at the L5-S1 level although there is some flattening of the spinal cord.  We will try an epidural steroid injection at this level.  - currently on flagyl - 7 days - started  yesterday - has 6 days left.  Will perform next Wednesday at completion of antibiotics.  - Discussed with the patient regarding the etiology of their pain. Informed them that they would likely benefit from a L5/S1 lumbar epidural steroid injection.  The procedure was described in detail and the risks, benefits and alternatives were discussed with the patient (including but not limited to: bleeding, infection, nerve damage, worsening of pain, inability to perform injection, paralysis, seizures, and death) who agreed to proceed.    - Will perform one injection and follow-up.  If patient receives no benefit from injection in next steps would be either to consider lumbar medial branch block giving his significant arthritis seen on MRI and loading on exam.  Or trial of lumbar epidural steroid injection of higher at the L1 level there is a possible subacute fracture.  If he feels his pain is higher up close to the L1 level, we'll consider a bone scan to try to date fracture along with referral for possible kyphoplasty.   - Significant pain on exam today.  Lortab wasn't effective.  Will give small and temporary dose of oxycodone.  No place to prescribe long-term.  I think his acute flare up of low back pain should resolve with epidural steroid injection.      Wt Readings from Last 3 Encounters:   01/24/19 97 kg (213 lb 12.8 oz)   01/23/19 96.2 kg (212 lb)   01/16/19 97.5 kg (215 lb)     Body mass index is 29 kg/m². Patient counseled on the importance of weight loss to help with overall health and pain control. Patient instructed to attempt weight loss.   Plan: Calorie counting increase water intake, increase physical activity and reduce portion size    Follow-up after injection.       Trish Wiley MD  Pain Management

## 2019-01-25 LAB — C DIFF TOX GENS STL QL NAA+PROBE: POSITIVE

## 2019-01-28 ENCOUNTER — TREATMENT (OUTPATIENT)
Dept: PHYSICAL THERAPY | Facility: CLINIC | Age: 79
End: 2019-01-28

## 2019-01-28 DIAGNOSIS — M54.2 PAIN, NECK: Primary | ICD-10-CM

## 2019-01-28 PROCEDURE — 97140 MANUAL THERAPY 1/> REGIONS: CPT | Performed by: PHYSICAL THERAPIST

## 2019-01-28 PROCEDURE — G0283 ELEC STIM OTHER THAN WOUND: HCPCS | Performed by: PHYSICAL THERAPIST

## 2019-01-28 NOTE — PROGRESS NOTES
Physical Therapy Daily Progress Note    Visit #12    Subjective     Faustino Horton reports: continued steady improvement in neck/shoulder pain.       Objective   See Exercise, Manual, and Modality Logs for complete treatment.       Assessment/Plan  Lateral flexion PROM is WNL, with good facet motion.  Progress per Plan of Care           Manual Therapy:    30     mins  05888;  Therapeutic Exercise:    0     mins  69312;     Neuromuscular Jake:    0    mins  77152;    Therapeutic Activity:     0     mins  47435;     Gait Trainin     mins  40989;     Ultrasound:     0     mins  75944;    Electrical Stimulation:    15     mins  20334 ( );    Timed Treatment:   30   mins   Total Treatment:     45   mins    Diana Chester PT, DPT  Physical Therapist  [place] License #[id number]

## 2019-01-28 NOTE — PROGRESS NOTES
Physical Therapy Daily Progress Note    Visit #11    Subjective     Faustino Horton reports: he sees pain medicine MD for his low back today. Neck/shoulder pain is steadily improving with PT.      Objective   See Exercise, Manual, and Modality Logs for complete treatment.       Assessment/Plan  Tolerated well, improved facet motion and ROM.  Progress per Plan of Care           Manual Therapy:    32     mins  42505;  Therapeutic Exercise:    0     mins  18053;     Neuromuscular Jake:    0    mins  26506;    Therapeutic Activity:     0     mins  45580;     Gait Trainin     mins  54494;     Ultrasound:     0     mins  78984;    Electrical Stimulation:    15     mins  77362 ( );    Timed Treatment:   32   mins   Total Treatment:     47   mins    Diana Chester, PT, DPT  Physical Therapist  [place] License #[id number]

## 2019-01-29 ENCOUNTER — RESULTS ENCOUNTER (OUTPATIENT)
Dept: PAIN MEDICINE | Facility: CLINIC | Age: 79
End: 2019-01-29

## 2019-01-29 DIAGNOSIS — M54.50 CHRONIC BILATERAL LOW BACK PAIN WITHOUT SCIATICA: ICD-10-CM

## 2019-01-29 DIAGNOSIS — G89.29 CHRONIC BILATERAL LOW BACK PAIN WITHOUT SCIATICA: ICD-10-CM

## 2019-01-30 ENCOUNTER — OUTSIDE FACILITY SERVICE (OUTPATIENT)
Dept: PAIN MEDICINE | Facility: CLINIC | Age: 79
End: 2019-01-30

## 2019-01-30 ENCOUNTER — DOCUMENTATION (OUTPATIENT)
Dept: PAIN MEDICINE | Facility: CLINIC | Age: 79
End: 2019-01-30

## 2019-01-30 PROCEDURE — 62323 NJX INTERLAMINAR LMBR/SAC: CPT | Performed by: PAIN MEDICINE

## 2019-01-31 ENCOUNTER — TREATMENT (OUTPATIENT)
Dept: PHYSICAL THERAPY | Facility: CLINIC | Age: 79
End: 2019-01-31

## 2019-01-31 DIAGNOSIS — M54.2 PAIN, NECK: Primary | ICD-10-CM

## 2019-01-31 PROCEDURE — 97140 MANUAL THERAPY 1/> REGIONS: CPT | Performed by: PHYSICAL THERAPIST

## 2019-01-31 PROCEDURE — G0283 ELEC STIM OTHER THAN WOUND: HCPCS | Performed by: PHYSICAL THERAPIST

## 2019-01-31 NOTE — PROGRESS NOTES
Lumbar Epidural Steroid Injection  [facility]    PREOPERATIVE DIAGNOSIS:   Chronic low back pain  POSTOPERATIVE DIAGNOSIS:  Same as preop diagnosis    PROCEDURE:   Lumbar Epidural Steroid Injection, Therapeutic Translaminar Injection, with epidurogram, at  L5/S1 level    PRE-PROCEDURE DISCUSSION WITH PATIENT:    Risks and complications were discussed with the patient prior to starting the procedure and informed consent was obtained.  We discussed various topics including but not limited to bleeding, infection, injury, paralysis, nerve injury, dural puncture, coma, death, worsening of clinical picture, lack of pain relief, and postprocedural soreness.    SURGEON:  Trish Wiley MD    REASON FOR PROCEDURE:    Diagnostic injection at this level is needed    SEDATION:  Versed 2mg & Fentanyl 50 mcg IV  ANESTHETIC:  Marcaine 0.25%  STEROID:   Methylprednisolone (DEPO MEDROL) 80mg/ml    DESCRIPTON OF PROCEDURE:    After obtaining informed consent, I.V. was started in the preop area.   The patient was taken to the operating room and placed in the prone position.  EKG, blood pressure, and pulse oximeter were monitored throughout, and sedation was provided as needed by the RN under my guidance. All pressure points were well padded.  The lumbar spine area was prepped with Chloraprep and draped in a sterile fashion.  Under fluoroscopic guidance, the above mentioned interlaminar space was identified. Skin and subcutaneous tissues were anesthetized with 1% lidocaine in the middle of the space. A Tuohy needle was introduced through the skin and advanced to this interlaminar space and into the epidural space under fluoroscopic guidance and verified with loss-of-resistance technique to air.  After confirming the position of the needle with the fluoroscope with all the views, and after aspiration was confirmed negative for blood and CSF, 1.5 mL of Omnipaque was injected.  After seeing appropriate epidurogram  with lateral and PA views, a total of 3 cc solution was injected, consisting of 1cc of local anesthetic as above, with normal saline and injectable steroid as above.     ESTIMATED BLOOD LOSS:  <5 mL  SPECIMENS:  None    COMPLICATIONS:     No complications were noted. and There was no indication of vascular uptake on live injection of contrast dye.    TOLERANCE & DISCHARGE CONDITION:    The patient tolerated the procedure well.  The patient was transported to the recovery area without difficulties.  The patient was discharged to home under the care of family in stable and satisfactory condition.    PLAN OF CARE:  1. The patient was given our standard instruction sheet.  2. The patient will Return to clinic 4 wks  3. The patient will resume all medications as per the medication reconciliation sheet.

## 2019-02-04 ENCOUNTER — TREATMENT (OUTPATIENT)
Dept: PHYSICAL THERAPY | Facility: CLINIC | Age: 79
End: 2019-02-04

## 2019-02-04 DIAGNOSIS — M54.2 PAIN, NECK: Primary | ICD-10-CM

## 2019-02-04 PROCEDURE — 97140 MANUAL THERAPY 1/> REGIONS: CPT | Performed by: PHYSICAL THERAPIST

## 2019-02-04 PROCEDURE — G0283 ELEC STIM OTHER THAN WOUND: HCPCS | Performed by: PHYSICAL THERAPIST

## 2019-02-04 NOTE — PROGRESS NOTES
Physical Therapy Daily Progress Note    Visit #14    Subjective     Faustino Horton reports: shoulder pain has nearly resolved.      Objective   See Exercise, Manual, and Modality Logs for complete treatment.       Assessment/Plan  Significant improvement in trigger point in left levator scapulae. Progressing well toward goals.  Progress per Plan of Care           Manual Therapy:    28     mins  94150;  Therapeutic Exercise:    0     mins  83040;     Neuromuscular Jake:    0    mins  42655;    Therapeutic Activity:     0     mins  29589;     Gait Trainin     mins  74784;     Ultrasound:     0     mins  99440;    Electrical Stimulation:    15     mins  17994 ( );    Timed Treatment:   28   mins   Total Treatment:     45   mins    Diana Chester PT, DPT  Physical Therapist  [place] License #[id number]

## 2019-02-04 NOTE — PROGRESS NOTES
" Physical Therapy Daily Progress Note    Visit #13    Subjective     Faustino Horton reports: he had a lumbar epidural injection yesterday, which has helped his low back.  Neck is \"feeling better and better.\"      Objective   See Exercise, Manual, and Modality Logs for complete treatment.       Assessment/Plan  Continued improvement in facet motion and pain free AROM.  Progress per Plan of Care           Manual Therapy:    30     mins  87151;  Therapeutic Exercise:    0     mins  40932;     Neuromuscular Jake:    0    mins  01871;    Therapeutic Activity:     0     mins  53764;     Gait Trainin     mins  09213;     Ultrasound:     0     mins  44461;    Electrical Stimulation:    15     mins  62346 ( );    Timed Treatment:   30   mins   Total Treatment:     45   mins    Diana Chester PT, DPT  Physical Therapist  [place] License #[id number]                    "

## 2019-02-11 ENCOUNTER — TREATMENT (OUTPATIENT)
Dept: PHYSICAL THERAPY | Facility: CLINIC | Age: 79
End: 2019-02-11

## 2019-02-11 DIAGNOSIS — M54.2 PAIN, NECK: Primary | ICD-10-CM

## 2019-02-11 PROCEDURE — G0283 ELEC STIM OTHER THAN WOUND: HCPCS | Performed by: PHYSICAL THERAPIST

## 2019-02-11 PROCEDURE — 97140 MANUAL THERAPY 1/> REGIONS: CPT | Performed by: PHYSICAL THERAPIST

## 2019-02-11 RX ORDER — AMLODIPINE BESYLATE 5 MG/1
5 TABLET ORAL DAILY
Qty: 30 TABLET | Refills: 5 | Status: SHIPPED | OUTPATIENT
Start: 2019-02-11 | End: 2019-05-23 | Stop reason: SDUPTHER

## 2019-02-11 NOTE — PROGRESS NOTES
Physical Therapy Daily Progress Note    Visit #15    Abel     Faustino Horton reports: he was admitted to the hospital again, with septic shock. Discharged on Saturday. Blood pressure has been high, took medicine as instructed this morning and it is still high.      Objective   See Exercise, Manual, and Modality Logs for complete treatment.   /104 mmHg in clinic today    Assessment/Plan  Proceeded with manual therapy/estim today, but did advise pt to contact MD regarding blood pressure. He verbalizes understanding.  Progress per Plan of Care           Manual Therapy:    30     mins  57304;  Therapeutic Exercise:    0     mins  52415;     Neuromuscular Jake:    0    mins  42322;    Therapeutic Activity:     0     mins  32223;     Gait Trainin     mins  23517;     Ultrasound:     0     mins  74684;    Electrical Stimulation:    15     mins  89088 ( );    Timed Treatment:   30   mins   Total Treatment:     45   mins    Diana Chester PT, DPT  Physical Therapist  [place] License #[id number]

## 2019-02-13 ENCOUNTER — OFFICE VISIT (OUTPATIENT)
Dept: FAMILY MEDICINE CLINIC | Facility: CLINIC | Age: 79
End: 2019-02-13

## 2019-02-13 VITALS
BODY MASS INDEX: 28.17 KG/M2 | HEIGHT: 72 IN | HEART RATE: 75 BPM | WEIGHT: 208 LBS | DIASTOLIC BLOOD PRESSURE: 80 MMHG | SYSTOLIC BLOOD PRESSURE: 130 MMHG | OXYGEN SATURATION: 97 %

## 2019-02-13 DIAGNOSIS — E27.40 ADRENAL INSUFFICIENCY (HCC): ICD-10-CM

## 2019-02-13 DIAGNOSIS — A49.8 CLOSTRIDIUM DIFFICILE INFECTION: Primary | ICD-10-CM

## 2019-02-13 PROCEDURE — 99213 OFFICE O/P EST LOW 20 MIN: CPT | Performed by: NURSE PRACTITIONER

## 2019-02-13 NOTE — PROGRESS NOTES
Subjective   Faustino Horton Jr. is a 78 y.o. male.     Follow-up recent C diff infection  Patient felt better after taking flagyl  He was without weakness fever  But then suddenly he became weak blood pressure was low  He went to the emergency room and apparently still has  cdiff This is his thirdEpisode  He took Vancomycin IV then discharged with PO Vancomycin extended  Evaluated infectious disease  Medication was extremely expensive over $5000 almost was unable to get his medication  [facility] ate  the cost as he had problemsObtaining his med    Considering fecal transplant    He's doing better presently  We discussed the importance of  Large dosing of steroid during any substantial illness  Presently taking steroids and slowly being decreased by ind    Feeling much better            Shoulder and low back much better      Id  decadrom 4 x day    Vancomycin  Po     tue to sat              Hypertension          The following portions of the patient's history were reviewed and updated as appropriate: allergies, current medications, past family history, past medical history, past social history, past surgical history and problem list.    Review of Systems   Gastrointestinal: Positive for diarrhea.   All other systems reviewed and are negative.      Objective   Physical Exam   Constitutional: He is oriented to person, place, and time. He appears well-developed and well-nourished. No distress.   HENT:   Head: Normocephalic and atraumatic.   Nose: Nose normal.   Mouth/Throat: Oropharynx is clear and moist.   Eyes: Conjunctivae are normal. Pupils are equal, round, and reactive to light. No scleral icterus.   Neck: Neck supple. No JVD present. No thyromegaly present.   Cardiovascular: Normal rate, regular rhythm and normal heart sounds. Exam reveals no gallop and no friction rub.   No murmur heard.  Pulmonary/Chest: Effort normal and breath sounds normal. No respiratory distress. He has no wheezes. He has no rales.    Abdominal: Soft. Bowel sounds are normal. He exhibits no distension and no mass. There is no tenderness. There is no guarding. No hernia.   Musculoskeletal: He exhibits no edema or tenderness.   Lymphadenopathy:     He has no cervical adenopathy.   Neurological: He is alert and oriented to person, place, and time. He has normal reflexes.   Skin: Skin is warm and dry. No rash noted. He is not diaphoretic. No erythema.   Psychiatric: He has a normal mood and affect. His behavior is normal. Judgment and thought content normal.   Vitals reviewed.        Assessment/Plan   Faustino was seen today for hypertension.    Diagnoses and all orders for this visit:    Clostridium difficile infection    Adrenal insufficiency (CMS/[facility])        Reviewed labs    Any febrile illness  Any illness with weakness or systemic signs  Patient will need substantial possibly prolonged  Steroid as we discussed previously  Consider fecal transplant?  Finish medication follow ID

## 2019-02-14 ENCOUNTER — TREATMENT (OUTPATIENT)
Dept: PHYSICAL THERAPY | Facility: CLINIC | Age: 79
End: 2019-02-14

## 2019-02-14 DIAGNOSIS — M54.2 PAIN, NECK: Primary | ICD-10-CM

## 2019-02-14 PROCEDURE — G0283 ELEC STIM OTHER THAN WOUND: HCPCS | Performed by: PHYSICAL THERAPIST

## 2019-02-14 PROCEDURE — 97140 MANUAL THERAPY 1/> REGIONS: CPT | Performed by: PHYSICAL THERAPIST

## 2019-02-14 NOTE — PROGRESS NOTES
Physical Therapy Daily Progress Note    Visit #16    Subjective     Faustino Horton reports: his neck and shoulder feel really good, overall 90% improvement.      Objective   See Exercise, Manual, and Modality Logs for complete treatment.   No soft tissue restriction in involved musculature.     Assessment/Plan  Most goals met, pt is pleased with pain reduction. Appropriate to hold chart open 30 days in case of acute exacerbation, otherwise D/C.           Manual Therapy:    30     mins  09657;  Therapeutic Exercise:    0     mins  48047;     Neuromuscular Jake:    0    mins  21731;    Therapeutic Activity:     0     mins  86755;     Gait Trainin     mins  49438;     Ultrasound:     0     mins  00313;    Electrical Stimulation:    15     mins  63857 ( );    Timed Treatment:   30   mins   Total Treatment:     45   mins    Diana Chester PT, DPT  Physical Therapist  [place] License #[id number]

## 2019-02-19 ENCOUNTER — OFFICE VISIT (OUTPATIENT)
Dept: PAIN MEDICINE | Facility: CLINIC | Age: 79
End: 2019-02-19

## 2019-02-19 VITALS
TEMPERATURE: 98 F | BODY MASS INDEX: 27.9 KG/M2 | SYSTOLIC BLOOD PRESSURE: 123 MMHG | OXYGEN SATURATION: 96 % | WEIGHT: 206 LBS | HEIGHT: 72 IN | DIASTOLIC BLOOD PRESSURE: 80 MMHG | RESPIRATION RATE: 18 BRPM | HEART RATE: 71 BPM

## 2019-02-19 DIAGNOSIS — M47.816 SPONDYLOSIS OF LUMBAR REGION WITHOUT MYELOPATHY OR RADICULOPATHY: ICD-10-CM

## 2019-02-19 DIAGNOSIS — G89.29 CHRONIC BILATERAL LOW BACK PAIN WITHOUT SCIATICA: Primary | ICD-10-CM

## 2019-02-19 DIAGNOSIS — M54.50 CHRONIC BILATERAL LOW BACK PAIN WITHOUT SCIATICA: Primary | ICD-10-CM

## 2019-02-19 PROCEDURE — 99213 OFFICE O/P EST LOW 20 MIN: CPT | Performed by: PAIN MEDICINE

## 2019-02-19 RX ORDER — VANCOMYCIN HYDROCHLORIDE 250 MG/1
CAPSULE ORAL
Refills: 2 | COMMUNITY
Start: 2019-02-09 | End: 2019-05-23

## 2019-02-19 NOTE — PROGRESS NOTES
CHIEF COMPLAINT: Back Pain    HPI  Faustino Horton Jr. is a 78 y.o. male.  He is here to follow up for Back Pain    Faustino Horton Jr. is a 78 y.o. male  who presents to the office for follow-up.  He completed a Lumbar Epidural Steroid Injection at  L5/S1  on 1/31/19. Patient reports 80% relief from the procedure. Since last visit their pain has improved. Able to walk further and perform more physical activity with less pain. Has not needed any pain medication since injection. Has plenty of oxycodone at home.   Getting over C. Diff - currently on antibiotics.     The patient states their pain is a 2 on a scale of 1-10.  The patient describes this pain as constant dull and ache.  The pain is located in bilateral low back and does not radiate. This painful problem is aggravated by physical activity and is alleviated by past injection.    Past pain medications: lortab - ineffective     Current pain medications: none     Past therapies:  Physical Therapy: yes (back pain -didn't help, currently for neck pain)  Chiropractor: yes (back pain -didn't help)  Massage Therapy: no  TENS: no  Neck or back surgery: no  Past pain management: no     Previous Injections: elbow injection for tennis elbow- 20 years ago)    Previous Injection: Lumbar Epidural Steroid Injection at  L5/S1 level on 1/31/19  Effect of Injection (%): 80%  Length of Relief: ongoing     PEG Assessment   What number best describes your pain on average in the past week? 2  What number best describes how, during the past week, pain has interfered with your enjoyment of life? 1  What number best describes how, during the past week, pain has interfered with your general activity? 1      Current Outpatient Medications:   •  ACCU-CHEK FASTCLIX LANCETS misc, CHECK BLOOD SUGAR D, Disp: , Rfl: 3  •  ACCU-CHEK SMARTVIEW test strip, USE TO TEST BLOOD SUGAR LEVEL QD, Disp: , Rfl: 3  •  amLODIPine (NORVASC) 5 MG tablet, Take 1 tablet by mouth Daily. For blood pressure, Disp:  30 tablet, Rfl: 5  •  atorvastatin (LIPITOR) 40 MG tablet, Take 40 mg by mouth Daily., Disp: , Rfl:   •  dexamethasone (DECADRON) 0.5 MG tablet, Take 0.5 mg by mouth Daily., Disp: , Rfl:   •  diclofenac (VOLTAREN) 1 % gel gel, Apply 2 g topically to the appropriate area as directed., Disp: , Rfl:   •  fludrocortisone 0.1 MG tablet, Take 0.1 mg by mouth daily., Disp: , Rfl:   •  gabapentin (NEURONTIN) 600 MG tablet, Take 1,200 mg by mouth 2 (two) times a day., Disp: , Rfl:   •  glimepiride (AMARYL) 1 MG tablet, Take 1 mg by mouth Every Evening., Disp: , Rfl:   •  levothyroxine (SYNTHROID, LEVOTHROID) 150 MCG tablet, Take 150 mcg by mouth daily., Disp: , Rfl:   •  lisinopril (PRINIVIL,ZESTRIL) 2.5 MG tablet, TAKE 1 TABLET EVERY DAY, Disp: , Rfl:   •  oxyCODONE-acetaminophen (PERCOCET) 5-325 MG per tablet, Take 1 tablet by mouth 2 (Two) Times a Day As Needed (pain)., Disp: 60 tablet, Rfl: 0  •  potassium chloride (K-DUR) 10 MEQ CR tablet, Take 1 tablet by mouth Daily., Disp: 30 tablet, Rfl: 1  •  vitamin B-12 (CYANOCOBALAMIN) 500 MCG tablet, Take 500 mcg by mouth Daily., Disp: , Rfl:   •  vancomycin (VANCOCIN) 250 MG capsule, TAKE 2 TABLETS BY MOUTH 4 TIMES A DAY FOR 14 DAYS, Disp: , Rfl: 2    IMAGING  MRI EXAMINATION LUMBAR SPINE WITH AND WITHOUT CONTRAST  FINDINGS: The alignment of the lumbar spine is within normal limits.  There is moderate to severe loss of disc height at L2-L3 and L3-L4.  Moderate endplate degenerative changes are noted at L3-L4. There is  irregularity involving the endplates at L2-L3 with adjacent edema,  nonspecific but also likely degenerative in nature.   After contrast administration there was mild enhancement involving the  endplates at L2-L3. There is a mild degree of increased signal intensity  involving the disc posteriorly at L2-L3 to a lesser extent L4-L5 on the  sagittal T2 sequence, nonspecific but likely degenerative in nature.    The CT examination of the abdomen and pelvis  performed on 01/07/2019  demonstrated vacuum disc effect at L2-L3 and L3-L4.   A horizontal plane of signal loss is noted on the sagittal T1 sequence  involving the L1 vertebral body and there is enhancement involving the  L1 vertebral body. There is no evidence of adjacent stranding. The  appearance is nonspecific but suggesting a subacute fracture with  minimal loss of vertical height. Clinical correlation is recommended.   L1-L2: There is a very mild broad-based disc osteophyte complex with no  evidence of herniation.   L2-L3: Mild facet degenerative disease is present bilaterally.  Broad-based disc osteophyte complex is present which is slightly more  prominent to the right. Disc material extends inferiorly consistent with  inferiorly directed disc herniation to the right of midline. This  results in lateral recess narrowing on the right. There is mild neural  foraminal compromise bilaterally secondary to loss of disc height and  eccentric with small disc osteophyte complex into the neural foramen.   L3-L4: Moderate facet degenerative disease is present bilaterally. There  is a mild broad-based disc osteophyte complex which is slightly more  prominent to the left. There is no evidence of focal herniation. There  is mild neural foraminal compromise on the left and mild to moderate  neural foraminal compromise on the right secondary to loss of disc  height and extension of disc osteophyte complex into the neural foramen.   L4-L5: Moderate facet degenerative disease is present bilaterally. There  is a mild broad-based disc osteophyte complex with no evidence of  herniation. There is mild neural foraminal compromise on the right and  mild to moderate neural foraminal compromise on the left secondary to  loss of disc height and extension of disc osteophyte complex into the  neural foramen.   L5-S1: Mild facet degenerative disease is present bilaterally. There is  mild broad-based disc osteophyte complex resulting in  mild flattening of  the ventral surface of the thecal sac. There is mild to moderate neural  foraminal compromise on the left secondary to the retrolisthesis of L5  upon S1. Disc material extends inferiorly to the left approaching and  mildly abutting the traversing left S1 nerve root. There is partial  visualization of an abdominal aortic aneurysm which has been treated  with a stent graft. Evaluation is limited on this current examination.   IMPRESSION:  1. 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  2. There is edema and endplate degenerative changes and enhancement at  L2-L3 and endplate degenerative changes without enhancement at L3-L4. An  early discitis cannot be entirely excluded at L2-L3 but this is thought  to be less likely. There is vacuum disc effect present at L2-L3 on the  CT examination of 01/07/2019. There is an inferiorly directed disc  herniation to the right of midline at L2-L3 and a smaller inferiorly  directed disc herniation to the left at L5-S1. Multilevel neural  foraminal compromise is noted as described above.   3. There is an area of signal loss appreciated on the sagittal T1  sequence paralleling the superior endplate at L1. There is mild edema  and enhancement at the same location. The findings are suggestive of a  subacute fracture. An obvious fracture on the CT examination 01/07/2019  is not evident. Further evaluation could be performed with a bone scan.  4. If the patient's back pain increases a repeat MRI examination of the  lumbar spine with and without contrast is recommended.  5. The above information was called to and discussed with Dr. Rey  on 01/09/2019.     Imaging last reviewed: 02/19/19     [facility]:  The following portions of the patient's history were reviewed and updated as appropriate: problem list, past medical history, past surgery  "history, social history, family history, medications, and allergies    Review of Systems   Constitutional: Positive for fatigue.   HENT: Negative for congestion.    Eyes: Negative for visual disturbance.   Respiratory: Negative for cough, shortness of breath and wheezing.    Cardiovascular: Negative.    Gastrointestinal: Negative for constipation and diarrhea.   Genitourinary: Negative for difficulty urinating.   Musculoskeletal: Positive for back pain.   Neurological: Negative for weakness and numbness.   Psychiatric/Behavioral: Negative for sleep disturbance and suicidal ideas. The patient is not nervous/anxious.    All other systems reviewed and are negative.      Vitals:    02/19/19 1012   BP: 123/80   Pulse: 71   Resp: 18   Temp: 98 °F (36.7 °C)   SpO2: 96%   Weight: 93.4 kg (206 lb)   Height: 182.9 cm (72\")   PainSc:   2   PainLoc: Back       Physical Exam   Constitutional: He appears well-developed and well-nourished. No distress.   HENT:   Head: Normocephalic and atraumatic.   Nose: Nose normal.   Mouth/Throat: Oropharynx is clear and moist.   Eyes: Conjunctivae and EOM are normal.   Neck: Normal range of motion. Neck supple.   Pulmonary/Chest: Effort normal. No stridor. No respiratory distress.   Musculoskeletal:        Lumbar back: He exhibits decreased range of motion, tenderness and pain.   +bilateral lumbar facet tenderness  +bilateral lumbar facet loading    Neurological: He is alert. He has normal strength. No cranial nerve deficit or sensory deficit. Coordination and gait normal.   Skin: Skin is warm and dry. No rash noted. He is not diaphoretic.   Psychiatric: He has a normal mood and affect. His speech is normal and behavior is normal.   Nursing note and vitals reviewed.    Ortho Exam  Neurologic Exam     Mental Status   Speech: speech is normal     Cranial Nerves     CN III, IV, VI   Extraocular motions are normal.     Motor Exam     Strength   Strength 5/5 throughout.       Lab Results "   Component Value Date    POCMETH Negative 01/24/2019    POCAMPHET Negative 01/24/2019    POCBARBITUR Negative 01/24/2019    POCBENZO Negative 01/24/2019    POCCOCAINE Negative 01/24/2019    POCMETHADO Negative 01/24/2019    POCOPIATES Negative 01/24/2019    POCOXYCODO Negative 01/24/2019    POCPHENCYC Negative 01/24/2019    POCPROPOXY Negative 01/24/2019    POCTHC Negative 01/24/2019    POCTRICYC Negative 01/24/2019     Last UDS results reviewed: 02/19/19   Last UDS: 1/24/2019  Comments: Consistent     Date of last FARA reviewed : 02/19/19   Comments: Dori Harmon was seen today for back pain.    Diagnoses and all orders for this visit:    Chronic bilateral low back pain without sciatica    Spondylosis of lumbar region without myelopathy or radiculopathy      Requested Prescriptions      No prescriptions requested or ordered in this encounter     - great relief with L5/S1 LESI x 1 - can repeat in future if pian returns or worsens. He will call to schedule if needed.   - pain is currently tolerable.   - continue to work with PT. Currently going.   - has some oxycodone at home - take for extreme pain if needed.     Wt Readings from Last 3 Encounters:   02/19/19 93.4 kg (206 lb)   02/13/19 94.3 kg (208 lb)   01/24/19 97 kg (213 lb 12.8 oz)     Body mass index is 27.94 kg/m². Patient counseled on the importance of weight loss to help with overall health and pain control. Patient instructed to attempt weight loss.   Plan: Calorie counting  reduce portion size, cut out extra servings and reduce fast food intake    Follow-up as needed for pain     Trish Wiley MD  Pain Management

## 2019-03-07 ENCOUNTER — HOSPITAL ENCOUNTER (OUTPATIENT)
Dept: GENERAL RADIOLOGY | Facility: HOSPITAL | Age: 79
Discharge: HOME | End: 2019-03-07
Admitting: SURGERY

## 2019-03-07 DIAGNOSIS — Z98.890 S/P AAA (ABDOMINAL AORTIC ANEURYSM) REPAIR: ICD-10-CM

## 2019-03-07 DIAGNOSIS — Z86.79 S/P AAA (ABDOMINAL AORTIC ANEURYSM) REPAIR: ICD-10-CM

## 2019-03-07 PROCEDURE — 74021 RADEX ABDOMEN 3+ VIEWS: CPT

## 2019-04-22 ENCOUNTER — TELEPHONE (OUTPATIENT)
Dept: FAMILY MEDICINE CLINIC | Facility: CLINIC | Age: 79
End: 2019-04-22

## 2019-04-22 RX ORDER — PREDNISONE 10 MG/1
60 TABLET ORAL DAILY
Qty: 42 TABLET | Refills: 0 | Status: SHIPPED | OUTPATIENT
Start: 2019-04-22 | End: 2019-04-29

## 2019-04-22 NOTE — TELEPHONE ENCOUNTER
Discussed with patient  Acute episode of gout elbow started last night worse today  No fever no chills no malaise  Increased warmth he has had gout in his elbow before  Colchicine does not help    Adrenal insufficiency he called his endocrinologist was told to call me  Taking hydrocortisone total of 20 mg daily  He has no set dosing for acute illness on steroid  Per patient      Prednisone 60 mg daily times 5-7 days  May continue hydrocortisone  Discussed risk benefit  Any fever increased pain malaise nausea vomiting any worsening immediately to the emergency room  Recheck if not improving in a couple days

## 2019-05-06 ENCOUNTER — OFFICE VISIT (OUTPATIENT)
Dept: FAMILY MEDICINE CLINIC | Facility: CLINIC | Age: 79
End: 2019-05-06

## 2019-05-06 VITALS
OXYGEN SATURATION: 94 % | SYSTOLIC BLOOD PRESSURE: 110 MMHG | HEART RATE: 71 BPM | HEIGHT: 72 IN | WEIGHT: 207 LBS | BODY MASS INDEX: 28.04 KG/M2 | TEMPERATURE: 98.3 F | DIASTOLIC BLOOD PRESSURE: 70 MMHG

## 2019-05-06 DIAGNOSIS — R53.83 FATIGUE, UNSPECIFIED TYPE: Primary | ICD-10-CM

## 2019-05-06 DIAGNOSIS — E27.40 ADRENAL INSUFFICIENCY (HCC): ICD-10-CM

## 2019-05-06 PROCEDURE — 99214 OFFICE O/P EST MOD 30 MIN: CPT | Performed by: NURSE PRACTITIONER

## 2019-05-06 RX ORDER — HYDROCORTISONE 10 MG/1
TABLET ORAL
COMMUNITY
Start: 2019-03-20 | End: 2019-05-23

## 2019-05-11 NOTE — PROGRESS NOTES
Subjective   Faustino Horton Jr. is a 78 y.o. male.     Follow-up emergency room fatigue no chest pain shortness of breath  No abdominal pain no urinary complaints    Adrenal insufficiency surgically induced  Sees endocrinology  Is already taking a bolus of steroid Per Endo  Everything checked out in the emergency room I think his urinalysis was borderline with some  Trace of bacteria  Quite a bit of glucose due to his diabetes medication no white blood cell or nitrate thought he may have a UTI  Started  Nitrofurantoin  Patient is concerned as he has had recent C. difficile drug-resistant with hospitalizations           The following portions of the patient's history were reviewed and updated as appropriate: allergies, current medications, past family history, past medical history, past social history, past surgical history and problem list.    Review of Systems   Constitutional: Positive for fatigue.   Respiratory: Negative for shortness of breath.    Cardiovascular: Negative for chest pain.   Gastrointestinal: Negative for abdominal pain.   All other systems reviewed and are negative.      Objective   Physical Exam   Constitutional: He is oriented to person, place, and time. He appears well-developed and well-nourished. No distress.   Pleasant appears well   HENT:   Head: Normocephalic and atraumatic.   Nose: Nose normal.   Mouth/Throat: Oropharynx is clear and moist.   Eyes: Conjunctivae are normal. Pupils are equal, round, and reactive to light.   Neck: Neck supple. No JVD present.   Cardiovascular: Normal rate, regular rhythm and normal heart sounds.   No murmur heard.  Pulmonary/Chest: Effort normal and breath sounds normal. No respiratory distress. He has no wheezes.   Abdominal: Soft. Bowel sounds are normal. He exhibits no distension. There is no tenderness.   Musculoskeletal: He exhibits no edema or tenderness.   Lymphadenopathy:     He has no cervical adenopathy.   Neurological: He is alert and oriented to  person, place, and time.   Skin: Skin is warm and dry. He is not diaphoretic.   Psychiatric: He has a normal mood and affect. His behavior is normal. Judgment and thought content normal.   Vitals reviewed.        Assessment/Plan   Faustino was seen today for follow-up from [place] emergency room.    Diagnoses and all orders for this visit:    Fatigue, unspecified type    Adrenal insufficiency (CMS/HCC)      All instructions given in the office not sure if he has a UTI he does not have symptoms    He does have some fatigue   Without chest pain other complaints  He does not want to take the antibiotic  He does have a legitimate risk of C. Difficile  However if he in fact does have a urinary tract infection untreated could develop sepsis  He prefers to stop the antibiotic this would be okay  But certainly any increased fatigue fever chills urgently emergency room  Follow-up short-term 1 week plenty fluids  Watch blood pressure avoid hypotension          There are no Patient Instructions on file for this visit.

## 2019-05-12 ENCOUNTER — HOSPITAL ENCOUNTER (EMERGENCY)
Facility: HOSPITAL | Age: 79
Discharge: HOME | End: 2019-05-12
Attending: EMERGENCY MEDICINE | Admitting: EMERGENCY MEDICINE

## 2019-05-12 ENCOUNTER — APPOINTMENT (OUTPATIENT)
Dept: GENERAL RADIOLOGY | Facility: HOSPITAL | Age: 79
End: 2019-05-12

## 2019-05-12 VITALS
SYSTOLIC BLOOD PRESSURE: 133 MMHG | DIASTOLIC BLOOD PRESSURE: 76 MMHG | OXYGEN SATURATION: 97 % | RESPIRATION RATE: 18 BRPM | HEART RATE: 66 BPM | TEMPERATURE: 98.9 F | WEIGHT: 212.4 LBS | BODY MASS INDEX: 28.77 KG/M2 | HEIGHT: 72 IN

## 2019-05-12 DIAGNOSIS — M10.9 ACUTE GOUT INVOLVING TOE OF LEFT FOOT, UNSPECIFIED CAUSE: ICD-10-CM

## 2019-05-12 DIAGNOSIS — M79.10 MYALGIA: Primary | ICD-10-CM

## 2019-05-12 LAB
ALBUMIN SERPL-MCNC: 3.5 G/DL (ref 3.5–5.2)
ALBUMIN/GLOB SERPL: 1 G/DL
ALP SERPL-CCNC: 57 U/L (ref 39–117)
ALT SERPL W P-5'-P-CCNC: 20 U/L (ref 1–41)
ANION GAP SERPL CALCULATED.3IONS-SCNC: 13.8 MMOL/L
AST SERPL-CCNC: 12 U/L (ref 1–40)
BACTERIA UR QL AUTO: NORMAL /HPF
BASOPHILS # BLD AUTO: 0.02 10*3/MM3 (ref 0–0.2)
BASOPHILS NFR BLD AUTO: 0.2 % (ref 0–1.5)
BILIRUB SERPL-MCNC: 0.6 MG/DL (ref 0.2–1.2)
BILIRUB UR QL STRIP: NEGATIVE
BUN BLD-MCNC: 11 MG/DL (ref 8–23)
BUN/CREAT SERPL: 11.3 (ref 7–25)
CALCIUM SPEC-SCNC: 9.4 MG/DL (ref 8.6–10.5)
CHLORIDE SERPL-SCNC: 100 MMOL/L (ref 98–107)
CK SERPL-CCNC: 41 U/L (ref 20–200)
CLARITY UR: CLEAR
CO2 SERPL-SCNC: 26.2 MMOL/L (ref 22–29)
COLOR UR: YELLOW
CREAT BLD-MCNC: 0.97 MG/DL (ref 0.76–1.27)
DEPRECATED RDW RBC AUTO: 49.7 FL (ref 37–54)
EOSINOPHIL # BLD AUTO: 0.11 10*3/MM3 (ref 0–0.4)
EOSINOPHIL NFR BLD AUTO: 1.3 % (ref 0.3–6.2)
ERYTHROCYTE [DISTWIDTH] IN BLOOD BY AUTOMATED COUNT: 13.3 % (ref 12.3–15.4)
GFR SERPL CREATININE-BSD FRML MDRD: 75 ML/MIN/1.73
GLOBULIN UR ELPH-MCNC: 3.6 GM/DL
GLUCOSE BLD-MCNC: 119 MG/DL (ref 65–99)
GLUCOSE UR STRIP-MCNC: NEGATIVE MG/DL
HCT VFR BLD AUTO: 37.3 % (ref 37.5–51)
HGB BLD-MCNC: 12.2 G/DL (ref 13–17.7)
HGB UR QL STRIP.AUTO: NEGATIVE
HYALINE CASTS UR QL AUTO: NORMAL /LPF
IMM GRANULOCYTES # BLD AUTO: 0.04 10*3/MM3 (ref 0–0.05)
IMM GRANULOCYTES NFR BLD AUTO: 0.5 % (ref 0–0.5)
KETONES UR QL STRIP: ABNORMAL
LEUKOCYTE ESTERASE UR QL STRIP.AUTO: NEGATIVE
LYMPHOCYTES # BLD AUTO: 2.03 10*3/MM3 (ref 0.7–3.1)
LYMPHOCYTES NFR BLD AUTO: 24.1 % (ref 19.6–45.3)
MAGNESIUM SERPL-MCNC: 2.2 MG/DL (ref 1.6–2.4)
MCH RBC QN AUTO: 33 PG (ref 26.6–33)
MCHC RBC AUTO-ENTMCNC: 32.7 G/DL (ref 31.5–35.7)
MCV RBC AUTO: 100.8 FL (ref 79–97)
MONOCYTES # BLD AUTO: 0.97 10*3/MM3 (ref 0.1–0.9)
MONOCYTES NFR BLD AUTO: 11.5 % (ref 5–12)
NEUTROPHILS # BLD AUTO: 5.24 10*3/MM3 (ref 1.7–7)
NEUTROPHILS NFR BLD AUTO: 62.4 % (ref 42.7–76)
NITRITE UR QL STRIP: NEGATIVE
NRBC BLD AUTO-RTO: 0 /100 WBC (ref 0–0.2)
PH UR STRIP.AUTO: 5.5 [PH] (ref 5–8)
PHOSPHATE SERPL-MCNC: 2.5 MG/DL (ref 2.5–4.5)
PLATELET # BLD AUTO: 170 10*3/MM3 (ref 140–450)
PMV BLD AUTO: 9.6 FL (ref 6–12)
POTASSIUM BLD-SCNC: 3.6 MMOL/L (ref 3.5–5.2)
PROT SERPL-MCNC: 7.1 G/DL (ref 6–8.5)
PROT UR QL STRIP: ABNORMAL
RBC # BLD AUTO: 3.7 10*6/MM3 (ref 4.14–5.8)
RBC # UR: NORMAL /HPF
REF LAB TEST METHOD: NORMAL
SODIUM BLD-SCNC: 140 MMOL/L (ref 136–145)
SP GR UR STRIP: 1.02 (ref 1–1.03)
SQUAMOUS #/AREA URNS HPF: NORMAL /HPF
T4 FREE SERPL-MCNC: 1.44 NG/DL (ref 0.93–1.7)
TSH SERPL DL<=0.05 MIU/L-ACNC: 0.42 MIU/ML (ref 0.27–4.2)
URATE SERPL-MCNC: 4.7 MG/DL (ref 3.4–7)
UROBILINOGEN UR QL STRIP: ABNORMAL
WBC NRBC COR # BLD: 8.41 10*3/MM3 (ref 3.4–10.8)
WBC UR QL AUTO: NORMAL /HPF

## 2019-05-12 PROCEDURE — 99283 EMERGENCY DEPT VISIT LOW MDM: CPT

## 2019-05-12 PROCEDURE — 80053 COMPREHEN METABOLIC PANEL: CPT | Performed by: EMERGENCY MEDICINE

## 2019-05-12 PROCEDURE — 85025 COMPLETE CBC W/AUTO DIFF WBC: CPT | Performed by: EMERGENCY MEDICINE

## 2019-05-12 PROCEDURE — 81001 URINALYSIS AUTO W/SCOPE: CPT | Performed by: EMERGENCY MEDICINE

## 2019-05-12 PROCEDURE — 83735 ASSAY OF MAGNESIUM: CPT | Performed by: EMERGENCY MEDICINE

## 2019-05-12 PROCEDURE — 84443 ASSAY THYROID STIM HORMONE: CPT | Performed by: EMERGENCY MEDICINE

## 2019-05-12 PROCEDURE — 84439 ASSAY OF FREE THYROXINE: CPT | Performed by: EMERGENCY MEDICINE

## 2019-05-12 PROCEDURE — 84550 ASSAY OF BLOOD/URIC ACID: CPT | Performed by: EMERGENCY MEDICINE

## 2019-05-12 PROCEDURE — 84100 ASSAY OF PHOSPHORUS: CPT | Performed by: EMERGENCY MEDICINE

## 2019-05-12 PROCEDURE — 82550 ASSAY OF CK (CPK): CPT | Performed by: EMERGENCY MEDICINE

## 2019-05-12 PROCEDURE — 71046 X-RAY EXAM CHEST 2 VIEWS: CPT

## 2019-05-12 RX ORDER — SODIUM CHLORIDE 0.9 % (FLUSH) 0.9 %
10 SYRINGE (ML) INJECTION AS NEEDED
Status: DISCONTINUED | OUTPATIENT
Start: 2019-05-12 | End: 2019-05-12 | Stop reason: HOSPADM

## 2019-05-12 RX ADMIN — SODIUM CHLORIDE, POTASSIUM CHLORIDE, SODIUM LACTATE AND CALCIUM CHLORIDE 1000 ML: 600; 310; 30; 20 INJECTION, SOLUTION INTRAVENOUS at 11:07

## 2019-05-17 ENCOUNTER — TELEPHONE (OUTPATIENT)
Dept: FAMILY MEDICINE CLINIC | Facility: CLINIC | Age: 79
End: 2019-05-17

## 2019-05-17 DIAGNOSIS — R19.7 DIARRHEA, UNSPECIFIED TYPE: Primary | ICD-10-CM

## 2019-05-17 NOTE — TELEPHONE ENCOUNTER
Recurrent diarrhea and worried about cdif. Can he  a kit to test and he will come in next week and see you.    He was just d/c from hospital     Please place order if you would like him to do this. thanks

## 2019-05-18 ENCOUNTER — RESULTS ENCOUNTER (OUTPATIENT)
Dept: FAMILY MEDICINE CLINIC | Facility: CLINIC | Age: 79
End: 2019-05-18

## 2019-05-18 DIAGNOSIS — R19.7 DIARRHEA, UNSPECIFIED TYPE: ICD-10-CM

## 2019-05-23 ENCOUNTER — OFFICE VISIT (OUTPATIENT)
Dept: FAMILY MEDICINE CLINIC | Facility: CLINIC | Age: 79
End: 2019-05-23

## 2019-05-23 VITALS
HEIGHT: 72 IN | BODY MASS INDEX: 27.68 KG/M2 | HEART RATE: 57 BPM | SYSTOLIC BLOOD PRESSURE: 136 MMHG | WEIGHT: 204.4 LBS | DIASTOLIC BLOOD PRESSURE: 80 MMHG | OXYGEN SATURATION: 96 % | TEMPERATURE: 97.6 F

## 2019-05-23 DIAGNOSIS — E27.40 ADRENAL INSUFFICIENCY (HCC): Primary | ICD-10-CM

## 2019-05-23 PROCEDURE — 99213 OFFICE O/P EST LOW 20 MIN: CPT | Performed by: NURSE PRACTITIONER

## 2019-05-23 RX ORDER — AMLODIPINE BESYLATE 5 MG/1
5 TABLET ORAL DAILY
Qty: 30 TABLET | Refills: 5
Start: 2019-05-23

## 2019-05-23 RX ORDER — BLOOD-GLUCOSE METER
1 EACH MISCELLANEOUS
COMMUNITY
Start: 2019-03-18

## 2019-05-23 RX ORDER — DEXAMETHASONE 1 MG
1 TABLET ORAL DAILY
COMMUNITY
Start: 2019-05-15 | End: 2019-10-21

## 2019-05-23 RX ORDER — COLCHICINE 0.6 MG/1
0.6 TABLET ORAL DAILY
COMMUNITY
Start: 2019-05-15 | End: 2019-10-21

## 2019-05-30 NOTE — PROGRESS NOTES
Subjective   Faustino Horton Jr. is a 78 y.o. male.     Follow-up Frequent recurrent hypotensionSecondary to adrenal insufficiencyRecurrent admissions  Quite frustrated  Daily maintenance steroid being adjusted and has been increased by his endocrinologist at [place]  Presently no chest pain no shortness of breath    His mother Roseanne passed    He's had periods of hypertension previously was on medication  I'll give this to him to take 5 mg daily one half one tabletAs needed for hypertension  hAs recurrent cdf      Hypotension   Associated symptoms include fatigue. Pertinent negatives include no chest pain.   Weakness - Generalized   Associated symptoms include fatigue. Pertinent negatives include no chest pain.        The following portions of the patient's history were reviewed and updated as appropriate: allergies, current medications, past family history, past medical history, past social history, past surgical history and problem list.    Review of Systems   Constitutional: Positive for fatigue.   Respiratory: Negative for shortness of breath.    Cardiovascular: Negative for chest pain, palpitations and leg swelling.   Neurological: Positive for dizziness. Negative for headache.       Objective   Physical Exam   Constitutional: He is oriented to person, place, and time. He appears well-developed and well-nourished. No distress.   HENT:   Head: Normocephalic and atraumatic.   Nose: Nose normal.   Mouth/Throat: Oropharynx is clear and moist.   Eyes: Conjunctivae are normal. Pupils are equal, round, and reactive to light.   Neck: Neck supple. No JVD present.   Cardiovascular: Normal rate, regular rhythm and normal heart sounds.   No murmur heard.  Pulmonary/Chest: Effort normal and breath sounds normal. No respiratory distress. He has no wheezes.   Abdominal: Soft. Bowel sounds are normal. He exhibits no distension and no mass. There is no tenderness. There is no guarding. No hernia.   Musculoskeletal: He exhibits  no edema or tenderness.   Lymphadenopathy:     He has no cervical adenopathy.   Neurological: He is alert and oriented to person, place, and time.   Skin: Skin is warm and dry. He is not diaphoretic.   Psychiatric: He has a normal mood and affect. His behavior is normal. Judgment and thought content normal.   Vitals reviewed.        Assessment/Plan   Faustino was seen today for hypotension, hospital follow up, weakness - generalized and balance issues.    Diagnoses and all orders for this visit:    Adrenal insufficiency (CMS/HCC)    Other orders  -     amLODIPine (NORVASC) 5 MG tablet; Take 1 tablet by mouth Daily. For blood pressure as needed        Discuss pathophysiology of hypertension renal insufficiency hypotension signs and symptoms importance of maintaining adequate hydration especially with hypotensive.  Will follow-up and communicate with endocrinology  Any fever gastro- intestinal infection  Emergency room    Office visit 25 minute          There are no Patient Instructions on file for this visit.

## 2019-06-11 ENCOUNTER — OFFICE VISIT (OUTPATIENT)
Dept: CARDIOLOGY | Facility: CLINIC | Age: 79
End: 2019-06-11

## 2019-06-11 VITALS
DIASTOLIC BLOOD PRESSURE: 80 MMHG | HEIGHT: 72 IN | SYSTOLIC BLOOD PRESSURE: 142 MMHG | HEART RATE: 64 BPM | BODY MASS INDEX: 28.31 KG/M2 | WEIGHT: 209 LBS | RESPIRATION RATE: 16 BRPM

## 2019-06-11 DIAGNOSIS — I10 ESSENTIAL HYPERTENSION: ICD-10-CM

## 2019-06-11 DIAGNOSIS — E78.00 PURE HYPERCHOLESTEROLEMIA: ICD-10-CM

## 2019-06-11 DIAGNOSIS — R53.1 WEAKNESS: ICD-10-CM

## 2019-06-11 DIAGNOSIS — I25.10 CHRONIC CORONARY ARTERY DISEASE: Primary | ICD-10-CM

## 2019-06-11 DIAGNOSIS — I95.9 HYPOTENSION, UNSPECIFIED HYPOTENSION TYPE: ICD-10-CM

## 2019-06-11 DIAGNOSIS — I73.9 PERIPHERAL VASCULAR DISEASE (HCC): ICD-10-CM

## 2019-06-11 PROCEDURE — 99214 OFFICE O/P EST MOD 30 MIN: CPT | Performed by: INTERNAL MEDICINE

## 2019-06-11 PROCEDURE — 93000 ELECTROCARDIOGRAM COMPLETE: CPT | Performed by: INTERNAL MEDICINE

## 2019-06-11 NOTE — PROGRESS NOTES
PATIENTINFORMATION    Date of Office Visit: 2019  Encounter Provider: Nan Ramachandran MD  Place of Service: [facility] CARDIOLOGY  Patient Name: Faustino Horton Jr.  : 1940    Subjective:     Encounter Date:2019      Patient ID: Faustino Horton Jr. is a 78 y.o. male.      History of Present Illness    This is a nice gentleman who was in [place] in  when he had sudden onset of chest pain and went to the hospital and was having an ST elevation myocardial infarction. He went straight to the catheter lab. His ejection fraction was 40%, left main normal, left anterior descending artery with a 99% mid-vessel stenosis which was stented. The circumflex was the dominant vessel with luminal irregularities. The right coronary artery was a medium size vessel with luminal irregularities. He had an echocardiogram in  which showed preserved LV function. No ejection fraction was given. He has a history of an abdominal aortic aneurysm and follows with Dr. Puga. He had some kind of a procedure, I am presuming a stent graft performed. He has not had open surgery. His other medical history includes adrenal insufficieny, prostate cancer, hypothyroidism, diabetes, hyperlipidemia. He has a history of hypotension and was having issues with syncope but has not had any recent problems.     He comes in today with kind of two complaints.  One is the problem he has where his blood pressure will suddenly drop and sometimes he passes out.  This has been going on for years now and no one has really found a good cause for it.  He is treated with fluids and steroids for his adrenal insufficiency and then goes home.  His most recent episode occurred a little over a month ago.  He was titrated down off of his steroid increase he was put on in the hospital.  He saw an endocrinologist and his baseline steroid dose was kept higher than where it had been in the past.  He has not had a syncopal spell  since then.  He has never been noted to have an arrhythmia except for sinus bradycardia.  He has amlodipine that he uses as needed.  Generally when he comes home from the hospital on the higher dose of steroids, he needs the amlodipine for a few days.      His other complaint is weakness.  He says he is just really fatigued.  He feels unable to do any kind of exertion because he gets fatigued and tired.  He feels like his legs are weak and he has no strength.  He is not doing much in the way of activity because he just does not feel like it.        Review of Systems   Constitution: Positive for malaise/fatigue. Negative for fever, weight gain and weight loss.   HENT: Negative for ear pain, hearing loss, nosebleeds and sore throat.    Eyes: Negative for double vision, pain, vision loss in left eye and vision loss in right eye.   Cardiovascular:        See history of present illness.   Respiratory: Positive for shortness of breath. Negative for cough, sleep disturbances due to breathing, snoring and wheezing.    Endocrine: Negative for cold intolerance, heat intolerance and polyuria.   Skin: Negative for itching, poor wound healing and rash.   Musculoskeletal: Negative for joint pain, joint swelling and myalgias.   Gastrointestinal: Negative for abdominal pain, diarrhea, hematochezia, nausea and vomiting.   Genitourinary: Negative for hematuria and hesitancy.   Neurological: Positive for dizziness and light-headedness. Negative for numbness, paresthesias and seizures.   Psychiatric/Behavioral: Negative for depression. The patient is not nervous/anxious.            ECG 12 Lead  Date/Time: 6/11/2019 1:58 PM  Performed by: Nan Ramachandran MD  Authorized by: Nan Ramachandran MD   Comparison: compared with previous ECG from 1/11/2019  Similar to previous ECG  Rhythm: sinus rhythm  BPM: 64  Conduction: conduction normal  ST Segments: ST segments normal  T Waves: T waves normal    Clinical impression: normal  "ECG               Objective:     /80 (BP Location: Right arm, Patient Position: Sitting, Cuff Size: Adult)   Pulse 64   Resp 16   Ht 182.9 cm (72\")   Wt 94.8 kg (209 lb)   BMI 28.35 kg/m²  Body mass index is 28.35 kg/m².     Physical Exam   Constitutional: He appears well-developed.   HENT:   Head: Normocephalic and atraumatic.   Eyes: Conjunctivae and lids are normal. Pupils are equal, round, and reactive to light. Lids are everted and swept, no foreign bodies found.   Neck: Normal range of motion. No JVD present. Carotid bruit is not present. No tracheal deviation present. No thyroid mass present.   Cardiovascular: Normal rate, regular rhythm and normal heart sounds.   Pulses:       Dorsalis pedis pulses are 2+ on the right side, and 2+ on the left side.   Pulmonary/Chest: Effort normal and breath sounds normal.   Abdominal: Normal appearance and bowel sounds are normal.   Musculoskeletal: Normal range of motion.   Neurological: He is alert. He has normal strength.   Skin: Skin is warm, dry and intact.   Psychiatric: He has a normal mood and affect. His behavior is normal.   Vitals reviewed.          Assessment/Plan:       1. Coronary Artery Disease  Assessment  • The patient has no angina    Plan  • Lifestyle modifications discussed include adhering to a heart healthy diet, maintenance of a healthy weight and regular exercise    Subjective - Objective  • There is a history of past MI on or around 8/4/2004      He has not had an ischemic evaluation in some time.  I recommend that he have a stress test and an echo but his symptoms of weakness and loss of stamina.  Do suspect he may just be deconditioned.  If his stress test is normal, I am going to refer him to do physical therapy.   He is not on aspirin.  He was told in the past not to take it.  He is on a statin.  2.  Hyperlipidemia  3.  Diabetes  4.  Hypotension with a history of syncope  5.  Abdominal aortic aneurysm followed by Dr. Puga  6.  Adrenal " insufficiency  7.  Hypothyroid.  He follows with an endocrinologist at [facility]  8.  Obstructive sleep apnea.  He says he lost 30 pounds and has no more issues with that.     If his stress test is normal, I am going to refer him to do physical therapy.         Orders Placed This Encounter   Procedures   • Stress Test With Myocardial Perfusion One Day     Standing Status:   Future     Standing Expiration Date:   6/10/2020     Order Specific Question:   What stress agent will be used?     Answer:   Exercise with possible pharmacologic     Order Specific Question:   Reason for exam?     Answer:   Angina     Order Specific Question:   Reason for exam?     Answer:   Known Coronary Artery Disease   • ECG 12 Lead     This order was created via procedure documentation   • Adult Transthoracic Echo Complete W/ Cont if Necessary Per Protocol     Standing Status:   Future     Standing Expiration Date:   6/10/2020     Order Specific Question:   Reason for exam?     Answer:   Dyspnea        Discharge Medications           Accurate as of 6/11/19  3:04 PM. If you have any questions, ask your nurse or doctor.               Continue These Medications      Instructions Start Date   ACCU-CHEK RONALDO PLUS w/Device kit   1 kit, Does not apply      ACCU-CHEK FASTCLIX LANCETS misc   CHECK BLOOD SUGAR D      ACCU-CHEK SMARTVIEW test strip  Generic drug:  glucose blood   USE TO TEST BLOOD SUGAR LEVEL QD      amLODIPine 5 MG tablet  Commonly known as:  NORVASC   5 mg, Oral, Daily, For blood pressure as needed      atorvastatin 40 MG tablet  Commonly known as:  LIPITOR   40 mg, Oral, Daily      colchicine 0.6 MG tablet   0.6 mg, Oral, Daily      dexamethasone 1 MG tablet  Commonly known as:  DECADRON   1 mg, Oral, Daily      diclofenac 1 % gel gel  Commonly known as:  VOLTAREN   2 g, Topical      fludrocortisone 0.1 MG tablet   0.1 mg, Oral, Daily      gabapentin 600 MG tablet  Commonly known as:  NEURONTIN   1,200 mg, Oral, 2 Times Daily       glimepiride 1 MG tablet  Commonly known as:  AMARYL   1 mg, Oral, Every Evening, Pt to take one and half tablets daily but can take 2 mg as need if over 140 in the morning      levothyroxine 150 MCG tablet  Commonly known as:  SYNTHROID, LEVOTHROID   150 mcg, Oral, Daily      potassium chloride 10 MEQ CR tablet  Commonly known as:  K-DUR   10 mEq, Oral, Daily      vitamin B-12 500 MCG tablet  Commonly known as:  CYANOCOBALAMIN   500 mcg, Oral, Daily                    Nan Ramachandran MD  06/11/19  3:04 PM

## 2019-06-26 ENCOUNTER — TELEPHONE (OUTPATIENT)
Dept: CARDIOLOGY | Facility: CLINIC | Age: 79
End: 2019-06-26

## 2019-06-26 ENCOUNTER — HOSPITAL ENCOUNTER (OUTPATIENT)
Dept: CARDIOLOGY | Facility: HOSPITAL | Age: 79
Discharge: HOME | End: 2019-06-26

## 2019-06-26 ENCOUNTER — HOSPITAL ENCOUNTER (OUTPATIENT)
Dept: CARDIOLOGY | Facility: HOSPITAL | Age: 79
Discharge: HOME | End: 2019-06-26
Admitting: INTERNAL MEDICINE

## 2019-06-26 VITALS
DIASTOLIC BLOOD PRESSURE: 62 MMHG | HEIGHT: 72 IN | HEART RATE: 65 BPM | WEIGHT: 209 LBS | BODY MASS INDEX: 28.31 KG/M2 | OXYGEN SATURATION: 93 % | SYSTOLIC BLOOD PRESSURE: 112 MMHG

## 2019-06-26 DIAGNOSIS — I25.10 CHRONIC CORONARY ARTERY DISEASE: ICD-10-CM

## 2019-06-26 DIAGNOSIS — R53.1 WEAKNESS: ICD-10-CM

## 2019-06-26 DIAGNOSIS — I95.9 HYPOTENSION, UNSPECIFIED HYPOTENSION TYPE: ICD-10-CM

## 2019-06-26 DIAGNOSIS — R53.1 WEAKNESS: Primary | ICD-10-CM

## 2019-06-26 LAB
BH CV NUCLEAR PRIOR STUDY: 2
BH CV STRESS BP STAGE 1: NORMAL
BH CV STRESS COMMENTS STAGE 1: NORMAL
BH CV STRESS DOSE REGADENOSON STAGE 1: 0.4
BH CV STRESS DURATION MIN STAGE 1: 0
BH CV STRESS DURATION SEC STAGE 1: 10
BH CV STRESS HR STAGE 1: 77
BH CV STRESS PROTOCOL 1: NORMAL
BH CV STRESS RECOVERY BP: NORMAL MMHG
BH CV STRESS RECOVERY HR: 74 BPM
BH CV STRESS STAGE 1: 1
LV EF NUC BP: 59 %
MAXIMAL PREDICTED HEART RATE: 143 BPM
PERCENT MAX PREDICTED HR: 53.85 %
STRESS BASELINE BP: NORMAL MMHG
STRESS BASELINE HR: 61 BPM
STRESS PERCENT HR: 63 %
STRESS POST EXERCISE DUR SEC: 10 SEC
STRESS POST PEAK BP: NORMAL MMHG
STRESS POST PEAK HR: 77 BPM
STRESS TARGET HR: 122 BPM

## 2019-06-26 PROCEDURE — 78452 HT MUSCLE IMAGE SPECT MULT: CPT

## 2019-06-26 PROCEDURE — 25010000002 REGADENOSON 0.4 MG/5ML SOLUTION: Performed by: INTERNAL MEDICINE

## 2019-06-26 PROCEDURE — 93016 CV STRESS TEST SUPVJ ONLY: CPT | Performed by: INTERNAL MEDICINE

## 2019-06-26 PROCEDURE — 78452 HT MUSCLE IMAGE SPECT MULT: CPT | Performed by: INTERNAL MEDICINE

## 2019-06-26 PROCEDURE — 93018 CV STRESS TEST I&R ONLY: CPT | Performed by: INTERNAL MEDICINE

## 2019-06-26 PROCEDURE — 93017 CV STRESS TEST TRACING ONLY: CPT

## 2019-06-26 PROCEDURE — 93306 TTE W/DOPPLER COMPLETE: CPT | Performed by: INTERNAL MEDICINE

## 2019-06-26 PROCEDURE — 25010000002 PERFLUTREN (DEFINITY) 8.476 MG IN SODIUM CHLORIDE 0.9 % 10 ML INJECTION: Performed by: INTERNAL MEDICINE

## 2019-06-26 PROCEDURE — 93306 TTE W/DOPPLER COMPLETE: CPT

## 2019-06-26 PROCEDURE — 0 TECHNETIUM TETROFOSMIN KIT: Performed by: INTERNAL MEDICINE

## 2019-06-26 PROCEDURE — A9502 TC99M TETROFOSMIN: HCPCS | Performed by: INTERNAL MEDICINE

## 2019-06-26 RX ADMIN — TETROFOSMIN 1 DOSE: 1.38 INJECTION, POWDER, LYOPHILIZED, FOR SOLUTION INTRAVENOUS at 07:31

## 2019-06-26 RX ADMIN — REGADENOSON 0.4 MG: 0.08 INJECTION, SOLUTION INTRAVENOUS at 08:30

## 2019-06-26 RX ADMIN — TETROFOSMIN 1 DOSE: 1.38 INJECTION, POWDER, LYOPHILIZED, FOR SOLUTION INTRAVENOUS at 08:30

## 2019-06-26 RX ADMIN — PERFLUTREN 2 ML: 6.52 INJECTION, SUSPENSION INTRAVENOUS at 07:32

## 2019-06-27 LAB
AORTIC ARCH: 3.6 CM
AORTIC ROOT ANNULUS: 2.4 CM
ASCENDING AORTA: 3.6 CM
BH CV ECHO MEAS - ACS: 2.3 CM
BH CV ECHO MEAS - AO MAX PG (FULL): 1.3 MMHG
BH CV ECHO MEAS - AO MAX PG: 4 MMHG
BH CV ECHO MEAS - AO MEAN PG (FULL): 0.73 MMHG
BH CV ECHO MEAS - AO MEAN PG: 2.3 MMHG
BH CV ECHO MEAS - AO ROOT AREA (BSA CORRECTED): 1.8
BH CV ECHO MEAS - AO ROOT AREA: 11.7 CM^2
BH CV ECHO MEAS - AO ROOT DIAM: 3.9 CM
BH CV ECHO MEAS - AO V2 MAX: 100.4 CM/SEC
BH CV ECHO MEAS - AO V2 MEAN: 69.1 CM/SEC
BH CV ECHO MEAS - AO V2 VTI: 19.7 CM
BH CV ECHO MEAS - ASC AORTA: 3.6 CM
BH CV ECHO MEAS - AVA(I,A): 3.8 CM^2
BH CV ECHO MEAS - AVA(I,D): 3.8 CM^2
BH CV ECHO MEAS - AVA(V,A): 3.7 CM^2
BH CV ECHO MEAS - AVA(V,D): 3.7 CM^2
BH CV ECHO MEAS - BSA(HAYCOCK): 2.2 M^2
BH CV ECHO MEAS - BSA: 2.2 M^2
BH CV ECHO MEAS - BZI_BMI: 28.3 KILOGRAMS/M^2
BH CV ECHO MEAS - BZI_METRIC_HEIGHT: 182.9 CM
BH CV ECHO MEAS - BZI_METRIC_WEIGHT: 94.8 KG
BH CV ECHO MEAS - EDV(CUBED): 200 ML
BH CV ECHO MEAS - EDV(MOD-SP2): 93 ML
BH CV ECHO MEAS - EDV(MOD-SP4): 126 ML
BH CV ECHO MEAS - EDV(TEICH): 169.8 ML
BH CV ECHO MEAS - EF(CUBED): 59.6 %
BH CV ECHO MEAS - EF(MOD-BP): 60 %
BH CV ECHO MEAS - EF(MOD-SP2): 60.2 %
BH CV ECHO MEAS - EF(MOD-SP4): 59.5 %
BH CV ECHO MEAS - EF(TEICH): 50.5 %
BH CV ECHO MEAS - ESV(CUBED): 80.8 ML
BH CV ECHO MEAS - ESV(MOD-SP2): 37 ML
BH CV ECHO MEAS - ESV(MOD-SP4): 51 ML
BH CV ECHO MEAS - ESV(TEICH): 84.1 ML
BH CV ECHO MEAS - IVS/LVPW: 0.96
BH CV ECHO MEAS - IVSD: 1.2 CM
BH CV ECHO MEAS - LAT PEAK E' VEL: 7 CM/SEC
BH CV ECHO MEAS - LV DIASTOLIC VOL/BSA (35-75): 58 ML/M^2
BH CV ECHO MEAS - LV MASS(C)D: 316.8 GRAMS
BH CV ECHO MEAS - LV MASS(C)DI: 146 GRAMS/M^2
BH CV ECHO MEAS - LV MAX PG: 2.8 MMHG
BH CV ECHO MEAS - LV MEAN PG: 1.5 MMHG
BH CV ECHO MEAS - LV SYSTOLIC VOL/BSA (12-30): 23.5 ML/M^2
BH CV ECHO MEAS - LV V1 MAX: 82.9 CM/SEC
BH CV ECHO MEAS - LV V1 MEAN: 57.7 CM/SEC
BH CV ECHO MEAS - LV V1 VTI: 16.7 CM
BH CV ECHO MEAS - LVIDD: 5.8 CM
BH CV ECHO MEAS - LVIDS: 4.3 CM
BH CV ECHO MEAS - LVLD AP2: 7.9 CM
BH CV ECHO MEAS - LVLD AP4: 8 CM
BH CV ECHO MEAS - LVLS AP2: 6.5 CM
BH CV ECHO MEAS - LVLS AP4: 7.1 CM
BH CV ECHO MEAS - LVOT AREA (M): 4.5 CM^2
BH CV ECHO MEAS - LVOT AREA: 4.5 CM^2
BH CV ECHO MEAS - LVOT DIAM: 2.4 CM
BH CV ECHO MEAS - LVPWD: 1.3 CM
BH CV ECHO MEAS - MED PEAK E' VEL: 5 CM/SEC
BH CV ECHO MEAS - MR MAX PG: 20.6 MMHG
BH CV ECHO MEAS - MR MAX VEL: 226.8 CM/SEC
BH CV ECHO MEAS - MV A DUR: 0.15 SEC
BH CV ECHO MEAS - MV A MAX VEL: 68.4 CM/SEC
BH CV ECHO MEAS - MV DEC SLOPE: 134.9 CM/SEC^2
BH CV ECHO MEAS - MV DEC TIME: 0.33 SEC
BH CV ECHO MEAS - MV E MAX VEL: 41.7 CM/SEC
BH CV ECHO MEAS - MV E/A: 0.61
BH CV ECHO MEAS - MV MAX PG: 2.4 MMHG
BH CV ECHO MEAS - MV MEAN PG: 0.77 MMHG
BH CV ECHO MEAS - MV P1/2T MAX VEL: 46.1 CM/SEC
BH CV ECHO MEAS - MV P1/2T: 100.1 MSEC
BH CV ECHO MEAS - MV V2 MAX: 77.2 CM/SEC
BH CV ECHO MEAS - MV V2 MEAN: 40.8 CM/SEC
BH CV ECHO MEAS - MV V2 VTI: 27.7 CM
BH CV ECHO MEAS - MVA P1/2T LCG: 4.8 CM^2
BH CV ECHO MEAS - MVA(P1/2T): 2.2 CM^2
BH CV ECHO MEAS - MVA(VTI): 2.7 CM^2
BH CV ECHO MEAS - PA ACC TIME: 0.08 SEC
BH CV ECHO MEAS - PA MAX PG (FULL): 0.17 MMHG
BH CV ECHO MEAS - PA MAX PG: 1.6 MMHG
BH CV ECHO MEAS - PA PR(ACCEL): 42.6 MMHG
BH CV ECHO MEAS - PA V2 MAX: 63.6 CM/SEC
BH CV ECHO MEAS - PULM A REVS DUR: 0.13 SEC
BH CV ECHO MEAS - PULM A REVS VEL: 28.6 CM/SEC
BH CV ECHO MEAS - PULM DIAS VEL: 32.5 CM/SEC
BH CV ECHO MEAS - PULM S/D: 1.2
BH CV ECHO MEAS - PULM SYS VEL: 40.3 CM/SEC
BH CV ECHO MEAS - PVA(V,A): 3.7 CM^2
BH CV ECHO MEAS - PVA(V,D): 3.7 CM^2
BH CV ECHO MEAS - QP/QS: 0.58
BH CV ECHO MEAS - RAP SYSTOLE: 3 MMHG
BH CV ECHO MEAS - RV MAX PG: 1.4 MMHG
BH CV ECHO MEAS - RV MEAN PG: 0.62 MMHG
BH CV ECHO MEAS - RV V1 MAX: 60.1 CM/SEC
BH CV ECHO MEAS - RV V1 MEAN: 36.1 CM/SEC
BH CV ECHO MEAS - RV V1 VTI: 11.2 CM
BH CV ECHO MEAS - RVOT AREA: 3.9 CM^2
BH CV ECHO MEAS - RVOT DIAM: 2.2 CM
BH CV ECHO MEAS - RVSP: 11 MMHG
BH CV ECHO MEAS - SI(AO): 106.6 ML/M^2
BH CV ECHO MEAS - SI(CUBED): 54.9 ML/M^2
BH CV ECHO MEAS - SI(LVOT): 34.7 ML/M^2
BH CV ECHO MEAS - SI(MOD-SP2): 25.8 ML/M^2
BH CV ECHO MEAS - SI(MOD-SP4): 34.6 ML/M^2
BH CV ECHO MEAS - SI(TEICH): 39.5 ML/M^2
BH CV ECHO MEAS - SV(AO): 231.3 ML
BH CV ECHO MEAS - SV(CUBED): 119.3 ML
BH CV ECHO MEAS - SV(LVOT): 75.4 ML
BH CV ECHO MEAS - SV(MOD-SP2): 56 ML
BH CV ECHO MEAS - SV(MOD-SP4): 75 ML
BH CV ECHO MEAS - SV(RVOT): 43.4 ML
BH CV ECHO MEAS - SV(TEICH): 85.7 ML
BH CV ECHO MEAS - TR MAX VEL: 92.3 CM/SEC
BH CV ECHO MEASUREMENTS AVERAGE E/E' RATIO: 6.95
BH CV XLRA - RV BASE: 2.8 CM
BH CV XLRA - TDI S': 13 CM/SEC
LEFT ATRIUM VOLUME INDEX: 16 ML/M2
MAXIMAL PREDICTED HEART RATE: 143 BPM
SINUS: 2.9 CM
STJ: 3.7 CM
STRESS TARGET HR: 122 BPM

## 2019-08-01 ENCOUNTER — OFFICE VISIT (OUTPATIENT)
Dept: PAIN MEDICINE | Facility: CLINIC | Age: 79
End: 2019-08-01

## 2019-08-01 VITALS
HEART RATE: 75 BPM | OXYGEN SATURATION: 93 % | WEIGHT: 213.2 LBS | HEIGHT: 72 IN | TEMPERATURE: 97 F | SYSTOLIC BLOOD PRESSURE: 124 MMHG | DIASTOLIC BLOOD PRESSURE: 75 MMHG | BODY MASS INDEX: 28.88 KG/M2 | RESPIRATION RATE: 20 BRPM

## 2019-08-01 DIAGNOSIS — M54.50 CHRONIC BILATERAL LOW BACK PAIN WITHOUT SCIATICA: ICD-10-CM

## 2019-08-01 DIAGNOSIS — G89.29 OTHER CHRONIC PAIN: Primary | ICD-10-CM

## 2019-08-01 DIAGNOSIS — G89.29 CHRONIC BILATERAL LOW BACK PAIN WITHOUT SCIATICA: ICD-10-CM

## 2019-08-01 PROCEDURE — 99214 OFFICE O/P EST MOD 30 MIN: CPT | Performed by: NURSE PRACTITIONER

## 2019-08-01 RX ORDER — INDOMETHACIN 50 MG/1
50 CAPSULE ORAL
COMMUNITY
Start: 2018-07-02 | End: 2019-10-21

## 2019-08-01 RX ORDER — LISINOPRIL 2.5 MG/1
2.5 TABLET ORAL DAILY
COMMUNITY
Start: 2019-06-27

## 2019-08-01 RX ORDER — GLIMEPIRIDE 4 MG/1
TABLET ORAL
COMMUNITY
Start: 2019-06-28

## 2019-08-01 RX ORDER — ATORVASTATIN CALCIUM 80 MG/1
TABLET, FILM COATED ORAL
COMMUNITY
Start: 2019-06-28 | End: 2019-10-21

## 2019-08-01 RX ORDER — METFORMIN HYDROCHLORIDE 500 MG/1
1000 TABLET, EXTENDED RELEASE ORAL DAILY
COMMUNITY
Start: 2019-06-27 | End: 2019-09-25

## 2019-08-01 RX ORDER — ERGOCALCIFEROL 1.25 MG/1
CAPSULE ORAL
Refills: 0 | COMMUNITY
Start: 2019-07-02

## 2019-08-01 RX ORDER — TIZANIDINE 2 MG/1
TABLET ORAL
Refills: 0 | COMMUNITY
Start: 2019-07-05 | End: 2019-10-21

## 2019-08-01 RX ORDER — ERGOCALCIFEROL 1.25 MG/1
50000 CAPSULE ORAL
COMMUNITY
Start: 2019-07-02 | End: 2019-08-21

## 2019-08-01 RX ORDER — GABAPENTIN 600 MG/1
1200 TABLET ORAL
COMMUNITY
Start: 2019-07-30 | End: 2019-10-28

## 2019-08-01 NOTE — PROGRESS NOTES
CHIEF COMPLAINT    F/u back pain. Pt sts pain has worsened since last ov. Pt had Lumbar Epidural Steroid Injection on 1/30/19. Pt sts received 6 months pain relief since having injection, but now pain has returned to baseline. Pt experiencing pain in right posterior lumbar, difficult to sleep at night, and do routine activities at home is painful to his back. Pt sts pain gets better during the day once he gets up and moving around. Pt interested in having another injection.     Subjective   Faustino Horton Jr. is a 79 y.o. male  who presents to the office for follow-up.He has a history of back pain.  6 months of relief following LESI 1/30/19.    Back Pain   This is a chronic problem. The current episode started more than 1 year ago. The problem occurs daily. The problem has been waxing and waning since onset. The pain is present in the lumbar spine. The quality of the pain is described as aching and burning. The pain does not radiate. The pain is at a severity of 8/10. The pain is moderate. Exacerbated by: activity. Associated symptoms include weakness (back and bilat legs). Pertinent negatives include no bladder incontinence, bowel incontinence, chest pain, fever, headaches or numbness. Risk factors include sedentary lifestyle and obesity. He has tried NSAIDs and analgesics for the symptoms. The treatment provided significant (with LESI) relief.      MRI LUMBAR        PEG Assessment   What number best describes your pain on average in the past week?8  What number best describes how, during the past week, pain has interfered with your enjoyment of life?8  What number best describes how, during the past week, pain has interfered with your general activity?  8    The following portions of the patient's history were reviewed and updated as appropriate: allergies, current medications, past family history, past medical history, past social history, past surgical history and problem list.    Review of Systems  "  Constitutional: Positive for activity change (dec) and fatigue (occ). Negative for fever.   HENT: Negative for congestion.    Eyes: Negative for visual disturbance.   Respiratory: Positive for shortness of breath (occ). Negative for cough and wheezing.    Cardiovascular: Negative.  Negative for chest pain.   Gastrointestinal: Negative for bowel incontinence, constipation, diarrhea and vomiting.   Genitourinary: Negative for bladder incontinence and difficulty urinating.   Musculoskeletal: Positive for back pain (right lower lumbar).   Neurological: Positive for weakness (back and bilat legs). Negative for dizziness, numbness and headaches.   Psychiatric/Behavioral: Positive for sleep disturbance (occ). Negative for agitation and suicidal ideas. The patient is not nervous/anxious.    All other systems reviewed and are negative.    Vitals:    08/01/19 0804   BP: 124/75   Pulse: 75   Resp: 20   Temp: 97 °F (36.1 °C)   SpO2: 93%   Weight: 96.7 kg (213 lb 3.2 oz)   Height: 182.9 cm (72\")   PainSc:   8   PainLoc: Back     Objective   Physical Exam   Constitutional: He is oriented to person, place, and time. He appears well-developed and well-nourished. No distress.   HENT:   Head: Normocephalic and atraumatic.   Eyes: Conjunctivae and EOM are normal.   Neck: Neck supple.   Cardiovascular: Normal rate.   Pulmonary/Chest: Effort normal. No respiratory distress.   Musculoskeletal:        Lumbar back: He exhibits tenderness and pain.   Neurological: He is alert and oriented to person, place, and time. He has normal strength. No sensory deficit. Gait normal.   Skin: Skin is warm and dry. He is not diaphoretic.   Psychiatric: He has a normal mood and affect. His behavior is normal.   Nursing note and vitals reviewed.    Assessment/Plan   Faustino was seen today for back pain.    Diagnoses and all orders for this visit:    Other chronic pain    Chronic bilateral low back pain without sciatica  -     Case Request      --- Repeat " L5/S1 LESI   --- Follow-up after procedure        FARA REPORT    FARA report has been reviewed and scanned into the patient's chart.    As the clinician, I personally reviewed the FARA from 2/18/19 while the patient was in the office today.    EMR Dragon/Transcription disclaimer:   Much of this encounter note is an electronic transcription/translation of spoken language to printed text. The electronic translation of spoken language may permit erroneous, or at times, nonsensical words or phrases to be inadvertently transcribed; Although I have reviewed the note for such errors, some may still exist.    INITIAL VISIT WITH CYNTHIA GARCIA

## 2019-08-06 ENCOUNTER — DOCUMENTATION (OUTPATIENT)
Dept: PHYSICAL THERAPY | Facility: CLINIC | Age: 79
End: 2019-08-06

## 2019-08-06 ENCOUNTER — TREATMENT (OUTPATIENT)
Dept: PHYSICAL THERAPY | Facility: CLINIC | Age: 79
End: 2019-08-06

## 2019-08-06 DIAGNOSIS — R29.898 LEG WEAKNESS, BILATERAL: Primary | ICD-10-CM

## 2019-08-06 DIAGNOSIS — M54.50 CHRONIC BILATERAL LOW BACK PAIN WITHOUT SCIATICA: ICD-10-CM

## 2019-08-06 DIAGNOSIS — G89.29 CHRONIC BILATERAL LOW BACK PAIN WITHOUT SCIATICA: ICD-10-CM

## 2019-08-06 DIAGNOSIS — M54.2 PAIN, NECK: Primary | ICD-10-CM

## 2019-08-06 PROCEDURE — 97161 PT EVAL LOW COMPLEX 20 MIN: CPT | Performed by: PHYSICAL THERAPIST

## 2019-08-06 PROCEDURE — 97110 THERAPEUTIC EXERCISES: CPT | Performed by: PHYSICAL THERAPIST

## 2019-08-06 NOTE — PROGRESS NOTES
Discharge Report    Patient Name: Faustino Horton Jr.       Patient MRN: [id number]  : 1940  Date: 2019    Encounter Diagnoses   Name Primary?   • Pain, neck Yes   • Chronic bilateral low back pain without sciatica        Treatment has included therapeutic exercise, neuromuscular re-education, manual therapy, electrical stimulation, ultrasound and moist heat    No notes on file      Assessment:  Progress towards goals: Partially Met    Discharge Reason: Patient achieved goals and Anticipate patient will achieve long-term goals independently      Plan of Care: Continue with current home exercise program as instructed    Prognosis: good    Understanding at Discharge: good

## 2019-08-06 NOTE — PROGRESS NOTES
Physical Therapy Initial Evaluation and Plan of Care    Patient: Faustino Horton Jr.   : 1940  Diagnosis/ICD-10 Code:  Leg weakness, bilateral [R29.898]  Referring practitioner: James Epley, APRN  Date of Initial Visit: 2019  Today's Date: 2019    Subjective Evaluation    History of Present Illness  Mechanism of injury: Pt with multiple hospitalizations over the last few months for issues with blood pressure, sepsis. Reports significant weakness, difficulty ambulating for more than 10 minutes. Denies recent falls, would like to get back to walking for exercise.       Patient Occupation: Retired Quality of life: fair    Pain  Location: Pt has intermittent back and neck pain, but no pain associated with weakness  Relieving factors: rest  Aggravating factors: ambulation and standing  Progression: worsening    Social Support  Lives in: multiple-level home  Lives with: spouse    Diagnostic Tests  No diagnostic tests performed    Treatments  Previous treatment: physical therapy  Patient Goals  Patient goals for therapy: increased strength, independence with ADLs/IADLs, return to sport/leisure activities and improved balance             Objective       Neurological Testing     Sensation     Hip   Left Hip   Intact: light touch    Right Hip   Intact: light touch    Active Range of Motion   Left Hip   Normal active range of motion    Right Hip   Normal active range of motion  Left Knee   Normal active range of motion    Right Knee   Normal active range of motion    Strength/Myotome Testing     Left Hip   Planes of Motion   Flexion: 4  Extension: 3-  Abduction: 3-  External rotation: 3-  Internal rotation: 3-    Right Hip   Planes of Motion   Flexion: 4  Extension: 3-  Abduction: 3-  External rotation: 3-  Internal rotation: 3-    Left Knee   Flexion: 4+  Extension: 4+    Right Knee   Flexion: 4+  Extension: 4+    Ambulation     Ambulation: Level Surfaces   Ambulation without assistive device:  "independent    Observational Gait   Decreased walking speed and stride length.     Functional Assessment   Squat   Left valgus, left tibial anterior translation beyond toes, right valgus and right tibial anterior translation beyond toes.     Forward Step Up 6\"   Left Leg  Increased contralateral push off.     Right Leg  Increased contralateral push off.     Single Leg Stance   Left: 3 seconds  Right: 4 seconds    Comments  TUG 9 seconds         Assessment & Plan     Assessment  Impairments: abnormal gait, abnormal muscle tone, activity intolerance, impaired balance, impaired physical strength, lacks appropriate home exercise program and pain with function  Assessment details: Mr. Horton is a pleasant 79 year old male who presents with core/LE weakness, gait abnormality and decreased endurance secondary to illness and multiple hospitalizations over the past few months. He will benefit from physical therapy to address impairments so that he can return to baseline performance of ADL's/IADL's.   Prognosis: good  Functional Limitations: carrying objects, walking, uncomfortable because of pain and standing  Goals  Plan Goals: Short Term Goals: 2-4 weeks. Patient will:  1. Be independent with initial HEP  2. Be instructed in posture and body mechanics\  3. Walk 10 minutes on TM in clinic at 1.5 MPH or better.    Long Term Goals: 4-6 weeks. Patient will:  1. Demonstrate improved core/bilateral lower extremity MMT of >/= 4+/5  2. Demonstrate lower extremity flexibility WFL.  3. Demonstrate adequate gluteal activation and control of dynamic genu valgus with squat lifting/squatting for improved performance of ADL's.  4. Report ability to walk 30 minutes without seated rest break.  5. Perceived disability improved by 20% as measured by LEFS  6. Demo SLS 10 seconds or greater bilaterally for improved balance and decreased risk of falls.      Plan  Therapy options: will be seen for skilled physical therapy services  Planned " modality interventions: cryotherapy, ultrasound, TENS, electrical stimulation/Russian stimulation and thermotherapy (hydrocollator packs)  Planned therapy interventions: abdominal trunk stabilization, manual therapy, neuromuscular re-education, postural training, soft tissue mobilization, spinal/joint mobilization, joint mobilization, stretching, strengthening, functional ROM exercises and flexibility  Frequency: 2x week  Duration in weeks: 8  Treatment plan discussed with: patient        Manual Therapy:    0     mins  40119;  Therapeutic Exercise:    24     mins  00705;     Neuromuscular Jake:    0    mins  30337;    Therapeutic Activity:     0     mins  69617;     Gait Trainin     mins  51659;     Ultrasound:     0     mins  09576;    Electrical Stimulation:    0     mins  86635 ( );    Timed Treatment:   24   mins   Total Treatment:     60   mins    PT SIGNATURE: Diana Chester PT, DPT          Physical Therapist                               [place] License #[id number]    DATE TREATMENT INITIATED: 2019    Initial Certification  Certification Period: 11/10/2019  I certify that the therapy services are furnished while this patient is under my care.  The services outlined above are required by this patient, and will be reviewed every 90 days.     PHYSICIAN: Epley, James, CYNTHIA      DATE:     Please sign and return via fax to [phone].. Thank you, [facility].

## 2019-08-06 NOTE — PATIENT INSTRUCTIONS
Pt was educated regarding relevant anatomy/physiology and plan of care.      Access Code: [email or web address]   URL: [email or web address]   Date: 08/06/2019   Prepared by: Diana Chester     Exercises   Supine Active Straight Leg Raise - 10 reps - 2 sets - 1x daily   Clamshell - 10 reps - 2 sets - 1x daily   Supine Bridge - 10 reps - 2 sets - 5 hold - 1x daily - 7x weekly   Sidelying Hip Abduction - 10 reps - 2 sets - 1x daily

## 2019-08-12 ENCOUNTER — TREATMENT (OUTPATIENT)
Dept: PHYSICAL THERAPY | Facility: CLINIC | Age: 79
End: 2019-08-12

## 2019-08-12 DIAGNOSIS — R29.898 LEG WEAKNESS, BILATERAL: Primary | ICD-10-CM

## 2019-08-12 PROCEDURE — 97110 THERAPEUTIC EXERCISES: CPT | Performed by: PHYSICAL THERAPIST

## 2019-08-15 ENCOUNTER — OUTSIDE FACILITY SERVICE (OUTPATIENT)
Dept: PAIN MEDICINE | Facility: CLINIC | Age: 79
End: 2019-08-15

## 2019-08-15 ENCOUNTER — DOCUMENTATION (OUTPATIENT)
Dept: PAIN MEDICINE | Facility: CLINIC | Age: 79
End: 2019-08-15

## 2019-08-15 PROCEDURE — 62323 NJX INTERLAMINAR LMBR/SAC: CPT | Performed by: ANESTHESIOLOGY

## 2019-08-16 NOTE — PROGRESS NOTES
Physical Therapy Daily Progress Note    Visit #2    Subjective     Faustino Horton reports: adherence with HEP. Fatigues easily.       Objective   See Exercise, Manual, and Modality Logs for complete treatment.       Assessment/Plan  Tolerated exercises well without pain. Pt requires monitoring for form when he fatigues.  Progress per Plan of Care           Manual Therapy:    0     mins  28690;  Therapeutic Exercise:    30     mins  23483;     Neuromuscular Jake:    0    mins  43479;    Therapeutic Activity:     0     mins  69707;     Gait Trainin     mins  66999;     Ultrasound:     0     mins  00928;    Electrical Stimulation:    0     mins  19277 ( );    Timed Treatment:   30   mins   Total Treatment:     30   mins    Diana Chester PT, DPT  Physical Therapist  [place] License #[id number]

## 2019-08-16 NOTE — PROGRESS NOTES
Lumbar Epidural Steroid Injection  [facility]    PREOPERATIVE DIAGNOSIS:   Chronic low back pain  POSTOPERATIVE DIAGNOSIS:  Same as preop diagnosis    PROCEDURE:   Lumbar Epidural Steroid Injection, Therapeutic Translaminar Injection, with epidurogram, at  L5/S1 level    PRE-PROCEDURE DISCUSSION WITH PATIENT:    Risks and complications were discussed with the patient prior to starting the procedure and informed consent was obtained.  We discussed various topics including but not limited to bleeding, infection, injury, paralysis, nerve injury, dural puncture, coma, death, worsening of clinical picture, lack of pain relief, and postprocedural soreness.    SURGEON:  Deandre Coleman MD    REASON FOR PROCEDURE:    Previous clinically significant therapeutic effect is noted. and Acute pain flareup is noted & problematic, and the injection is used to attempt to break the flareup.      SEDATION:  Patient declined administration of moderate sedation    ANESTHETIC:  Marcaine 0.25%  STEROID:   Methylprednisolone (DEPO MEDROL) 80mg/ml    DESCRIPTON OF PROCEDURE:    After obtaining informed consent, I.V. was not started in the preop area.   The patient was taken to the operating room and placed in the prone position.  EKG, blood pressure, and pulse oximeter were monitored throughout, and sedation was provided as needed by the RN under my guidance. All pressure points were well padded.  The lumbar spine area was prepped with Chloraprep and draped in a sterile fashion.  Under fluoroscopic guidance, the above mentioned interlaminar space was identified. Skin and subcutaneous tissues were anesthetized with 1% lidocaine in the middle of the space. A Tuohy needle was introduced through the skin and advanced to this interlaminar space and into the epidural space under fluoroscopic guidance and verified with loss-of-resistance technique to air.  After confirming the position of the needle with the fluoroscope with  all the views, and after aspiration was confirmed negative for blood and CSF, 1.5 mL of Omnipaque was injected.  After seeing appropriate epidurogram with lateral and PA views, a total of 3 cc solution was injected, consisting of 2cc of local anesthetic as above, with normal saline and injectable steroid as above.     ESTIMATED BLOOD LOSS:  <5 mL  SPECIMENS:  None    COMPLICATIONS:     No complications were noted., There was no indication of vascular uptake on live injection of contrast dye., There was no indication of intrathecal uptake on live injection of contrast dye., There was not any evidence of dural puncture.   and The patient did not have any signs of postprocedure numbness nor weakness.    TOLERANCE & DISCHARGE CONDITION:    The patient tolerated the procedure well.  The patient was transported to the recovery area without difficulties.  The patient was discharged to home under the care of family in stable and satisfactory condition.    PLAN OF CARE:  1. The patient was given our standard instruction sheet.  2. The patient will Return to clinic 4 wks and Repeat injection in 3 months PRN  3. The patient will resume all medications as per the medication reconciliation sheet.

## 2019-08-19 ENCOUNTER — TREATMENT (OUTPATIENT)
Dept: PHYSICAL THERAPY | Facility: CLINIC | Age: 79
End: 2019-08-19

## 2019-08-19 DIAGNOSIS — R29.898 LEG WEAKNESS, BILATERAL: Primary | ICD-10-CM

## 2019-08-19 PROCEDURE — 97110 THERAPEUTIC EXERCISES: CPT | Performed by: PHYSICAL THERAPIST

## 2019-08-19 NOTE — PROGRESS NOTES
Physical Therapy Daily Progress Note    Visit #3    Subjective     Faustino Horton reports: he got another epidural, helped some but not as much as the first.      Objective   See Exercise, Manual, and Modality Logs for complete treatment.       Assessment/Plan  Tolerated new exercises well without increased pain.   Progress per Plan of Care           Manual Therapy:    0     mins  30238;  Therapeutic Exercise:    35     mins  09108;     Neuromuscular Jake:    0    mins  24180;    Therapeutic Activity:     0     mins  94182;     Gait Trainin     mins  58127;     Ultrasound:     0     mins  39039;    Electrical Stimulation:    0     mins  21886 ( );    Timed Treatment:   35   mins   Total Treatment:     35   mins    Diana Chester PT, DPT  Physical Therapist  [place] License #[id number]

## 2019-08-22 ENCOUNTER — TREATMENT (OUTPATIENT)
Dept: PHYSICAL THERAPY | Facility: CLINIC | Age: 79
End: 2019-08-22

## 2019-08-22 DIAGNOSIS — R29.898 LEG WEAKNESS, BILATERAL: Primary | ICD-10-CM

## 2019-08-22 PROCEDURE — 97110 THERAPEUTIC EXERCISES: CPT | Performed by: PHYSICAL THERAPIST

## 2019-08-22 NOTE — PROGRESS NOTES
Physical Therapy Daily Progress Note    Visit #4    Subjective     Faustino Horton reports: he gets an injection in his back tomorrow.      Objective   See Exercise, Manual, and Modality Logs for complete treatment.       Assessment/Plan  Fatigued easily today.  Progress per Plan of Care           Manual Therapy:    0     mins  33095;  Therapeutic Exercise:    35     mins  06707;     Neuromuscular Jake:    0    mins  26721;    Therapeutic Activity:     0     mins  69402;     Gait Trainin     mins  22108;     Ultrasound:     0     mins  32461;    Electrical Stimulation:    0     mins  87910 ( );    Timed Treatment:   35   mins   Total Treatment:     35   mins    Diana Chester PT, DPT  Physical Therapist  [place] License #[id number]

## 2019-08-26 ENCOUNTER — TREATMENT (OUTPATIENT)
Dept: PHYSICAL THERAPY | Facility: CLINIC | Age: 79
End: 2019-08-26

## 2019-08-26 DIAGNOSIS — R29.898 LEG WEAKNESS, BILATERAL: Primary | ICD-10-CM

## 2019-08-26 PROCEDURE — 97110 THERAPEUTIC EXERCISES: CPT | Performed by: PHYSICAL THERAPIST

## 2019-08-26 NOTE — PROGRESS NOTES
Physical Therapy Daily Progress Note    Visit #5    Subjective     Faustino Horton reports: very tired after last session, but didn't get too sore.       Objective   See Exercise, Manual, and Modality Logs for complete treatment.       Assessment/Plan  Increased standing exercise today, tolerated well without pain.   Progress per Plan of Care           Manual Therapy:    0     mins  89603;  Therapeutic Exercise:    35     mins  99001;     Neuromuscular Jake:    0    mins  93685;    Therapeutic Activity:     0     mins  93510;     Gait Trainin     mins  59520;     Ultrasound:     0     mins  52093;    Electrical Stimulation:    0     mins  34092 ( );    Timed Treatment:   35   mins   Total Treatment:     35   mins    Diana Chester PT, DPT  Physical Therapist  [place] License #[id number]

## 2019-09-05 ENCOUNTER — TREATMENT (OUTPATIENT)
Dept: PHYSICAL THERAPY | Facility: CLINIC | Age: 79
End: 2019-09-05

## 2019-09-05 DIAGNOSIS — R29.898 LEG WEAKNESS, BILATERAL: Primary | ICD-10-CM

## 2019-09-05 PROCEDURE — 97110 THERAPEUTIC EXERCISES: CPT | Performed by: PHYSICAL THERAPIST

## 2019-09-05 NOTE — PROGRESS NOTES
Physical Therapy Daily Progress Note    Visit #6    Subjective     Faustino Horton reports: he visited his daughter in [place], got home last night. Had to take breaks [place] walking in the [place] but didn't need to use a wheelchair.      Objective   See Exercise, Manual, and Modality Logs for complete treatment.       Assessment/Plan  Able to increase repetitions today, will add new exercises next visit.  Progress per Plan of Care           Manual Therapy:    0     mins  22453;  Therapeutic Exercise:    35     mins  97796;     Neuromuscular Jake:    0    mins  58354;    Therapeutic Activity:     0     mins  99979;     Gait Trainin     mins  11827;     Ultrasound:     0     mins  10016;    Electrical Stimulation:    0     mins  96145 ( );    Timed Treatment:   35   mins   Total Treatment:     35   mins    Diana Chester PT, DPT  Physical Therapist  [place] License #[id number]

## 2019-09-10 ENCOUNTER — OFFICE VISIT (OUTPATIENT)
Dept: PHYSICAL THERAPY | Facility: CLINIC | Age: 79
End: 2019-09-10

## 2019-09-10 DIAGNOSIS — R29.898 LEG WEAKNESS, BILATERAL: Primary | ICD-10-CM

## 2019-09-10 PROCEDURE — 97110 THERAPEUTIC EXERCISES: CPT | Performed by: PHYSICAL THERAPIST

## 2019-09-10 NOTE — PROGRESS NOTES
Re-Assessment / Re-Certification    Patient: Faustino Horton Jr.   : 1940  Diagnosis/ICD-10 Code:  Leg weakness, bilateral [R29.898]  Referring practitioner: Nan Ramachandran MD  Date of Initial Visit: 2019  Today's Date: 9/10/2019  Patient seen for 7 sessions      Subjective:   Faustino Horton reports: he feels like his strength is slowly improving. Is able to work in the yard for small amounts of time, and had an easier time getting on to his boat over the weekend. Is able to walk ~15 minutes at a time before he has to rest.  Subjective Questionnaire: LEFS: 35/80 (improved from 32/80 at initial evaluation)  Clinical Progress: improved  Home Program Compliance: Yes  Treatment has included: therapeutic exercise, neuromuscular re-education and therapeutic activity    Subjective   Objective       Strength/Myotome Testing     Left Hip   Planes of Motion   Flexion: 4-  Extension: 4-  Abduction: 4-    Right Hip   Planes of Motion   Flexion: 4-  Extension: 4-  Abduction: 4-    Functional Assessment     Single Leg Stance   Left: 6 seconds  Right: 7 seconds     Assessment/Plan  Progress toward previous goals: Partially Met    Short Term Goals: 2-4 weeks. Patient will:  1. Be independent with initial HEP (MET)  2. Be instructed in posture and body mechanics (MET)  3. Walk 10 minutes on TM in clinic at 1.5 MPH or better. (NOT MET)    Long Term Goals: 4-6 weeks. Patient will:  1. Demonstrate improved core/bilateral lower extremity MMT of >/= 4+/5 (PROGRESSING)  2. Demonstrate lower extremity flexibility WFL. (PROGRESSING)  3. Demonstrate adequate gluteal activation and control of dynamic genu valgus with squat lifting/squatting for improved performance of ADL's. (PROGRESSING)  4. Report ability to walk 30 minutes without seated rest break. (PROGRESSING)  5. Perceived disability improved by 20% as measured by LEFS (PROGRESSING)  6. Demo SLS 10 seconds or greater bilaterally for improved balance and decreased risk of  falls. (NOT MET)      Recommendations: Continue as planned  Timeframe: 1 month  Prognosis to achieve goals: good    PT Signature: Diana Chester, PT, DPT                         Physical Therapist                         [place] License #[id number]    Based upon review of the patient's progress and continued therapy plan, it is my medical opinion that Faustino Horton should continue physical therapy treatment at [facility] PHYSICAL THERAPY  [address], [address] [address] [phone].    Signature: __________________________________  Nan Ramachandran MD    Manual Therapy:    0     mins  61794;  Therapeutic Exercise:    40     mins  34814;     Neuromuscular Jake:    0    mins  46221;    Therapeutic Activity:     0     mins  53740;     Gait Trainin     mins  16594;     Ultrasound:     0     mins  83748;    Electrical Stimulation:    0     mins  18239 ( );    Timed Treatment:   40   mins   Total Treatment:     40   mins

## 2019-09-12 ENCOUNTER — OFFICE VISIT (OUTPATIENT)
Dept: PHYSICAL THERAPY | Facility: CLINIC | Age: 79
End: 2019-09-12

## 2019-09-12 DIAGNOSIS — R29.898 LEG WEAKNESS, BILATERAL: Primary | ICD-10-CM

## 2019-09-12 PROCEDURE — 97110 THERAPEUTIC EXERCISES: CPT | Performed by: PHYSICAL THERAPIST

## 2019-09-12 NOTE — PROGRESS NOTES
Physical Therapy Daily Progress Note    Visit #8    Subjective     Faustino Horton reports: his hip is bothering him today.      Objective   See Exercise, Manual, and Modality Logs for complete treatment.       Assessment/Plan  Added hamstring stretch today, that combined with ITB stretch improved hip ROM for strengthening exercises.  Progress per Plan of Care           Manual Therapy:    0     mins  79904;  Therapeutic Exercise:    35     mins  36363;     Neuromuscular Jake:    0    mins  28317;    Therapeutic Activity:     0     mins  27672;     Gait Trainin     mins  97685;     Ultrasound:     0     mins  53983;    Electrical Stimulation:    0     mins  83033 ( );    Timed Treatment:   35   mins   Total Treatment:     35   mins    Diana Chester PT, DPT  Physical Therapist  [place] License #[id number]

## 2019-09-18 ENCOUNTER — OFFICE VISIT (OUTPATIENT)
Dept: PAIN MEDICINE | Facility: CLINIC | Age: 79
End: 2019-09-18

## 2019-09-18 ENCOUNTER — HOSPITAL ENCOUNTER (OUTPATIENT)
Dept: GENERAL RADIOLOGY | Facility: HOSPITAL | Age: 79
Discharge: HOME | End: 2019-09-18
Admitting: NURSE PRACTITIONER

## 2019-09-18 VITALS
WEIGHT: 210.6 LBS | DIASTOLIC BLOOD PRESSURE: 83 MMHG | TEMPERATURE: 97.9 F | HEIGHT: 72 IN | RESPIRATION RATE: 16 BRPM | SYSTOLIC BLOOD PRESSURE: 129 MMHG | OXYGEN SATURATION: 94 % | BODY MASS INDEX: 28.53 KG/M2 | HEART RATE: 77 BPM

## 2019-09-18 DIAGNOSIS — M25.551 PAIN OF RIGHT HIP JOINT: ICD-10-CM

## 2019-09-18 DIAGNOSIS — G89.29 CHRONIC BILATERAL LOW BACK PAIN WITHOUT SCIATICA: ICD-10-CM

## 2019-09-18 DIAGNOSIS — M54.50 CHRONIC BILATERAL LOW BACK PAIN WITHOUT SCIATICA: ICD-10-CM

## 2019-09-18 DIAGNOSIS — G89.29 OTHER CHRONIC PAIN: Primary | ICD-10-CM

## 2019-09-18 PROCEDURE — 73502 X-RAY EXAM HIP UNI 2-3 VIEWS: CPT

## 2019-09-18 PROCEDURE — 99213 OFFICE O/P EST LOW 20 MIN: CPT | Performed by: NURSE PRACTITIONER

## 2019-09-18 NOTE — PROGRESS NOTES
Xray shows joint space of right hip narrowed - likely a result of osteoarthritis.  We can see how he does with the upcoming LESI, or I could go ahead and send him to see orthopedics.  Let me know what his preference is.

## 2019-09-18 NOTE — PROGRESS NOTES
CHIEF COMPLAINT  F/U back pain- Lumbar Epidural Steroid Injection- patient states that he had 20% improvement. He states that it was not as effective as his first procedure.       Subjective   Faustino Horton Jr. is a 79 y.o. male  who presents to the office for follow-up of procedure.  He completed a L5/S1 LESI   on  8/15/19 performed by Dr. Coleman for management of back pain. Patient reports 20% relief from the procedure.     Reports 6 months of significant relief from previous LESI.      Also reporting worsening right hip/groin area pain.  Worsening since starting PT.  Worst pain with hip abduction exercises.      Back Pain   This is a chronic problem. The current episode started more than 1 year ago. The problem occurs daily. The problem has been waxing and waning since onset. The pain is present in the lumbar spine. The quality of the pain is described as aching and burning. The pain does not radiate. The pain is at a severity of 4/10. The pain is moderate. Exacerbated by: activity. Associated symptoms include numbness (bilat feet) and weakness (back and bilat legs). Pertinent negatives include no abdominal pain, bladder incontinence, bowel incontinence, chest pain, dysuria, fever or headaches. Risk factors include sedentary lifestyle and obesity. He has tried NSAIDs and analgesics for the symptoms. The treatment provided significant (with LESI) relief.      PEG Assessment   What number best describes your pain on average in the past week?5  What number best describes how, during the past week, pain has interfered with your enjoyment of life?5  What number best describes how, during the past week, pain has interfered with your general activity?  6    The following portions of the patient's history were reviewed and updated as appropriate: allergies, current medications, past family history, past medical history, past social history, past surgical history and problem list.    Review of Systems   Constitutional:  "Positive for activity change (dec) and fatigue (occ). Negative for chills and fever.   HENT: Negative for congestion.    Eyes: Negative for visual disturbance.   Respiratory: Positive for shortness of breath (occ). Negative for cough, chest tightness and wheezing.    Cardiovascular: Negative.  Negative for chest pain.   Gastrointestinal: Negative for abdominal pain, bowel incontinence, constipation, diarrhea and vomiting.   Genitourinary: Negative for bladder incontinence, difficulty urinating and dysuria.   Musculoskeletal: Positive for back pain (right lower lumbar).   Neurological: Positive for weakness (back and bilat legs) and numbness (bilat feet). Negative for dizziness, light-headedness and headaches.   Psychiatric/Behavioral: Positive for sleep disturbance (occ). Negative for agitation and suicidal ideas. The patient is not nervous/anxious.    All other systems reviewed and are negative.    Vitals:    09/18/19 1247   BP: 129/83   Pulse: 77   Resp: 16   Temp: 97.9 °F (36.6 °C)   SpO2: 94%   Weight: 95.5 kg (210 lb 9.6 oz)   Height: 182.9 cm (72\")   PainSc:   4   PainLoc: Back     Objective   Physical Exam   Constitutional: He is oriented to person, place, and time. He appears well-developed and well-nourished. No distress.   HENT:   Head: Normocephalic and atraumatic.   Eyes: Conjunctivae and EOM are normal.   Neck: Neck supple.   Cardiovascular: Normal rate.   Pulmonary/Chest: Effort normal. No respiratory distress.   Musculoskeletal:        Lumbar back: He exhibits tenderness and pain.   Neurological: He is alert and oriented to person, place, and time. He has normal strength. No sensory deficit. Gait normal.   Skin: Skin is warm and dry. He is not diaphoretic.   Psychiatric: He has a normal mood and affect. His behavior is normal.   Nursing note and vitals reviewed.    Assessment/Plan   Faustino was seen today for back pain.    Diagnoses and all orders for this visit:    Other chronic pain    Chronic " bilateral low back pain without sciatica  -     Case Request    Pain of right hip joint  -     XR hip w or wo pelvis 2-3 view right; Future      --- Repeat L5/S1 LESI  --- xray right hip   --- Follow-up after procedure        FARA REPORT  FARA report has been reviewed and scanned into the patient's chart.    As the clinician, I personally reviewed the FARA from 9/18/19 while the patient was in the office today.    EMR Dragon/Transcription disclaimer:   Much of this encounter note is an electronic transcription/translation of spoken language to printed text. The electronic translation of spoken language may permit erroneous, or at times, nonsensical words or phrases to be inadvertently transcribed; Although I have reviewed the note for such errors, some may still exist.

## 2019-09-23 ENCOUNTER — TREATMENT (OUTPATIENT)
Dept: PHYSICAL THERAPY | Facility: CLINIC | Age: 79
End: 2019-09-23

## 2019-09-23 DIAGNOSIS — R29.898 LEG WEAKNESS, BILATERAL: Primary | ICD-10-CM

## 2019-09-23 PROCEDURE — 97110 THERAPEUTIC EXERCISES: CPT | Performed by: PHYSICAL THERAPIST

## 2019-09-23 NOTE — PROGRESS NOTES
Physical Therapy Daily Progress Note    Visit #9    Subjective     Faustino Horton reports: he saw pain management MD, who is going to repeat his injection since the last one didn't help. Also had a hip x-ray which showed OA, will be referred to ortho if injection does not help.      Objective   See Exercise, Manual, and Modality Logs for complete treatment.       Assessment/Plan  Decreased reps on RLE for a few exercises due to pain.  Progress per Plan of Care           Manual Therapy:    0     mins  38324;  Therapeutic Exercise:    35     mins  50110;     Neuromuscular Jake:    0    mins  40602;    Therapeutic Activity:     0     mins  90878;     Gait Trainin     mins  90535;     Ultrasound:     0     mins  20412;    Electrical Stimulation:    0     mins  87064 ( );    Timed Treatment:   35   mins   Total Treatment:     35   mins    Diana Chester PT, DPT  Physical Therapist  [place] License #[id number]

## 2019-09-23 NOTE — PROGRESS NOTES
I spoke with Mr. Greshamwer and relayed his x-ray results to him. He would like to get the LESI first and if it doesn't help he would like to see the orthopedic doctor.

## 2019-09-26 ENCOUNTER — TREATMENT (OUTPATIENT)
Dept: PHYSICAL THERAPY | Facility: CLINIC | Age: 79
End: 2019-09-26

## 2019-09-26 DIAGNOSIS — R29.898 LEG WEAKNESS, BILATERAL: Primary | ICD-10-CM

## 2019-09-26 PROCEDURE — 97110 THERAPEUTIC EXERCISES: CPT | Performed by: PHYSICAL THERAPIST

## 2019-09-26 NOTE — PROGRESS NOTES
Physical Therapy Daily Progress Note    Visit #10    Abel Horton reports: no new complaints      Objective   See Exercise, Manual, and Modality Logs for complete treatment.       Assessment/Plan  Continued to decrease reps secondary to hip pain, but tolerated well otherwise. Gait continues to improve.   Progress per Plan of Care           Manual Therapy:    0     mins  14650;  Therapeutic Exercise:    35     mins  18379;     Neuromuscular Jake:    0    mins  28507;    Therapeutic Activity:     0     mins  11005;     Gait Trainin     mins  61332;     Ultrasound:     0     mins  88215;    Electrical Stimulation:    0     mins  72466 ( );    Timed Treatment:   35   mins   Total Treatment:     35   mins    Diana Chester PT, DPT  Physical Therapist  [place] License #[id number]

## 2019-10-01 ENCOUNTER — TREATMENT (OUTPATIENT)
Dept: PHYSICAL THERAPY | Facility: CLINIC | Age: 79
End: 2019-10-01

## 2019-10-01 DIAGNOSIS — R29.898 LEG WEAKNESS, BILATERAL: Primary | ICD-10-CM

## 2019-10-01 PROCEDURE — 97110 THERAPEUTIC EXERCISES: CPT | Performed by: PHYSICAL THERAPIST

## 2019-10-01 NOTE — PROGRESS NOTES
Physical Therapy Daily Progress Note    Visit #11    Subjective     Faustino Horton reports: no new complaints. Has to leave a bit early today.      Objective   See Exercise, Manual, and Modality Logs for complete treatment.       Assessment/Plan  Did not progress exercises due to pt time constraint, will add new exercises at next visit.  Progress per Plan of Care           Manual Therapy:    0     mins  11364;  Therapeutic Exercise:    35     mins  04675;     Neuromuscular Jake:    0    mins  62947;    Therapeutic Activity:     0     mins  38233;     Gait Trainin     mins  22430;     Ultrasound:     0     mins  53542;    Electrical Stimulation:    0     mins  40861 ( );    Timed Treatment:   35   mins   Total Treatment:     35   mins    Diana Chester, PT, DPT  Physical Therapist  [place] License #[id number]

## 2019-10-15 ENCOUNTER — TREATMENT (OUTPATIENT)
Dept: PHYSICAL THERAPY | Facility: CLINIC | Age: 79
End: 2019-10-15

## 2019-10-15 DIAGNOSIS — R29.898 LEG WEAKNESS, BILATERAL: Primary | ICD-10-CM

## 2019-10-15 PROCEDURE — 97110 THERAPEUTIC EXERCISES: CPT | Performed by: PHYSICAL THERAPIST

## 2019-10-15 NOTE — PROGRESS NOTES
Re-Assessment / Re-Certification     Patient: Faustino Horton Jr.   : 1940  Diagnosis/ICD-10 Code:  Leg weakness, bilateral [R29.898]  Referring practitioner: Nan Ramachandran MD  Date of Initial Visit: 2019  Today's Date: 10/15/2019  Patient seen for 7 sessions        Subjective:   Faustino Horton reports: he feels like he is getting stronger, more flexible. Notes that he is walking better.  Subjective Questionnaire: LEFS: 35/80 (improved from 32/80 at initial evaluation)  Clinical Progress: improved  Home Program Compliance: Yes  Treatment has included: therapeutic exercise, neuromuscular re-education and therapeutic activity     Subjective   Objective         Strength/Myotome Testing      Left Hip   Planes of Motion   Flexion: 4  Extension: 4  Abduction: 4     Right Hip   Planes of Motion   Flexion: 4  Extension: 4  Abduction: 4     Functional Assessment      Single Leg Stance   Left: 7 seconds  Right: 7 seconds     Assessment/Plan  Progress toward previous goals: Partially Met     Short Term Goals: 2-4 weeks. Patient will:  1. Be independent with initial HEP (MET)  2. Be instructed in posture and body mechanics (MET)  3. Walk 10 minutes on TM in clinic at 1.5 MPH or better. (NOT MET)    Long Term Goals: 4-6 weeks. Patient will:  1. Demonstrate improved core/bilateral lower extremity MMT of >/= 4+/5 (PROGRESSING)  2. Demonstrate lower extremity flexibility WFL. (PROGRESSING)  3. Demonstrate adequate gluteal activation and control of dynamic genu valgus with squat lifting/squatting for improved performance of ADL's. (PROGRESSING)  4. Report ability to walk 30 minutes without seated rest break. (PROGRESSING)  5. Perceived disability improved by 20% as measured by LEFS (PROGRESSING)  6. Demo SLS 10 seconds or greater bilaterally for improved balance and decreased risk of falls. (PROGRESSING)                Recommendations: Continue as planned  Timeframe: 1 month  Prognosis to achieve goals: good     PT  Signature: Diana Chester, PT, DPT                         Physical Therapist                         [place] License #[id number]     Based upon review of the patient's progress and continued therapy plan, it is my medical opinion that Faustino Horton should continue physical therapy treatment at [facility] PHYSICAL THERAPY  [address] [place] [address], [address] [address] [phone].     Signature: __________________________________  Nan Ramachandran MD     Manual Therapy:            0     mins  63513;  Therapeutic Exercise:    35     mins  37732;     Neuromuscular Jake:    0    mins  73010;    Therapeutic Activity:      0     mins  76363;     Gait Trainin     mins  97634;     Ultrasound:                     0     mins  00485;    Electrical Stimulation:    0     mins  20631 ( );     Timed Treatment:   35   mins   Total Treatment:     35   mins

## 2019-10-17 ENCOUNTER — TREATMENT (OUTPATIENT)
Dept: PHYSICAL THERAPY | Facility: CLINIC | Age: 79
End: 2019-10-17

## 2019-10-17 DIAGNOSIS — R29.898 LEG WEAKNESS, BILATERAL: Primary | ICD-10-CM

## 2019-10-17 PROCEDURE — 97110 THERAPEUTIC EXERCISES: CPT | Performed by: PHYSICAL THERAPIST

## 2019-10-17 NOTE — PROGRESS NOTES
Physical Therapy Daily Progress Note    Visit #13    Subjective     Faustino Horton reports: feeling good today      Objective   See Exercise, Manual, and Modality Logs for complete treatment.       Assessment/Plan  Tolerated well.    Progress per Plan of Care           Manual Therapy:    0     mins  05997;  Therapeutic Exercise:    35     mins  66570;     Neuromuscular aJke:    0    mins  79398;    Therapeutic Activity:     0     mins  96805;     Gait Trainin     mins  05590;     Ultrasound:     0     mins  63461;    Electrical Stimulation:    0     mins  47595 ( );    Timed Treatment:   35   mins   Total Treatment:     35   mins    Diana Chester PT, DPT  Physical Therapist  [place] License #[id number]

## 2019-10-24 ENCOUNTER — OUTSIDE FACILITY SERVICE (OUTPATIENT)
Dept: PAIN MEDICINE | Facility: CLINIC | Age: 79
End: 2019-10-24

## 2019-10-24 ENCOUNTER — DOCUMENTATION (OUTPATIENT)
Dept: PAIN MEDICINE | Facility: CLINIC | Age: 79
End: 2019-10-24

## 2019-10-24 PROCEDURE — 62323 NJX INTERLAMINAR LMBR/SAC: CPT | Performed by: ANESTHESIOLOGY

## 2019-10-25 NOTE — PROGRESS NOTES
Lumbar Epidural Steroid Injection  [facility]    PREOPERATIVE DIAGNOSIS:   Chronic low back pain  POSTOPERATIVE DIAGNOSIS:  Same as preop diagnosis.  Other dx include right hip OA and SI dysfunction.    PROCEDURE:   Lumbar Epidural Steroid Injection, Therapeutic Translaminar Injection, with epidurogram, at  L5/S1 level    PRE-PROCEDURE DISCUSSION WITH PATIENT:    Risks and complications were discussed with the patient prior to starting the procedure and informed consent was obtained.  We discussed various topics including but not limited to bleeding, infection, injury, paralysis, nerve injury, dural puncture, coma, death, worsening of clinical picture, lack of pain relief, and postprocedural soreness.    SURGEON:  Deandre Coleman MD    REASON FOR PROCEDURE:    Diagnostic injection at this level is needed, Degenerative changes are noted in the area., Radiating pattern of pain is likely consistent with degenerative changes in the area., Because of the history of multiple painful diagnoses/findings, this injection is reasonable and necessary to attempt to distinguish relative contributions of a painful generator. and Acute pain flareup is noted & problematic, and the injection is used to attempt to break the flareup.      SEDATION:  Patient declined administration of moderate sedation    ANESTHETIC:  Marcaine 0.25%  STEROID:   Methylprednisolone (DEPO MEDROL) 80mg/ml    DESCRIPTON OF PROCEDURE:    After obtaining informed consent, I.V. was not started in the preop area.   The patient was taken to the operating room and placed in the prone position.  EKG, blood pressure, and pulse oximeter were monitored throughout, and sedation was provided as needed by the RN under my guidance. All pressure points were well padded.  The lumbar spine area was prepped with Chloraprep and draped in a sterile fashion.  Under fluoroscopic guidance, the above mentioned interlaminar space was identified. Skin and  subcutaneous tissues were anesthetized with 1% lidocaine in the middle of the space. A Tuohy needle was introduced through the skin and advanced to this interlaminar space and into the epidural space under fluoroscopic guidance and verified with loss-of-resistance technique to air.  After confirming the position of the needle with the fluoroscope with all the views, and after aspiration was confirmed negative for blood and CSF, 1.5 mL of Omnipaque was injected.  After seeing appropriate epidurogram with lateral and PA views, a total of 3 cc solution was injected, consisting of 2cc of local anesthetic as above, with normal saline and injectable steroid as above.     ESTIMATED BLOOD LOSS:  <5 mL  SPECIMENS:  None    COMPLICATIONS:     No complications were noted., There was no indication of vascular uptake on live injection of contrast dye., There was no indication of intrathecal uptake on live injection of contrast dye., There was not any evidence of dural puncture.   and The patient did not have any signs of postprocedure numbness nor weakness.    TOLERANCE & DISCHARGE CONDITION:    The patient tolerated the procedure well.  The patient was transported to the recovery area without difficulties.  The patient was discharged to home under the care of family in stable and satisfactory condition.    PLAN OF CARE:  1. The patient was given our standard instruction sheet.  2. The patient will Return to clinic 2 wks and may consider a hip injection if he is not improving.  3. The patient will resume all medications as per the medication reconciliation sheet.

## 2019-10-28 ENCOUNTER — TREATMENT (OUTPATIENT)
Dept: PHYSICAL THERAPY | Facility: CLINIC | Age: 79
End: 2019-10-28

## 2019-10-28 DIAGNOSIS — R29.898 LEG WEAKNESS, BILATERAL: Primary | ICD-10-CM

## 2019-10-28 PROCEDURE — 97110 THERAPEUTIC EXERCISES: CPT | Performed by: PHYSICAL THERAPIST

## 2019-10-28 NOTE — PROGRESS NOTES
Physical Therapy Daily Progress Note    Visit #14    Subjective     Faustino Horton reports: he had a lumbar epidural injection on Thursday, back pain has been a lot better so he was able to walk more over the weekend.       Objective   See Exercise, Manual, and Modality Logs for complete treatment.       Assessment/Plan  Tolerated well. Will progress exercises next visit.  Progress per Plan of Care           Manual Therapy:    0     mins  06752;  Therapeutic Exercise:    35     mins  59243;     Neuromuscular Jake:    0    mins  14102;    Therapeutic Activity:     0     mins  07929;     Gait Trainin     mins  62438;     Ultrasound:     0     mins  94047;    Electrical Stimulation:    0     mins  99791 ( );    Timed Treatment:   35   mins   Total Treatment:     35   mins    Diana Chester PT, DPT  Physical Therapist  [place] License #[id number]

## 2019-10-31 ENCOUNTER — TREATMENT (OUTPATIENT)
Dept: PHYSICAL THERAPY | Facility: CLINIC | Age: 79
End: 2019-10-31

## 2019-10-31 DIAGNOSIS — R29.898 LEG WEAKNESS, BILATERAL: Primary | ICD-10-CM

## 2019-10-31 PROCEDURE — 97110 THERAPEUTIC EXERCISES: CPT | Performed by: PHYSICAL THERAPIST

## 2019-10-31 NOTE — PROGRESS NOTES
" Physical Therapy Daily Progress Note    Visit #15    Subjective     Faustino Horton reports: he used to trip on \"the smallest things\", but now feels that he is lifting his feet better when he walks.      Objective   See Exercise, Manual, and Modality Logs for complete treatment.       Assessment/Plan  Continues to progress well toward goals.  Progress per Plan of Care           Manual Therapy:    0     mins  27576;  Therapeutic Exercise:    35     mins  61317;     Neuromuscular Jake:    0    mins  43479;    Therapeutic Activity:     0     mins  00387;     Gait Trainin     mins  92464;     Ultrasound:     0     mins  57287;    Electrical Stimulation:    0     mins  06518 ( );    Timed Treatment:   35   mins   Total Treatment:     35   mins    Diana Chester PT, DPT  Physical Therapist  [place] License #[id number]                    "

## 2019-11-04 ENCOUNTER — TREATMENT (OUTPATIENT)
Dept: PHYSICAL THERAPY | Facility: CLINIC | Age: 79
End: 2019-11-04

## 2019-11-04 DIAGNOSIS — R29.898 LEG WEAKNESS, BILATERAL: Primary | ICD-10-CM

## 2019-11-04 PROCEDURE — 97110 THERAPEUTIC EXERCISES: CPT | Performed by: PHYSICAL THERAPIST

## 2019-11-04 NOTE — PROGRESS NOTES
Physical Therapy Daily Progress Note    Visit #16    Subjective     Faustino Horton reports: he feels that his walking tolerance is increased      Objective   See Exercise, Manual, and Modality Logs for complete treatment.       Assessment/Plan  Hip strength and step length are both increased. Progressing well toward goals.  Progress per Plan of Care           Manual Therapy:    0     mins  80980;  Therapeutic Exercise:    35     mins  77058;     Neuromuscular Jake:    0    mins  89099;    Therapeutic Activity:     0     mins  06001;     Gait Trainin     mins  30331;     Ultrasound:     0     mins  71504;    Electrical Stimulation:    0     mins  40082 ( );    Timed Treatment:   35   mins   Total Treatment:     35   mins    Diana Chester PT, DPT  Physical Therapist  [place] License #[id number]

## 2019-11-07 ENCOUNTER — TREATMENT (OUTPATIENT)
Dept: PHYSICAL THERAPY | Facility: CLINIC | Age: 79
End: 2019-11-07

## 2019-11-07 DIAGNOSIS — R29.898 LEG WEAKNESS, BILATERAL: Primary | ICD-10-CM

## 2019-11-07 PROCEDURE — 97110 THERAPEUTIC EXERCISES: CPT | Performed by: PHYSICAL THERAPIST

## 2019-11-08 NOTE — PROGRESS NOTES
Physical Therapy Daily Progress Note    Visit #17    Subjective     Faustino Horton reports: he is taking a new medicine for bone density, it has made him feel ill.     Objective   See Exercise, Manual, and Modality Logs for complete treatment.       Assessment/Plan  Held jesusita walks today per pt request because he didn't feel well. Will resume and progress as able.  Progress per Plan of Care           Manual Therapy:    0     mins  63771;  Therapeutic Exercise:    35     mins  34079;     Neuromuscular Jake:    0    mins  47348;    Therapeutic Activity:     0     mins  67394;     Gait Trainin     mins  83225;     Ultrasound:     0     mins  28709;    Electrical Stimulation:    0     mins  40812 ( );    Timed Treatment:   35   mins   Total Treatment:     35   mins    Diana Chester, PT, DPT  Physical Therapist  [place] License #[id number]

## 2019-11-11 ENCOUNTER — TREATMENT (OUTPATIENT)
Dept: PHYSICAL THERAPY | Facility: CLINIC | Age: 79
End: 2019-11-11

## 2019-11-11 DIAGNOSIS — R29.898 LEG WEAKNESS, BILATERAL: Primary | ICD-10-CM

## 2019-11-11 PROCEDURE — 97110 THERAPEUTIC EXERCISES: CPT | Performed by: PHYSICAL THERAPIST

## 2019-11-11 NOTE — PROGRESS NOTES
Physical Therapy Daily Progress Note    Visit #18    Subjective     Faustino Horton reports: feeling better today since he stopped taking the new medicine.      Objective   See Exercise, Manual, and Modality Logs for complete treatment.       Assessment/Plan  Step length increased, as is gait speed.   Progress per Plan of Care           Manual Therapy:    0     mins  06228;  Therapeutic Exercise:    35     mins  25370;     Neuromuscular Jake:    0    mins  82476;    Therapeutic Activity:     0     mins  82669;     Gait Trainin     mins  01539;     Ultrasound:     0     mins  45350;    Electrical Stimulation:    0     mins  54314 ( );    Timed Treatment:   35   mins   Total Treatment:     35   mins    Diana Chester PT, DPT  Physical Therapist  [place] License #[id number]

## 2019-11-14 ENCOUNTER — TREATMENT (OUTPATIENT)
Dept: PHYSICAL THERAPY | Facility: CLINIC | Age: 79
End: 2019-11-14

## 2019-11-14 DIAGNOSIS — R29.898 LEG WEAKNESS, BILATERAL: Primary | ICD-10-CM

## 2019-11-14 PROCEDURE — 97110 THERAPEUTIC EXERCISES: CPT | Performed by: PHYSICAL THERAPIST

## 2019-11-14 NOTE — PROGRESS NOTES
Physical Therapy Daily Progress Note    Visit #19    Subjective     Faustino Horton reports: he is busy with moving right now, is adherent with his HEP and would like to hold on PT at this time.      Objective   See Exercise, Manual, and Modality Logs for complete treatment.       Assessment/Plan  Reviewed HEP, will hold PT at this time per pt request.           Manual Therapy:    0     mins  67120;  Therapeutic Exercise:    30     mins  78294;     Neuromuscular Jake:    0    mins  32858;    Therapeutic Activity:     0     mins  14346;     Gait Trainin     mins  84024;     Ultrasound:     0     mins  71684;    Electrical Stimulation:    0     mins  49066 ( );    Timed Treatment:   30   mins   Total Treatment:     30   mins    Diana Chester, PT, DPT  Physical Therapist  [place] License #[id number]

## 2019-12-05 ENCOUNTER — HOSPITAL ENCOUNTER (OUTPATIENT)
Dept: GENERAL RADIOLOGY | Facility: HOSPITAL | Age: 79
Discharge: HOME | End: 2019-12-05
Admitting: NURSE PRACTITIONER

## 2019-12-05 DIAGNOSIS — Z86.79 S/P AAA (ABDOMINAL AORTIC ANEURYSM) REPAIR: ICD-10-CM

## 2019-12-05 DIAGNOSIS — Z98.890 S/P AAA (ABDOMINAL AORTIC ANEURYSM) REPAIR: ICD-10-CM

## 2019-12-05 PROCEDURE — 74021 RADEX ABDOMEN 3+ VIEWS: CPT

## 2020-06-11 ENCOUNTER — HOSPITAL ENCOUNTER (OUTPATIENT)
Dept: GENERAL RADIOLOGY | Facility: HOSPITAL | Age: 80
Discharge: HOME | End: 2020-06-11
Admitting: NURSE PRACTITIONER

## 2020-06-11 DIAGNOSIS — Z86.79 S/P AAA (ABDOMINAL AORTIC ANEURYSM) REPAIR: ICD-10-CM

## 2020-06-11 DIAGNOSIS — Z98.890 S/P AAA (ABDOMINAL AORTIC ANEURYSM) REPAIR: ICD-10-CM

## 2020-06-11 PROCEDURE — 74021 RADEX ABDOMEN 3+ VIEWS: CPT

## 2021-06-17 ENCOUNTER — HOSPITAL ENCOUNTER (OUTPATIENT)
Dept: GENERAL RADIOLOGY | Facility: HOSPITAL | Age: 81
Discharge: HOME | End: 2021-06-17
Admitting: NURSE PRACTITIONER

## 2021-06-17 DIAGNOSIS — Z86.79 S/P AAA (ABDOMINAL AORTIC ANEURYSM) REPAIR: ICD-10-CM

## 2021-06-17 DIAGNOSIS — Z98.890 S/P AAA (ABDOMINAL AORTIC ANEURYSM) REPAIR: ICD-10-CM

## 2021-06-17 PROCEDURE — 74021 RADEX ABDOMEN 3+ VIEWS: CPT

## 2022-06-23 ENCOUNTER — HOSPITAL ENCOUNTER (OUTPATIENT)
Dept: GENERAL RADIOLOGY | Facility: HOSPITAL | Age: 82
Discharge: HOME | End: 2022-06-23
Admitting: SURGERY

## 2022-06-23 ENCOUNTER — TRANSCRIBE ORDERS (OUTPATIENT)
Dept: ADMINISTRATIVE | Facility: HOSPITAL | Age: 82
End: 2022-06-23

## 2022-06-23 DIAGNOSIS — Z95.828 S/P INSERTION OF ENDOVASCULAR THORACIC AORTIC STENT GRAFT: ICD-10-CM

## 2022-06-23 PROCEDURE — 74021 RADEX ABDOMEN 3+ VIEWS: CPT

## 2023-05-30 ENCOUNTER — APPOINTMENT (OUTPATIENT)
Dept: GENERAL RADIOLOGY | Facility: HOSPITAL | Age: 83
End: 2023-05-30

## 2023-05-30 ENCOUNTER — HOSPITAL ENCOUNTER (OUTPATIENT)
Facility: HOSPITAL | Age: 83
Discharge: HOME | End: 2023-05-30
Attending: EMERGENCY MEDICINE | Admitting: EMERGENCY MEDICINE

## 2023-05-30 VITALS
WEIGHT: 206 LBS | HEART RATE: 61 BPM | TEMPERATURE: 98.1 F | BODY MASS INDEX: 27.9 KG/M2 | HEIGHT: 72 IN | SYSTOLIC BLOOD PRESSURE: 150 MMHG | DIASTOLIC BLOOD PRESSURE: 79 MMHG | OXYGEN SATURATION: 95 % | RESPIRATION RATE: 20 BRPM

## 2023-05-30 DIAGNOSIS — S22.42XA CLOSED FRACTURE OF MULTIPLE RIBS OF LEFT SIDE, INITIAL ENCOUNTER: Primary | ICD-10-CM

## 2023-05-30 PROCEDURE — G0463 HOSPITAL OUTPT CLINIC VISIT: HCPCS | Performed by: EMERGENCY MEDICINE

## 2023-05-30 PROCEDURE — 71101 X-RAY EXAM UNILAT RIBS/CHEST: CPT

## 2023-05-30 RX ORDER — METFORMIN HYDROCHLORIDE 500 MG/1
2 TABLET, EXTENDED RELEASE ORAL 2 TIMES DAILY
COMMUNITY
Start: 2022-04-02

## 2023-05-30 RX ORDER — UBIDECARENONE 75 MG
CAPSULE ORAL
COMMUNITY
Start: 2023-02-18 | End: 2023-05-30 | Stop reason: SDUPTHER

## 2023-05-30 RX ORDER — CYCLOBENZAPRINE HCL 10 MG
10 TABLET ORAL
COMMUNITY
Start: 2023-04-13

## 2023-05-30 RX ORDER — TESTOSTERONE CYPIONATE 200 MG/ML
200 INJECTION, SOLUTION INTRAMUSCULAR
COMMUNITY
Start: 2023-02-18

## 2023-05-30 RX ORDER — DICLOFENAC SODIUM 75 MG/1
75 TABLET, DELAYED RELEASE ORAL 2 TIMES DAILY
Qty: 20 TABLET | Refills: 0 | Status: SHIPPED | OUTPATIENT
Start: 2023-05-30

## 2023-05-30 RX ORDER — HYDROCODONE BITARTRATE AND ACETAMINOPHEN 7.5; 325 MG/1; MG/1
1 TABLET ORAL
COMMUNITY
Start: 2023-04-13

## 2023-05-30 RX ORDER — MELATONIN
1000 DAILY
COMMUNITY

## 2023-05-30 RX ORDER — ATORVASTATIN CALCIUM 80 MG/1
1 TABLET, FILM COATED ORAL DAILY
COMMUNITY
Start: 2022-06-15

## 2023-05-30 NOTE — FSED PROVIDER NOTE
Subjective   History of Present Illness  Pt states he was reaching over to  something, over extended, felt a pop in his L. Side and now with L. Rib pain, hurts to extend or bend over, no abd pain/n/v/d, breathing ok, alleviated by rest, no other injuries to report    History provided by:  Patient      Review of Systems   Constitutional: Negative.    HENT: Negative.    Cardiovascular: Positive for chest pain.   Gastrointestinal: Positive for abdominal distention.   All other systems reviewed and are negative.      Past Medical History:   Diagnosis Date   • AAA (abdominal aortic aneurysm)    • Acute kidney failure    • Acute MI     2004 - stented   • Adrenal insufficiency    • Arthritis    • B12 deficiency    • Back pain    • Cancer     prostate - prostatectomy   • Clostridium difficile infection    • Coronary artery disease    • Diabetes mellitus     oral meds   • Gout    • Hyperlipidemia    • Hypertension    • Hypotension     2-3 years ago was hospitalized 13 times in 1 year span abrupt hypotension   • Hypothyroid    • Low testosterone    • Macular degeneration    • Myalgia    • Peripheral nerve disease    • Peripheral vascular disease    • Pneumonia    • Prostate cancer    • Rotator cuff disorder    • Sepsis    • Shoulder pain    • Vertebral compression fracture        Allergies   Allergen Reactions   • Losartan Other (See Comments)     Other reaction(s): facial swelling, itching   • Acetylcysteine Hives   • Allopurinol Hives   • Baclofen Itching   • Hydrocodone-Acetaminophen Swelling   • Levaquin [Levofloxacin] Itching   • Nsaids Other (See Comments)     Reaction: due to renal dysfunction  Reaction: due to renal dysfunction   • Oxycodone Other (See Comments)     Other reaction(s): n/v; headache   • Tolmetin Other (See Comments)     Reaction: due to renal dysfunction       Past Surgical History:   Procedure Laterality Date   • ABDOMINAL AORTIC ANEURYSM REPAIR Bilateral 09/28/2007    Bilateral femoral cut  downs with diagnostic and iliofemoral arteriogram with intravascular ultrasound x5, repair of abdominal aortic aneurysm with Porex excluder aortic stent graft, bilateral iliac extension cuffs, aortic and bilateral iliac percutaneous transabdominal angioplasty-Dr. Dillan Puga   • APPENDECTOMY N/A    • ARTERY SURGERY N/A 04/15/2008    Laparoscopic ligation of inferior mesenteric artery-Dr. Otf Ahn   • COLONOSCOPY N/A 12/06/2002    Mild proctitis, otherwise normal colonoscopy, repeat in 10 years-Dr. Jones Amaya   • COLONOSCOPY N/A 9/12/2017    Procedure: COLONOSCOPY TO CECUM WITH COLD BIOPSY AND HOT SNARE POLYPECTOMIES;  Surgeon: aMrk Pike MD;  Location: [facility] ENDOSCOPY;  Service:    • CORONARY ANGIOPLASTY  08/2004   • DUPUYTREN / PALMAR FASCIOTOMY Right 02/19/2010    Small digit and palmar Dupuytren's cord resection with multiple z-plasties, resection cord in the web space and palm at the base of the 4th digit-Dr Arthur Perez   • INCISION AND DRAINAGE PERIRECTAL ABSCESS Left 7/5/2017    Procedure: INCISION AND DRAINAGE OF ARIANNE-RECTAL ABSCESS; SUPERFICIAL FISTULOTOMY;  Surgeon: Mark Pike MD;  Location: [facility] MAIN OR;  Service:    • LAPAROSCOPIC CHOLECYSTECTOMY W/ CHOLANGIOGRAPHY N/A 09/06/2009    Dr. Keagan Montemayor   • ROTATOR CUFF REPAIR Bilateral    • UPPER GASTROINTESTINAL ENDOSCOPY N/A 09/15/2009    Normal esophagus, gastric mucosal variant: biopsied, normal 2nd part of the duodenum: biopsied, otherwise normal exam-Dr. Keagan Montemayor       Family History   Problem Relation Age of Onset   • Cancer Mother    • Breast cancer Mother    • Heart disease Mother    • Hypertension Father    • Stroke Father    • Other Father         carotid disease   • Cancer Sister    • Breast cancer Sister    • Aneurysm Sister    • Hypertension Sister    • Thyroid disease Sister    • Hyperlipidemia Sister    • Diabetes Brother    • Aneurysm Brother    • Stroke Brother        Social  History     Socioeconomic History   • Marital status:    Tobacco Use   • Smoking status: Former   • Smokeless tobacco: Never   • Tobacco comments:     quit 30 years ago   Substance and Sexual Activity   • Alcohol use: Yes     Comment: 2-3 beers daily/Caffeine use   • Drug use: No   • Sexual activity: Defer           Objective   Physical Exam  Vitals and nursing note reviewed.   HENT:      Head: Normocephalic.      Nose: Nose normal.      Mouth/Throat:      Mouth: Mucous membranes are moist.   Eyes:      Conjunctiva/sclera: Conjunctivae normal.   Cardiovascular:      Rate and Rhythm: Normal rate.   Chest:      Chest wall: Tenderness present.   Abdominal:      Palpations: Abdomen is soft.   Musculoskeletal:         General: Normal range of motion.      Cervical back: Normal range of motion.   Skin:     General: Skin is warm.   Neurological:      General: No focal deficit present.      Mental Status: He is alert.   Psychiatric:         Mood and Affect: Mood normal.         Procedures           ED Course                                           Medical Decision Making  2 non displaced rib fx, good sats, and pt over 48 hours out, pain control and f/u, RTEr .d    Amount and/or Complexity of Data Reviewed  Radiology: ordered.          Final diagnoses:   Closed fracture of multiple ribs of left side, initial encounter       ED Disposition  ED Disposition     ED Disposition   Discharge    Condition   Stable    Comment   --             Dukes, Darra, NP-C  [address] [facility], [place], [facility] [place] [address] [phone]    In 3 days  If symptoms worsen         Medication List      New Prescriptions    diclofenac 75 MG EC tablet  Commonly known as: VOLTAREN  Take 1 tablet by mouth 2 (Two) Times a Day.           Where to Get Your Medications      These medications were sent to [facility] [address] [place] [address] [place] - [address]  [phone] [facility] [phone] FX  [address] [place] [address]    Phone:  [phone]   · diclofenac 75 MG EC tablet

## (undated) DEVICE — IRRIGATOR BULB ASEPTO 60CC STRL

## (undated) DEVICE — PANTY KNIT MATERN L/XL

## (undated) DEVICE — CULT AER/ANAEROB FASTIDIOUS BACT

## (undated) DEVICE — GAUZE,PACKING STRIP,IODOFORM,1/2"X5YD,ST: Brand: CURAD

## (undated) DEVICE — 3M™ STERI-DRAPE™ INSTRUMENT POUCH 1018: Brand: STERI-DRAPE™

## (undated) DEVICE — SPNG GZ STRL 2S 4X4 12PLY

## (undated) DEVICE — CANNULA,ADULT,SOFT-TOUCH,7TUBE,SC: Brand: MEDLINE

## (undated) DEVICE — MEDI-VAC YANKAUER SUCTION HANDLE W/BULBOUS TIP: Brand: CARDINAL HEALTH

## (undated) DEVICE — THE TORRENT IRRIGATION SCOPE CONNECTOR IS USED WITH THE TORRENT IRRIGATION TUBING TO PROVIDE IRRIGATION FLUIDS SUCH AS STERILE WATER DURING GASTROINTESTINAL ENDOSCOPIC PROCEDURES WHEN USED IN CONJUNCTION WITH AN IRRIGATION PUMP (OR ELECTROSURGICAL UNIT).: Brand: TORRENT

## (undated) DEVICE — LOU MINOR PROCEDURE: Brand: MEDLINE INDUSTRIES, INC.

## (undated) DEVICE — TUBING, SUCTION, 1/4" X 20', STRAIGHT: Brand: MEDLINE INDUSTRIES, INC.

## (undated) DEVICE — THE SINGLE USE ETRAP – POLYP TRAP IS USED FOR SUCTION RETRIEVAL OF ENDOSCOPICALLY REMOVED POLYPS.: Brand: ETRAP

## (undated) DEVICE — TUBING, SUCTION, 1/4" X 10', STRAIGHT: Brand: MEDLINE

## (undated) DEVICE — NDL HYPO PRECISIONGLIDE REG 25G 1 1/2

## (undated) DEVICE — SINGLE-USE BIOPSY FORCEPS: Brand: RADIAL JAW 4

## (undated) DEVICE — Device: Brand: DEFENDO AIR/WATER/SUCTION AND BIOPSY VALVE

## (undated) DEVICE — TBG 02 CRUSH RESIST LF CLR 7FT

## (undated) DEVICE — SPNG GZ WOVN 4X4IN 12PLY 10/BX STRL

## (undated) DEVICE — PREMIUM WET SKIN PREP TRAY: Brand: MEDLINE INDUSTRIES, INC.

## (undated) DEVICE — SPNG LAP 18X18IN LF STRL PK/5

## (undated) DEVICE — PAD GRND REM POLYHESIVE A/ DISP

## (undated) DEVICE — GLV SURG BIOGEL LTX PF 7 1/2

## (undated) DEVICE — SNAR POLYP SENSATION STDOVL 27 240 BX40